# Patient Record
Sex: FEMALE | Race: WHITE | NOT HISPANIC OR LATINO | Employment: OTHER | URBAN - METROPOLITAN AREA
[De-identification: names, ages, dates, MRNs, and addresses within clinical notes are randomized per-mention and may not be internally consistent; named-entity substitution may affect disease eponyms.]

---

## 2017-01-04 ENCOUNTER — OFFICE VISIT (OUTPATIENT)
Dept: PSYCHIATRY | Facility: CLINIC | Age: 32
End: 2017-01-04
Payer: COMMERCIAL

## 2017-01-04 ENCOUNTER — TELEPHONE (OUTPATIENT)
Dept: ADMINISTRATIVE | Facility: HOSPITAL | Age: 32
End: 2017-01-04

## 2017-01-04 DIAGNOSIS — F41.1 ANXIETY STATE, UNSPECIFIED: Primary | ICD-10-CM

## 2017-01-04 DIAGNOSIS — F60.89 CLUSTER B PERSONALITY DISORDER: ICD-10-CM

## 2017-01-04 DIAGNOSIS — F39 MOOD DISORDER: ICD-10-CM

## 2017-01-04 PROCEDURE — 90834 PSYTX W PT 45 MINUTES: CPT | Mod: S$GLB,,, | Performed by: SOCIAL WORKER

## 2017-01-04 PROCEDURE — 99499 UNLISTED E&M SERVICE: CPT | Mod: S$PBB,,, | Performed by: SOCIAL WORKER

## 2017-01-04 NOTE — PROGRESS NOTES
"Individual Psychotherapy (PhD/LCSW)    1/4/2017    Site:  [facility]         Therapeutic Intervention: Met with patient.  Outpatient - Insight oriented psychotherapy 45 min - CPT code 03549 and Outpatient - Supportive psychotherapy 45 min - CPT Code 45191    Chief complaint/reason for encounter: attention deficit, depression, mood swings, anger, anxiety and interpersonal     Interval history and content of current session: Patient presents today wearing a long, red wig.  She reports she is doing "ok" today.  She notes after last session, she sought help at [facility], and was hospitalized for a couple weeks, due to "intense rage."  She elaborates that she thought she might her boyfriend, so MD there thought it best she be admitted.  Today, she denies any HI or SI.  She denies any angry feelings.  She is currently staying with her mother - "Until I get stable."  She is still in communication with boyfriend and they have plans to reconcile, but "he wants me to get the help I need first."  [facility] stepped her down to their IOP, but patient states, "I couldn't handle it.  I related to everyone in the group too intensely."  She describes feeling her emotions "are all over the place."  Discussed emotional regulation.  Patient inquires about seeing me twice weekly.  Checked schedule and informed patient there is no availability to accommodate that frequency.  Patient will return to see me this Friday 01/06 at 3 PM.  Recommended she seek other [facility] - provided contact numbers for local programs, including [facility].  Also, recommended patient seek care with local DBT skills group - provided contact information for [facility].  Patient mentions she has been exercising and is compliant with medications (Lithium 300 mg three times daily, Zoloft 150 mg daily, Abilify 15 mg daily, and Gabapentin 600 mg three times daily).  She plans to see Dr. Renae on 01/06.  She expresses the desire to eventually get a part time job " - discussed the need to focus on treatment first, and then possible employment.                                  Treatment plan:  · Target symptoms: depression, anxiety , mood swings  · Why chosen therapy is appropriate versus another modality: relevant to diagnosis, patient responds to this modality  · Outcome monitoring methods: self-report, observation  · Therapeutic intervention type: insight oriented psychotherapy, supportive psychotherapy    Risk parameters:  Patient reports no suicidal ideation  Patient reports no homicidal ideation  Patient reports no self-injurious behavior  Patient reports no violent behavior    Verbal deficits: None    Patient's response to intervention:  The patient's response to intervention is accepting.    Progress toward goals and other mental status changes:  The patient's progress toward goals is limited.    Diagnosis:     ICD-10-CM ICD-9-CM   1. Anxiety state, unspecified F41.1 300.00   2. Mood disorder F39 296.90   3. Cluster B personality disorder F60.9 301.9       Plan:  individual psychotherapy and medication management by physician    Return to clinic: as scheduled    Length of Service: 45 minutes

## 2017-01-04 NOTE — TELEPHONE ENCOUNTER
Please be advised as follows:       As per PHN: Pt. was discharged from [facility] on 12/23/16. Needs a post hospital follow up appointment.     Thanks,  Caitlyn Cooley, RN  RN Navigator  [facility]

## 2017-01-06 ENCOUNTER — PATIENT MESSAGE (OUTPATIENT)
Dept: PSYCHIATRY | Facility: HOSPITAL | Age: 32
End: 2017-01-06

## 2017-01-06 ENCOUNTER — OFFICE VISIT (OUTPATIENT)
Dept: PSYCHIATRY | Facility: CLINIC | Age: 32
End: 2017-01-06
Payer: COMMERCIAL

## 2017-01-06 VITALS
DIASTOLIC BLOOD PRESSURE: 65 MMHG | WEIGHT: 120 LBS | SYSTOLIC BLOOD PRESSURE: 128 MMHG | BODY MASS INDEX: 18.83 KG/M2 | HEART RATE: 91 BPM | HEIGHT: 67 IN

## 2017-01-06 DIAGNOSIS — F39 MOOD DISORDER: ICD-10-CM

## 2017-01-06 DIAGNOSIS — F39 UNSPECIFIED EPISODIC MOOD DISORDER: Primary | ICD-10-CM

## 2017-01-06 DIAGNOSIS — F41.1 ANXIETY STATE, UNSPECIFIED: Primary | ICD-10-CM

## 2017-01-06 DIAGNOSIS — F60.89 CLUSTER B PERSONALITY DISORDER: ICD-10-CM

## 2017-01-06 DIAGNOSIS — F41.1 ANXIETY STATE, UNSPECIFIED: ICD-10-CM

## 2017-01-06 PROCEDURE — 90834 PSYTX W PT 45 MINUTES: CPT | Mod: S$GLB,,, | Performed by: SOCIAL WORKER

## 2017-01-06 PROCEDURE — 90833 PSYTX W PT W E/M 30 MIN: CPT | Mod: S$GLB,,, | Performed by: NURSE PRACTITIONER

## 2017-01-06 PROCEDURE — 99999 PR PBB SHADOW E&M-EST. PATIENT-LVL III: CPT | Mod: PBBFAC,,, | Performed by: NURSE PRACTITIONER

## 2017-01-06 PROCEDURE — 99499 UNLISTED E&M SERVICE: CPT | Mod: S$PBB,,, | Performed by: NURSE PRACTITIONER

## 2017-01-06 PROCEDURE — 1159F MED LIST DOCD IN RCRD: CPT | Mod: S$GLB,,, | Performed by: NURSE PRACTITIONER

## 2017-01-06 PROCEDURE — 99214 OFFICE O/P EST MOD 30 MIN: CPT | Mod: S$GLB,,, | Performed by: NURSE PRACTITIONER

## 2017-01-06 PROCEDURE — 99499 UNLISTED E&M SERVICE: CPT | Mod: S$PBB,,, | Performed by: SOCIAL WORKER

## 2017-01-06 RX ORDER — SERTRALINE HYDROCHLORIDE 100 MG/1
TABLET, FILM COATED ORAL
Qty: 60 TABLET | Refills: 5 | Status: SHIPPED | OUTPATIENT
Start: 2017-01-06 | End: 2017-06-15 | Stop reason: SDUPTHER

## 2017-01-06 RX ORDER — ARIPIPRAZOLE 15 MG/1
TABLET ORAL
Qty: 15 TABLET | Refills: 5 | Status: SHIPPED | OUTPATIENT
Start: 2017-01-06 | End: 2017-01-26 | Stop reason: SDUPTHER

## 2017-01-06 RX ORDER — LITHIUM CARBONATE 300 MG/1
300 CAPSULE ORAL
Qty: 90 CAPSULE | Refills: 5 | Status: SHIPPED | OUTPATIENT
Start: 2017-01-06 | End: 2017-01-20 | Stop reason: SDUPTHER

## 2017-01-06 RX ORDER — GABAPENTIN 600 MG/1
TABLET ORAL
Qty: 120 TABLET | Refills: 5 | Status: SHIPPED | OUTPATIENT
Start: 2017-01-06 | End: 2017-03-30 | Stop reason: SDUPTHER

## 2017-01-06 NOTE — PATIENT INSTRUCTIONS
[facility] - DEPARTMENT OF PSYCHIATRY   PATIENT INFORMATION    We appreciate the opportunity to participate in your medical care and hope the following protocols will make it easier for you to receive quality treatment in our department.    1. PUNCTUALITY: Your appointment is scheduled for a fixed amount of time.  To get the benefit of your appointment, please arrive early enough to allow time for traffic, parking and registration.  If you are late for your appointment, you may have to reschedule.  Please make every effort to be on time.      2. PAYMENT FOR SERVICES:   Payments are expected at the time of service.  Please contact [phone] if you need to resolve issues involving your account at [facility] or to set up a payment plan.    3. CANCELLATION / MISSED APPOINTMENTS:   In order to receive quality care, all appointments must be kept.  Appointment may be cancelled at least 24 hours before your appointment time. If you do not give at least 24-hour notice of cancellation a fee may be billed directly to the patient.  Please note that insurance does not cover no-show charges, so you will be billed directly.  If you are consistently late, cancel, or do not show for your appointments, our department reserves the right to terminate treatment     MESSAGES- In general you can reach the department by calling [phone], between 8:00 a.m. and 5:00 p.m., Monday through Friday.  You can also use the [facility] Patient Portal.     AFTER HOURS, WEEKEND OR HOLIDAYS- For urgent questions after hours, weekends and holidays, calling the department number [phone] will connect you with a representative.  EMERGENCY-  In case of a crisis when there is a concern of harm to self or others, call 911 or the office [phone] between 8:00 a.m. and 5:00 p.m., Monday through Friday or go to the [facility] Emergency Room.    4. PRESCRIPTION REFILLS:  Prescription refills must be done at your physician office visit, You  will be given a sufficient number of refills to last until your next appointment, you must come to appointments for prescriptions.   No additional refills will be approved beyond the original treatment plan. Again, please note that no additional prescriptions will be approved per patient request.     5. FOLLOW UP APPOINTMENTS:  Follow-up appointments can be made in person at the Psychiatry Appointment Desk, online through the [facility] Patient Portal, or by calling [phone], from 8am to 5pm, between Monday and Friday.  Patients are responsible for scheduling their own follow-up appointment,  It  Is recommended you schedule your appointment before leaving the clinic.    6. Providers are NOT ABLE to schedule appointments; all appointments must be made through the appointment department or through [facility].           PATIENTS MAY EXPERIENCE SYMPTOMS OF WITHDRAWAL IF THEY RUN OUT OF MEDICATIONS.  THE PATIENT WILL ASSUME ALL RESPONSIBILITIES OF ANY OUTCOMES WHEN THERE IS AN ISSUE WITH NONCOMPLIANCE WITH FOLLOW-UP APPOINTMENTS AND MEDICATIONS.        THERE IS TO BE NO USE OF ALCOHOL AND/OR ILLEGAL SUBSTANCES    PATIENT ARE TO GO TO THE CLOSEST EMERGENCY ROOM IF FEELING A THREAT TO THEMSELVES OR OTHER OR IF GRAVELY DISABLED    TELL US HOW WE ARE DOING.  PLEASE COMPLETE THE PATIENT SATISFACTION SURVERY  Revised December 2016

## 2017-01-06 NOTE — PROGRESS NOTES
"Outpatient Psychiatry Follow-Up Visit (MD/NP)    1/6/2017    Clinical Status of Patient:  Outpatient (Ambulatory)    Chief Complaint:  Vianney Callahan is a 31 y.o. female who presents today for follow-up of mood disorder.  Met with patient.      Interval History and Content of Current Session:  Interim Events/Subjective Report/Content of Current Session:     Mood and thought disorder chronic and fragile, HR for decompensation, chart reviewed.     Zoloft 150mg po q day  Neurontin 600mg po tid  Lithium 300mg po tid  Abilify 15mg po q day    Early December patient and BF had an argument, was in the middle of moving into a new apt, broke up, patient became very unstable, shaved her head, felt was was grandiose, was having what she called "visions or short movies" and was admitted to [facility] 12/8-23/2016.  Zoloft was increased and Lithium and Abilify was added.  Feels she is more irritable and paranoid, states she is depressed.  Living with her mother, which is good. High anxiety, easily frustrated and irritated.  Does not feel grandiose currently, sleeping throughout the night.  Becomes more anxious as the day goes on.        Psychotherapy:  · Target symptoms: mood disorder  · Why chosen therapy is appropriate versus another modality: relevant to diagnosis  · Outcome monitoring methods: self-report  · Therapeutic intervention type: insight oriented psychotherapy  · Topics discussed/themes: symptom recognition  · The patient's response to the intervention is accepting. The patient's progress toward treatment goals is poor.  · Duration of intervention: 16 minutes.      Review Of Systems:     GENERAL: no weight loss or gain  SKIN: No rashes or lacerations   HEAD: No headaches   EYES: No exophthalmos, jaundice or blindness  EARS: No dizziness, tinnitus or hearing loss   NOSE: No changes in smell   MOUTH & THROAT: No dyskinetic movements or obvious goiter   CHEST: No shortness of breath, hyperventilation or cough   CARDIOVASCULAR: No " "tachycardia or chest pain   ABDOMEN: no nausea,no vomiting, pain, constipation or diarrhea   URINARY: No frequency, dysuria or sexual dysfunction   ENDOCRINE: No polydipsia, polyuria   MUSCULOSKELETAL: No pain or stiffness of the joints   NEUROLOGIC: No weakness, sensory changes, seizures, confusion, memory loss, tremor or other abnormal movements      Psychiatric Review Of Systems:  sleep: yes  appetite changes: no  weight changes: no  energy/anergy: no  interest/pleasure/anhedonia: no  somatic symptoms: no  libido: no  anxiety/panic: yes        Past Medical, Family and Social History: The patient's past medical, family and social history have been reviewed and updated as appropriate within the electronic medical record - see encounter notes.    Compliance: yes    Side effects: None    Risk Parameters:  Patient reports no suicidal ideation  Patient reports no homicidal ideation  Patient reports no self-injurious behavior  Patient reports no violent behavior    Exam (detailed: at least 9 elements; comprehensive: all 15 elements)   Constitutional  Vitals:  Most recent vital signs, dated less than 90 days prior to this appointment, were reviewed.   Vitals:    01/06/17 1446   BP: 128/65   Pulse: 91   Weight: 54.4 kg (120 lb)   Height: 5' 7" (1.702 m)        General:  unremarkable, age appropriate, casually dressed, neatly groomed     Musculoskeletal  Muscle Strength/Tone:  no dyskinesia, no dystonia, no tremor, no tic   Gait & Station:  non-ataxic     Psychiatric  Speech:  constant, loud at times   Mood & Affect:  anxious, irritable, sad  blunted   Thought Process:  goal-directed   Associations:  intact, circumstantial, tangential   Thought Content:  normal, no suicidality, no homicidality, delusions, or paranoia   Insight:  has awareness of illness   Judgement: behavior is adequate to circumstances   Orientation:  grossly intact   Memory: intact for content of interview   Language: grossly intact   Attention Span & " Concentration:  able to focus, distracted   Fund of Knowledge:  intact and appropriate to age and level of education     Assessment and Diagnosis   Status/Progress: Based on the examination today, the patient's problem(s) is/are worsening.  New problems have been presented today.   Co-morbidities and Lack of compliance are not complicating management of the primary condition.  There are no active rule-out diagnoses for this patient at this time.     General Impression:       ICD-10-CM ICD-9-CM   1. Unspecified episodic mood disorder F39 296.90   2. Anxiety state, unspecified F41.1 300.00   Cluster B  R/O Bipolar, Schizoaffective    Intervention/Counseling/Treatment Plan   · Medication Management: The risks and benefits of medication were discussed with the patient.   · Increase Zoloft 200mg po q d  · Increase Neurontin 600mg 2 cap po q AM, 1 cap po 4pm, and 1 cap at hs  · Lithium 300mg po tid  · Decrease Abilify 15mg half tab po q AM due to increased anxiety  · Lithium Level and Labs  · Patient to start BMU      Return to Clinic: 6 weeks

## 2017-01-06 NOTE — MR AVS SNAPSHOT
[facility]  [address]  [place] [address]  Phone: [phone]  Fax: [phone]                  Vianney Callahan   2017 4:30 PM   Office Visit    Description:  Female : 1985   Provider:  Roman Renae NP   Department:  [facility] - Psychiatry           Reason for Visit     Mood           Diagnoses this Visit        Comments    Unspecified episodic mood disorder    -  Primary     Anxiety state, unspecified         Mood disorder                To Do List           Future Appointments        Provider Department Dept Phone    2017 8:00 AM BMU, [address] [facility] [phone]    1/10/2017 8:00 AM BMU, [address] [facility] [phone]    2017 8:00 AM BMU, [address] [facility] [phone]    2017 8:00 AM BMU, [address] [facility] [phone]    2017 8:00 AM BMU, [address] [facility] [phone]      Goals (5 Years of Data)     None      Follow-Up and Disposition     Return in about 6 weeks (around 2017).       These Medications        Disp Refills Start End    sertraline (ZOLOFT) 100 MG tablet 60 tablet 5 2017     Take 2 tabs by mouth each morning    Pharmacy: [facility] [address] Ph #: [phone]       lithium (ESKALITH) 300 MG capsule 90 capsule 5 2017     Take 1 capsule (300 mg total) by mouth 3 (three) times daily with meals. - Oral    Pharmacy: [id number] [place] [address] Ph #: [phone]       gabapentin (NEURONTIN) 600 MG tablet 120 tablet 5 2017     Take 2 caps by mouth every morning, 1 cap by mouth at 4pm, and 1 cap by mouth at bedtime    Pharmacy: [id number] [place] [address] Ph #: [phone]       aripiprazole (ABILIFY) 15 MG Tab 15 tablet 5 2017     Take half tab by mouth each morning     Pharmacy: [facility] [place] [address]. - [place] [address] #: [phone]         [facility]     [facility] - 24/7 Assistance  Registered nurses in the [facility] provide clinical advisement, health education, appointment booking, and other advisory services.  Call for this free service at [phone].             Medications           Message regarding Medications     Verify the changes and/or additions to your medication regime listed below are the same as discussed with your clinician today.  If any of these changes or additions are incorrect, please notify your healthcare provider.        START taking these NEW medications        Refills    sertraline (ZOLOFT) 100 MG tablet 5    Sig: Take 2 tabs by mouth each morning    Class: Normal    lithium (ESKALITH) 300 MG capsule 5    Sig: Take 1 capsule (300 mg total) by mouth 3 (three) times daily with meals.    Class: Normal    Route: Oral    aripiprazole (ABILIFY) 15 MG Tab 5    Sig: Take half tab by mouth each morning    Class: Normal      CHANGE how you are taking these medications     Start Taking Instead of    gabapentin (NEURONTIN) 600 MG tablet gabapentin (NEURONTIN) 600 MG tablet    Dosage:  Take 2 caps by mouth every morning, 1 cap by mouth at 4pm, and 1 cap by mouth at bedtime Dosage:  Take 1 tablet (600 mg total) by mouth 3 (three) times daily.    Reason for Change:  Reorder            Verify that the below list of medications is an accurate representation of the medications you are currently taking.  If none reported, the list may be blank. If incorrect, please contact your healthcare provider. Carry this list with you in case of emergency.           Current Medications     ACZONE 5 % topical gel APPLY TO AFFECTED AREA TWICE DAILY    aripiprazole (ABILIFY) 15 MG Tab Take half tab by mouth each morning    cyclobenzaprine (FLEXERIL) 10 MG tablet TAKE 0.5 TO 1 TABLET BY MOUTH AT BEDTIME AS  "NEEDED FOR BACKPAIN    fluconazole (DIFLUCAN) 150 MG Tab Take one tablet by mouth once and may retreat in 3 days if still symptomatic.    gabapentin (NEURONTIN) 600 MG tablet Take 2 caps by mouth every morning, 1 cap by mouth at 4pm, and 1 cap by mouth at bedtime    lithium (ESKALITH) 300 MG capsule Take 1 capsule (300 mg total) by mouth 3 (three) times daily with meals.    metronidazole (FLAGYL) 500 MG tablet take 1 tablet by mouth twice a day for 7 days    norethindrone-ethinyl estradiol (MICROGESTIN 1/20) 1-20 mg-mcg per tablet Take 1 tablet by mouth every evening.    ondansetron (ZOFRAN-ODT) 4 MG TbDL Take 1 tablet (4 mg total) by mouth every 8 (eight) hours as needed.    promethazine (PHENERGAN) 12.5 MG Tab 1 tab po q 8 h prn nausea    sertraline (ZOLOFT) 100 MG tablet Take 2 tabs by mouth each morning    TAZORAC 0.05 % gel APPLY TOPICALLY EVERY EVENING. WASH OFF IN THE MORNING, AND APPLY SUNSCREEN    tinidazole (TINDAMAX) 500 MG tablet Take 4 tablets by mouth at once daily for two days    tramadol (ULTRAM) 50 mg tablet take 1 tablet by mouth every 8 hours if needed    valacyclovir (VALTREX) 1000 MG tablet Take 1 tablet (1,000 mg total) by mouth once daily.           Clinical Reference Information           Vital Signs - Last Recorded  Most recent update: 1/6/2017  2:47 PM by Serenity Harper MA    BP Pulse Ht Wt BMI    128/65 (BP Location: Left arm, Patient Position: Sitting, BP Method: Automatic) 91 5' 7" (1.702 m) 54.4 kg (120 lb) 18.79 kg/m2      Blood Pressure          Most Recent Value    BP  128/65      Allergies as of 1/6/2017     Dilantin [Phenytoin Sodium Extended]    Lithium Analogues    Saphris [Asenapine]    Topamax [Topiramate]    Wellbutrin [Bupropion Hcl]      Immunizations Administered on Date of Encounter - 1/6/2017     None      Orders Placed During Today's Visit     Future Labs/Procedures Expected by Expires    CBC auto differential  1/6/2017 1/6/2018    Comprehensive metabolic panel  " 1/6/2017 1/6/2018    Hepatic function panel  1/6/2017 1/6/2018    HEPATITIS PANEL, ACUTE  1/6/2017 3/7/2018    [place] level  1/6/2017 1/6/2018    TSH  1/6/2017 1/6/2018      Instructions    [facility] - DEPARTMENT OF PSYCHIATRY   PATIENT INFORMATION    We appreciate the opportunity to participate in your medical care and hope the following protocols will make it easier for you to receive quality treatment in our department.    1. PUNCTUALITY: Your appointment is scheduled for a fixed amount of time.  To get the benefit of your appointment, please arrive early enough to allow time for traffic, parking and registration.  If you are late for your appointment, you may have to reschedule.  Please make every effort to be on time.      2. PAYMENT FOR SERVICES:   Payments are expected at the time of service.  Please contact [phone] if you need to resolve issues involving your account at [facility] or to set up a payment plan.    3. CANCELLATION / MISSED APPOINTMENTS:   In order to receive quality care, all appointments must be kept.  Appointment may be cancelled at least 24 hours before your appointment time. If you do not give at least 24-hour notice of cancellation a fee may be billed directly to the patient.  Please note that insurance does not cover no-show charges, so you will be billed directly.  If you are consistently late, cancel, or do not show for your appointments, our department reserves the right to terminate treatment     MESSAGES- In general you can reach the department by calling [phone], between 8:00 a.m. and 5:00 p.m., Monday through Friday.  You can also use the [facility] Patient Portal.     AFTER HOURS, WEEKEND OR HOLIDAYS- For urgent questions after hours, weekends and holidays, calling the department number [phone] will connect you with a representative.  EMERGENCY-  In case of a crisis when there is a concern of harm to self or others, call 911 or the office [phone] between 8:00 a.m. and 5:00 p.m., Monday through Friday or go to the [facility] Emergency Room.    4. PRESCRIPTION REFILLS:  Prescription refills must be done at your physician office visit, You will be given a sufficient number of refills to last until your next appointment, you must come to appointments for prescriptions.   No additional refills will be approved beyond the original treatment plan. Again, please note that no additional prescriptions will be approved per patient request.     5. FOLLOW UP APPOINTMENTS:  Follow-up appointments can be made in person at the Psychiatry Appointment Desk, online through the [facility] Portal, or by calling [phone], from 8am to 5pm, between Monday and Friday.  Patients are responsible for scheduling their own follow-up appointment,  It  Is recommended you schedule your appointment before leaving the clinic.    6. Providers are NOT ABLE to schedule appointments; all appointments must be made through the appointment department or through [facility].           PATIENTS MAY EXPERIENCE SYMPTOMS OF WITHDRAWAL IF THEY RUN OUT OF MEDICATIONS.  THE PATIENT WILL ASSUME ALL RESPONSIBILITIES OF ANY OUTCOMES WHEN THERE IS AN ISSUE WITH NONCOMPLIANCE WITH FOLLOW-UP APPOINTMENTS AND MEDICATIONS.        THERE IS TO BE NO USE OF ALCOHOL AND/OR ILLEGAL SUBSTANCES    PATIENT ARE TO GO TO THE CLOSEST EMERGENCY ROOM IF FEELING A THREAT TO THEMSELVES OR OTHER OR IF GRAVELY DISABLED    TELL US HOW WE ARE DOING.  PLEASE COMPLETE THE PATIENT SATISFACTION SURVERY  Revised December 2016         Smoking Cessation     If you would like to quit smoking:   You may be eligible for free services if you are a [place] resident and started smoking cigarettes before September 1, 1988.  Call the [employer]oking [employer]essation [employer] ([employer]) toll free at (718) 608-5055 or (745) 873-0880.   Call 9-800-QUIT-NOW if you do not meet the above criteria.

## 2017-01-09 ENCOUNTER — HOSPITAL ENCOUNTER (OUTPATIENT)
Dept: PSYCHIATRY | Facility: HOSPITAL | Age: 32
Discharge: HOME | End: 2017-01-09
Attending: PSYCHIATRY & NEUROLOGY
Payer: MEDICARE

## 2017-01-09 VITALS
HEART RATE: 93 BPM | WEIGHT: 120.38 LBS | SYSTOLIC BLOOD PRESSURE: 116 MMHG | TEMPERATURE: 98 F | HEIGHT: 67 IN | DIASTOLIC BLOOD PRESSURE: 58 MMHG | RESPIRATION RATE: 18 BRPM | BODY MASS INDEX: 18.89 KG/M2

## 2017-01-09 DIAGNOSIS — M35.7 BENIGN HYPERMOBILITY SYNDROME: ICD-10-CM

## 2017-01-09 DIAGNOSIS — F31.9 BIPOLAR I DISORDER: Primary | ICD-10-CM

## 2017-01-09 DIAGNOSIS — R45.851 SUICIDAL IDEATIONS: ICD-10-CM

## 2017-01-09 DIAGNOSIS — F60.9 PERSONALITY DISORDER: ICD-10-CM

## 2017-01-09 DIAGNOSIS — R10.13 EPIGASTRIC ABDOMINAL PAIN: ICD-10-CM

## 2017-01-09 DIAGNOSIS — Z72.0 TOBACCO ABUSE: ICD-10-CM

## 2017-01-09 DIAGNOSIS — F19.21: ICD-10-CM

## 2017-01-09 DIAGNOSIS — K80.20 GALLSTONES: ICD-10-CM

## 2017-01-09 DIAGNOSIS — L70.0 ACNE VULGARIS: ICD-10-CM

## 2017-01-09 DIAGNOSIS — F29 PSYCHOSIS, UNSPECIFIED PSYCHOSIS TYPE: ICD-10-CM

## 2017-01-09 DIAGNOSIS — F60.89 CLUSTER B PERSONALITY DISORDER: ICD-10-CM

## 2017-01-09 DIAGNOSIS — F39 MOOD DISORDER: ICD-10-CM

## 2017-01-09 DIAGNOSIS — F90.9 ATTENTION DEFICIT HYPERACTIVITY DISORDER (ADHD), UNSPECIFIED ADHD TYPE: ICD-10-CM

## 2017-01-09 LAB
AMPHET+METHAMPHET UR QL: NEGATIVE
B-HCG UR QL: NEGATIVE
BARBITURATES UR QL SCN>200 NG/ML: NEGATIVE
BENZODIAZ UR QL SCN>200 NG/ML: NEGATIVE
BZE UR QL SCN: NEGATIVE
CANNABINOIDS UR QL SCN: NEGATIVE
CREAT UR-MCNC: 62 MG/DL
ETHANOL UR-MCNC: <10 MG/DL
METHADONE UR QL SCN>300 NG/ML: NEGATIVE
OPIATES UR QL SCN: NEGATIVE
PCP UR QL SCN>25 NG/ML: NEGATIVE
TOXICOLOGY INFORMATION: NORMAL

## 2017-01-09 PROCEDURE — 90853 GROUP PSYCHOTHERAPY: CPT | Performed by: SOCIAL WORKER

## 2017-01-09 PROCEDURE — 90853 GROUP PSYCHOTHERAPY: CPT

## 2017-01-09 PROCEDURE — 99223 1ST HOSP IP/OBS HIGH 75: CPT | Mod: ,,, | Performed by: PSYCHIATRY & NEUROLOGY

## 2017-01-09 PROCEDURE — 81025 URINE PREGNANCY TEST: CPT

## 2017-01-09 PROCEDURE — 90853 GROUP PSYCHOTHERAPY: CPT | Mod: ,,, | Performed by: PSYCHOLOGIST

## 2017-01-09 PROCEDURE — 80307 DRUG TEST PRSMV CHEM ANLYZR: CPT

## 2017-01-09 PROCEDURE — 82570 ASSAY OF URINE CREATININE: CPT

## 2017-01-09 NOTE — PLAN OF CARE
01/09/17 1000   Activity/Group Therapy Checklist   Group Current Events  (Weekend Check In)   Attendance Attended   Follows Direction Followed directions   Group Interactions/Observations Interacted appropriately   Affect/Mood Range Normal range   Affect/Mood Display Appropriate   Goal Progression Progressing

## 2017-01-09 NOTE — TREATMENT PLAN
"[facility]  BEHAVIORAL MEDICINE UNIT  INTERDISCIPLINARY TREATMENT PLAN  INTENSIVE OUTPATIENT PROGRAM    INTERDISCIPLINARY  TREATMENT TEAM:    Enrique Howard M.D., Psychiatrist      Fern Gutierrez, Ph.D., Clinical Psychologist    Perla Thomas R.N., Registered Nurse    Loida Farley, [facility],     Brianna Prajapati, [facility] [place] [facility],     Waldemar León, Ph.D., Clinical Psychologist    Keri Umana, Ph.D., Clinical Psychologist      Resident:    (Signatures scanned into record separately).      ESTIMATED LOS:  2 weeks      The patient has reviewed the treatment plan with staff and has signed the "Patient Responsibilities" form.    (Patient signature scanned into record separately).      TREATMENT PLAN    DIAGNOSIS:      Patient Education Needs/Barriers to Learning (i.e., Language, Reading, Comprehension): None        Support/Advocacy Services/Needs (i.e., Financial, Transportation, Medications): None        Community Resources (i.e., Alcoholics Anonymous, Al Anon): None    Patients Identified Goals:  1. To learn coping skills  2. Get medication adjusted properly  3. To get best treatment possible  4.    Coping Skills:  1. Smoking   2. Exercise   3. Hot Shower  4. Hot Bath        Strengths:  1. I am creative  2. People usually like me  3.  4.      Limitations:  1. Bad relationships  2. Side effects from meds- I quit taking them b/c I don't like the side effects  3.  4.      Goals and Objectives:  1. Goal: Attend and participate in all groups   Objective measure: Progress notes indicating active listening,    self-disclosure, feedback   Time frame to reach goal: Each day    2. Goal: Medication management   Objective measure: Physician progress note indicating improved medication   status   Time frame to reach goal: By discharge    3. Goal: Reduce depression   Objective measure: Physician progress note indicating depression is improved   Time frame to reach goal: By discharge    4. "  Goal: Reduce anxiety   Objective measure: Physician progress note indicating anxiety is improved   Time frame to reach goal: By discharge    5. Goal: Develop stress coping skills   Objective measure: Patient self-report of improved coping   Time frame to reach goal: By discharge    6.  Goal: Address health problems   Objective measure: Physician progress note regarding management of health   problems   Time frame to reach goal: Within first week of treatment    7. Goal: Assess level of pain   Objective measure: Physician progress note regarding patient's self-reported pain   Time frame to reach goal: Within first week of treatment    8. Goal:    Objective measure:    Time frame to reach goal:    9. Goal:    Objective measure:    Time frame to reach goal:     10. Goal:    Objective measure:    Time frame to reach goal:       Group Interventions:    Psychodynamic Group Psychotherapy - 1 hour, 5 times per week  Goals: 1. Utilize group empathy and support for problem solving; 2. Apply stress management, communication, and assertiveness skills to personal issues; 3. Discuss mental health symptoms and explore strategies for coping; 4. Discuss ways to change lifestyle to maintain better emotional health.    Communication Skills - 1 hour, 2 times per week  Goals: 1. Learn rules of effective communication; 2. Improve listening skills; 3. Practice clear communication.    Nutrition and Health - 1 hour, 1 time per week  Goals: 1. Explore impact that nutrition has on health; 2. Identify positive nutritional choices; 3. Explore how nutrition relates to stress tolerance.    Personal Growth - 1 hour, 2 times per week  Goals: 1. Discuss the development of self; 2. Create personal awareness and insight; 3. Explore a variety of psycho-educational techniques.    Promoting Healthy Lifestyles - 1 hour, 1 time per week  Goals: 1. Understand the Biopsychosocial Model of Health; 2. Develop insight into how mental health can impact other  dimensions of health; 3. Develop appropriate health promotion strategies.    Rational Emotive Therapy (RET) - 1 hour, 1 time per week  Goals: 1. Learn an action-oriented psychotherapy called RET; 2. Focus on identifying, challenging, and replacing self-defeating thoughts and beliefs; 3. Explore how healthier thinking can lead to healthier emotional well-being.    Relationship Dynamics - 1 hour, 1 time per week  Goals: 1. Learn about factors that shape relationships; 2. Understand the central role of relationships in personal well-being; 3. Learn how to improve all relationships.    Relaxation Training - 1 hour, 3 times per week  Goals: 1. Learn about and implement various techniques for releasing physical tension from the body.    Spirituality - 1 hour, 1 time per week  Goals: 1. Discuss and reflect on the process of seeking peace and comfort; 2. Identify healthy ways of exploring spirituality.    Stress Management - 1 hour, 4 times per week  Goals: 1. Identify types and levels of stress; 2. Identify and change maladaptive beliefs and behaviors; 3. Identify and practice techniques of stress management.

## 2017-01-09 NOTE — PROGRESS NOTES
Group Psychotherapy (PhD/LCSW)    Site: [facility]    Clinical status of patient: Intensive Outpatient Program (IOP)    Date: 1/9/2017    Group Focus: Communication Skills     Length of service: 90437 - 45-50 minutes    Number of patients in attendance: 9    Referred by: Behavioral Medicine Unit Treatment Team    Target symptoms: Mood Disorder    Patient's response to treatment: Active Listening      Progress toward goals: Progressing adequately    Interval History: Discussed basic communication skills and their application to problems currently facing group members.     Diagnosis: Bipolar Disorder, Borderline Personality Disorder    Plan: Continue treatment on U
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
## 2017-01-09 NOTE — PROGRESS NOTES
Group Psychotherapy (PhD/LCSW)    Site: [facility]    Clinical status of patient: Intensive Outpatient Program (IOP)    Date: 1/9/2017    Group Focus: Psychodynamic Group Psychotherapy    Length of service: 44419 - 45-50 minutes    Number of patients in attendance: 6    Referred by: Behavioral Medicine Unit Treatment Team    Target symptoms: Mood Disorder    Patient's response to treatment: Active Listening and Self-disclosure    Progress toward goals: Progressing slowly    Interval History: Pt introduced herself to the group today. She started out her introduction appropriately but soon began describing some of the delusions, hallucinations, and grandiose thoughts that she experienced while recently psychotic in a fairly loud voice.     Diagnosis: Bipolar Disorder, Borderline Personality Disorder    Plan: Continue treatment on U

## 2017-01-10 ENCOUNTER — HOSPITAL ENCOUNTER (OUTPATIENT)
Dept: PSYCHIATRY | Facility: HOSPITAL | Age: 32
Discharge: HOME | End: 2017-01-10
Attending: PSYCHIATRY & NEUROLOGY
Payer: COMMERCIAL

## 2017-01-10 DIAGNOSIS — R10.13 EPIGASTRIC ABDOMINAL PAIN: ICD-10-CM

## 2017-01-10 DIAGNOSIS — K80.20 GALLSTONES: ICD-10-CM

## 2017-01-10 DIAGNOSIS — F90.9 ATTENTION DEFICIT HYPERACTIVITY DISORDER (ADHD), UNSPECIFIED ADHD TYPE: ICD-10-CM

## 2017-01-10 DIAGNOSIS — F19.21: ICD-10-CM

## 2017-01-10 DIAGNOSIS — F31.9 BIPOLAR I DISORDER: Primary | ICD-10-CM

## 2017-01-10 DIAGNOSIS — L70.0 ACNE VULGARIS: ICD-10-CM

## 2017-01-10 DIAGNOSIS — Z72.0 TOBACCO ABUSE: ICD-10-CM

## 2017-01-10 DIAGNOSIS — R45.851 SUICIDAL IDEATIONS: ICD-10-CM

## 2017-01-10 DIAGNOSIS — F39 MOOD DISORDER: ICD-10-CM

## 2017-01-10 DIAGNOSIS — F60.9 PERSONALITY DISORDER: ICD-10-CM

## 2017-01-10 DIAGNOSIS — F60.89 CLUSTER B PERSONALITY DISORDER: ICD-10-CM

## 2017-01-10 DIAGNOSIS — M35.7 BENIGN HYPERMOBILITY SYNDROME: ICD-10-CM

## 2017-01-10 PROCEDURE — 99232 SBSQ HOSP IP/OBS MODERATE 35: CPT | Mod: ,,, | Performed by: INTERNAL MEDICINE

## 2017-01-10 PROCEDURE — 90853 GROUP PSYCHOTHERAPY: CPT | Mod: ,,, | Performed by: PSYCHOLOGIST

## 2017-01-10 PROCEDURE — 90853 GROUP PSYCHOTHERAPY: CPT | Performed by: SOCIAL WORKER

## 2017-01-10 PROCEDURE — 90853 GROUP PSYCHOTHERAPY: CPT

## 2017-01-10 NOTE — PLAN OF CARE
01/10/17 1000   Activity/Group Therapy Checklist   Group Cognitive Therapy  (Coping Skills)   Attendance Attended   Follows Direction Followed directions   Group Interactions/Observations Interacted appropriately;Alert   Affect/Mood Range Normal range   Affect/Mood Display Appropriate   Goal Progression Progressing

## 2017-01-10 NOTE — PROGRESS NOTES
Group Psychotherapy (PhD/LCSW)    Site: [facility]    Clinical status of patient: Intensive Outpatient Program (IOP)    Date: 1/10/2017    Group Focus: Psychodynamic Group Psychotherapy    Length of service: 99801 - 45-50 minutes    Number of patients in attendance: 4    Referred by: Behavioral Medicine Unit Treatment Team    Target symptoms: Mood Disorder    Patient's response to treatment: Active Listening and Self-disclosure    Progress toward goals: Progressing slowly    Interval History: Pt. reported progress in acceptance of emotions; however, she noted more difficulty with commitment to values-based actions.     Diagnosis: Bipolar Disorder, Borderline Personality Disorder    Plan: Continue treatment on U

## 2017-01-10 NOTE — PROGRESS NOTES
"Group Psychotherapy (PhD/LCSW)    Site: [facility]    Clinical status of patient: Intensive Outpatient Program (IOP)    Date: 1/10/2017    Group Focus: Psychodynamic Group Psychotherapy    Length of service: 45809 - 45-50 minutes    Number of patients in attendance: 4    Referred by: Behavioral Medicine Unit Treatment Team    Target symptoms: Mood Disorder    Patient's response to treatment: Active Listening and Self-disclosure    Progress toward goals: Progressing slowly    Interval History: Pt appeared more relaxed in group today and participated appropriately. She explained that impulsive and "wound-up" behavior are likely signs that she is experiencing extreme anxiety. Additionally, she provided some background to the group about her current romantic relationship and her ambivalence about its continuation despite knowledge that it is "toxic".    Diagnosis: Bipolar Disorder, Borderline Personality Disorder    Plan: Continue treatment on BMU        "

## 2017-01-11 ENCOUNTER — HOSPITAL ENCOUNTER (OUTPATIENT)
Dept: PSYCHIATRY | Facility: HOSPITAL | Age: 32
Discharge: HOME | End: 2017-01-11
Attending: PSYCHIATRY & NEUROLOGY
Payer: COMMERCIAL

## 2017-01-11 VITALS — HEART RATE: 81 BPM | RESPIRATION RATE: 18 BRPM | DIASTOLIC BLOOD PRESSURE: 59 MMHG | SYSTOLIC BLOOD PRESSURE: 119 MMHG

## 2017-01-11 DIAGNOSIS — F60.9 PERSONALITY DISORDER: ICD-10-CM

## 2017-01-11 DIAGNOSIS — F19.21: ICD-10-CM

## 2017-01-11 DIAGNOSIS — K80.20 GALLSTONES: ICD-10-CM

## 2017-01-11 DIAGNOSIS — F60.89 CLUSTER B PERSONALITY DISORDER: ICD-10-CM

## 2017-01-11 DIAGNOSIS — Z72.0 TOBACCO ABUSE: ICD-10-CM

## 2017-01-11 DIAGNOSIS — F31.9 BIPOLAR I DISORDER: Primary | ICD-10-CM

## 2017-01-11 DIAGNOSIS — F90.9 ATTENTION DEFICIT HYPERACTIVITY DISORDER (ADHD), UNSPECIFIED ADHD TYPE: ICD-10-CM

## 2017-01-11 DIAGNOSIS — M35.7 BENIGN HYPERMOBILITY SYNDROME: ICD-10-CM

## 2017-01-11 DIAGNOSIS — R10.13 EPIGASTRIC ABDOMINAL PAIN: ICD-10-CM

## 2017-01-11 DIAGNOSIS — R45.851 SUICIDAL IDEATIONS: ICD-10-CM

## 2017-01-11 DIAGNOSIS — F39 MOOD DISORDER: ICD-10-CM

## 2017-01-11 DIAGNOSIS — L70.0 ACNE VULGARIS: ICD-10-CM

## 2017-01-11 PROCEDURE — 90853 GROUP PSYCHOTHERAPY: CPT | Mod: ,,, | Performed by: PSYCHOLOGIST

## 2017-01-11 PROCEDURE — 90853 GROUP PSYCHOTHERAPY: CPT

## 2017-01-11 PROCEDURE — 90853 GROUP PSYCHOTHERAPY: CPT | Performed by: SOCIAL WORKER

## 2017-01-11 NOTE — PROGRESS NOTES
Group Psychotherapy (PhD/LCSW)    Site: [facility]    Clinical status of patient: Intensive Outpatient Program (IOP)    Date: 1/11/2017    Group Focus: Psychodynamic Group Psychotherapy    Length of service: 16559 - 45-50 minutes    Number of patients in attendance: 4    Referred by: Behavioral Medicine Unit Treatment Team    Target symptoms: Mood Disorder    Patient's response to treatment: Active Listening and Self-disclosure    Progress toward goals: Progressing slowly    Interval History: Pt asked group to help her understand how to know if she is in a good relationship. She discussed her ambivalence about leaving her boyfriend, and explored how scary it would be for her to be single even though she realizes that her current relationship is unhealthy.    Diagnosis: Bipolar Disorder, Borderline Personality Disorder    Plan: Continue treatment on BMU

## 2017-01-11 NOTE — PROGRESS NOTES
Group Psychotherapy (PhD/LCSW)    Site: [facility]    Clinical status of patient: Intensive Outpatient Program (IOP)    Date: 1/11/2017    Group Focus: Stress Management    Length of service: 57739 - 45-50 minutes    Number of patients in attendance: 7    Referred by: Behavioral Medicine Unit Treatment Team    Target symptoms: Mood Disorder    Patient's response to treatment: Active Listening and Self-disclosure    Progress toward goals: Progressing adequately    Interval History: Discussed numerous positive affirmations to use to build self-esteem.    Diagnosis: Bipolar disorder    Plan: Continue treatment on U

## 2017-01-11 NOTE — PROGRESS NOTES
Mahendra placed a call to pt to inquire about attending program today. No answer sw left a vm for immediate call back.        Brianna Prajapati LMSW

## 2017-01-11 NOTE — PLAN OF CARE
01/11/17 1000   Activity/Group Therapy Checklist   Group Cognitive Therapy  (Therapy Toolbox)   Attendance Attended   Follows Direction Followed directions   Group Interactions/Observations Interacted appropriately;Alert   Affect/Mood Range Normal range   Affect/Mood Display Appropriate   Goal Progression Progressing

## 2017-01-12 ENCOUNTER — HOSPITAL ENCOUNTER (OUTPATIENT)
Dept: PSYCHIATRY | Facility: HOSPITAL | Age: 32
Discharge: HOME | End: 2017-01-12
Attending: PSYCHIATRY & NEUROLOGY
Payer: COMMERCIAL

## 2017-01-12 DIAGNOSIS — F31.9 BIPOLAR I DISORDER: Primary | ICD-10-CM

## 2017-01-12 PROCEDURE — 90853 GROUP PSYCHOTHERAPY: CPT | Mod: ,,, | Performed by: PSYCHOLOGIST

## 2017-01-12 PROCEDURE — 90853 GROUP PSYCHOTHERAPY: CPT

## 2017-01-12 NOTE — PROGRESS NOTES
Group Psychotherapy (PhD/LCSW)    Site: [facility]    Clinical status of patient: Intensive Outpatient Program (IOP)    Date: 1/12/2017    Group Focus: Stress Management    Length of service: 93130 - 45-50 minutes    Number of patients in attendance: 8    Referred by: Behavioral Medicine Unit Treatment Team    Target symptoms: Mood Disorder    Patient's response to treatment: Active Listening and Self-disclosure    Progress toward goals: Progressing adequately    Interval History: Discussed multiple ways to increase resilience.    Diagnosis: Bipolar disorder    Plan: Continue treatment on U

## 2017-01-12 NOTE — PROGRESS NOTES
Group Psychotherapy (PhD/LCSW)    Site: [facility]    Clinical status of patient: Intensive Outpatient Program (IOP)    Date: 1/12/2017    Group Focus: Psychodynamic Group Psychotherapy    Length of service: 25560 - 45-50 minutes    Number of patients in attendance: 5    Referred by: Behavioral Medicine Unit Treatment Team    Target symptoms: Mood Disorder    Patient's response to treatment: Active Listening and Self-disclosure    Progress toward goals: Progressing slowly    Interval History: Pt reports that she did not have a headache this morning and felt somewhat lighter after discussing a difficult topic in group yesterday.    Diagnosis: Bipolar Disorder, Borderline Personality Disorder    Plan: Continue treatment on U

## 2017-01-12 NOTE — PROGRESS NOTES
Group Psychotherapy (PhD/LCSW)    Site: [facility]    Clinical status of patient: Intensive Outpatient Program (IOP)    Date: 1/12/2017    Group Focus: Spirituality and Well Being    Length of service: 87243 - 45-50 minutes    Number of patients in attendance: 5    Referred by: Behavioral Medicine Unit Treatment Team    Target symptoms: Mood Disorder    Patient's response to treatment: Active Listening, Feedback, and Self-disclosure    Progress toward goals: Progressing adequately    Interval History:  Discussed the ways that social connectedness and the experience of acceptance by others support both spiritual and psychological well-being. Pt talked about how important it was for her to connect with a new group member yesterday and the way it left her feeling less alone in the world.     Diagnosis: Bipolar disorder    Plan: Continue treatment on BMU

## 2017-01-13 ENCOUNTER — HOSPITAL ENCOUNTER (OUTPATIENT)
Dept: PSYCHIATRY | Facility: HOSPITAL | Age: 32
Discharge: HOME | End: 2017-01-13
Attending: PSYCHIATRY & NEUROLOGY
Payer: MEDICARE

## 2017-01-13 DIAGNOSIS — F31.9 BIPOLAR I DISORDER: Primary | ICD-10-CM

## 2017-01-13 DIAGNOSIS — R45.851 SUICIDAL IDEATIONS: ICD-10-CM

## 2017-01-13 DIAGNOSIS — R10.13 EPIGASTRIC ABDOMINAL PAIN: ICD-10-CM

## 2017-01-13 DIAGNOSIS — Z72.0 TOBACCO ABUSE: ICD-10-CM

## 2017-01-13 DIAGNOSIS — F19.21: ICD-10-CM

## 2017-01-13 DIAGNOSIS — F39 MOOD DISORDER: ICD-10-CM

## 2017-01-13 DIAGNOSIS — F90.9 ATTENTION DEFICIT HYPERACTIVITY DISORDER (ADHD), UNSPECIFIED ADHD TYPE: ICD-10-CM

## 2017-01-13 DIAGNOSIS — F60.9 PERSONALITY DISORDER: ICD-10-CM

## 2017-01-13 DIAGNOSIS — K80.20 GALLSTONES: ICD-10-CM

## 2017-01-13 DIAGNOSIS — L70.0 ACNE VULGARIS: ICD-10-CM

## 2017-01-13 DIAGNOSIS — F60.89 CLUSTER B PERSONALITY DISORDER: ICD-10-CM

## 2017-01-13 DIAGNOSIS — M35.7 BENIGN HYPERMOBILITY SYNDROME: ICD-10-CM

## 2017-01-13 PROCEDURE — 99233 SBSQ HOSP IP/OBS HIGH 50: CPT | Mod: ,,, | Performed by: PSYCHIATRY & NEUROLOGY

## 2017-01-13 PROCEDURE — 90853 GROUP PSYCHOTHERAPY: CPT | Mod: ,,, | Performed by: PSYCHOLOGIST

## 2017-01-13 PROCEDURE — 90834 PSYTX W PT 45 MINUTES: CPT | Mod: 59 | Performed by: SOCIAL WORKER

## 2017-01-13 PROCEDURE — 90853 GROUP PSYCHOTHERAPY: CPT

## 2017-01-13 PROCEDURE — 90853 GROUP PSYCHOTHERAPY: CPT | Mod: 59,,, | Performed by: PSYCHOLOGIST

## 2017-01-13 NOTE — PROGRESS NOTES
Group Psychotherapy (PhD/LCSW)    Site: [facility]    Clinical status of patient: Intensive Outpatient Program (IOP)    Date: 1/13/2017    Group Focus: Psychodynamic Group Psychotherapy    Length of service: 62026 - 45-50 minutes    Number of patients in attendance: 4    Referred by: Behavioral Medicine Unit Treatment Team    Target symptoms: Mood Disorder    Patient's response to treatment: Active Listening and Self-disclosure    Progress toward goals: Progressing slowly    Interval History: Pt shared her fear and desire to go to nephew's party this weekend; she received feedback and support well.    Diagnosis: Bipolar Disorder, Borderline Personality Disorder    Plan: Continue treatment on U

## 2017-01-13 NOTE — PROGRESS NOTES
"[facility]  Progress Note  Behavioral Medicine Unit    Date: 2017  Time: 1:55 PM    Name: Vianney Callahan    Age: 31 y.o.       : 1985            Admit Date: 17    SUBJECTIVE:  Patient is a 31 y.o. old female with a history of unspecified mood and anxiety disorder and possible borderline personality disorder admitted to the BMU additional  stabilization after recent hospitalization in December.  At that time she apparently became angry with her boyfriend and had desire to harm him.  She cut off her hair so that she would be less attractive to him.  She also has had complaints about anxiety that is uncontrolled.  She also reports that PTA she was thinking things that were not true, including negative things about her bf, that there was a man living inside her head that was controlling her and that her, bf was a drug dealer.    She reports she is finding the program helpful.  She believes she is doing really well and is working on coping skills, She feels more calm and "okay with being calm instead of up."  She still reports she is easily excitable, cries easily.  She reports she cries during every group.  She reports hearing a voice inside of her head as she was trying to go to sleep that sounded like the voice of one of the other patients saying "Don't say anything" which meant nothing to her.  Pt reports she is not sleeping well, 4hrs last night because of "thinking of a lot of things at the same time."       She likes Zoloft and feels it has been helpful - recently increased from 150mg daily to 200mg daily on 17.  Lithium and Abilify were started in December during hospitalization and she also feels that these are helpful.  Abilify was decreased from 15mg to 7.5mg on 17 due to concerns for akathisia.  Neurontin was recently increased from 600mg TID to 1200mg qAM, 600mg qPM, and 600mg qHS, which she believes may be helpful as well.       Current Medications:   Current " Outpatient Prescriptions on File Prior to Encounter   Medication Sig Dispense Refill    ACZONE 5 % topical gel APPLY TO AFFECTED AREA TWICE DAILY 60 g 0    aripiprazole (ABILIFY) 15 MG Tab Take half tab by mouth each morning 15 tablet 5    cyclobenzaprine (FLEXERIL) 10 MG tablet TAKE 0.5 TO 1 TABLET BY MOUTH AT BEDTIME AS NEEDED FOR BACKPAIN 30 tablet 0    fluconazole (DIFLUCAN) 150 MG Tab Take one tablet by mouth once and may retreat in 3 days if still symptomatic. 2 tablet 4    gabapentin (NEURONTIN) 600 MG tablet Take 2 caps by mouth every morning, 1 cap by mouth at 4pm, and 1 cap by mouth at bedtime 120 tablet 5    lithium (ESKALITH) 300 MG capsule Take 1 capsule (300 mg total) by mouth 3 (three) times daily with meals. 90 capsule 5    metronidazole (FLAGYL) 500 MG tablet take 1 tablet by mouth twice a day for 7 days 14 tablet 0    norethindrone-ethinyl estradiol (MICROGESTIN 1/20) 1-20 mg-mcg per tablet Take 1 tablet by mouth every evening. 84 tablet 4    ondansetron (ZOFRAN-ODT) 4 MG TbDL Take 1 tablet (4 mg total) by mouth every 8 (eight) hours as needed. 30 tablet 0    promethazine (PHENERGAN) 12.5 MG Tab 1 tab po q 8 h prn nausea 30 tablet 0    sertraline (ZOLOFT) 100 MG tablet Take 2 tabs by mouth each morning 60 tablet 5    TAZORAC 0.05 % gel APPLY TOPICALLY EVERY EVENING. WASH OFF IN THE MORNING, AND APPLY SUNSCREEN 100 g 0    tinidazole (TINDAMAX) 500 MG tablet Take 4 tablets by mouth at once daily for two days 8 tablet 1    tramadol (ULTRAM) 50 mg tablet take 1 tablet by mouth every 8 hours if needed 60 tablet 0    valacyclovir (VALTREX) 1000 MG tablet Take 1 tablet (1,000 mg total) by mouth once daily. 90 tablet 3     No current facility-administered medications on file prior to encounter.      Psychiatric ROS:  See Dr. Chou's Admission Note of date 1/9/17 for ROS.  Otherwise, addressed in HPI above.    OBJECTIVE:  Labs/Imaging/Studies:   No results found for this or any previous visit  "(from the past 48 hour(s)).   Lab Results   Component Value Date    CBMZ 5.3 (L) 01/29/2008     Mental Status Exam:  Appearance:  Well groomed, thin, appearing healthy and of stated age, long blondish wig  Behavior:  Cooperative, pleasant  Speech:  Quite verbal, not loud or pressured  Mood:  As above  Affect:  Full range and appropriate, no lability  Thought Process:  Tangential and at times illogical including a statement, "I understand the sofa"  Thought Content:  Negative for suicidal ideation, homicidal ideation, delusions or hallucinations.  Associations:  Intact  Memory:  Grossly Intact  Level of Consciousness/Orientation:  Grossly intact  Fund of Knowledge:  Good  Attention:  Attends to interview without distraction  Language:  intact  Insight:  Pt has awareness of illness  Judgment:  Fair  Psychomotor signs:  No involuntary movements or tremor  Gait:  Normal    ASSESSMENT/PLAN:  General Assessment:  Patient is a 31 y.o., female admitted to the BMU partial hospitalization program for stabilization of mood and improvement of anxiety.    Diagnoses:  Bipolar Disorder manic vs mixed, moderate  Unspecified Anxiety Disorder  Cluster B Personality traits, likely Borderline Personality Disorder    Plan:  · Continue BMU treatment  · Continue current medication regimen - recently changed by Roman Renae on 1/6/17:   1) Abilify 7.5mg daily (Started during December hospitalization, Decreased on 1/6/17 from 15mg daily due to concern for akathisia)   2) Lithium 300mg TID (Started during December hospitalization), We discussed possible increase of dose given level   3) Zoloft 200mg daily (Increased from 150mg daily on 1/6/17)   4) Neurontin 1200mg qAM, 600mg qPM, and 600mg qHS (Increased on 1/6/17 from Neurontin 600mg TID)     Enrique Howard MD     "

## 2017-01-13 NOTE — PROGRESS NOTES
Group Psychotherapy (PhD/LCSW)    Site: [facility]    Clinical status of patient: Intensive Outpatient Program (IOP)    Date: 1/13/2017    Group Focus: Stress Management    Length of service: 56149 - 45-50 minutes    Number of patients in attendance: 6    Referred by: Behavioral Medicine Unit Treatment Team    Target symptoms: Mood Disorder    Patient's response to treatment: Active Listening and Self-disclosure    Progress toward goals: Progressing slowly    Interval History: Group learned mindfulness techniques (breathing, thought defusion) to improve present-moment awareness, impulsive behavior tendencies, and tolerance of various emotional states.    Diagnosis: Bipolar Disorder, Borderline Personality Disorder    Plan: Continue treatment on U

## 2017-01-13 NOTE — PROGRESS NOTES
Group Psychotherapy (PhD/LCSW)    Site: [facility]    Clinical status of patient: Intensive Outpatient Program (IOP)    Date: 1/13/2017    Group Focus: Strength Training    Length of service: 42189 - 45-50 minutes    Number of patients in attendance: 3    Referred by: Behavioral Medicine Unit Treatment Team    Target symptoms: Mood Disorder    Patient's response to treatment: Active Listening, Feedback, and Self-disclosure    Progress toward goals: Progressing adequately     Interval History: Discussed strategies for maintaining progress being made on BMU and promoting emotional resilience. Pt talked about the way positive self-talk is helpful to her when facing challenges that make her anxious.     Diagnosis: Bipolar Disorder, Borderline Personality Disorder    Plan: Continue treatment on BMU

## 2017-01-13 NOTE — PATIENT CARE CONFERENCE
BMU Staffing       Problem List:  Bipolar, MRE Manic  Unspecified Anxiety D/O  Cluster B PD Traits  Recent Inpatient Hospitalization  Maladaptive Coping Skills  Poor Stress Management      Pt was staffed with treatment team today. Staff discussed pt's reason for seeking treatment at this time. Staff discussed pt's recent admission to inpatient unit ([place]). Staff discussed pt's hypomanic symptoms presenting in group. Staff discussed pt's presentation in group (loose and pressured). Staff discussed that pt has fair insight into her symptoms but at times has to be redirected.          Follow-Up Appointments:  Medication Management: Dr. Renae  Psychotherapy: TBD      Staff Present:  Dr. Gutierrez, PhD  Loida Farley, [facility]  Brianna Prajapati, ANALI Thomas RN

## 2017-01-16 ENCOUNTER — HOSPITAL ENCOUNTER (OUTPATIENT)
Dept: PSYCHIATRY | Facility: HOSPITAL | Age: 32
Discharge: HOME | End: 2017-01-16
Attending: PSYCHIATRY & NEUROLOGY
Payer: COMMERCIAL

## 2017-01-16 DIAGNOSIS — Z72.0 TOBACCO ABUSE: ICD-10-CM

## 2017-01-16 DIAGNOSIS — F90.9 ATTENTION DEFICIT HYPERACTIVITY DISORDER (ADHD), UNSPECIFIED ADHD TYPE: ICD-10-CM

## 2017-01-16 DIAGNOSIS — F39 MOOD DISORDER: ICD-10-CM

## 2017-01-16 DIAGNOSIS — R10.13 EPIGASTRIC ABDOMINAL PAIN: ICD-10-CM

## 2017-01-16 DIAGNOSIS — R45.851 SUICIDAL IDEATIONS: ICD-10-CM

## 2017-01-16 DIAGNOSIS — F19.21: ICD-10-CM

## 2017-01-16 DIAGNOSIS — F60.89 CLUSTER B PERSONALITY DISORDER: ICD-10-CM

## 2017-01-16 DIAGNOSIS — F60.9 PERSONALITY DISORDER: ICD-10-CM

## 2017-01-16 DIAGNOSIS — F31.9 BIPOLAR I DISORDER: Primary | ICD-10-CM

## 2017-01-16 DIAGNOSIS — L70.0 ACNE VULGARIS: ICD-10-CM

## 2017-01-16 DIAGNOSIS — K80.20 GALLSTONES: ICD-10-CM

## 2017-01-16 DIAGNOSIS — M35.7 BENIGN HYPERMOBILITY SYNDROME: ICD-10-CM

## 2017-01-16 PROCEDURE — 90853 GROUP PSYCHOTHERAPY: CPT

## 2017-01-16 PROCEDURE — 99232 SBSQ HOSP IP/OBS MODERATE 35: CPT | Mod: ,,, | Performed by: PSYCHIATRY & NEUROLOGY

## 2017-01-16 PROCEDURE — 90853 GROUP PSYCHOTHERAPY: CPT | Mod: ,,, | Performed by: PSYCHOLOGIST

## 2017-01-16 PROCEDURE — 90853 GROUP PSYCHOTHERAPY: CPT | Performed by: SOCIAL WORKER

## 2017-01-16 PROCEDURE — 90853 GROUP PSYCHOTHERAPY: CPT | Mod: 59,,, | Performed by: PSYCHOLOGIST

## 2017-01-16 RX ORDER — ARIPIPRAZOLE 10 MG/1
10 TABLET ORAL DAILY
Qty: 30 TABLET | Refills: 0 | Status: SHIPPED | OUTPATIENT
Start: 2017-01-16 | End: 2017-01-26

## 2017-01-16 NOTE — PLAN OF CARE
01/16/17 1000   Activity/Group Therapy Checklist   Group Cognitive Therapy  (Weekend Check In)   Attendance Attended   Follows Direction Followed directions   Group Interactions/Observations Interacted appropriately   Affect/Mood Range Normal range   Affect/Mood Display Appropriate   Goal Progression Progressing

## 2017-01-16 NOTE — PROGRESS NOTES
Group Psychotherapy (PhD/LCSW)    Site: [facility]    Clinical status of patient: Intensive Outpatient Program (IOP)    Date: 1/16/2017    Group Focus: Communication Skills       Length of service: 65933 - 45-50 minutes    Number of patients in attendance: 6    Referred by: Behavioral Medicine Unit Treatment Team    Target symptoms: Mood Disorder    Patient's response to treatment: Active Listening and Self-disclosure    Progress toward goals: Progressing slowly    Interval History: Patient participated in role playing practice of turning you-messages into I-messages. Pt role played giving I-message to her boyfriend about the way she reacts when he comes home from work stressed out.     Diagnosis: Bipolar Disorder, Borderline Personality Disorder    Plan: Continue treatment on BMU

## 2017-01-16 NOTE — PROGRESS NOTES
"Group Psychotherapy (PhD/LCSW)    Site: [facility]    Clinical status of patient: Intensive Outpatient Program (IOP)    Date: 1/16/2017    Group Focus: Psychodynamic Group Psychotherapy    Length of service: 41106 - 45-50 minutes    Number of patients in attendance: 3    Referred by: Behavioral Medicine Unit Treatment Team    Target symptoms: Mood Disorder    Patient's response to treatment: Active Listening and Self-disclosure    Progress toward goals: Progressing slowly    Interval History: Pt went to her nephew's party and stayed the whole time. Despite feeling that the party was "pleasant", she wanted to leave because she assumed that her family members were judging her harshly or made assumptions that certain of their behaviors had to do with her her. She expressed some anger toward her family, particularly her sister.    Diagnosis: Bipolar Disorder, Borderline Personality Disorder    Plan: Continue treatment on BMU        "

## 2017-01-16 NOTE — PROGRESS NOTES
"Progress Note  Psychiatry   Behavioral Medicine Unit    2017 10:21 AM   Pt Name: Vianney Callahan   : 1985   MRN: [id number]     Admit Date: 17  LOS: 8 days    SUBJECTIVE:  Patient is a 31 y.o. old female with a history of unspecified mood and anxiety disorder and possible borderline personality disorder admitted to the BMU additional  stabilization after recent hospitalization in December.     Today,  Pt reports that she had a good weekend. Expresses having some anxiety which remains a problem. She reports that she went to her family [employer] party over the weekend that made her very anxious and she had to leave. She states that she felt like she was panic attacks. She reports having some visual hallucination? (however questionable reliability and may be over endorsing her symptoms) She describes it as being very nervous and when she was talking with her sister she saw her sisters mouth become distorted and bid and "weird' pt states that she "didnt freak out" but was able to redirect herself. She denied having any other Vh or psychotic symptoms. She continues to report her worry about her relationship and if she needs to continue to be with him or not. Encouraged pt to discuss further in group. Pt insightful regarding her Borderline personality d/o and realizes that iot may be partly why she is having a hard time.  She denied any other complains or side effects currently.    Allergies:   Review of patient's allergies indicates:   Allergen Reactions    Dilantin [phenytoin sodium extended] Rash    Lithium analogues Rash    Saphris [asenapine]      Confusion and depressed mood.    Topamax [topiramate] Anaphylaxis    Wellbutrin [bupropion hcl] Other (See Comments)     Induces law         Current Medications:   Home Psychiatric Meds:   1) Abilify 7.5mg daily   2) Lithium 300mg TID   3) Zoloft 200mg daily (Increased from 150mg daily on 17)  4) Neurontin 1200mg qAM, 600mg qPM, and 600mg qHS (Increased on " 1/6/17 from Neurontin 600mg TID)     Psychiatric ROS:  See Dr. Howard's Admission Note of date 1/9/2017 for ROS.  No acute changes since then      OBJECTIVE:   Vitals (Most Recent)  There were no vitals filed for this visit.      Labs/Imaging/Studies:   No results found for this or any previous visit (from the past 48 hour(s)).   Lab Results   Component Value Date    CBMZ 5.3 (L) 01/29/2008         Pain: n/a /10    Mental Status Exam:  Appearance: casually dressed, shaved head  Behavior/Cooperation: friendly and cooperative, restless and fidgety   Speech: normal tone, normal pitch, normal volume, rapid  Mood: neutral  Affect: normal  Thought Process: normal and logical  Thought Content: normal, no suicidality, no homicidality, delusions, or paranoia, hallucinations: (auditory: no, visual: yes but questionable reliability   Orientation: grossly intact  Memory: Grossly intact  Attention Span/Concentration: Easily distracted  Fund of Knowledge: Aware of current events  Estimate of Intelligence: grossly intact  Cognition: grossly intact  Insight: fair  Judgment: fair        ASSESSMENT/PLAN:   General Assessment:  Patient is a 31 y.o., female admitted to the U partial hospitalization program for stabilization of mood and improvement of anxiety.     Diagnoses:  Bipolar Disorder manic vs mixed, moderate  Unspecified Anxiety Disorder  Cluster B Personality traits, likely Borderline Personality Disorder      Plan:  1. Continue U treatment- group and individual therapies  2. Continue current medication regimen except Will increase ABilify 10mg qd as pt identifies this is helping with her anxiety informed pt to watch out for any EPS or akathisia like symptoms she complained of previously when she was on 15mg qd    KAYLA MORSE MD   [facility]   1/16/2017 5:24 PM

## 2017-01-17 NOTE — PROGRESS NOTES
"Individual Psychotherapy (PhD/LCSW)    1/6/2017    Site:  [facility]         Therapeutic Intervention: Met with patient.  Outpatient - Insight oriented psychotherapy 45 min - CPT code 08156 and Outpatient - Supportive psychotherapy 45 min - CPT Code 09309    Chief complaint/reason for encounter: attention deficit, depression, mood swings, anger, anxiety and interpersonal     Interval history and content of current session: Patient reports she is doing "ok" today, but notes she is now grieving her relationship.  Since last session, boyfriend broke up with her - he has blocked her calls.  She continues to reside with her mother.  Processed feelings about recent break up and discussed the importance of focusing on self and healing.  She denies current HI or SI.  She speaks about a long hx co-dependency and describes boyfriend as a "con artist."  Further discussed emotional regulation.  She mentions she recently added one of her ex-boyfriends as a friend on facebook.  Discouraged seeking this type of attention/involvement with someone at this time.  Reiterated the need to focus on self for stability.  Patient verbalizes plans to begin BMU on Monday, Jan. 9th.           Treatment plan:  · Target symptoms: depression, anxiety , mood swings  · Why chosen therapy is appropriate versus another modality: relevant to diagnosis, patient responds to this modality  · Outcome monitoring methods: self-report, observation  · Therapeutic intervention type: insight oriented psychotherapy, supportive psychotherapy    Risk parameters:  Patient reports no suicidal ideation  Patient reports no homicidal ideation  Patient reports no self-injurious behavior  Patient reports no violent behavior    Verbal deficits: None    Patient's response to intervention:  The patient's response to intervention is accepting.    Progress toward goals and other mental status changes:  The patient's progress toward goals is limited.    Diagnosis:     " ICD-10-CM ICD-9-CM   1. Anxiety state, unspecified F41.1 300.00   2. Mood disorder F39 296.90   3. Cluster B personality disorder F60.9 301.9       Plan:  individual psychotherapy and medication management by physician; patient plans to begin BMU on Monday, Jan. 9th    Return to clinic: as scheduled    Length of Service: 45 minutes

## 2017-01-18 ENCOUNTER — HOSPITAL ENCOUNTER (OUTPATIENT)
Dept: PSYCHIATRY | Facility: HOSPITAL | Age: 32
Discharge: HOME | End: 2017-01-18
Attending: PSYCHIATRY & NEUROLOGY
Payer: COMMERCIAL

## 2017-01-18 DIAGNOSIS — L70.0 ACNE VULGARIS: ICD-10-CM

## 2017-01-18 DIAGNOSIS — M35.7 BENIGN HYPERMOBILITY SYNDROME: ICD-10-CM

## 2017-01-18 DIAGNOSIS — F60.9 PERSONALITY DISORDER: ICD-10-CM

## 2017-01-18 DIAGNOSIS — F31.9 BIPOLAR I DISORDER: Primary | ICD-10-CM

## 2017-01-18 DIAGNOSIS — F39 MOOD DISORDER: ICD-10-CM

## 2017-01-18 DIAGNOSIS — F60.89 CLUSTER B PERSONALITY DISORDER: ICD-10-CM

## 2017-01-18 DIAGNOSIS — F19.21: ICD-10-CM

## 2017-01-18 DIAGNOSIS — R45.851 SUICIDAL IDEATIONS: ICD-10-CM

## 2017-01-18 DIAGNOSIS — F90.9 ATTENTION DEFICIT HYPERACTIVITY DISORDER (ADHD), UNSPECIFIED ADHD TYPE: ICD-10-CM

## 2017-01-18 DIAGNOSIS — Z72.0 TOBACCO ABUSE: ICD-10-CM

## 2017-01-18 DIAGNOSIS — K80.20 GALLSTONES: ICD-10-CM

## 2017-01-18 DIAGNOSIS — R10.13 EPIGASTRIC ABDOMINAL PAIN: ICD-10-CM

## 2017-01-18 PROCEDURE — 99233 SBSQ HOSP IP/OBS HIGH 50: CPT | Mod: ,,, | Performed by: PSYCHIATRY & NEUROLOGY

## 2017-01-18 PROCEDURE — 90853 GROUP PSYCHOTHERAPY: CPT | Performed by: SOCIAL WORKER

## 2017-01-18 PROCEDURE — 90853 GROUP PSYCHOTHERAPY: CPT | Mod: 59,,, | Performed by: PSYCHOLOGIST

## 2017-01-18 PROCEDURE — 90853 GROUP PSYCHOTHERAPY: CPT | Mod: ,,, | Performed by: PSYCHOLOGIST

## 2017-01-18 PROCEDURE — 90853 GROUP PSYCHOTHERAPY: CPT

## 2017-01-18 NOTE — PROGRESS NOTES
[facility]  Progress Note  Behavioral Medicine Unit    Date: 2017  Time:  PM    Name: Vianney Callahan    Age: 31 y.o.       : 1985            Admit Date: 17    SUBJECTIVE:  Patient is a 31 y.o. old female with a history of unspecified mood and anxiety disorder and possible borderline personality disorder admitted to the BMU additional  stabilization after recent hospitalization in December.  At that time she apparently became angry with her boyfriend and had desire to harm him.  She cut off her hair so that she would be less attractive to him.  She also has had complaints about anxiety that is uncontrolled.  She also reports that PTA she was thinking things that were not true, including negative things about her bf, that there was a man living inside her head that was controlling her and that her, bf was a drug dealer.    She reports today that she has been stable but became emotional in group when discussing self talk.  She reports she is finding therapy very helpful.  Still have concerns with her relationship and stress with her boyfriend.      Abilify was increased to 10mg by Dr. Swenson 2 days ago.  She denies ARs from it.  She has been going to the gym and swimming as well as reading outside.  She reports some inappropriate anger recently related to her byfriend.  Her sleep has been variable between 4 and 10 hrs.  She states she is eating okay except for tday when she was anxious from attending group.  She denies SI or desire to harm others.  She is uncertain about whether she has had AHs or not recently, reporting that she may hear some mumbling.        Current Medications:   Current Outpatient Prescriptions on File Prior to Encounter   Medication Sig Dispense Refill    ACZONE 5 % topical gel APPLY TO AFFECTED AREA TWICE DAILY 60 g 0    aripiprazole (ABILIFY) 10 MG Tab Take 1 tablet (10 mg total) by mouth once daily. 30 tablet 0    aripiprazole (ABILIFY) 15 MG Tab Take  half tab by mouth each morning 15 tablet 5    cyclobenzaprine (FLEXERIL) 10 MG tablet TAKE 0.5 TO 1 TABLET BY MOUTH AT BEDTIME AS NEEDED FOR BACKPAIN 30 tablet 0    fluconazole (DIFLUCAN) 150 MG Tab Take one tablet by mouth once and may retreat in 3 days if still symptomatic. 2 tablet 4    gabapentin (NEURONTIN) 600 MG tablet Take 2 caps by mouth every morning, 1 cap by mouth at 4pm, and 1 cap by mouth at bedtime 120 tablet 5    lithium (ESKALITH) 300 MG capsule Take 1 capsule (300 mg total) by mouth 3 (three) times daily with meals. 90 capsule 5    metronidazole (FLAGYL) 500 MG tablet take 1 tablet by mouth twice a day for 7 days 14 tablet 0    norethindrone-ethinyl estradiol (MICROGESTIN 1/20) 1-20 mg-mcg per tablet Take 1 tablet by mouth every evening. 84 tablet 4    ondansetron (ZOFRAN-ODT) 4 MG TbDL Take 1 tablet (4 mg total) by mouth every 8 (eight) hours as needed. 30 tablet 0    promethazine (PHENERGAN) 12.5 MG Tab 1 tab po q 8 h prn nausea 30 tablet 0    sertraline (ZOLOFT) 100 MG tablet Take 2 tabs by mouth each morning 60 tablet 5    TAZORAC 0.05 % gel APPLY TOPICALLY EVERY EVENING. WASH OFF IN THE MORNING, AND APPLY SUNSCREEN 100 g 0    tinidazole (TINDAMAX) 500 MG tablet Take 4 tablets by mouth at once daily for two days 8 tablet 1    tramadol (ULTRAM) 50 mg tablet take 1 tablet by mouth every 8 hours if needed 60 tablet 0    valacyclovir (VALTREX) 1000 MG tablet Take 1 tablet (1,000 mg total) by mouth once daily. 90 tablet 3     No current facility-administered medications on file prior to encounter.      Psychiatric ROS:  See Dr. Chou's Admission Note of date 1/9/17 for ROS.  Otherwise, addressed in HPI above.    OBJECTIVE:  Labs/Imaging/Studies:   No results found for this or any previous visit (from the past 48 hour(s)).   Lab Results   Component Value Date    CBMZ 5.3 (L) 01/29/2008     Mental Status Exam:  Appearance:  Well groomed, thin, appearing healthy and of stated age, very  short hair  Behavior:  Cooperative, pleasant  Speech:  Quite verbal, not loud or pressured  Mood:  Near euthymic  Affect:  Full range and appropriate, no lability  Thought Process:  Mildly tangential  Thought Content:  Negative for suicidal ideation, homicidal ideation, delusions or hallucinations currently  Associations:  Intact  Memory:  Grossly Intact  Level of Consciousness/Orientation:  Grossly intact  Fund of Knowledge:  Good  Attention:  Attends to interview without distraction  Language:  intact  Insight:  Pt has awareness of illness  Judgment:  Fair  Psychomotor signs:  No involuntary movements or tremor  Gait:  Normal    ASSESSMENT/PLAN:  General Assessment:  Patient is a 31 y.o., female admitted to the BMU partial hospitalization program for stabilization of mood and improvement of anxiety.    Diagnoses:  Bipolar Disorder manic vs mixed, moderate  Unspecified Anxiety Disorder  Cluster B Personality traits, likely Borderline Personality Disorder    Plan:  · Continue BMU treatment  · Continue current medication regimen - recently changed by Roman Renae on 1/6/17:   1) Abilify 10 mg daily (Started during December hospitalization, Decreased on 1/6/17 from 15mg daily due to concern for akathisia, increased to 10m on 1/16/17)   2) Lithium 300mg TID (Started during December hospitalization), We discussed possible increase of dose given level   3) Zoloft 200mg daily (Increased from 150mg daily on 1/6/17)   4) Neurontin 1200mg qAM, 600mg qPM, and 600mg qHS (Increased on 1/6/17 from Neurontin 600mg TID)     Enrique Howard MD

## 2017-01-18 NOTE — PLAN OF CARE
01/18/17 1400   Activity/Group Therapy Checklist   Group Addiction Education  (Stress and Relaxation Explained)   Attendance Attended   Follows Direction Followed directions   Group Interactions/Observations Interacted appropriately;Alert   Affect/Mood Range Normal range   Affect/Mood Display Appropriate   Goal Progression Progressing

## 2017-01-18 NOTE — PROGRESS NOTES
Group Psychotherapy (PhD/LCSW)    Site: [facility]    Clinical status of patient: Intensive Outpatient Program (IOP)    Date: 1/18/2017    Group Focus: Stress Management    Length of service: 37194 - 45-50 minutes    Number of patients in attendance: 5    Referred by: Behavioral Medicine Unit Treatment Team    Target symptoms: Mood Disorder    Patient's response to treatment: Active Listening and Self-disclosure    Progress toward goals: Progressing adequately    Interval History: Practiced using positive self-talk to address stressors.    Diagnosis: Bipolar disorder    Plan: Continue treatment on U

## 2017-01-18 NOTE — PROGRESS NOTES
"Group Psychotherapy (PhD/LCSW)    Site: [facility]    Clinical status of patient: Intensive Outpatient Program (IOP)    Date: 1/18/2017    Group Focus: Psychodynamic Group Psychotherapy    Length of service: 15187 - 45-50 minutes    Number of patients in attendance: 2    Referred by: Behavioral Medicine Unit Treatment Team    Target symptoms: Mood Disorder    Patient's response to treatment: Active Listening and Self-disclosure    Progress toward goals: Progressing slowly    Interval History: Pt struggling with thoughts of being "a worthless failure". Worked with pt utilizing principles of defusion to see the thoughts for what they are so that she can hold them more lightly.    Diagnosis: Bipolar Disorder, Borderline Personality Disorder    Plan: Continue treatment on BMU        "

## 2017-01-19 ENCOUNTER — HOSPITAL ENCOUNTER (OUTPATIENT)
Dept: PSYCHIATRY | Facility: HOSPITAL | Age: 32
Discharge: HOME | End: 2017-01-19
Attending: PSYCHIATRY & NEUROLOGY
Payer: COMMERCIAL

## 2017-01-19 DIAGNOSIS — L70.0 ACNE VULGARIS: ICD-10-CM

## 2017-01-19 DIAGNOSIS — Z72.0 TOBACCO ABUSE: ICD-10-CM

## 2017-01-19 DIAGNOSIS — K80.20 GALLSTONES: ICD-10-CM

## 2017-01-19 DIAGNOSIS — F90.9 ATTENTION DEFICIT HYPERACTIVITY DISORDER (ADHD), UNSPECIFIED ADHD TYPE: ICD-10-CM

## 2017-01-19 DIAGNOSIS — F60.89 CLUSTER B PERSONALITY DISORDER: ICD-10-CM

## 2017-01-19 DIAGNOSIS — F60.9 PERSONALITY DISORDER: ICD-10-CM

## 2017-01-19 DIAGNOSIS — F19.21: ICD-10-CM

## 2017-01-19 DIAGNOSIS — R10.13 EPIGASTRIC ABDOMINAL PAIN: ICD-10-CM

## 2017-01-19 DIAGNOSIS — F31.9 BIPOLAR I DISORDER: Primary | ICD-10-CM

## 2017-01-19 DIAGNOSIS — F39 MOOD DISORDER: ICD-10-CM

## 2017-01-19 DIAGNOSIS — M35.7 BENIGN HYPERMOBILITY SYNDROME: ICD-10-CM

## 2017-01-19 DIAGNOSIS — R45.851 SUICIDAL IDEATIONS: ICD-10-CM

## 2017-01-19 PROCEDURE — 90853 GROUP PSYCHOTHERAPY: CPT | Mod: ,,, | Performed by: PSYCHOLOGIST

## 2017-01-19 PROCEDURE — 90853 GROUP PSYCHOTHERAPY: CPT | Mod: 59,,, | Performed by: PSYCHOLOGIST

## 2017-01-19 PROCEDURE — 90853 GROUP PSYCHOTHERAPY: CPT

## 2017-01-19 PROCEDURE — 99232 SBSQ HOSP IP/OBS MODERATE 35: CPT | Mod: ,,, | Performed by: PSYCHIATRY & NEUROLOGY

## 2017-01-19 NOTE — PROGRESS NOTES
Group Psychotherapy (PhD/LCSW)    Site: [facility]    Clinical status of patient: Intensive Outpatient Program (IOP)    Date: 1/19/2017    Group Focus: The Dynamics of Relationship      Length of service: 48798 - 45-50 minutes    Number of patients in attendance: 2    Referred by: Behavioral Medicine Unit Treatment Team    Target symptoms: Mood Disorder    Patient's response to treatment: Active Listening and Self-disclosure    Progress toward goals: Progressing slowly    Interval History: Pt discussed fears that if she gets better emotionally and starts to move forward in her life doing the things she wants and needs to do for herself she will lose her relationship with her boyfriend. She said she found it very helpful to see that to be assertive and true to oneself means giving up control over the other person's feelings.     Diagnosis: Bipolar Disorder, Borderline Personality Disorder    Plan: Continue treatment on BMU

## 2017-01-19 NOTE — PROGRESS NOTES
"[facility]  Progress Note  Behavioral Medicine Unit    Date: 2017  Time:  PM    Name: Vianney Callahan    Age: 31 y.o.       : 1985            Admit Date: 17    SUBJECTIVE:  Patient is a 31 y.o. old female with a history of unspecified mood and anxiety disorder and possible borderline personality disorder admitted to the BMU additional  stabilization after recent hospitalization in December.  At that time she apparently became angry with her boyfriend and had desire to harm him.  She cut off her hair so that she would be less attractive to him.  She also has had complaints about anxiety that is uncontrolled.  She also reports that PTA she was thinking things that were not true, including negative things about her bf, that there was a man living inside her head that was controlling her and that her, bf was a drug dealer.    She reports today that she is having intense emotions that keep changing from paralyzing depression to intense anxiety with second guessing herslef.  She is crying a lot.  "Emotions are running through me."      She reports she is always anxious however but it may be worse since abilify was increased.      She complains of paranoia with the feeling that "Something is going to come get me" but admits that she has not had this since her discharge.  She continues to speak of a voice in her head that she initially reports is a male voice but later upon questioning reports is gender neutral that she cannot distinguish from her own thoughts.  It tells her to do routine non dangerous tasks, gives example "take off you glasses."      She had a hard time sleeping last night.  Had grandiose thoughts of returning to school and becoming a , then she had a panic attack.        Current Medications:   Current Outpatient Prescriptions on File Prior to Encounter   Medication Sig Dispense Refill    ACZONE 5 % topical gel APPLY TO AFFECTED AREA TWICE DAILY 60 g 0    " aripiprazole (ABILIFY) 10 MG Tab Take 1 tablet (10 mg total) by mouth once daily. 30 tablet 0    aripiprazole (ABILIFY) 15 MG Tab Take half tab by mouth each morning 15 tablet 5    cyclobenzaprine (FLEXERIL) 10 MG tablet TAKE 0.5 TO 1 TABLET BY MOUTH AT BEDTIME AS NEEDED FOR BACKPAIN 30 tablet 0    fluconazole (DIFLUCAN) 150 MG Tab Take one tablet by mouth once and may retreat in 3 days if still symptomatic. 2 tablet 4    gabapentin (NEURONTIN) 600 MG tablet Take 2 caps by mouth every morning, 1 cap by mouth at 4pm, and 1 cap by mouth at bedtime 120 tablet 5    lithium (ESKALITH) 300 MG capsule Take 1 capsule (300 mg total) by mouth 3 (three) times daily with meals. 90 capsule 5    metronidazole (FLAGYL) 500 MG tablet take 1 tablet by mouth twice a day for 7 days 14 tablet 0    norethindrone-ethinyl estradiol (MICROGESTIN 1/20) 1-20 mg-mcg per tablet Take 1 tablet by mouth every evening. 84 tablet 4    ondansetron (ZOFRAN-ODT) 4 MG TbDL Take 1 tablet (4 mg total) by mouth every 8 (eight) hours as needed. 30 tablet 0    promethazine (PHENERGAN) 12.5 MG Tab 1 tab po q 8 h prn nausea 30 tablet 0    sertraline (ZOLOFT) 100 MG tablet Take 2 tabs by mouth each morning 60 tablet 5    TAZORAC 0.05 % gel APPLY TOPICALLY EVERY EVENING. WASH OFF IN THE MORNING, AND APPLY SUNSCREEN 100 g 0    tinidazole (TINDAMAX) 500 MG tablet Take 4 tablets by mouth at once daily for two days 8 tablet 1    tramadol (ULTRAM) 50 mg tablet take 1 tablet by mouth every 8 hours if needed 60 tablet 0    valacyclovir (VALTREX) 1000 MG tablet Take 1 tablet (1,000 mg total) by mouth once daily. 90 tablet 3     No current facility-administered medications on file prior to encounter.      Psychiatric ROS:  See Dr. Chou's Admission Note of date 1/9/17 for ROS.  Otherwise, addressed in HPI above.    OBJECTIVE:  Labs/Imaging/Studies:   No results found for this or any previous visit (from the past 48 hour(s)).   Lab Results   Component Value  Date    CBMZ 5.3 (L) 01/29/2008     Mental Status Exam:  Appearance:  Well groomed, thin, appearing healthy and of stated age, very short hair  Behavior:  Cooperative, pleasant  Speech:  Quite verbal, not loud or pressured  Mood:  Near euthymic  Affect:  Labile, at times tearful  Thought Process:  Mildly tangential  Thought Content:  Negative for suicidal ideation, homicidal ideation, delusions or hallucinations currently  Associations:  Intact  Memory:  Grossly Intact  Level of Consciousness/Orientation:  Grossly intact  Fund of Knowledge:  Good  Attention:  Attends to interview without distraction  Language:  intact  Insight:  Pt has awareness of illness  Judgment:  Fair  Psychomotor signs:  No involuntary movements or tremor  Gait:  Normal    ASSESSMENT/PLAN:  General Assessment:  Patient is a 31 y.o., female admitted to the BMU partial hospitalization program for stabilization of mood and improvement of anxiety.    Diagnoses:  Bipolar Disorder manic vs mixed, moderate  Unspecified Anxiety Disorder  Cluster B Personality traits, likely Borderline Personality Disorder    Plan:  · Continue BMU treatment  · Continue current medication regimen - recently changed by Roman Renae on 1/6/17:   1) Abilify 10 mg daily (Started during December hospitalization, Decreased on 1/6/17 from 15mg daily due to concern for akathisia, increased to 10m on 1/16/17)   2) Lithium 300mg TID (Started during December hospitalization), We discussed possible increase of dose given level   3) Zoloft 200mg daily (Increased from 150mg daily on 1/6/17)   4) Neurontin 1200mg qAM, 600mg qPM, and 600mg qHS (Increased on 1/6/17 from Neurontin 600mg TID)   · We discussed possible increase of lithium if not improved by next week.    Enrique Howard MD

## 2017-01-19 NOTE — PROGRESS NOTES
Group Psychotherapy (PhD/LCSW)    Site: [facility]    Clinical status of patient: Intensive Outpatient Program (IOP)    Date: 1/19/2017    Group Focus: Psychodynamic Group Psychotherapy    Length of service: 71882 - 45-50 minutes    Number of patients in attendance: 3    Referred by: Behavioral Medicine Unit Treatment Team    Target symptoms: Mood Disorder    Patient's response to treatment: Active Listening and Self-disclosure    Progress toward goals: Progressing slowly    Interval History: Pt was emotional, with pressured speech today. She shared her experience of having very intense and overwhelming emotions in group. Discussed her diagnosis of Borderline Personality Disorder and how this affects her emotional experiences, and the importance of emotion regulation techniques, some of which we discussed today.    Diagnosis: Bipolar Disorder, Borderline Personality Disorder    Plan: Continue treatment on U

## 2017-01-19 NOTE — PROGRESS NOTES
Group Psychotherapy (PhD/LCSW)    Site: [facility]    Clinical status of patient: Intensive Outpatient Program (IOP)    Date: 1/19/2017    Group Focus: Stress Management    Length of service: 45608 - 45-50 minutes    Number of patients in attendance: 7    Referred by: Behavioral Medicine Unit Treatment Team    Target symptoms: Mood Disorder    Patient's response to treatment: Active Listening and Self-disclosure    Progress toward goals: Progressing adequately    Interval History: Discussed the range of feeling states and practice expressing feelings specifically.    Diagnosis: Bipolar disorder    Plan: Continue treatment on U

## 2017-01-20 ENCOUNTER — HOSPITAL ENCOUNTER (OUTPATIENT)
Dept: PSYCHIATRY | Facility: HOSPITAL | Age: 32
Discharge: HOME | End: 2017-01-20
Attending: PSYCHIATRY & NEUROLOGY
Payer: COMMERCIAL

## 2017-01-20 DIAGNOSIS — F39 UNSPECIFIED EPISODIC MOOD DISORDER: ICD-10-CM

## 2017-01-20 DIAGNOSIS — Z72.0 TOBACCO ABUSE: ICD-10-CM

## 2017-01-20 DIAGNOSIS — F60.9 PERSONALITY DISORDER: ICD-10-CM

## 2017-01-20 DIAGNOSIS — F60.89 CLUSTER B PERSONALITY DISORDER: ICD-10-CM

## 2017-01-20 DIAGNOSIS — F31.9 BIPOLAR I DISORDER: Primary | ICD-10-CM

## 2017-01-20 DIAGNOSIS — F90.9 ATTENTION DEFICIT HYPERACTIVITY DISORDER (ADHD), UNSPECIFIED ADHD TYPE: ICD-10-CM

## 2017-01-20 DIAGNOSIS — R45.851 SUICIDAL IDEATIONS: ICD-10-CM

## 2017-01-20 DIAGNOSIS — F19.21: ICD-10-CM

## 2017-01-20 DIAGNOSIS — F39 MOOD DISORDER: ICD-10-CM

## 2017-01-20 DIAGNOSIS — M35.7 BENIGN HYPERMOBILITY SYNDROME: ICD-10-CM

## 2017-01-20 DIAGNOSIS — K80.20 GALLSTONES: ICD-10-CM

## 2017-01-20 DIAGNOSIS — L70.0 ACNE VULGARIS: ICD-10-CM

## 2017-01-20 DIAGNOSIS — R10.13 EPIGASTRIC ABDOMINAL PAIN: ICD-10-CM

## 2017-01-20 PROCEDURE — 99233 SBSQ HOSP IP/OBS HIGH 50: CPT | Mod: ,,, | Performed by: PSYCHIATRY & NEUROLOGY

## 2017-01-20 PROCEDURE — 90853 GROUP PSYCHOTHERAPY: CPT | Mod: 59,,, | Performed by: PSYCHOLOGIST

## 2017-01-20 PROCEDURE — 90853 GROUP PSYCHOTHERAPY: CPT

## 2017-01-20 PROCEDURE — 90853 GROUP PSYCHOTHERAPY: CPT | Mod: ,,, | Performed by: PSYCHOLOGIST

## 2017-01-20 RX ORDER — LITHIUM CARBONATE 300 MG/1
600 CAPSULE ORAL 2 TIMES DAILY
Qty: 120 CAPSULE | Refills: 1 | Status: SHIPPED | OUTPATIENT
Start: 2017-01-20 | End: 2017-02-06 | Stop reason: SDUPTHER

## 2017-01-20 NOTE — PROGRESS NOTES
Group Psychotherapy (PhD/LCSW)    Site: [facility]    Clinical status of patient: Intensive Outpatient Program (IOP)    Date: 1/20/2017    Group Focus: Spirituality and Well Being      Length of service: 33646 - 45-50 minutes    Number of patients in attendance: 3    Referred by: Behavioral Medicine Unit Treatment Team    Target symptoms: Mood Disorder    Patient's response to treatment: Active Listening and Self-disclosure    Progress toward goals: Progressing slowly    Interval History: Discussed the value and application of 12-step principles to stress management with the emphasis on the first three steps. Pt very anxious in group but settled down some as group progressed.     Diagnosis: Bipolar Disorder, Borderline Personality Disorder    Plan: Continue treatment on BMU

## 2017-01-20 NOTE — PATIENT CARE CONFERENCE
BMU Staffing       Problem List:  Bipolar, MRE Manic  Unspecified Anxiety D/O  Cluster B PD Traits  Recent Inpatient Hospitalization  Maladaptive Coping Skills  Poor Stress Management      Pt was staffed with treatment team today. Staff discussed pt's progress in group sessions. Staff discussed that pt is unsure about auditory hallucinations. Staff discussed pt's increase in abilify on Monday. Staff discussed pt's continued hypomanic symptoms. Staff discussed tentative d/c date.          Follow-Up Appointments:  Medication Management: Dr. Renae  Psychotherapy: TBD      Staff Present:  MD Dr. Brenda Burton, PhD  Alethea Barrett, PhD  [facility]oid[facility] Farley, [facility]  Brianna Prajapati, JD [facility]  Perla Thomas, RN

## 2017-01-20 NOTE — PROGRESS NOTES
"Group Psychotherapy (PhD/LCSW)    Site: [facility]    Clinical status of patient: Intensive Outpatient Program (IOP)    Date: 1/20/2017    Group Focus: Psychodynamic Group Psychotherapy    Length of service: 49199 - 45-50 minutes    Number of patients in attendance: 2    Referred by: Behavioral Medicine Unit Treatment Team    Target symptoms: Mood Disorder    Patient's response to treatment: Active Listening and Self-disclosure    Progress toward goals: Progressing slowly    Interval History: Pt asked today about the concept of "honesty" and wanted to work through her guilt about lying to her mother that she goes to see her boyfriend at times, since the two of them "hate each other". She discussed the costs and benefits of telling the truth versus lying. She was somewhat labile and pressured again in group but was able to settle down and was redirectable.    Diagnosis: Bipolar Disorder, Borderline Personality Disorder    Plan: Continue treatment on BMU        "

## 2017-01-20 NOTE — PROGRESS NOTES
"[facility]  Progress Note  Behavioral Medicine Unit    Date: 2017  Time: 11:30 AM    Name: Vianney Callahan    Age: 31 y.o.       : 1985            Admit Date: 17    SUBJECTIVE:  Patient is a 31 y.o. old female with a history of unspecified mood and anxiety disorder and possible borderline personality disorder admitted to the BMU additional  stabilization after recent hospitalization in December.  At that time she apparently became angry with her boyfriend and had desire to harm him.  She cut off her hair so that she would be less attractive to him.  She also has had complaints about anxiety that is uncontrolled.  She also reports that PTA she was thinking things that were not true, including negative things about her bf, that there was a man living inside her head that was controlling her and that her, bf was a drug dealer.    Today she reports she is in crisis because of recent thoughts of cutting herself.  She reports she last did this at age 17.  She mentions that she has had SI but upon further questioning states that she is afraid of death and will not make a suicide attempt.  She reports having a difficult time tolerating group because she cannot concentrate on it and feels like she wants to run.  She feels "all over the place". She admits to going to the mall yesterday and spending what little money she had on clothes.        Current Medications:   Current Outpatient Prescriptions on File Prior to Encounter   Medication Sig Dispense Refill    ACZONE 5 % topical gel APPLY TO AFFECTED AREA TWICE DAILY 60 g 0    aripiprazole (ABILIFY) 10 MG Tab Take 1 tablet (10 mg total) by mouth once daily. 30 tablet 0    aripiprazole (ABILIFY) 15 MG Tab Take half tab by mouth each morning 15 tablet 5    cyclobenzaprine (FLEXERIL) 10 MG tablet TAKE 0.5 TO 1 TABLET BY MOUTH AT BEDTIME AS NEEDED FOR BACKPAIN 30 tablet 0    fluconazole (DIFLUCAN) 150 MG Tab Take one tablet by mouth once and " may retreat in 3 days if still symptomatic. 2 tablet 4    gabapentin (NEURONTIN) 600 MG tablet Take 2 caps by mouth every morning, 1 cap by mouth at 4pm, and 1 cap by mouth at bedtime 120 tablet 5    lithium (ESKALITH) 300 MG capsule Take 1 capsule (300 mg total) by mouth 3 (three) times daily with meals. 90 capsule 5    metronidazole (FLAGYL) 500 MG tablet take 1 tablet by mouth twice a day for 7 days 14 tablet 0    norethindrone-ethinyl estradiol (MICROGESTIN 1/20) 1-20 mg-mcg per tablet Take 1 tablet by mouth every evening. 84 tablet 4    ondansetron (ZOFRAN-ODT) 4 MG TbDL Take 1 tablet (4 mg total) by mouth every 8 (eight) hours as needed. 30 tablet 0    promethazine (PHENERGAN) 12.5 MG Tab 1 tab po q 8 h prn nausea 30 tablet 0    sertraline (ZOLOFT) 100 MG tablet Take 2 tabs by mouth each morning 60 tablet 5    TAZORAC 0.05 % gel APPLY TOPICALLY EVERY EVENING. WASH OFF IN THE MORNING, AND APPLY SUNSCREEN 100 g 0    tinidazole (TINDAMAX) 500 MG tablet Take 4 tablets by mouth at once daily for two days 8 tablet 1    tramadol (ULTRAM) 50 mg tablet take 1 tablet by mouth every 8 hours if needed 60 tablet 0    valacyclovir (VALTREX) 1000 MG tablet Take 1 tablet (1,000 mg total) by mouth once daily. 90 tablet 3     No current facility-administered medications on file prior to encounter.      Psychiatric ROS:  See Dr. Chou's Admission Note of date 1/9/17 for ROS.  Otherwise, addressed in HPI above.    OBJECTIVE:  Labs/Imaging/Studies:   No results found for this or any previous visit (from the past 48 hour(s)).   Lab Results   Component Value Date    CBMZ 5.3 (L) 01/29/2008     Mental Status Exam:  Appearance:  Well groomed, thin, neatly dressed with hat, appearing healthy and of stated age, very short hair  Behavior:  Cooperative, pleasant  Speech:  Quite verbal, not loud or pressured  Mood:  anxious  Affect:  Labile, at times tearful  Thought Process:  Mildly tangential  Thought Content:  SI as above, No  homicidal ideation, delusions or hallucinations currently  Associations:  Intact  Memory:  Grossly Intact  Level of Consciousness/Orientation:  Grossly intact  Fund of Knowledge:  Good  Attention:  Attends to interview without distraction  Language:  intact  Insight:  Pt has awareness of illness  Judgment:  Fair  Psychomotor signs:  No involuntary movements or tremor  Gait:  Normal    ASSESSMENT/PLAN:  General Assessment:  Patient is a 31 y.o., female admitted to the BMU partial hospitalization program for stabilization of mood and improvement of anxiety.    Diagnoses:  Bipolar Disorder manic vs mixed, moderate  Unspecified Anxiety Disorder  Cluster B Personality traits, likely Borderline Personality Disorder    Plan:  · Continue BMU treatment  · Abilify 10 mg daily (Started during December hospitalization, Decreased on 1/6/17 from 15mg daily due to concern for akathisia, increased to 10m on 1/16/17  · Increase Lithium to 600mg bid.  (Started during December hospitalization at 300mg tid)  · Zoloft 200mg daily (Increased from 150mg daily on 1/6/17)  · Neurontin 1200mg qAM, 600mg qPM, and 600mg qHS (Increased on 1/6/17 from Neurontin 600mg TID)       Enrique Howard MD

## 2017-01-23 ENCOUNTER — HOSPITAL ENCOUNTER (OUTPATIENT)
Dept: PSYCHIATRY | Facility: HOSPITAL | Age: 32
Discharge: HOME | End: 2017-01-23
Attending: PSYCHIATRY & NEUROLOGY
Payer: COMMERCIAL

## 2017-01-23 DIAGNOSIS — F60.9 PERSONALITY DISORDER: ICD-10-CM

## 2017-01-23 DIAGNOSIS — L70.0 ACNE VULGARIS: ICD-10-CM

## 2017-01-23 DIAGNOSIS — R10.13 EPIGASTRIC ABDOMINAL PAIN: ICD-10-CM

## 2017-01-23 DIAGNOSIS — F19.21: ICD-10-CM

## 2017-01-23 DIAGNOSIS — Z72.0 TOBACCO ABUSE: ICD-10-CM

## 2017-01-23 DIAGNOSIS — F39 MOOD DISORDER: ICD-10-CM

## 2017-01-23 DIAGNOSIS — M35.7 BENIGN HYPERMOBILITY SYNDROME: ICD-10-CM

## 2017-01-23 DIAGNOSIS — R45.851 SUICIDAL IDEATIONS: ICD-10-CM

## 2017-01-23 DIAGNOSIS — F60.89 CLUSTER B PERSONALITY DISORDER: ICD-10-CM

## 2017-01-23 DIAGNOSIS — K80.20 GALLSTONES: ICD-10-CM

## 2017-01-23 DIAGNOSIS — F31.9 BIPOLAR I DISORDER: ICD-10-CM

## 2017-01-23 DIAGNOSIS — F90.9 ATTENTION DEFICIT HYPERACTIVITY DISORDER (ADHD), UNSPECIFIED ADHD TYPE: ICD-10-CM

## 2017-01-23 PROCEDURE — 99233 SBSQ HOSP IP/OBS HIGH 50: CPT | Mod: ,,, | Performed by: PSYCHIATRY & NEUROLOGY

## 2017-01-23 PROCEDURE — 90853 GROUP PSYCHOTHERAPY: CPT

## 2017-01-23 PROCEDURE — 90853 GROUP PSYCHOTHERAPY: CPT | Mod: ,,, | Performed by: PSYCHOLOGIST

## 2017-01-23 PROCEDURE — 90853 GROUP PSYCHOTHERAPY: CPT | Mod: 59,,, | Performed by: PSYCHOLOGIST

## 2017-01-23 NOTE — PROGRESS NOTES
"Group Psychotherapy (PhD/LCSW)    Site: [facility]    Clinical status of patient: Intensive Outpatient Program (IOP)    Date: 1/23/2017    Group Focus: Communication Skills     Length of service: 76953 - 45-50 minutes    Number of patients in attendance: 10    Referred by: Behavioral Medicine Unit Treatment Team    Target symptoms: Mood Disorder    Patient's response to treatment: Active Listening and Feedback    Progress toward goals: Progressing slowly    Interval History:  Discussed application of basic communication skills to common relationship problems affecting group members (I-messages; Reflective Listening; contextual factors). Role played their use. Emphasized the value of Reflective Listening in responding to the concern of others when perceived as "controlling" by the patient.     Diagnosis: Bipolar Disorder, Borderline Personality Disorder    Plan: Continue treatment on BMU        "

## 2017-01-23 NOTE — PROGRESS NOTES
"[facility]  Progress Note  Behavioral Medicine Unit    Date: 2017  Time: 2:15 PM    Name: Vianney Callahan    Age: 31 y.o.       : 1985            Admit Date: 17    SUBJECTIVE:  Patient is a 31 y.o. old female with a history of unspecified mood and anxiety disorder and possible borderline personality disorder admitted to the BMU additional  stabilization after recent hospitalization in December.  At that time she apparently became angry with her boyfriend and had desire to harm him.  She cut off her hair so that she would be less attractive to him.  She also has had complaints about anxiety that is uncontrolled.  She also reports that PTA she was thinking things that were not true, including negative things about her bf, that there was a man living inside her head that was controlling her and that her, bf was a drug dealer.    Today she reports her weekend turned out well.  She felt better on  than Saturday.  She still admits to crying.  Thouhts of harming herself have resolved.  She is using some mindfulness strategies such as telling herself,  "I am not my thoughts.  I am not my feelings" and  "This is not going to last forever."  She still reports fear of death.     She reports she has been having some nausea and vomiting since starting lithium.  She reports it is always in the morning typically an hour or 1.5 hrs after dosing.  She has vomited only twice.             Current Medications:   Current Outpatient Prescriptions on File Prior to Encounter   Medication Sig Dispense Refill    ACZONE 5 % topical gel APPLY TO AFFECTED AREA TWICE DAILY 60 g 0    aripiprazole (ABILIFY) 10 MG Tab Take 1 tablet (10 mg total) by mouth once daily. 30 tablet 0    aripiprazole (ABILIFY) 15 MG Tab Take half tab by mouth each morning 15 tablet 5    cyclobenzaprine (FLEXERIL) 10 MG tablet TAKE 0.5 TO 1 TABLET BY MOUTH AT BEDTIME AS NEEDED FOR BACKPAIN 30 tablet 0    fluconazole (DIFLUCAN) " 150 MG Tab Take one tablet by mouth once and may retreat in 3 days if still symptomatic. 2 tablet 4    gabapentin (NEURONTIN) 600 MG tablet Take 2 caps by mouth every morning, 1 cap by mouth at 4pm, and 1 cap by mouth at bedtime 120 tablet 5    lithium (ESKALITH) 300 MG capsule Take 2 capsules (600 mg total) by mouth 2 (two) times daily. 120 capsule 1    metronidazole (FLAGYL) 500 MG tablet take 1 tablet by mouth twice a day for 7 days 14 tablet 0    norethindrone-ethinyl estradiol (MICROGESTIN 1/20) 1-20 mg-mcg per tablet Take 1 tablet by mouth every evening. 84 tablet 4    ondansetron (ZOFRAN-ODT) 4 MG TbDL Take 1 tablet (4 mg total) by mouth every 8 (eight) hours as needed. 30 tablet 0    promethazine (PHENERGAN) 12.5 MG Tab 1 tab po q 8 h prn nausea 30 tablet 0    sertraline (ZOLOFT) 100 MG tablet Take 2 tabs by mouth each morning 60 tablet 5    TAZORAC 0.05 % gel APPLY TOPICALLY EVERY EVENING. WASH OFF IN THE MORNING, AND APPLY SUNSCREEN 100 g 0    tinidazole (TINDAMAX) 500 MG tablet Take 4 tablets by mouth at once daily for two days 8 tablet 1    tramadol (ULTRAM) 50 mg tablet take 1 tablet by mouth every 8 hours if needed 60 tablet 0    valacyclovir (VALTREX) 1000 MG tablet Take 1 tablet (1,000 mg total) by mouth once daily. 90 tablet 3     No current facility-administered medications on file prior to encounter.      Psychiatric ROS:  See Dr. Chou's Admission Note of date 1/9/17 for ROS.  Otherwise, addressed in HPI above.    OBJECTIVE:  Labs/Imaging/Studies:   No results found for this or any previous visit (from the past 48 hour(s)).   Lab Results   Component Value Date    CBMZ 5.3 (L) 01/29/2008     Mental Status Exam:  Appearance:  Well groomed, thin, neatly dressed with hat, appearing healthy and of stated age, very short white hair  Behavior:  Cooperative, pleasant  Speech:  Verbal, not loud or pressured  Mood:  anxious  Affect:  Less labile but still tearful at times  Thought Process:   Mildly tangential  Thought Content:  SI as above, No homicidal ideation, delusions or hallucinations currently  Associations:  Intact  Memory:  Grossly Intact  Level of Consciousness/Orientation:  Grossly intact  Fund of Knowledge:  Good  Attention:  Attends to interview without distraction  Language:  intact  Insight:  Pt has awareness of illness  Judgment:  Fair  Psychomotor signs:  No involuntary movements or tremor  Gait:  Normal    ASSESSMENT/PLAN:  General Assessment:  Patient is a 31 y.o., female admitted to the BMU partial hospitalization program for stabilization of mood and improvement of anxiety.    Diagnoses:  Bipolar Disorder manic vs mixed, moderate  Unspecified Anxiety Disorder  Cluster B Personality traits, likely Borderline Personality Disorder    Plan:  · Continue BMU treatment  · Abilify 10 mg daily (Started during December hospitalization, Decreased on 1/6/17 from 15mg daily due to concern for akathisia, increased to 10m on 1/16/17  · Continue Lithium 600mg bid.  (Started during December hospitalization at 300mg tid)  · Zoloft 200mg daily (Increased from 150mg daily on 1/6/17)  · Neurontin 1200mg qAM, 600mg qPM, and 600mg qHS (Increased on 1/6/17 from Neurontin 600mg TID)       Enrique Howard MD

## 2017-01-23 NOTE — PROGRESS NOTES
"Group Psychotherapy (PhD/LCSW)    Site: [facility]    Clinical status of patient: Intensive Outpatient Program (IOP)    Date: 1/23/2017    Group Focus: Psychodynamic Group Psychotherapy    Length of service: 56193 - 45-50 minutes    Number of patients in attendance: 3    Referred by: Behavioral Medicine Unit Treatment Team    Target symptoms: Mood Disorder    Patient's response to treatment: Active Listening and Self-disclosure    Progress toward goals: Progressing slowly    Interval History:  Patient fulfilled commitments of going to the gym, practicing mindfulness, and coming back to group today.  She made a new commitment of exercising and practicing defusion.  She was engaged in discussion on defusion and acknowledged that she has "deep feelings" that are difficult to defuse.     Diagnosis: Bipolar Disorder, Borderline Personality Disorder    Plan: Continue treatment on BMU        "

## 2017-01-23 NOTE — PROGRESS NOTES
Group Psychotherapy (PhD/LCSW)    Site: [facility]    Clinical status of patient: Intensive Outpatient Program (IOP)    Date: 1/23/2017    Group Focus: Acceptance & Commitment Therapy (ACT) Group Psychotherapy    Length of service: 73864 - 45-50 minutes    Number of patients in attendance: 3    Referred by: Behavioral Medicine Unit Treatment Team    Target symptoms: Mood Disorder    Patient's response to treatment: Active Listening and Self-disclosure    Progress toward goals: Progressing slowly    Interval History: Session focus was Acceptance and Willingness.  Patient was having difficulty with medications (i.e., nausea and fatigue), but was engaged in discussion.  She reported going to a family party, which made her family happy but felt invalidating to her.  She seemed open to the session content.    Diagnosis: Bipolar Disorder, Borderline Personality Disorder    Plan: Continue treatment on U

## 2017-01-24 ENCOUNTER — HOSPITAL ENCOUNTER (OUTPATIENT)
Dept: PSYCHIATRY | Facility: HOSPITAL | Age: 32
Discharge: HOME | End: 2017-01-24
Attending: PSYCHIATRY & NEUROLOGY
Payer: COMMERCIAL

## 2017-01-24 DIAGNOSIS — K80.20 GALLSTONES: ICD-10-CM

## 2017-01-24 DIAGNOSIS — F60.9 PERSONALITY DISORDER: ICD-10-CM

## 2017-01-24 DIAGNOSIS — F31.9 BIPOLAR I DISORDER: Primary | ICD-10-CM

## 2017-01-24 DIAGNOSIS — F90.9 ATTENTION DEFICIT HYPERACTIVITY DISORDER (ADHD), UNSPECIFIED ADHD TYPE: ICD-10-CM

## 2017-01-24 DIAGNOSIS — F39 MOOD DISORDER: ICD-10-CM

## 2017-01-24 DIAGNOSIS — F19.21: ICD-10-CM

## 2017-01-24 DIAGNOSIS — R45.851 SUICIDAL IDEATIONS: ICD-10-CM

## 2017-01-24 DIAGNOSIS — L70.0 ACNE VULGARIS: ICD-10-CM

## 2017-01-24 DIAGNOSIS — Z72.0 TOBACCO ABUSE: ICD-10-CM

## 2017-01-24 DIAGNOSIS — M35.7 BENIGN HYPERMOBILITY SYNDROME: ICD-10-CM

## 2017-01-24 DIAGNOSIS — F60.89 CLUSTER B PERSONALITY DISORDER: ICD-10-CM

## 2017-01-24 DIAGNOSIS — R10.13 EPIGASTRIC ABDOMINAL PAIN: ICD-10-CM

## 2017-01-24 PROCEDURE — 90853 GROUP PSYCHOTHERAPY: CPT | Performed by: SOCIAL WORKER

## 2017-01-24 PROCEDURE — 90853 GROUP PSYCHOTHERAPY: CPT | Mod: 59,,, | Performed by: PSYCHOLOGIST

## 2017-01-24 PROCEDURE — 90853 GROUP PSYCHOTHERAPY: CPT | Mod: ,,, | Performed by: PSYCHOLOGIST

## 2017-01-24 PROCEDURE — 90853 GROUP PSYCHOTHERAPY: CPT

## 2017-01-24 NOTE — PLAN OF CARE
01/24/17 1100   Activity/Group Therapy Checklist   Group Educational  (Identifying Values)   Attendance Attended   Follows Direction Followed directions   Group Interactions/Observations Interacted appropriately   Affect/Mood Range Normal range   Affect/Mood Display Appropriate   Goal Progression Progressing

## 2017-01-24 NOTE — PROGRESS NOTES
Group Psychotherapy (PhD/LCSW)    Site: [facility]    Clinical status of patient: Intensive Outpatient Program (IOP)    Date: 1/24/2017    Group Focus: Psychodynamic Group Psychotherapy    Length of service: 82784 - 45-50 minutes    Number of patients in attendance: 4    Referred by: Behavioral Medicine Unit Treatment Team    Target symptoms: Mood Disorder    Patient's response to treatment: Active Listening and Self-disclosure    Progress toward goals: Progressing slowly    Interval History: Pt talked about appropriate use of mindfulness and defusion while getting angry at her boyfriend yesterday.    Diagnosis: Bipolar Disorder, Borderline Personality Disorder    Plan: Continue treatment on U

## 2017-01-25 ENCOUNTER — HOSPITAL ENCOUNTER (OUTPATIENT)
Dept: PSYCHIATRY | Facility: HOSPITAL | Age: 32
Discharge: HOME | End: 2017-01-25
Attending: PSYCHIATRY & NEUROLOGY
Payer: MEDICARE

## 2017-01-25 DIAGNOSIS — F60.9 PERSONALITY DISORDER: ICD-10-CM

## 2017-01-25 DIAGNOSIS — F90.9 ATTENTION DEFICIT HYPERACTIVITY DISORDER (ADHD), UNSPECIFIED ADHD TYPE: ICD-10-CM

## 2017-01-25 DIAGNOSIS — K80.20 GALLSTONES: ICD-10-CM

## 2017-01-25 DIAGNOSIS — F31.9 BIPOLAR I DISORDER: Primary | ICD-10-CM

## 2017-01-25 DIAGNOSIS — F39 MOOD DISORDER: ICD-10-CM

## 2017-01-25 DIAGNOSIS — F60.89 CLUSTER B PERSONALITY DISORDER: ICD-10-CM

## 2017-01-25 DIAGNOSIS — F19.21: ICD-10-CM

## 2017-01-25 DIAGNOSIS — Z72.0 TOBACCO ABUSE: ICD-10-CM

## 2017-01-25 DIAGNOSIS — M35.7 BENIGN HYPERMOBILITY SYNDROME: ICD-10-CM

## 2017-01-25 DIAGNOSIS — L70.0 ACNE VULGARIS: ICD-10-CM

## 2017-01-25 DIAGNOSIS — R45.851 SUICIDAL IDEATIONS: ICD-10-CM

## 2017-01-25 DIAGNOSIS — R10.13 EPIGASTRIC ABDOMINAL PAIN: ICD-10-CM

## 2017-01-25 PROCEDURE — 90853 GROUP PSYCHOTHERAPY: CPT | Mod: 59,,, | Performed by: PSYCHOLOGIST

## 2017-01-25 PROCEDURE — 90853 GROUP PSYCHOTHERAPY: CPT | Mod: ,,, | Performed by: PSYCHOLOGIST

## 2017-01-25 PROCEDURE — 99233 SBSQ HOSP IP/OBS HIGH 50: CPT | Mod: ,,, | Performed by: PSYCHIATRY & NEUROLOGY

## 2017-01-25 PROCEDURE — 90853 GROUP PSYCHOTHERAPY: CPT

## 2017-01-25 NOTE — PROGRESS NOTES
[facility]  Progress Note  Behavioral Medicine Unit    Date: 2017  Time: 11:15 AM    Name: Vianney Callahan    Age: 31 y.o.       : 1985            Admit Date: 17    SUBJECTIVE:  Patient is a 31 y.o. old female with a history of unspecified mood and anxiety disorder and possible borderline personality disorder admitted to the BMU additional  stabilization after recent hospitalization in December.  At that time she apparently became angry with her boyfriend and had desire to harm him.  She cut off her hair so that she would be less attractive to him.  She also has had complaints about anxiety that is uncontrolled.  She also reports that PTA she was thinking things that were not true, including negative things about her bf, that there was a man living inside her head that was controlling her and that her, bf was a drug dealer.    Today the patient c/o continued concerns she believes are side effects to lithium including shakey vision, lethargy, feeling heavy and continued nausea and vomitting.  She denies crying excessively.  Denies SI.  She did lose her tempere some with her boyfriend last night.  She has been falling asleep in group.      Prior trials:  depakote - abdom pain  seroquel - several trials sedation, sometimes helped  latuda - cant take a high dose at night  zyprexa - eats too much and then purges  Trileptal   risperdal - hand twitches on higher doses  Geodon- similar to latduda    Current Medications:   Current Outpatient Prescriptions on File Prior to Encounter   Medication Sig Dispense Refill    ACZONE 5 % topical gel APPLY TO AFFECTED AREA TWICE DAILY 60 g 0    aripiprazole (ABILIFY) 10 MG Tab Take 1 tablet (10 mg total) by mouth once daily. 30 tablet 0    aripiprazole (ABILIFY) 15 MG Tab Take half tab by mouth each morning 15 tablet 5    cyclobenzaprine (FLEXERIL) 10 MG tablet TAKE 0.5 TO 1 TABLET BY MOUTH AT BEDTIME AS NEEDED FOR BACKPAIN 30 tablet 0     fluconazole (DIFLUCAN) 150 MG Tab Take one tablet by mouth once and may retreat in 3 days if still symptomatic. 2 tablet 4    gabapentin (NEURONTIN) 600 MG tablet Take 2 caps by mouth every morning, 1 cap by mouth at 4pm, and 1 cap by mouth at bedtime 120 tablet 5    lithium (ESKALITH) 300 MG capsule Take 2 capsules (600 mg total) by mouth 2 (two) times daily. 120 capsule 1    metronidazole (FLAGYL) 500 MG tablet take 1 tablet by mouth twice a day for 7 days 14 tablet 0    norethindrone-ethinyl estradiol (MICROGESTIN 1/20) 1-20 mg-mcg per tablet Take 1 tablet by mouth every evening. 84 tablet 4    ondansetron (ZOFRAN-ODT) 4 MG TbDL Take 1 tablet (4 mg total) by mouth every 8 (eight) hours as needed. 30 tablet 0    promethazine (PHENERGAN) 12.5 MG Tab 1 tab po q 8 h prn nausea 30 tablet 0    sertraline (ZOLOFT) 100 MG tablet Take 2 tabs by mouth each morning 60 tablet 5    TAZORAC 0.05 % gel APPLY TOPICALLY EVERY EVENING. WASH OFF IN THE MORNING, AND APPLY SUNSCREEN 100 g 0    tinidazole (TINDAMAX) 500 MG tablet Take 4 tablets by mouth at once daily for two days 8 tablet 1    tramadol (ULTRAM) 50 mg tablet take 1 tablet by mouth every 8 hours if needed 60 tablet 0    valacyclovir (VALTREX) 1000 MG tablet Take 1 tablet (1,000 mg total) by mouth once daily. 90 tablet 3     No current facility-administered medications on file prior to encounter.      Psychiatric ROS:  See Dr. Chou's Admission Note of date 1/9/17 for ROS.  Otherwise, addressed in HPI above.    OBJECTIVE:  Labs/Imaging/Studies:   No results found for this or any previous visit (from the past 48 hour(s)).   Lab Results   Component Value Date    CBMZ 5.3 (L) 01/29/2008     Mental Status Exam:  Appearance:  Well groomed, thin, neatly dressed with hat, appearing healthy and of stated age, very short white hair  Behavior:  Cooperative, pleasant  Speech:  not loud or pressured  Mood:  anxious  Affect:   Calm, mostly serious, appropriate  Thought  Process:  Goal oriented  Thought Content:   No suicidal or homicidal ideation, delusions or hallucinations currently  Associations:  Intact  Memory:  Grossly Intact  Level of Consciousness/Orientation:  Grossly intact  Fund of Knowledge:  Good  Attention:  Attends to interview without distraction  Language:  intact  Insight:  Pt has awareness of illness  Judgment:  Fair  Psychomotor signs:  No involuntary movements or tremor  Gait:  Normal    ASSESSMENT/PLAN:  General Assessment:  Patient is a 31 y.o., female admitted to the BMU partial hospitalization program for stabilization of mood and improvement of anxiety.    Diagnoses:  Bipolar Disorder manic vs mixed, moderate  Unspecified Anxiety Disorder  Cluster B Personality traits, likely Borderline Personality Disorder    Plan:  · Continue BMU treatment  · Will attempt to increase abilify to 15mg, a dose she has taken before with akathisia (Started during December hospitalization, Decreased on 1/6/17 from 15mg daily due to concern for akathisia, increased to 10m on 1/16/17  · Reduce Lithium to 900mg daily, the dose she was taking before complaining of side effects..  (Started during December hospitalization at 300mg tid)  · We discussed the possiblity of replacing abilify with risperidone  · Zoloft 200mg daily (Increased from 150mg daily on 1/6/17)  · Neurontin 1200mg qAM, 600mg qPM, and 600mg qHS (Increased on 1/6/17 from Neurontin 600mg TID)       Enrique Howard MD

## 2017-01-25 NOTE — PROGRESS NOTES
Group Psychotherapy (PhD/LCSW)    Site: [facility]    Clinical status of patient: Intensive Outpatient Program (IOP)    Date: 1/25/2017    Group Focus: Dialectical Behavior Therapy (DBT)-Based Group Psychotherapy    Length of service: 75094 - 45-50 minutes    Number of patients in attendance: 3    Referred by: Behavioral Medicine Unit Treatment Team    Target symptoms: Mood Disorder    Patient's response to treatment: Active Listening and Self-disclosure    Progress toward goals: Progressing slowly    Interval History: Session focus was Distress Tolerance:  Self-Soothing.  Patient identified self-soothing skills for all five senses.  She was actively engaged in session discussion.    Diagnosis: Bipolar Disorder, Borderline Personality Disorder    Plan: Continue treatment on [place]

## 2017-01-25 NOTE — PROGRESS NOTES
Group Psychotherapy (PhD/LCSW)    Site: [facility]    Clinical status of patient: Intensive Outpatient Program (IOP)    Date: 1/25/2017    Group Focus: Stress Management    Length of service: 67885 - 45-50 minutes    Number of patients in attendance: 13    Referred by: Behavioral Medicine Unit Treatment Team    Target symptoms: Mood Disorder    Patient's response to treatment: Active Listening and Self-disclosure    Progress toward goals: Progressing adequately    Interval History: Discussed how excessive change can contribute to illness.    Diagnosis: Bipolar disorder    Plan: Continue treatment on U

## 2017-01-25 NOTE — PROGRESS NOTES
"Group Psychotherapy (PhD/LCSW)    Site: [facility]    Clinical status of patient: Intensive Outpatient Program (IOP)    Date: 1/25/2017    Group Focus: Psychodynamic Group Psychotherapy    Length of service: 64735 - 45-50 minutes    Number of patients in attendance: 5    Referred by: Behavioral Medicine Unit Treatment Team    Target symptoms: Mood Disorder    Patient's response to treatment: Active Listening and Self-disclosure    Progress toward goals: Progressing slowly    Interval History: Pt reports that she remains "confused" about many things in her life but is working on "staying focused" as she is learning in group. She is having some problems with her medication and side effects and does not feel well today, but managed to remain engaged in group.    Diagnosis: Bipolar Disorder, Borderline Personality Disorder    Plan: Continue treatment on BMU        "

## 2017-01-26 ENCOUNTER — HOSPITAL ENCOUNTER (OUTPATIENT)
Dept: PSYCHIATRY | Facility: HOSPITAL | Age: 32
Discharge: HOME | End: 2017-01-26
Attending: PSYCHIATRY & NEUROLOGY
Payer: MEDICARE

## 2017-01-26 DIAGNOSIS — Z72.0 TOBACCO ABUSE: ICD-10-CM

## 2017-01-26 DIAGNOSIS — F60.9 PERSONALITY DISORDER: ICD-10-CM

## 2017-01-26 DIAGNOSIS — F39 MOOD DISORDER: ICD-10-CM

## 2017-01-26 DIAGNOSIS — L70.0 ACNE VULGARIS: ICD-10-CM

## 2017-01-26 DIAGNOSIS — F31.9 BIPOLAR I DISORDER: Primary | ICD-10-CM

## 2017-01-26 DIAGNOSIS — K80.20 GALLSTONES: ICD-10-CM

## 2017-01-26 DIAGNOSIS — F60.89 CLUSTER B PERSONALITY DISORDER: ICD-10-CM

## 2017-01-26 DIAGNOSIS — R10.13 EPIGASTRIC ABDOMINAL PAIN: ICD-10-CM

## 2017-01-26 DIAGNOSIS — R45.851 SUICIDAL IDEATIONS: ICD-10-CM

## 2017-01-26 DIAGNOSIS — F39 UNSPECIFIED EPISODIC MOOD DISORDER: ICD-10-CM

## 2017-01-26 DIAGNOSIS — F19.21: ICD-10-CM

## 2017-01-26 DIAGNOSIS — F90.9 ATTENTION DEFICIT HYPERACTIVITY DISORDER (ADHD), UNSPECIFIED ADHD TYPE: ICD-10-CM

## 2017-01-26 DIAGNOSIS — M35.7 BENIGN HYPERMOBILITY SYNDROME: ICD-10-CM

## 2017-01-26 PROCEDURE — 90853 GROUP PSYCHOTHERAPY: CPT | Mod: 59,,, | Performed by: PSYCHOLOGIST

## 2017-01-26 PROCEDURE — 90853 GROUP PSYCHOTHERAPY: CPT | Mod: ,,, | Performed by: PSYCHOLOGIST

## 2017-01-26 PROCEDURE — 90853 GROUP PSYCHOTHERAPY: CPT

## 2017-01-26 PROCEDURE — 99232 SBSQ HOSP IP/OBS MODERATE 35: CPT | Mod: ,,, | Performed by: PSYCHIATRY & NEUROLOGY

## 2017-01-26 RX ORDER — ARIPIPRAZOLE 15 MG/1
15 TABLET ORAL DAILY
Qty: 30 TABLET | Refills: 1 | Status: SHIPPED | OUTPATIENT
Start: 2017-01-26 | End: 2017-02-06 | Stop reason: DRUGHIGH

## 2017-01-26 NOTE — PROGRESS NOTES
Group Psychotherapy (PhD/LCSW)    Site: [facility]    Clinical status of patient: Intensive Outpatient Program (IOP)    Date: 1/26/2017    Group Focus: Stress Management    Length of service: 45841 - 45-50 minutes    Number of patients in attendance: 15    Referred by: Behavioral Medicine Unit Treatment Team    Target symptoms: Mood Disorder    Patient's response to treatment: Active Listening and Self-disclosure    Progress toward goals: Progressing adequately    Interval History: Discussed how embracing false assumptions can contribute to stress.    Diagnosis: Bipolar disorder    Plan: Continue treatment on U

## 2017-01-26 NOTE — PATIENT CARE CONFERENCE
BMU Staffing       Problem List:  Bipolar, MRE Manic  Unspecified Anxiety D/O  Cluster B PD Traits  Recent Inpatient Hospitalization  Maladaptive Coping Skills  Poor Stress Management      Pt was staffed with treatment team today. Staff discussed pt's progress in group sessions. Staff discussed that pt is having side effects from medications and medication adjustments.        Follow-Up Appointments:  Medication Management: Dr. Renae  Psychotherapy: Isiah Mahan      Staff Present:  MD Dr. Brenda Burton, PhD  Brianna Prajapati, LEANNE Thomas RN

## 2017-01-26 NOTE — PROGRESS NOTES
"Group Psychotherapy (PhD/LCSW)    Site: [facility]    Clinical status of patient: Intensive Outpatient Program (IOP)    Date: 1/26/2017    Group Focus: Psychodynamic Group Psychotherapy    Length of service: 67537 - 45-50 minutes    Number of patients in attendance: 5    Referred by: Behavioral Medicine Unit Treatment Team    Target symptoms: Mood Disorder    Patient's response to treatment: Active Listening and Self-disclosure    Progress toward goals: Progressing slowly    Interval History: Pt reports that she is feeling well today. She empathized with another pt who is dealing with "trust issues" with her partner, and helped her work on defusion techniques.    Diagnosis: Bipolar Disorder, Borderline Personality Disorder    Plan: Continue treatment on BMU        "

## 2017-01-26 NOTE — PROGRESS NOTES
[facility]  Progress Note  Behavioral Medicine Unit    Date: 2017  Time: 2:05 PM    Name: Vianney Callahan    Age: 31 y.o.       : 1985            Admit Date: 17    SUBJECTIVE:  Patient is a 31 y.o. old female with a history of unspecified mood and anxiety disorder and possible borderline personality disorder admitted to the BMU additional  stabilization after recent hospitalization in December.  At that time she apparently became angry with her boyfriend and had desire to harm him.  She cut off her hair so that she would be less attractive to him.  She also has had complaints about anxiety that is uncontrolled.  She also reports that PTA she was thinking things that were not true, including negative things about her bf, that there was a man living inside her head that was controlling her and that her, bf was a drug dealer.    After yesterday's medication changes the patient reports she remains tired and falling asleep in group.  She however denies akathesia with the increase of abilify thus far.  She denies SI.  She has not been crying.  She has not had intense anger since yesterday.        Prior trials:  depakote - abdom pain  seroquel - several trials sedation, sometimes helped  latuda - cant take a high dose at night  zyprexa - eats too much and then purges  Trileptal   risperdal - hand twitches on higher doses  Geodon- similar to latduda    Current Medications:   Current Outpatient Prescriptions on File Prior to Encounter   Medication Sig Dispense Refill    ACZONE 5 % topical gel APPLY TO AFFECTED AREA TWICE DAILY 60 g 0    cyclobenzaprine (FLEXERIL) 10 MG tablet TAKE 0.5 TO 1 TABLET BY MOUTH AT BEDTIME AS NEEDED FOR BACKPAIN 30 tablet 0    fluconazole (DIFLUCAN) 150 MG Tab Take one tablet by mouth once and may retreat in 3 days if still symptomatic. 2 tablet 4    gabapentin (NEURONTIN) 600 MG tablet Take 2 caps by mouth every morning, 1 cap by mouth at 4pm, and 1 cap by  mouth at bedtime 120 tablet 5    lithium (ESKALITH) 300 MG capsule Take 2 capsules (600 mg total) by mouth 2 (two) times daily. 120 capsule 1    metronidazole (FLAGYL) 500 MG tablet take 1 tablet by mouth twice a day for 7 days 14 tablet 0    norethindrone-ethinyl estradiol (MICROGESTIN 1/20) 1-20 mg-mcg per tablet Take 1 tablet by mouth every evening. 84 tablet 4    ondansetron (ZOFRAN-ODT) 4 MG TbDL Take 1 tablet (4 mg total) by mouth every 8 (eight) hours as needed. 30 tablet 0    promethazine (PHENERGAN) 12.5 MG Tab 1 tab po q 8 h prn nausea 30 tablet 0    sertraline (ZOLOFT) 100 MG tablet Take 2 tabs by mouth each morning 60 tablet 5    TAZORAC 0.05 % gel APPLY TOPICALLY EVERY EVENING. WASH OFF IN THE MORNING, AND APPLY SUNSCREEN 100 g 0    tinidazole (TINDAMAX) 500 MG tablet Take 4 tablets by mouth at once daily for two days 8 tablet 1    tramadol (ULTRAM) 50 mg tablet take 1 tablet by mouth every 8 hours if needed 60 tablet 0    valacyclovir (VALTREX) 1000 MG tablet Take 1 tablet (1,000 mg total) by mouth once daily. 90 tablet 3    [DISCONTINUED] aripiprazole (ABILIFY) 10 MG Tab Take 1 tablet (10 mg total) by mouth once daily. 30 tablet 0    [DISCONTINUED] aripiprazole (ABILIFY) 15 MG Tab Take half tab by mouth each morning 15 tablet 5     No current facility-administered medications on file prior to encounter.      Psychiatric ROS:  See Dr. Chou's Admission Note of date 1/9/17 for ROS.  Otherwise, addressed in HPI above.    OBJECTIVE:  Labs/Imaging/Studies:   No results found for this or any previous visit (from the past 48 hour(s)).   Lab Results   Component Value Date    CBMZ 5.3 (L) 01/29/2008     Mental Status Exam:  Appearance:  Well groomed, thin, neatly dressed with hat, appearing healthy and of stated age, very short white hair  Behavior:  Cooperative, pleasant  Speech:  not loud or pressured  Mood:  Much improved  Affect:   Calm, mostly serious, appropriate  Thought Process:  Goal  oriented  Thought Content:   No suicidal or homicidal ideation, delusions or hallucinations currently  Associations:  Intact  Memory:  Grossly Intact  Level of Consciousness/Orientation:  Grossly intact  Fund of Knowledge:  Good  Attention:  Attends to interview without distraction  Language:  intact  Insight:  Pt has awareness of illness  Judgment:  Fair  Psychomotor signs:  No involuntary movements or tremor  Gait:  Normal    ASSESSMENT/PLAN:  General Assessment:  Patient is a 31 y.o., female admitted to the BMU partial hospitalization program for stabilization of mood and improvement of anxiety.    Diagnoses:  Bipolar Disorder manic vs mixed, moderate  Unspecified Anxiety Disorder  Cluster B Personality traits, likely Borderline Personality Disorder    Plan:  · Continue BMU treatment.  We discussed D/C tomorrow  · Continue abilify 15mg, a dose she has taken before with akathisia (Started during December hospitalization, Decreased on 1/6/17 from 15mg daily due to concern for akathisia, increased to 10m on 1/16/17  · Continue Lithium 900mg daily, the dose she was taking before complaining of side effects..  (Started during December hospitalization at 300mg tid)  · Zoloft 200mg daily (Increased from 150mg daily on 1/6/17)  · Neurontin 1200mg qAM, 600mg qPM, and 600mg qHS (Increased on 1/6/17 from Neurontin 600mg TID)       Enrique Howard MD

## 2017-01-26 NOTE — PROGRESS NOTES
Group Psychotherapy (PhD/LCSW)    Site: [facility]    Clinical status of patient: Intensive Outpatient Program (IOP)    Date: 1/26/2017    Group Focus: Strength Training      Length of service: 44039 - 45-50 minutes    Number of patients in attendance: 5    Referred by: Behavioral Medicine Unit Treatment Team    Target symptoms: Mood Disorder    Patient's response to treatment: Active Listening and Feedback    Progress toward goals: Progressing slowly    Interval History: Discussed creative problem solving strategies. Explained technique of brainstorming. Group successfully practiced brainstorming to help a group member develop new ideas for finding work.     Diagnosis: Bipolar Disorder, Borderline Personality Disorder    Plan: Continue treatment on BMU

## 2017-01-27 ENCOUNTER — HOSPITAL ENCOUNTER (OUTPATIENT)
Dept: PSYCHIATRY | Facility: HOSPITAL | Age: 32
Discharge: HOME | End: 2017-01-27
Attending: PSYCHIATRY & NEUROLOGY
Payer: COMMERCIAL

## 2017-01-27 DIAGNOSIS — F31.9 BIPOLAR I DISORDER: Primary | ICD-10-CM

## 2017-01-27 DIAGNOSIS — M35.7 BENIGN HYPERMOBILITY SYNDROME: ICD-10-CM

## 2017-01-27 DIAGNOSIS — F60.9 PERSONALITY DISORDER: ICD-10-CM

## 2017-01-27 DIAGNOSIS — R10.13 EPIGASTRIC ABDOMINAL PAIN: ICD-10-CM

## 2017-01-27 DIAGNOSIS — F39 MOOD DISORDER: ICD-10-CM

## 2017-01-27 DIAGNOSIS — F19.21: ICD-10-CM

## 2017-01-27 DIAGNOSIS — K80.20 GALLSTONES: ICD-10-CM

## 2017-01-27 DIAGNOSIS — F90.9 ATTENTION DEFICIT HYPERACTIVITY DISORDER (ADHD), UNSPECIFIED ADHD TYPE: ICD-10-CM

## 2017-01-27 DIAGNOSIS — R45.851 SUICIDAL IDEATIONS: ICD-10-CM

## 2017-01-27 DIAGNOSIS — F60.89 CLUSTER B PERSONALITY DISORDER: ICD-10-CM

## 2017-01-27 DIAGNOSIS — Z72.0 TOBACCO ABUSE: ICD-10-CM

## 2017-01-27 DIAGNOSIS — L70.0 ACNE VULGARIS: ICD-10-CM

## 2017-01-27 PROCEDURE — 90853 GROUP PSYCHOTHERAPY: CPT | Mod: ,,, | Performed by: PSYCHOLOGIST

## 2017-01-27 PROCEDURE — 90853 GROUP PSYCHOTHERAPY: CPT | Mod: 59,,, | Performed by: PSYCHOLOGIST

## 2017-01-27 PROCEDURE — 90853 GROUP PSYCHOTHERAPY: CPT

## 2017-01-27 NOTE — PROGRESS NOTES
Group Psychotherapy (PhD/LCSW)    Site: [facility]    Clinical status of patient: Intensive Outpatient Program (IOP)    Date: 1/27/2017    Group Focus: Stress Management    Length of service: 97235 - 45-50 minutes    Number of patients in attendance: 13    Referred by: Behavioral Medicine Unit Treatment Team    Target symptoms: Mood Disorder    Patient's response to treatment: Active Listening and Self-disclosure    Progress toward goals: Progressing slowly    Interval History: Group learned mindfulness techniques (breathing) to improve present-moment awareness, impulsive behavior tendencies, and tolerance of various emotional states.    Diagnosis: Bipolar Disorder, Borderline Personality Disorder    Plan: Continue treatment on U

## 2017-01-27 NOTE — PROGRESS NOTES
Group Psychotherapy (PhD/LCSW)    Site: [facility]    Clinical status of patient: Intensive Outpatient Program (IOP)    Date: 1/27/2017    Group Focus:The Dynamics of Relationship       Length of service: 46656 - 45-50 minutes    Number of patients in attendance: 5    Referred by: Behavioral Medicine Unit Treatment Team    Target symptoms: Mood Disorder    Patient's response to treatment: Active Listening and Self-disclosure    Progress toward goals: Progressing slowly    Interval History: Discussed techniques for self-initiated change in dysfunctional relationship dynamics by shifting the focus from getting the other person to change to identifying ways that one can change one's own responses in the dysfunctional dynamics. Pt discussed ways she could ameliorate problems in relationship with her mother.     Diagnosis: Bipolar Disorder, Borderline Personality Disorder    Plan: Continue treatment on BMU

## 2017-01-27 NOTE — IP AVS SNAPSHOT
[facility]  [address]  [facility] [address]  Phone: [phone]           Patient Discharge Instructions     Our goal is to set you up for success. This packet includes information on your condition, medications, and your home care. It will help you to care for yourself so you don't get sicker and need to go back to the hospital.     Please ask your nurse if you have any questions.        There are many details to remember when preparing to leave the hospital. Here is what you will need to do:    1. Take your medicine. If you are prescribed medications, review your Medication List in the following pages. You may have new medications to  at the pharmacy and others that you'll need to stop taking. Review the instructions for how and when to take your medications. Talk with your doctor or nurses if you are unsure of what to do.     2. Go to your follow-up appointments. Specific follow-up information is listed in the following pages. Your may be contacted by a transition nurse or clinical provider about future appointments. Be sure we have all of the phone numbers to reach you, if needed. Please contact your provider's office if you are unable to make an appointment.     3. Watch for warning signs. Your doctor or nurse will give you detailed warning signs to watch for and when to call for assistance. These instructions may also include educational information about your condition. If you experience any of warning signs to your health, call your doctor.               [facility]  Unless otherwise directed by your provider, please contact [facility], our nurse care line that is available for 24/7 assistance.     [phone] (toll-free)    Registered nurses in the [facility] provide clinical advisement, health education, appointment booking, and other advisory services.                    ** Verify the list of medication(s) below is accurate and up  to date. Carry this with you in case of emergency. If your medications have changed, please notify your healthcare provider.             Medication List      TAKE these medications        Additional Info                      ACZONE 5 % topical gel   Quantity:  60 g   Refills:  0   Generic drug:  dapsone    Instructions:  APPLY TO AFFECTED AREA TWICE DAILY     Begin Date    AM    Noon    PM    Bedtime       aripiprazole 15 MG Tab   Commonly known as:  ABILIFY   Quantity:  30 tablet   Refills:  1   Dose:  15 mg    Instructions:  Take 1 tablet (15 mg total) by mouth once daily. Take half tab by mouth each morning     Begin Date    AM    Noon    PM    Bedtime       cyclobenzaprine 10 MG tablet   Commonly known as:  FLEXERIL   Quantity:  30 tablet   Refills:  0    Instructions:  TAKE 0.5 TO 1 TABLET BY MOUTH AT BEDTIME AS NEEDED FOR BACKPAIN     Begin Date    AM    Noon    PM    Bedtime       fluconazole 150 MG Tab   Commonly known as:  DIFLUCAN   Quantity:  2 tablet   Refills:  4    Instructions:  Take one tablet by mouth once and may retreat in 3 days if still symptomatic.     Begin Date    AM    Noon    PM    Bedtime       gabapentin 600 MG tablet   Commonly known as:  NEURONTIN   Quantity:  120 tablet   Refills:  5    Instructions:  Take 2 caps by mouth every morning, 1 cap by mouth at 4pm, and 1 cap by mouth at bedtime     Begin Date    AM    Noon    PM    Bedtime       lithium 300 MG capsule   Commonly known as:  ESKALITH   Quantity:  120 capsule   Refills:  1   Dose:  600 mg    Instructions:  Take 2 capsules (600 mg total) by mouth 2 (two) times daily.     Begin Date    AM    Noon    PM    Bedtime       metronidazole 500 MG tablet   Commonly known as:  FLAGYL   Quantity:  14 tablet   Refills:  0    Instructions:  take 1 tablet by mouth twice a day for 7 days     Begin Date    AM    Noon    PM    Bedtime       norethindrone-ethinyl estradiol 1-20 mg-mcg per tablet   Commonly known as:  MICROGESTIN 1/20   Quantity:   84 tablet   Refills:  4   Dose:  1 tablet    Instructions:  Take 1 tablet by mouth every evening.     Begin Date    AM    Noon    PM    Bedtime       ondansetron 4 MG Tbdl   Commonly known as:  ZOFRAN-ODT   Quantity:  30 tablet   Refills:  0   Dose:  4 mg    Instructions:  Take 1 tablet (4 mg total) by mouth every 8 (eight) hours as needed.     Begin Date    AM    Noon    PM    Bedtime       promethazine 12.5 MG Tab   Commonly known as:  PHENERGAN   Quantity:  30 tablet   Refills:  0    Instructions:  1 tab po q 8 h prn nausea     Begin Date    AM    Noon    PM    Bedtime       sertraline 100 MG tablet   Commonly known as:  ZOLOFT   Quantity:  60 tablet   Refills:  5    Instructions:  Take 2 tabs by mouth each morning     Begin Date    AM    Noon    PM    Bedtime       TAZORAC 0.05 % gel   Quantity:  100 g   Refills:  0   Generic drug:  tazarotene    Instructions:  APPLY TOPICALLY EVERY EVENING. WASH OFF IN THE MORNING, AND APPLY SUNSCREEN     Begin Date    AM    Noon    PM    Bedtime       tinidazole 500 MG tablet   Commonly known as:  TINDAMAX   Quantity:  8 tablet   Refills:  1    Instructions:  Take 4 tablets by mouth at once daily for two days     Begin Date    AM    Noon    PM    Bedtime       tramadol 50 mg tablet   Commonly known as:  ULTRAM   Quantity:  60 tablet   Refills:  0    Instructions:  take 1 tablet by mouth every 8 hours if needed     Begin Date    AM    Noon    PM    Bedtime       valacyclovir 1000 MG tablet   Commonly known as:  VALTREX   Quantity:  90 tablet   Refills:  3   Dose:  1000 mg    Instructions:  Take 1 tablet (1,000 mg total) by mouth once daily.     Begin Date    AM    Noon    PM    Bedtime                  Please bring to all follow up appointments:    1. A copy of your discharge instructions.  2. All medicines you are currently taking in their original bottles.  3. Identification and insurance card.    Please arrive 15 minutes ahead of scheduled appointment time.    Please call 24  hours in advance if you must reschedule your appointment and/or time.        Your Scheduled Appointments     Jan 30, 2017  1:00 PM CST   Hospital Follow Up with Nellie Coreas MD   [facility] - Internal Medicine ([facility])    [address]  [place] [address]   [phone]            Feb 03, 2017  2:00 PM CST   Follow Up/Office Visit with Isiah Mahan LCSW [facility] ([facility] )    [address]  [place] [address]   [phone]            Feb 06, 2017  8:30 AM CST   Follow Up/Office Visit with Roman Renae NP   [facility] - Psychiatry ([facility] )    [address]  [place] [address]   [phone]            Feb 07, 2017 10:00 AM CST   Follow Up/Office Visit with Isiah Mahan LCSW [facility] ([facility] )    [address]  [place] [address]   [phone]            Feb 08, 2017  8:15 AM CST   Follow Up/Office Visit with Clarissa Lynn MD   [facility] - Dermatology ([facility] )    [address]  [place] [address]   [phone]                  Admission Information     Date & Time Provider Department CSN    1/27/2017  8:00 AM Enrique Howard MD [facility] [id number]       Admisson Diagnosis: None      Care Providers     Provider Role Specialty Primary office phone    Enrique Howard MD Attending Provider Psychiatry [phone]      Your Vitals Were     Last Period                   01/06/2017           Recent Lab Values     No lab values to display.      Blood Glucose and Lipid Panel Labs        9/10/2012 12/5/2016                       10:53 AM  9:00 AM          Cholesterol 165 193          Triglycerides 61 71          HDL Cholesterol 57 64          LDL Cholesterol 96.0 114.8          HDL/Cholesterol Ratio 34.5 33.2          Total Cholesterol/HDL Ratio 2.9 3.0          Non-HDL Cholesterol - 129          Comment for Cholesterol at 10:53  AM on 9/10/2012:  The National Cholesterol Education Program (NCEP) has set thefollowing guidelines (reference ranges) for Cholesterol:Optimal.....................<200 mg/dLBorderline High.............200-239 mg/dLHigh........................> or = 240 mg/dL    Comment for Cholesterol at  9:00 AM on 12/5/2016:  The National Cholesterol Education Program (NCEP) has set the  following guidelines (reference ranges) for Cholesterol:  Optimal.....................<200 mg/dL  Borderline High.............200-239 mg/dL  High........................> or = 240 mg/dL      Comment for Triglycerides at 10:53 AM on 9/10/2012:  The National Cholesterol Education Program (NCEP) has set thefollowing guidelines (reference values) for triglycerides:Normal......................<150 mg/dLBorderline High.............150-199 mg/dLHigh........................200-499 mg/dLVery High...................> or = 500 mg/dL    Comment for Triglycerides at  9:00 AM on 12/5/2016:  The National Cholesterol Education Program (NCEP) has set the  following guidelines (reference values) for triglycerides:  Normal......................<150 mg/dL  Borderline High.............150-199 mg/dL  High........................200-499 mg/dL      Comment for HDL Cholesterol at 10:53 AM on 9/10/2012:  The National Cholesterol Education Program (NCEP) has set thefollowing guidelines (reference values) for HDL Cholesterol:Low...............<40Optimal...........>60    Comment for HDL Cholesterol at  9:00 AM on 12/5/2016:  The National Cholesterol Education Program (NCEP) has set the  following guidelines (reference values) for HDL Cholesterol:  Low...............<40 mg/dL  Optimal...........>60 mg/dL      Comment for LDL Cholesterol at 10:53 AM on 9/10/2012:  The National Cholesterol Education Program (NCEP) has set thefollowing guidelines (reference values) for LDL Cholesterol:Optimal.......................<130 mg/dLBorderline High...............130-159  mg/dLHigh..........................160-189 mg/dLVery High.....................>190 mg/dL    Comment for LDL Cholesterol at  9:00 AM on 12/5/2016:  The National Cholesterol Education Program (NCEP) has set the  following guidelines (reference values) for LDL Cholesterol:  Optimal.......................<130 mg/dL  Borderline High...............130-159 mg/dL  High..........................160-189 mg/dL  Very High.....................>190 mg/dL      Comment for Non-HDL Cholesterol at  9:00 AM on 12/5/2016:  Risk category and Non-HDL cholesterol goals:  Coronary heart disease (CHD)or equivalent (10-year risk of CHD >20%):  Non-HDL cholesterol goal     <130 mg/dL  Two or more CHD risk factors and 10-year risk of CHD <= 20%:  Non-HDL cholesterol goal     <160 mg/dL  0 to 1 CHD risk factor:  Non-HDL cholesterol goal     <190 mg/dL        Allergies as of 1/27/2017        Reactions    Dilantin [Phenytoin Sodium Extended] Rash    Lithium Analogues Rash    Saphris [Asenapine]     Confusion and depressed mood.    Topamax [Topiramate] Anaphylaxis    Wellbutrin [Bupropion Hcl] Other (See Comments)    Induces law      Advance Directives     An advance directive is a document which, in the event you are no longer able to make decisions for yourself, tells your healthcare team what kind of treatment you do or do not want to receive, or who you would like to make those decisions for you.  If you do not currently have an advance directive, [facility] encourages you to create one.  For more information call:  [phone]), [phone] ([phone]),  or log on to [email or web address].        Smoking Cessation     If you would like to quit smoking:   You may be eligible for free services if you are a [place] resident and started smoking cigarettes before September 1, 1988.  Call the Smoking Cessation Trust (SCT) toll free at (900) 176-7936 or (174) 940-3026.   Call 0-855-QUIT-NOW if you do not meet the above  criteria.            Language Assistance Services     ATTENTION: Language assistance services are available, free of charge. Please call [phone].      ATENCIÓN: Si habla herson, tiene a best disposición servicios gratuitos de asistencia lingüística. Llame al [phone].     CHÚ Ý: N?u b?n nói Ti?ng Vi?t, có các d?ch v? h? tr? ngôn ng? mi?n phí dành cho b?n. G?i s? [phone].        National Suicide Prevention Lifeline     If you or someone you know is thinking about suicide, call the National Suicide Prevention Lifeline.  National Suicide Hotline: 8-955-076-RJTJ (0877)  The lifeline is free, confidential and always available.  Help a loved one, a friend or yourself.  www.suicideprevenetionlifeline.org           [facility] complies with applicable Federal civil rights laws and does not discriminate on the basis of race, color, national origin, age, disability, or sex.

## 2017-01-30 ENCOUNTER — OFFICE VISIT (OUTPATIENT)
Dept: PSYCHIATRY | Facility: CLINIC | Age: 32
End: 2017-01-30
Payer: COMMERCIAL

## 2017-01-30 ENCOUNTER — OFFICE VISIT (OUTPATIENT)
Dept: INTERNAL MEDICINE | Facility: CLINIC | Age: 32
End: 2017-01-30
Payer: MEDICARE

## 2017-01-30 VITALS
SYSTOLIC BLOOD PRESSURE: 108 MMHG | OXYGEN SATURATION: 99 % | HEART RATE: 96 BPM | WEIGHT: 122.38 LBS | HEIGHT: 67 IN | BODY MASS INDEX: 19.21 KG/M2 | DIASTOLIC BLOOD PRESSURE: 60 MMHG

## 2017-01-30 DIAGNOSIS — R76.8 FALSE POSITIVE SEROLOGICAL TEST FOR HEPATITIS C: Primary | ICD-10-CM

## 2017-01-30 DIAGNOSIS — F17.210 CIGARETTE SMOKER: ICD-10-CM

## 2017-01-30 DIAGNOSIS — F31.77 BIPOLAR I DISORDER, MOST RECENT EPISODE (OR CURRENT) MIXED, IN PARTIAL OR UNSPECIFIED REMISSION: Primary | ICD-10-CM

## 2017-01-30 DIAGNOSIS — F39 UNSPECIFIED EPISODIC MOOD DISORDER: ICD-10-CM

## 2017-01-30 PROCEDURE — 99499 UNLISTED E&M SERVICE: CPT | Mod: S$PBB,,, | Performed by: INTERNAL MEDICINE

## 2017-01-30 PROCEDURE — 1159F MED LIST DOCD IN RCRD: CPT | Mod: S$GLB,,, | Performed by: PSYCHIATRY & NEUROLOGY

## 2017-01-30 PROCEDURE — 99213 OFFICE O/P EST LOW 20 MIN: CPT | Mod: S$GLB,,, | Performed by: INTERNAL MEDICINE

## 2017-01-30 PROCEDURE — 99213 OFFICE O/P EST LOW 20 MIN: CPT | Mod: S$GLB,,, | Performed by: PSYCHIATRY & NEUROLOGY

## 2017-01-30 PROCEDURE — 99499 UNLISTED E&M SERVICE: CPT | Mod: S$GLB,,, | Performed by: PSYCHIATRY & NEUROLOGY

## 2017-01-30 PROCEDURE — 99999 PR PBB SHADOW E&M-EST. PATIENT-LVL III: CPT | Mod: PBBFAC,,, | Performed by: INTERNAL MEDICINE

## 2017-01-30 PROCEDURE — 1159F MED LIST DOCD IN RCRD: CPT | Mod: S$GLB,,, | Performed by: INTERNAL MEDICINE

## 2017-01-30 RX ORDER — NICOTINE 7MG/24HR
1 PATCH, TRANSDERMAL 24 HOURS TRANSDERMAL DAILY
Refills: 0 | Status: CANCELLED | COMMUNITY
Start: 2017-01-30

## 2017-01-30 NOTE — PROGRESS NOTES
Subjective:       Patient ID: Vianney Callahan is a 31 y.o. female.    Chief Complaint: Hospital Follow Up    HPI   Comes to discuss Hep C ab status.  Positive when measured at [facility] request at [facility].  She has well established h/o false Hep C ab.  Viral loads negative when measured in 5/11,  and 10/13.  No interval h/o Hep c exposure, IV drug abuse.  Lft's are normal.     Admitted [place] 12/8, followed by stay at [facility].    She is eager to stop smoking, but doesn't want to try chanatix due to h/o mental illness.  Would like rx for nicoderm.  Her boyfriend smokes in the house.    Otherwise without complaints.  Depression/mood DO controlled with lithium, ability, zoloft, neurontin.      Review of Systems   Constitutional: Negative for fever and unexpected weight change.   HENT: Negative for congestion and postnasal drip.    Eyes: Negative for pain, discharge and visual disturbance.   Respiratory: Negative for cough, chest tightness, shortness of breath and wheezing.    Cardiovascular: Negative for chest pain and leg swelling.   Gastrointestinal: Positive for nausea (which she relates to Lithium). Negative for abdominal pain, constipation and diarrhea.   Genitourinary: Negative for difficulty urinating, dysuria and hematuria.   Skin: Negative for rash.   Neurological: Negative for headaches.   Psychiatric/Behavioral: Negative for dysphoric mood and sleep disturbance. The patient is not nervous/anxious.        Objective:      Physical Exam   Constitutional: She is oriented to person, place, and time. She appears well-developed and well-nourished. She appears distressed.   Abdominal: Soft. Bowel sounds are normal. She exhibits no distension. There is no tenderness.   Neurological: She is alert and oriented to person, place, and time.   Psychiatric: She has a normal mood and affect. Her behavior is normal.       Assessment:       1. False positive serological test for hepatitis C    2. Unspecified episodic mood disorder    3.  Cigarette smoker        Plan:       Vianney was seen today for hospital follow up.    Diagnoses and all orders for this visit:    False positive serological test for hepatitis C    Unspecified episodic mood disorder    Cigarette smoker    Other orders  -     nicotine (NICODERM CQ) 7 mg/24 hr; Place 1 patch onto the skin once daily.    No need to re-measure Hep C viral load   Encouraged to join smoking cessation clinic.  She'll consider.
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
## 2017-01-30 NOTE — MR AVS SNAPSHOT
[facility] - Internal Medicine  [address]  [facility] [address]  Phone: [phone]  Fax: [phone]                  Vianney Callahan   2017 1:00 PM   Office Visit    Description:  Female : 1985   Provider:  Nellie Coreas MD   Department:  [facility] - [facility] Medicine           Reason for Visit     Hospital Follow Up                To Do List           Future Appointments        Provider Department Dept Phone    2017 8:15 AM Clarissa Lynn MD [facility] - Dermatology [phone]      Goals (5 Years of Data)     None      [facility]     [facility] Nurse Care Line -  Assistance  Registered nurses in the [facility] provide clinical advisement, health education, appointment booking, and other advisory services.  Call for this free service at [phone].             Medications           Message regarding Medications     Verify the changes and/or additions to your medication regime listed below are the same as discussed with your clinician today.  If any of these changes or additions are incorrect, please notify your healthcare provider.        STOP taking these medications     fluconazole (DIFLUCAN) 150 MG Tab Take one tablet by mouth once and may retreat in 3 days if still symptomatic.    metronidazole (FLAGYL) 500 MG tablet take 1 tablet by mouth twice a day for 7 days    tinidazole (TINDAMAX) 500 MG tablet Take 4 tablets by mouth at once daily for two days           Verify that the below list of medications is an accurate representation of the medications you are currently taking.  If none reported, the list may be blank. If incorrect, please contact your healthcare provider. Carry this list with you in case of emergency.           Current Medications     ACZONE 5 % topical gel APPLY TO AFFECTED AREA TWICE DAILY    aripiprazole (ABILIFY) 15 MG Tab Take 1 tablet (15 mg total) by mouth once daily. Take half tab by mouth each morning    gabapentin  "(NEURONTIN) 600 MG tablet Take 2 caps by mouth every morning, 1 cap by mouth at 4pm, and 1 cap by mouth at bedtime    lithium (ESKALITH) 300 MG capsule Take 2 capsules (600 mg total) by mouth 2 (two) times daily.    norethindrone-ethinyl estradiol (MICROGESTIN 1/20) 1-20 mg-mcg per tablet Take 1 tablet by mouth every evening.    sertraline (ZOLOFT) 100 MG tablet Take 2 tabs by mouth each morning    TAZORAC 0.05 % gel APPLY TOPICALLY EVERY EVENING. WASH OFF IN THE MORNING, AND APPLY SUNSCREEN    tramadol (ULTRAM) 50 mg tablet take 1 tablet by mouth every 8 hours if needed    valacyclovir (VALTREX) 1000 MG tablet Take 1 tablet (1,000 mg total) by mouth once daily.    cyclobenzaprine (FLEXERIL) 10 MG tablet TAKE 0.5 TO 1 TABLET BY MOUTH AT BEDTIME AS NEEDED FOR BACKPAIN    ondansetron (ZOFRAN-ODT) 4 MG TbDL Take 1 tablet (4 mg total) by mouth every 8 (eight) hours as needed.    promethazine (PHENERGAN) 12.5 MG Tab 1 tab po q 8 h prn nausea           Clinical Reference Information           Vital Signs - Last Recorded  Most recent update: 1/30/2017  1:10 PM by Jay Vaughn MA    BP Pulse Ht Wt LMP SpO2    108/60 (BP Location: Right arm, Patient Position: Sitting, BP Method: Manual) 96 5' 7" (1.702 m) 55.5 kg (122 lb 5.7 oz) 01/06/2017 99%    BMI                19.16 kg/m2          Blood Pressure          Most Recent Value    BP  108/60      Allergies as of 1/30/2017     Dilantin [Phenytoin Sodium Extended]    Lithium Analogues    Saphris [Asenapine]    Topamax [Topiramate]    Wellbutrin [Bupropion Hcl]      Immunizations Administered on Date of Encounter - 1/30/2017     None      Smoking Cessation     If you would like to quit smoking:   You may be eligible for free services if you are a [place] resident and started smoking cigarettes before September 1, 1988.  Call the Smoking Cessation Trust (SCT) toll free at [phone] or [phone].   Call -800-QUIT-NOW if you do not meet the above criteria.       "

## 2017-02-03 ENCOUNTER — OFFICE VISIT (OUTPATIENT)
Dept: PSYCHIATRY | Facility: CLINIC | Age: 32
End: 2017-02-03
Payer: COMMERCIAL

## 2017-02-03 DIAGNOSIS — F60.89 CLUSTER B PERSONALITY DISORDER: ICD-10-CM

## 2017-02-03 DIAGNOSIS — F39 MOOD DISORDER: ICD-10-CM

## 2017-02-03 DIAGNOSIS — F41.1 ANXIETY STATE, UNSPECIFIED: Primary | ICD-10-CM

## 2017-02-03 PROCEDURE — 90834 PSYTX W PT 45 MINUTES: CPT | Mod: S$GLB,,, | Performed by: SOCIAL WORKER

## 2017-02-03 PROCEDURE — 99499 UNLISTED E&M SERVICE: CPT | Mod: S$GLB,,, | Performed by: SOCIAL WORKER

## 2017-02-06 ENCOUNTER — OFFICE VISIT (OUTPATIENT)
Dept: PSYCHIATRY | Facility: CLINIC | Age: 32
End: 2017-02-06
Payer: COMMERCIAL

## 2017-02-06 VITALS
WEIGHT: 123 LBS | SYSTOLIC BLOOD PRESSURE: 110 MMHG | HEART RATE: 91 BPM | BODY MASS INDEX: 19.3 KG/M2 | HEIGHT: 67 IN | DIASTOLIC BLOOD PRESSURE: 61 MMHG

## 2017-02-06 DIAGNOSIS — F39 MOOD DISORDER: Primary | ICD-10-CM

## 2017-02-06 DIAGNOSIS — F41.9 ANXIETY: ICD-10-CM

## 2017-02-06 DIAGNOSIS — F39 UNSPECIFIED EPISODIC MOOD DISORDER: ICD-10-CM

## 2017-02-06 PROCEDURE — 99214 OFFICE O/P EST MOD 30 MIN: CPT | Mod: S$GLB,,, | Performed by: NURSE PRACTITIONER

## 2017-02-06 PROCEDURE — 99499 UNLISTED E&M SERVICE: CPT | Mod: S$GLB,,, | Performed by: NURSE PRACTITIONER

## 2017-02-06 PROCEDURE — 90833 PSYTX W PT W E/M 30 MIN: CPT | Mod: S$GLB,,, | Performed by: NURSE PRACTITIONER

## 2017-02-06 PROCEDURE — 99999 PR PBB SHADOW E&M-EST. PATIENT-LVL III: CPT | Mod: PBBFAC,,, | Performed by: NURSE PRACTITIONER

## 2017-02-06 RX ORDER — ARIPIPRAZOLE 10 MG/1
10 TABLET ORAL DAILY
Qty: 30 TABLET | Refills: 5 | Status: SHIPPED | OUTPATIENT
Start: 2017-02-06 | End: 2017-06-15 | Stop reason: SDUPTHER

## 2017-02-06 RX ORDER — LITHIUM CARBONATE 300 MG/1
CAPSULE ORAL
Qty: 90 CAPSULE | Refills: 5 | Status: SHIPPED | OUTPATIENT
Start: 2017-02-06 | End: 2017-03-30 | Stop reason: SDUPTHER

## 2017-02-06 NOTE — PATIENT INSTRUCTIONS
[facility] - DEPARTMENT OF PSYCHIATRY   PATIENT INFORMATION    We appreciate the opportunity to participate in your medical care and hope the following protocols will make it easier for you to receive quality treatment in our department.    1. PUNCTUALITY: Your appointment is scheduled for a fixed amount of time.  To get the benefit of your appointment, please arrive early enough to allow time for traffic, parking and registration.  If you are late for your appointment, you may have to reschedule.  Please make every effort to be on time.      2. PAYMENT FOR SERVICES:   Payments are expected at the time of service.  Please contact [phone] if you need to resolve issues involving your account at [facility] or to set up a payment plan.    3. CANCELLATION / MISSED APPOINTMENTS:   In order to receive quality care, all appointments must be kept.  Appointment may be cancelled at least 24 hours before your appointment time. If you do not give at least 24-hour notice of cancellation a fee may be billed directly to the patient.  Please note that insurance does not cover no-show charges, so you will be billed directly.  If you are consistently late, cancel, or do not show for your appointments, our department reserves the right to terminate treatment     MESSAGES- In general you can reach the department by calling [phone], between 8:00 a.m. and 5:00 p.m., Monday through Friday.  You can also use the [facility] Patient Portal.     AFTER HOURS, WEEKEND OR HOLIDAYS- For urgent questions after hours, weekends and holidays, calling the department number [phone] will connect you with a representative.  EMERGENCY-  In case of a crisis when there is a concern of harm to self or others, call 911 or the office [phone] between 8:00 a.m. and 5:00 p.m., Monday through Friday or go to the [facility] Emergency Room.    4. PRESCRIPTION REFILLS:  Prescription refills must be done at your physician office visit, You  will be given a sufficient number of refills to last until your next appointment, you must come to appointments for prescriptions.   No additional refills will be approved beyond the original treatment plan. Again, please note that no additional prescriptions will be approved per patient request.     5. FOLLOW UP APPOINTMENTS:  Follow-up appointments can be made in person at the Psychiatry Appointment Desk, online through the [facility] Patient Portal, or by calling [phone], from 8am to 5pm, between Monday and Friday.  Patients are responsible for scheduling their own follow-up appointment,  It  Is recommended you schedule your appointment before leaving the clinic.    6. Providers are NOT ABLE to schedule appointments; all appointments must be made through the appointment department or through [facility] Portal.           PATIENTS MAY EXPERIENCE SYMPTOMS OF WITHDRAWAL IF THEY RUN OUT OF MEDICATIONS.  THE PATIENT WILL ASSUME ALL RESPONSIBILITIES OF ANY OUTCOMES WHEN THERE IS AN ISSUE WITH NONCOMPLIANCE WITH FOLLOW-UP APPOINTMENTS AND MEDICATIONS.        THERE IS TO BE NO USE OF ALCOHOL AND/OR ILLEGAL SUBSTANCES    PATIENT ARE TO GO TO THE CLOSEST EMERGENCY ROOM IF FEELING A THREAT TO THEMSELVES OR OTHER OR IF GRAVELY DISABLED    TELL US HOW WE ARE DOING.  PLEASE COMPLETE THE PATIENT SATISFACTION SURVERY  Revised December 2016

## 2017-02-06 NOTE — MR AVS SNAPSHOT
[facility]  [address]  [place] [address]  Phone: [phone]  Fax: [phone]                  Vianney Callahan   2017 8:30 AM   Office Visit    Description:  Female : 1985   Provider:  Roman Renae NP   Department:  [facility] Psych[facility]           Reason for Visit     Mood           Diagnoses this Visit        Comments    Mood disorder    -  Primary     Anxiety         Unspecified episodic mood disorder                To Do List           Future Appointments        Provider Department Dept Phone    2017 8:15 AM Clarissa Lynn MD [facility] Dermatology [phone]      Goals (5 Years of Data)     None      Follow-Up and Disposition     Return in about 8 weeks (around 4/3/2017).       These Medications        Disp Refills Start End    aripiprazole (ABILIFY) 10 MG Tab 30 tablet 5 2017     Take 1 tablet (10 mg total) by mouth once daily. - Oral    [facility] [place] [address] Ph #: [phone]       lithium (ESKALITH) 300 MG capsule 90 capsule 5 2017     Take 1 cap by mouth each morning and 2 caps by mouth at bedtime    Ph[facility]acy: [facility] - [place] [address] Ph #: [phone]         [facility]     [facility] On Call Nurse Care Line -  Assistance  Registered nurses in the [facility] On Call Center provide clinical advisement, health education, appointment booking, and other advisory services.  Call for this free service at [phone].             Medications           Message regarding Medications     Verify the changes and/or additions to your medication regime listed below are the same as discussed with your clinician today.  If any of these changes or additions are incorrect, please notify your healthcare provider.        START taking these NEW medications        Refills    aripiprazole (ABILIFY) 10 MG Tab 5    Sig: Take 1 tablet (10 mg total) by  "mouth once daily.    Class: Normal    Route: Oral      CHANGE how you are taking these medications     Start Taking Instead of    lithium (ESKALITH) 300 MG capsule lithium (ESKALITH) 300 MG capsule    Dosage:  Take 1 cap by mouth each morning and 2 caps by mouth at bedtime Dosage:  Take 2 capsules (600 mg total) by mouth 2 (two) times daily.    Reason for Change:  Reorder            Verify that the below list of medications is an accurate representation of the medications you are currently taking.  If none reported, the list may be blank. If incorrect, please contact your healthcare provider. Carry this list with you in case of emergency.           Current Medications     ACZONE 5 % topical gel APPLY TO AFFECTED AREA TWICE DAILY    aripiprazole (ABILIFY) 10 MG Tab Take 1 tablet (10 mg total) by mouth once daily.    cyclobenzaprine (FLEXERIL) 10 MG tablet TAKE 0.5 TO 1 TABLET BY MOUTH AT BEDTIME AS NEEDED FOR BACKPAIN    gabapentin (NEURONTIN) 600 MG tablet Take 2 caps by mouth every morning, 1 cap by mouth at 4pm, and 1 cap by mouth at bedtime    lithium (ESKALITH) 300 MG capsule Take 1 cap by mouth each morning and 2 caps by mouth at bedtime    norethindrone-ethinyl estradiol (MICROGESTIN 1/20) 1-20 mg-mcg per tablet Take 1 tablet by mouth every evening.    ondansetron (ZOFRAN-ODT) 4 MG TbDL Take 1 tablet (4 mg total) by mouth every 8 (eight) hours as needed.    promethazine (PHENERGAN) 12.5 MG Tab 1 tab po q 8 h prn nausea    sertraline (ZOLOFT) 100 MG tablet Take 2 tabs by mouth each morning    TAZORAC 0.05 % gel APPLY TOPICALLY EVERY EVENING. WASH OFF IN THE MORNING, AND APPLY SUNSCREEN    tramadol (ULTRAM) 50 mg tablet take 1 tablet by mouth every 8 hours if needed    valacyclovir (VALTREX) 1000 MG tablet Take 1 tablet (1,000 mg total) by mouth once daily.           Clinical Reference Information           Your Vitals Were     BP Pulse Height Weight Last Period BMI    110/61 91 5' 7" (1.702 m) 55.8 kg (123 lb) " 01/06/2017 19.26 kg/m2      Blood Pressure          Most Recent Value    BP  110/61      Allergies as of 2/6/2017     Dilantin [Phenytoin Sodium Extended]    Lithium Analogues    Saphris [Asenapine]    Topamax [Topiramate]    Wellbutrin [Bupropion Hcl]      Immunizations Administered on Date of Encounter - 2/6/2017     None      Instructions    [facility] - DEPARTMENT OF PSYCHIATRY   PATIENT INFORMATION    We appreciate the opportunity to participate in your medical care and hope the following protocols will make it easier for you to receive quality treatment in our department.    1. PUNCTUALITY: Your appointment is scheduled for a fixed amount of time.  To get the benefit of your appointment, please arrive early enough to allow time for traffic, parking and registration.  If you are late for your appointment, you may have to reschedule.  Please make every effort to be on time.      2. PAYMENT FOR SERVICES:   Payments are expected at the time of service.  Please contact [phone] if you need to resolve issues involving your account at [facility] or to set up a payment plan.    3. CANCELLATION / MISSED APPOINTMENTS:   In order to receive quality care, all appointments must be kept.  Appointment may be cancelled at least 24 hours before your appointment time. If you do not give at least 24-hour notice of cancellation a fee may be billed directly to the patient.  Please note that insurance does not cover no-show charges, so you will be billed directly.  If you are consistently late, cancel, or do not show for your appointments, our department reserves the right to terminate treatment     MESSAGES- In general you can reach the department by calling [phone], between 8:00 a.m. and 5:00 p.m., Monday through Friday.  You can also use the [facility] Patient Portal.     AFTER HOURS, WEEKEND OR HOLIDAYS- For urgent questions after hours, weekends and holidays, calling the department number [phone] will  connect you with a representative.  EMERGENCY-  In case of a crisis when there is a concern of harm to self or others, call 911 or the office [phone] between 8:00 a.m. and 5:00 p.m., Monday through Friday or go to the [facility] Emergency Room.    4. PRESCRIPTION REFILLS:  Prescription refills must be done at your physician office visit, You will be given a sufficient number of refills to last until your next appointment, you must come to appointments for prescriptions.   No additional refills will be approved beyond the original treatment plan. Again, please note that no additional prescriptions will be approved per patient request.     5. FOLLOW UP APPOINTMENTS:  Follow-up appointments can be made in person at the Psychiatry Appointment Desk, online through the [facility] Patient Portal, or by calling [phone], from 8am to 5pm, between Monday and Friday.  Patients are responsible for scheduling their own follow-up appointment,  It  Is recommended you schedule your appointment before leaving the clinic.    6. Providers are NOT ABLE to schedule appointments; all appointments must be made through the appointment department or through [facility].           PATIENTS MAY EXPERIENCE SYMPTOMS OF WITHDRAWAL IF THEY RUN OUT OF MEDICATIONS.  THE PATIENT WILL ASSUME ALL RESPONSIBILITIES OF ANY OUTCOMES WHEN THERE IS AN ISSUE WITH NONCOMPLIANCE WITH FOLLOW-UP APPOINTMENTS AND MEDICATIONS.        THERE IS TO BE NO USE OF ALCOHOL AND/OR ILLEGAL SUBSTANCES    PATIENT ARE TO GO TO THE CLOSEST EMERGENCY ROOM IF FEELING A THREAT TO THEMSELVES OR OTHER OR IF GRAVELY DISABLED    TELL US HOW WE ARE DOING.  PLEASE COMPLETE THE PATIENT SATISFACTION SURVERY  Revised December 2016         Smoking Cessation     If you would like to quit smoking:   You may be eligible for free services if you are a [place] resident and started smoking cigarettes before September 1, 1988.  Call the [employer]oking [employer]essation [employer] ([employer]) toll  free at (944) 315-4958 or (859) 953-6043.   Call 1-800-QUIT-NOW if you do not meet the above criteria.            Language Assistance Services     ATTENTION: Language assistance services are available, free of charge. Please call [phone].      ATENCIÓN: Si habla español, tiene a best disposición servicios gratuitos de asistencia lingüística. Llame al [phone].     CHÚ Ý: N?u b?n nói Ti?ng Vi?t, có các d?ch v? h? tr? ngôn ng? mi?n phí dành cho b?n. G?i s? [phone].         [facility] complies with applicable Federal civil rights laws and does not discriminate on the basis of race, color, national origin, age, disability, or sex.

## 2017-02-06 NOTE — PROGRESS NOTES
"Outpatient Psychiatry Follow-Up Visit (MD/NP)    2/6/2017    Clinical Status of Patient:  Outpatient (Ambulatory)    Chief Complaint:  Vianney Callahan is a 31 y.o. female who presents today for follow-up of mood disorder.  Met with patient.      Interval History and Content of Current Session:  Interim Events/Subjective Report/Content of Current Session:     Mood and thought disorder chronic and fragile, HR for decompensation, chart and Labs reviewed.     Last Lith Level 3 weeks ago, 0.5.       Zoloft 200mg po q day  Neurontin 600mg 2 caps po q AM, 1 cap po q 4pm, 1 cap po q hs  Lithium 300mg 1 cap po a AM and 2 caps po q hs  Abilify 15mg half tab po q day    Patient completed BMU, "it helped me a lot".  Admits she is still taking to her ex-BF, admits to periods of anxiety and pending feelings of panic.  At times feels as if she wants to cry, but feels more in control of her emotions.  Still working with therapist.  Overall, describes mood as more stable.  Eating and sleeping .  Sleeping patient improving , sleeping at night.       Psychotherapy:  · Target symptoms: mood disorder  · Why chosen therapy is appropriate versus another modality: relevant to diagnosis  · Outcome monitoring methods: self-report  · Therapeutic intervention type: insight oriented psychotherapy  · Topics discussed/themes: symptom recognition  · The patient's response to the intervention is accepting. The patient's progress toward treatment goals is fair.  · Duration of intervention: 16 minutes.      Review Of Systems:     GENERAL: no weight loss or gain  SKIN: No rashes or lacerations   HEAD: No headaches   EYES: No exophthalmos, jaundice or blindness  EARS: No dizziness, tinnitus or hearing loss   NOSE: No changes in smell   MOUTH & THROAT: No dyskinetic movements or obvious goiter   CHEST: No shortness of breath, hyperventilation or cough   CARDIOVASCULAR: No tachycardia or chest pain   ABDOMEN: no nausea,no vomiting, pain, constipation or " "diarrhea   URINARY: No frequency, dysuria or sexual dysfunction   ENDOCRINE: No polydipsia, polyuria   MUSCULOSKELETAL: No pain or stiffness of the joints   NEUROLOGIC: No weakness, sensory changes, seizures, confusion, memory loss, tremor or other abnormal movements      Psychiatric Review Of Systems:  sleep: yes  appetite changes: no  weight changes: no  energy/anergy: no  interest/pleasure/anhedonia: no  somatic symptoms: no  libido: no  anxiety/panic: yes        Past Medical, Family and Social History: The patient's past medical, family and social history have been reviewed and updated as appropriate within the electronic medical record - see encounter notes.    Compliance: yes    Side effects: None    Risk Parameters:  Patient reports no suicidal ideation  Patient reports no homicidal ideation  Patient reports no self-injurious behavior  Patient reports no violent behavior    Exam (detailed: at least 9 elements; comprehensive: all 15 elements)   Constitutional  Vitals:  Most recent vital signs, dated less than 90 days prior to this appointment, were reviewed.   Vitals:    02/06/17 0811   BP: 110/61   Pulse: 91   Weight: 55.8 kg (123 lb)   Height: 5' 7" (1.702 m)        General:  unremarkable, age appropriate, casually dressed, neatly groomed     Musculoskeletal  Muscle Strength/Tone:  no dyskinesia, no dystonia, no tremor, no tic   Gait & Station:  non-ataxic     Psychiatric  Speech:  constant, loud at times   Mood & Affect:  steady, yet still dysthymic  appropriate   Thought Process:  goal-directed   Associations:  intact, circumstantial, tangential   Thought Content:  normal, no suicidality, no homicidality, delusions, or paranoia   Insight:  has awareness of illness   Judgement: behavior is adequate to circumstances   Orientation:  grossly intact   Memory: intact for content of interview   Language: grossly intact   Attention Span & Concentration:  able to focus, distracted   Fund of Knowledge:  intact and " appropriate to age and level of education     Assessment and Diagnosis   Status/Progress: Based on the examination today, the patient's problem(s) is/are resolving.  New problems have been presented today.   Co-morbidities and Lack of compliance are not complicating management of the primary condition.  There are no active rule-out diagnoses for this patient at this time.     General Impression:       ICD-10-CM ICD-9-CM   1. Mood disorder F39 296.90   2. Anxiety F41.9 300.00   Cluster B  R/O Bipolar, Schizoaffective    Intervention/Counseling/Treatment Plan   · Medication Management: The risks and benefits of medication were discussed with the patient.   · Zoloft 200mg po q d  · Neurontin 600mg 2 cap po q AM, 1 cap po 4pm, and 1 cap at hs  · Lithium 300mg po a AM and 2 caps po hs  · Abilify 10mg po q d  · Continue with individual therapist      Return to Clinic: 6 weeks

## 2017-02-08 ENCOUNTER — OFFICE VISIT (OUTPATIENT)
Dept: DERMATOLOGY | Facility: CLINIC | Age: 32
End: 2017-02-08
Payer: MEDICARE

## 2017-02-08 DIAGNOSIS — Z12.83 SKIN CANCER SCREENING: ICD-10-CM

## 2017-02-08 DIAGNOSIS — D22.9 MULTIPLE BENIGN NEVI: Primary | ICD-10-CM

## 2017-02-08 PROCEDURE — 99213 OFFICE O/P EST LOW 20 MIN: CPT | Mod: S$GLB,,, | Performed by: DERMATOLOGY

## 2017-02-08 PROCEDURE — 99999 PR PBB SHADOW E&M-EST. PATIENT-LVL II: CPT | Mod: PBBFAC,,, | Performed by: DERMATOLOGY

## 2017-02-08 NOTE — PATIENT INSTRUCTIONS

## 2017-02-08 NOTE — PROGRESS NOTES
Subjective:       Patient ID:  Vianney Callahan is a 31 y.o. female who presents for   Chief Complaint   Patient presents with    [facility] Check     [facility]  [facility]ed [facility] body skin check [facility].  Pt had an atypical mole excised from her L thigh around 4.5 yrs ago. States there is no pigment returning in that location.  No personal hx of skin cancer, but pt has a family history of skin cancer. Her mother had a dark black mole excised from her back.  Pt states she has a mole on her chest which may be changing color. Present since childhood. Asymptomatic and no prior treatment.    Review of Systems   Constitutional: Positive for fatigue. Negative for fever, chills, weight loss, weight gain, night sweats and malaise.   Skin: Positive for daily sunscreen use and activity-related sunscreen use. Negative for recent sunburn.   Hematologic/Lymphatic: Does not bruise/bleed easily.        Objective:    Physical Exam   Constitutional: She appears well-developed and well-nourished. No distress.   Neurological: She is alert and oriented to person, place, and time. She is not disoriented.   Psychiatric: She has a normal mood and affect.   Skin:   Areas Examined (abnormalities noted in diagram):   Head / Face Inspection Performed  Neck Inspection Performed  Chest / Axilla Inspection Performed  Back Inspection Performed  RUE Inspected  LUE Inspection Performed                  Diagram Legend     Erythematous scaling macule/papule c/w actinic keratosis       Vascular papule c/w angioma      Pigmented verrucoid papule/plaque c/w seborrheic keratosis      Yellow umbilicated papule c/w sebaceous hyperplasia      Irregularly shaped tan macule c/w lentigo     1-2 mm smooth white papules consistent with Milia      Movable subcutaneous cyst with punctum c/w epidermal inclusion cyst      Subcutaneous movable cyst c/w pilar cyst      Firm pink to brown papule c/w dermatofibroma      Pedunculated fleshy papule(s) c/w skin tag(s)       Evenly pigmented macule c/w junctional nevus     Mildly variegated pigmented, slightly irregular-bordered macule c/w mildly atypical nevus      Flesh colored to evenly pigmented papule c/w intradermal nevus       Pink pearly papule/plaque c/w basal cell carcinoma      Erythematous hyperkeratotic cursted plaque c/w SCC      Surgical scar with no sign of skin cancer recurrence      Open and closed comedones      Inflammatory papules and pustules      Verrucoid papule consistent consistent with wart     Erythematous eczematous patches and plaques     Dystrophic onycholytic nail with subungual debris c/w onychomycosis     Umbilicated papule    Erythematous-base heme-crusted tan verrucoid plaque consistent with inflamed seborrheic keratosis     Erythematous Silvery Scaling Plaque c/w Psoriasis     See annotation      Assessment / Plan:        Multiple benign nevi  Benign-appearing on exam today. Counseled pt to monitor mole(s) and return to clinic if any changes noted or symptoms (bleeding, itching, pain, etc) noted. Brochure provided.    Skin cancer screening  Upper body skin examination performed today including at least 6 points as noted in physical examination. No lesions suspicious for malignancy noted.  Patient instructed in importance of daily broad spectrum sunscreen use with spf at least 30. Sun avoidance and topical protection/protective clothing discussed.    Return in about 1 year (around 2/8/2018) for skin check or sooner for any concerns.

## 2017-02-08 NOTE — MR AVS SNAPSHOT
[facility]  [address]  [facility] [address]  Phone: [phone]  Fax: [phone]                  Vianney Callahan   2017 8:15 AM   Office Visit    Description:  Female : 1985   Provider:  Clarissa Lynn MD   Department:  [facility]           Reason for Visit     Skin Check           Diagnoses this Visit        Comments    Multiple benign nevi    -  Primary     Skin cancer screening                To Do List           Goals (5 Years of Data)     None      Follow-Up and Disposition     Return in about 1 year (around 2018) for skin check or sooner for any concerns.      [facility]     [facility] -  Assistance  Registered nurses in the [facility] provide clinical advisement, health education, appointment booking, and other advisory services.  Call for this free service at [phone].             Medications           Message regarding Medications     Verify the changes and/or additions to your medication regime listed below are the same as discussed with your clinician today.  If any of these changes or additions are incorrect, please notify your healthcare provider.             Verify that the below list of medications is an accurate representation of the medications you are currently taking.  If none reported, the list may be blank. If incorrect, please contact your healthcare provider. Carry this list with you in case of emergency.           Current Medications     ACZONE 5 % topical gel APPLY TO AFFECTED AREA TWICE DAILY    aripiprazole (ABILIFY) 10 MG Tab Take 1 tablet (10 mg total) by mouth once daily.    cyclobenzaprine (FLEXERIL) 10 MG tablet TAKE 0.5 TO 1 TABLET BY MOUTH AT BEDTIME AS NEEDED FOR BACKPAIN    gabapentin (NEURONTIN) 600 MG tablet Take 2 caps by mouth every morning, 1 cap by mouth at 4pm, and 1 cap by mouth at bedtime    lithium (ESKALITH) 300 MG capsule Take 1 cap by mouth each morning and 2  caps by mouth at bedtime    norethindrone-ethinyl estradiol (MICROGESTIN 1/20) 1-20 mg-mcg per tablet Take 1 tablet by mouth every evening.    ondansetron (ZOFRAN-ODT) 4 MG TbDL Take 1 tablet (4 mg total) by mouth every 8 (eight) hours as needed.    promethazine (PHENERGAN) 12.5 MG Tab 1 tab po q 8 h prn nausea    sertraline (ZOLOFT) 100 MG tablet Take 2 tabs by mouth each morning    TAZORAC 0.05 % gel APPLY TOPICALLY EVERY EVENING. WASH OFF IN THE MORNING, AND APPLY SUNSCREEN    tramadol (ULTRAM) 50 mg tablet take 1 tablet by mouth every 8 hours if needed    valacyclovir (VALTREX) 1000 MG tablet Take 1 tablet (1,000 mg total) by mouth once daily.           Clinical Reference Information           Your Vitals Were     Last Period                   01/06/2017           Allergies as of 2/8/2017     Dilantin [Phenytoin Sodium Extended]    Lithium Analogues    Saphris [Asenapine]    Topamax [Topiramate]    Wellbutrin [Bupropion Hcl]      Immunizations Administered on Date of Encounter - 2/8/2017     None      Instructions    Summer Sun Protection      The [facility] would like to remind you of the importance of sun protection all year round and particularly during the summer when the suns rays are the strongest. It has been proven that both acute and chronic sun exposure damages our cells and leads to skin cancer. Beyond skin cancer, the sun causes 90% of the symptoms of pre-mature skin aging, including wrinkles, lentigines (brown spots), and thin, easily bruised skin. Proper sun protection can help prevent these unwanted conditions.    Many patients report that the dont go in the sun. It has been shown that the average person receives 18 hours of incidental sun exposure per week during activities such as walking through parking lots, driving, or sitting next to windows. This accumulates to several bad sunburns per year!    In choosing sunscreen, you want one that protects against both UVA and  UVB rays. It is recommended that you use one of SPF 30 or higher. It is important to apply the sunscreen about 20 minutes prior to sun exposure. Most sunscreens are chemical sunscreens and a reaction must take place in the skin so that they are effective. If they are applied and then you are immediately exposed to the sun or start sweating, this reaction has not had time to take place and you are therefore unprotected. Sunscreen needs to be reapplied every 2 hours if you are participating in water sports or sweating. We recommend Elta MD or Neutrogena Ultra Sheer Dry Touch SPF 55 for daily use; however there are many options and it is most important for you to find one that you will use on a consistent basis.    If you have sensitive skin, you may do best with a sunscreen that contains only physical blockers such as titanium dioxide or zinc oxide. These are typically thicker and harder to apply, however they afford very good protection. Neutrogena Sensitive Skin, Blue Lizard Sensitive Skin (pink top) or Neutrogena Pure and Free are popular ones.     Aside from sunscreen, clothes with UV protection, wide brimmed hats, and sunglasses are other means of sun protection that we recommend.                        [facility]  [address]  [place] [address]  Dept: [phone]  Dept Fax: [phone]                                                                                      Smoking Cessation     If you would like to quit smoking:   You may be eligible for free services if you are a [place] resident and started smoking cigarettes before September 1, 1988.  Call the Smoking Cessation Trust (SCT) toll free at (016) 304-0520 or (771) 592-0261.   Call 3-963-QUIT-NOW if you do not meet the above criteria.            Language Assistance Services     ATTENTION: Language assistance services are available, free of charge. Please call [phone].      ATENCIÓN: Si  habla herson, tiene a best disposición servicios gratuitos de asistencia lingüística. Bipin al [phone].     FRANCIS Ý: N?u b?n nói Ti?ng Vi?t, có các d?ch v? h? tr? ngôn ng? mi?n phí dành cho b?n. G?i s? [phone].         [facility] complies with applicable Federal civil rights laws and does not discriminate on the basis of race, color, national origin, age, disability, or sex.

## 2017-02-20 RX ORDER — TRAMADOL HYDROCHLORIDE 50 MG/1
TABLET ORAL
Qty: 60 TABLET | Refills: 0 | Status: SHIPPED | OUTPATIENT
Start: 2017-02-20 | End: 2017-04-18 | Stop reason: SDUPTHER
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
## 2017-03-15 RX ORDER — DAPSONE 50 MG/G
GEL TOPICAL
Qty: 60 G | Refills: 0 | Status: SHIPPED | OUTPATIENT
Start: 2017-03-15 | End: 2017-07-05 | Stop reason: SDUPTHER

## 2017-03-17 ENCOUNTER — OFFICE VISIT (OUTPATIENT)
Dept: OTOLARYNGOLOGY | Facility: CLINIC | Age: 32
End: 2017-03-17
Payer: MEDICARE

## 2017-03-17 VITALS
TEMPERATURE: 99 F | BODY MASS INDEX: 21.29 KG/M2 | SYSTOLIC BLOOD PRESSURE: 127 MMHG | WEIGHT: 132.5 LBS | HEIGHT: 66 IN | DIASTOLIC BLOOD PRESSURE: 68 MMHG | HEART RATE: 105 BPM

## 2017-03-17 DIAGNOSIS — J34.3 HYPERTROPHY OF BOTH INFERIOR NASAL TURBINATES: ICD-10-CM

## 2017-03-17 DIAGNOSIS — J34.2 NASAL SEPTAL DEVIATION: ICD-10-CM

## 2017-03-17 DIAGNOSIS — R06.83 SNORING: Primary | ICD-10-CM

## 2017-03-17 DIAGNOSIS — J35.1 TONSILLAR HYPERTROPHY: ICD-10-CM

## 2017-03-17 DIAGNOSIS — R09.81 NASAL CONGESTION: ICD-10-CM

## 2017-03-17 PROCEDURE — 1160F RVW MEDS BY RX/DR IN RCRD: CPT | Mod: S$GLB,,, | Performed by: OTOLARYNGOLOGY

## 2017-03-17 PROCEDURE — 99999 PR PBB SHADOW E&M-EST. PATIENT-LVL III: CPT | Mod: PBBFAC,,, | Performed by: OTOLARYNGOLOGY

## 2017-03-17 PROCEDURE — 99203 OFFICE O/P NEW LOW 30 MIN: CPT | Mod: S$GLB,,, | Performed by: OTOLARYNGOLOGY
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
## 2017-03-17 NOTE — PATIENT INSTRUCTIONS
Consider allergy consultation pending course [id number]  Trial of otc Nasacort or Flonase; 1 spray @ nostril BID x 6 weeks   Consider addition of Astelin nasal spray to regimen( one spray @ nostril h.s. or BID)         ( Rx provided)   Nasal irrigation may help; use distilled water only; Steffen Med rinse kit  Pt. is a potential candidate for septoplasty +/- turbinate reduction pending course  Avoid supine sleeping position  Mucinex D 60 mg in AM may help ( if not taking Aderall)   Smoking cessation literature provided

## 2017-03-17 NOTE — MR AVS SNAPSHOT
[facility] - Otorhinolaryngology  [address]  [facility] [address]  Phone: [phone]  Fax: [phone]                  Vianney Callahan   3/17/2017 9:30 AM   Office Visit    Description:  Female : 1985   Provider:  Nicolas Hernandez III, MD   Department:  [facility] - Otorhinolaryngology           Diagnoses this Visit        Comments    Snoring    -  Primary     Nasal septal deviation     left/posterior    Hypertrophy of both inferior nasal turbinates         Tonsillar hypertrophy         Nasal congestion                To Do List           Goals (5 Years of Data)     None      [facility]     [facility] Care [facility] -  Assistance  Registered nurses in the [facility] Center provide clinical advisement, health education, appointment booking, and other advisory services.  Call for this free service at [phone].             Medications           Message regarding Medications     Verify the changes and/or additions to your medication regime listed below are the same as discussed with your clinician today.  If any of these changes or additions are incorrect, please notify your healthcare provider.             Verify that the below list of medications is an accurate representation of the medications you are currently taking.  If none reported, the list may be blank. If incorrect, please contact your healthcare provider. Carry this list with you in case of emergency.           Current Medications     ACZONE 5 % topical gel APPLY TOPICALLY TO THE AFFECTED AREA TWICE DAILY    aripiprazole (ABILIFY) 10 MG Tab Take 1 tablet (10 mg total) by mouth once daily.    cyclobenzaprine (FLEXERIL) 10 MG tablet TAKE 0.5 TO 1 TABLET BY MOUTH AT BEDTIME AS NEEDED FOR BACKPAIN    gabapentin (NEURONTIN) 600 MG tablet Take 2 caps by mouth every morning, 1 cap by mouth at 4pm, and 1 cap by mouth at bedtime    lithium (ESKALITH) 300 MG capsule Take 1 cap by mouth each morning and 2 caps by mouth  "at bedtime    norethindrone-ethinyl estradiol (MICROGESTIN 1/20) 1-20 mg-mcg per tablet Take 1 tablet by mouth every evening.    ondansetron (ZOFRAN-ODT) 4 MG TbDL Take 1 tablet (4 mg total) by mouth every 8 (eight) hours as needed.    promethazine (PHENERGAN) 12.5 MG Tab 1 tab po q 8 h prn nausea    sertraline (ZOLOFT) 100 MG tablet Take 2 tabs by mouth each morning    TAZORAC 0.05 % gel APPLY TOPICALLY EVERY EVENING. WASH OFF IN THE MORNING, AND APPLY SUNSCREEN    tramadol (ULTRAM) 50 mg tablet take 1 tablet by mouth every 8 hours if needed    valacyclovir (VALTREX) 1000 MG tablet Take 1 tablet (1,000 mg total) by mouth once daily.           Clinical Reference Information           Your Vitals Were     BP Pulse Temp    127/68 (BP Location: Right arm, Patient Position: Sitting, BP Method: Automatic) 105 98.8 °F (37.1 °C) (Tympanic)    Height Weight BMI    5' 6" (1.676 m) 60.1 kg (132 lb 7.9 oz) 21.39 kg/m2      Blood Pressure          Most Recent Value    BP  127/68      Allergies as of 3/17/2017     Dilantin [Phenytoin Sodium Extended]    Lithium Analogues    Saphris [Asenapine]    Topamax [Topiramate]    Wellbutrin [Bupropion Hcl]      Immunizations Administered on Date of Encounter - 3/17/2017     None      Instructions    Consider allergy consultation pending course [id number]  Trial of otc Nasacort or Flonase; 1 spray @ nostril BID x 6 weeks   Consider addition of Astelin nasal spray to regimen( one spray @ nostril h.s. or BID)   Nasal irrigation may help; use distilled water only; Steffen Med rinse kit  Pt. is a potential candidate for septoplasty +/- turbinate reduction pending course  Avoid supine sleeping position            Smoking Cessation     If you would like to quit smoking:   You may be eligible for free services if you are a [place] resident and started smoking cigarettes before September 1, 1988.  Call the Smoking Cessation Trust (SCT) toll free at (920) 272-8989 or (286) 637-3254.   Call " 1-800-QUIT-NOW if you do not meet the above criteria.            Language Assistance Services     ATTENTION: Language assistance services are available, free of charge. Please call [phone].      ATENCIÓN: Si habla herson, tiene a best disposición servicios gratuitos de asistencia lingüística. Llame al [phone].     CHÚ Ý: N?u b?n nói Ti?ng Vi?t, có các d?ch v? h? tr? ngôn ng? mi?n phí dành cho b?n. G?i s? [phone].         [facility] complies with applicable Federal civil rights laws and does not discriminate on the basis of race, color, national origin, age, disability, or sex.

## 2017-03-19 ENCOUNTER — PATIENT MESSAGE (OUTPATIENT)
Dept: PSYCHIATRY | Facility: CLINIC | Age: 32
End: 2017-03-19

## 2017-03-19 DIAGNOSIS — F31.61 BIPOLAR DISORDER, CURRENT EPISODE MIXED, MILD: Primary | ICD-10-CM

## 2017-03-20 ENCOUNTER — PATIENT MESSAGE (OUTPATIENT)
Dept: PSYCHIATRY | Facility: CLINIC | Age: 32
End: 2017-03-20

## 2017-03-20 ENCOUNTER — LAB VISIT (OUTPATIENT)
Dept: LAB | Facility: HOSPITAL | Age: 32
End: 2017-03-20
Payer: MEDICARE

## 2017-03-20 DIAGNOSIS — F31.61 BIPOLAR DISORDER, CURRENT EPISODE MIXED, MILD: ICD-10-CM

## 2017-03-20 LAB — LITHIUM SERPL-SCNC: 0.5 MMOL/L

## 2017-03-20 PROCEDURE — 80178 ASSAY OF LITHIUM: CPT

## 2017-03-20 PROCEDURE — 36415 COLL VENOUS BLD VENIPUNCTURE: CPT

## 2017-03-21 ENCOUNTER — TELEPHONE (OUTPATIENT)
Dept: PSYCHIATRY | Facility: CLINIC | Age: 32
End: 2017-03-21

## 2017-03-21 ENCOUNTER — PATIENT MESSAGE (OUTPATIENT)
Dept: OBSTETRICS AND GYNECOLOGY | Facility: CLINIC | Age: 32
End: 2017-03-21

## 2017-03-30 ENCOUNTER — OFFICE VISIT (OUTPATIENT)
Dept: PSYCHIATRY | Facility: CLINIC | Age: 32
End: 2017-03-30
Payer: COMMERCIAL

## 2017-03-30 VITALS
SYSTOLIC BLOOD PRESSURE: 105 MMHG | HEIGHT: 66 IN | HEART RATE: 57 BPM | BODY MASS INDEX: 20.57 KG/M2 | WEIGHT: 128 LBS | DIASTOLIC BLOOD PRESSURE: 58 MMHG

## 2017-03-30 DIAGNOSIS — F39 MOOD DISORDER: ICD-10-CM

## 2017-03-30 DIAGNOSIS — F39 UNSPECIFIED EPISODIC MOOD DISORDER: ICD-10-CM

## 2017-03-30 DIAGNOSIS — F31.61 BIPOLAR DISORDER, CURRENT EPISODE MIXED, MILD: Primary | ICD-10-CM

## 2017-03-30 PROCEDURE — 99499 UNLISTED E&M SERVICE: CPT | Mod: S$GLB,,, | Performed by: NURSE PRACTITIONER

## 2017-03-30 PROCEDURE — 1160F RVW MEDS BY RX/DR IN RCRD: CPT | Mod: S$GLB,,, | Performed by: NURSE PRACTITIONER

## 2017-03-30 PROCEDURE — 90833 PSYTX W PT W E/M 30 MIN: CPT | Mod: S$GLB,,, | Performed by: NURSE PRACTITIONER

## 2017-03-30 PROCEDURE — 99999 PR PBB SHADOW E&M-EST. PATIENT-LVL III: CPT | Mod: PBBFAC,,, | Performed by: NURSE PRACTITIONER

## 2017-03-30 PROCEDURE — 99214 OFFICE O/P EST MOD 30 MIN: CPT | Mod: S$GLB,,, | Performed by: NURSE PRACTITIONER

## 2017-03-30 RX ORDER — GABAPENTIN 600 MG/1
TABLET ORAL
Qty: 120 TABLET | Refills: 5
Start: 2017-01-06 | End: 2017-06-15 | Stop reason: SDUPTHER

## 2017-03-30 RX ORDER — LITHIUM CARBONATE 300 MG/1
CAPSULE ORAL
Qty: 90 CAPSULE | Refills: 5
Start: 2017-02-06 | End: 2017-06-15 | Stop reason: SDDI

## 2017-03-30 NOTE — PATIENT INSTRUCTIONS
[facility] - DEPARTMENT OF PSYCHIATRY   PATIENT INFORMATION    We appreciate the opportunity to participate in your medical care and hope the following protocols will make it easier for you to receive quality treatment in our department.    1. PUNCTUALITY: Your appointment is scheduled for a fixed amount of time.  To get the benefit of your appointment, please arrive early enough to allow time for traffic, parking and registration.  If you are late for your appointment, you may have to reschedule.  Please make every effort to be on time.      2. PAYMENT FOR SERVICES:   Payments are expected at the time of service.  Please contact [phone] if you need to resolve issues involving your account at [facility] or to set up a payment plan.    3. CANCELLATION / MISSED APPOINTMENTS:   In order to receive quality care, all appointments must be kept.  Appointment may be cancelled at least 24 hours before your appointment time. If you do not give at least 24-hour notice of cancellation a fee may be billed directly to the patient.  Please note that insurance does not cover no-show charges, so you will be billed directly.  If you are consistently late, cancel, or do not show for your appointments, our department reserves the right to terminate treatment     MESSAGES- In general you can reach the department by calling [phone], between 8:00 a.m. and 5:00 p.m., Monday through Friday.  You can also use the [facility] Patient Portal.     AFTER HOURS, WEEKEND OR HOLIDAYS- For urgent questions after hours, weekends and holidays, calling the department number [phone] will connect you with a representative.  EMERGENCY-  In case of a crisis when there is a concern of harm to self or others, call 911 or the office [phone] between 8:00 a.m. and 5:00 p.m., Monday through Friday or go to the [facility] Emergency Room.    4. PRESCRIPTION REFILLS:  Prescription refills must be done at your physician office visit, You  will be given a sufficient number of refills to last until your next appointment, you must come to appointments for prescriptions.   No additional refills will be approved beyond the original treatment plan. Again, please note that no additional prescriptions will be approved per patient request.     5. FOLLOW UP APPOINTMENTS:  Follow-up appointments can be made in person at the Psychiatry Appointment Desk, online through the [facility] Portal, or by calling [phone], from 8am to 5pm, between Monday and Friday.  Patients are responsible for scheduling their own follow-up appointment,  It  Is recommended you schedule your appointment before leaving the clinic.    6. Providers are NOT ABLE to schedule appointments; all appointments must be made through the appointment department or through [facility].           PATIENTS MAY EXPERIENCE SYMPTOMS OF WITHDRAWAL IF THEY RUN OUT OF MEDICATIONS.  THE PATIENT WILL ASSUME ALL RESPONSIBILITIES OF ANY OUTCOMES WHEN THERE IS AN ISSUE WITH NONCOMPLIANCE WITH FOLLOW-UP APPOINTMENTS AND MEDICATIONS.        THERE IS TO BE NO USE OF ALCOHOL AND/OR ILLEGAL SUBSTANCES    PATIENT ARE TO GO TO THE CLOSEST EMERGENCY ROOM IF FEELING A THREAT TO THEMSELVES OR OTHER OR IF GRAVELY DISABLED    TELL US HOW WE ARE DOING.  PLEASE COMPLETE THE PATIENT SATISFACTION SURVERY  Revised December 2016

## 2017-03-30 NOTE — PROGRESS NOTES
Outpatient Psychiatry Follow-Up Visit (MD/NP)    3/30/2017    Clinical Status of Patient:  Outpatient (Ambulatory)    Chief Complaint:  Vianney Callahan is a 31 y.o. female who presents today for follow-up of mood disorder.  Met with patient.      Interval History and Content of Current Session:  Interim Events/Subjective Report/Content of Current Session:     Mood and thought disorder chronic and fragile, HR for decompensation, chart and Labs reviewed.     Repeat Lith Level, 0.5.     Zoloft 200mg po q day  Neurontin 600mg 2 caps po q AM, 1 cap po q 4pm, 1 cap po q hs  Lithium 300mg 1 cap po a AM and 2 caps po q hs  Abilify 10mg po q day    Patient recently got a job as a  at local [employer]. Making friends.  Patient likes Lithium, feels mood is stable, yet patient is gaining weight and lethargic during the day, difficulty focusing, difficulty getting motivated, would rather sit on the couch . Patient would like an alternative. Patient sleeping steady, able to fall asleep, wakes twice, smokes a cig, and goes back to sleep.        Psychotherapy:  · Target symptoms: mood disorder  · Why chosen therapy is appropriate versus another modality: relevant to diagnosis  · Outcome monitoring methods: self-report  · Therapeutic intervention type: insight oriented psychotherapy  · Topics discussed/themes: symptom recognition  · The patient's response to the intervention is accepting. The patient's progress toward treatment goals is fair.  · Duration of intervention: 16 minutes.      Review Of Systems:     GENERAL: + weight loss  SKIN: No rashes or lacerations   HEAD: No headaches   EYES: No exophthalmos, jaundice or blindness  EARS: No dizziness, tinnitus or hearing loss   NOSE: No changes in smell   MOUTH & THROAT: No dyskinetic movements or obvious goiter   CHEST: No shortness of breath, hyperventilation or cough   CARDIOVASCULAR: No tachycardia or chest pain   ABDOMEN: no nausea,no vomiting, pain, constipation or  "diarrhea   URINARY: No frequency, dysuria or sexual dysfunction   ENDOCRINE: No polydipsia, polyuria   MUSCULOSKELETAL: No pain or stiffness of the joints   NEUROLOGIC: No weakness, sensory changes, seizures, confusion, memory loss, tremor or other abnormal movements      Psychiatric Review Of Systems:  sleep: yes  appetite changes: no  weight changes: no  energy/anergy: no  interest/pleasure/anhedonia: no  somatic symptoms: no  libido: no  anxiety/panic: yes        Past Medical, Family and Social History: The patient's past medical, family and social history have been reviewed and updated as appropriate within the electronic medical record - see encounter notes.    Compliance: yes    Side effects: None    Risk Parameters:  Patient reports no suicidal ideation  Patient reports no homicidal ideation  Patient reports no self-injurious behavior  Patient reports no violent behavior    Exam (detailed: at least 9 elements; comprehensive: all 15 elements)   Constitutional  Vitals:  Most recent vital signs, dated less than 90 days prior to this appointment, were reviewed.   Vitals:    03/30/17 0800   BP: (!) 105/58   Pulse: (!) 57   Weight: 58.1 kg (128 lb)   Height: 5' 6" (1.676 m)        General:  unremarkable, age appropriate, casually dressed, neatly groomed     Musculoskeletal  Muscle Strength/Tone:  no dyskinesia, no dystonia, no tremor, no tic   Gait & Station:  non-ataxic     Psychiatric  Speech:  constant, loud at times   Mood & Affect:  steady, happy  appropriate   Thought Process:  goal-directed   Associations:  intact, circumstantial, tangential   Thought Content:  normal, no suicidality, no homicidality, delusions, or paranoia   Insight:  has awareness of illness   Judgement: behavior is adequate to circumstances   Orientation:  grossly intact   Memory: intact for content of interview   Language: grossly intact   Attention Span & Concentration:  able to focus, distracted   Fund of Knowledge:  intact and " appropriate to age and level of education     Assessment and Diagnosis   Status/Progress: Based on the examination today, the patient's problem(s) is/are resolving.  New problems have been presented today.   Co-morbidities and Lack of compliance are not complicating management of the primary condition.  There are no active rule-out diagnoses for this patient at this time.     General Impression:       ICD-10-CM ICD-9-CM   1. Bipolar disorder, current episode mixed, mild F31.61 296.61   2. Unspecified episodic mood disorder F39 296.90   3. Mood disorder F39 296.90        Cluster B Traits      Intervention/Counseling/Treatment Plan   · Medication Management: The risks and benefits of medication were discussed with the patient.   · Zoloft 200mg po q day  · Change to Neurontin 600mg 1 cap po q AM, 1 cap po 4pm, and 2 cap at hs  · D/C morning dose of Lithium; continue Lithium 2 caps po hs  · Abilify 10mg po q day  · Continue with individual therapist      Return to Clinic: 6 weeks

## 2017-03-30 NOTE — MR AVS SNAPSHOT
[facility] - Psychiatry  [address]  [place] [address]  Phone: [phone]  Fax: [phone]                  Vianney Callahan   3/30/2017 8:00 AM   Office Visit    Description:  Female : 1985   Provider:  Roman Renae NP   Department:  [facility] - Psychiatry           Reason for Visit     Mood           Diagnoses this Visit        Comments    Bipolar disorder, current episode mixed, mild    -  Primary     Unspecified episodic mood disorder         Mood disorder                To Do List           Goals (5 Years of Data)     None      Follow-Up and Disposition     Return in about 6 weeks (around 2017).       These Medications        Disp Refills Start End    gabapentin (NEURONTIN) 600 MG tablet 120 tablet 5 2017     Take 1 caps by mouth every morning, 1 cap by mouth at 4pm, and 2 cap by mouth at bedtime    [facility] [place] [address] Ph #: [phone]       lithium (ESKALITH) 300 MG capsule 90 capsule 5 2017     Take 2 caps by mouth at bedtime    [facility] [address] Ph #: [phone]         [facility]     [facility] Nurse Care Line -  Assistance  Unless otherwise directed by your provider, please contact [facility], our nurse care line that is available for  assistance.     Registered nurses in the [facility] provide: appointment scheduling, clinical advisement, health education, and other advisory services.  Call: [phone] (toll free)               Medications           Message regarding Medications     Verify the changes and/or additions to your medication regime listed below are the same as discussed with your clinician today.  If any of these changes or additions are incorrect, please notify your healthcare provider.        CHANGE how you are taking these medications     Start Taking Instead of    gabapentin  (NEURONTIN) 600 MG tablet gabapentin (NEURONTIN) 600 MG tablet    Dosage:  Take 1 caps by mouth every morning, 1 cap by mouth at 4pm, and 2 cap by mouth at bedtime Dosage:  Take 2 caps by mouth every morning, 1 cap by mouth at 4pm, and 1 cap by mouth at bedtime    Reason for Change:  Reorder     lithium (ESKALITH) 300 MG capsule lithium (ESKALITH) 300 MG capsule    Dosage:  Take 2 caps by mouth at bedtime Dosage:  Take 1 cap by mouth each morning and 2 caps by mouth at bedtime    Reason for Change:  Reorder            Verify that the below list of medications is an accurate representation of the medications you are currently taking.  If none reported, the list may be blank. If incorrect, please contact your healthcare provider. Carry this list with you in case of emergency.           Current Medications     ACZONE 5 % topical gel APPLY TOPICALLY TO THE AFFECTED AREA TWICE DAILY    aripiprazole (ABILIFY) 10 MG Tab Take 1 tablet (10 mg total) by mouth once daily.    cyclobenzaprine (FLEXERIL) 10 MG tablet TAKE 0.5 TO 1 TABLET BY MOUTH AT BEDTIME AS NEEDED FOR BACKPAIN    gabapentin (NEURONTIN) 600 MG tablet Take 1 caps by mouth every morning, 1 cap by mouth at 4pm, and 2 cap by mouth at bedtime    lithium (ESKALITH) 300 MG capsule Take 2 caps by mouth at bedtime    norethindrone-ethinyl estradiol (MICROGESTIN 1/20) 1-20 mg-mcg per tablet Take 1 tablet by mouth every evening.    ondansetron (ZOFRAN-ODT) 4 MG TbDL Take 1 tablet (4 mg total) by mouth every 8 (eight) hours as needed.    promethazine (PHENERGAN) 12.5 MG Tab 1 tab po q 8 h prn nausea    sertraline (ZOLOFT) 100 MG tablet Take 2 tabs by mouth each morning    TAZORAC 0.05 % gel APPLY TOPICALLY EVERY EVENING. WASH OFF IN THE MORNING, AND APPLY SUNSCREEN    tramadol (ULTRAM) 50 mg tablet take 1 tablet by mouth every 8 hours if needed    valacyclovir (VALTREX) 1000 MG tablet Take 1 tablet (1,000 mg total) by mouth once daily.           Clinical Reference  "Information           Your Vitals Were     BP Pulse Height Weight BMI    105/58 57 5' 6" (1.676 m) 58.1 kg (128 lb) 20.66 kg/m2      Blood Pressure          Most Recent Value    BP  (!)  105/58      Allergies as of 3/30/2017     Dilantin [Phenytoin Sodium Extended]    Lithium Analogues    Saphris [Asenapine]    Topamax [Topiramate]    Wellbutrin [Bupropion Hcl]      Immunizations Administered on Date of Encounter - 3/30/2017     None      Instructions    [facility] - DEPARTMENT OF PSYCHIATRY   PATIENT INFORMATION    We appreciate the opportunity to participate in your medical care and hope the following protocols will make it easier for you to receive quality treatment in our department.    1. PUNCTUALITY: Your appointment is scheduled for a fixed amount of time.  To get the benefit of your appointment, please arrive early enough to allow time for traffic, parking and registration.  If you are late for your appointment, you may have to reschedule.  Please make every effort to be on time.      2. PAYMENT FOR SERVICES:   Payments are expected at the time of service.  Please contact [phone] if you need to resolve issues involving your account at [facility] or to set up a payment plan.    3. CANCELLATION / MISSED APPOINTMENTS:   In order to receive quality care, all appointments must be kept.  Appointment may be cancelled at least 24 hours before your appointment time. If you do not give at least 24-hour notice of cancellation a fee may be billed directly to the patient.  Please note that insurance does not cover no-show charges, so you will be billed directly.  If you are consistently late, cancel, or do not show for your appointments, our department reserves the right to terminate treatment     MESSAGES- In general you can reach the department by calling [phone], between 8:00 a.m. and 5:00 p.m., Monday through Friday.  You can also use the [facility] Patient Portal.     AFTER HOURS, WEEKEND OR " HOLIDAYS- For urgent questions after hours, weekends and holidays, calling the department number [phone] will connect you with a representative.  EMERGENCY-  In case of a crisis when there is a concern of harm to self or others, call 911 or the office [phone] between 8:00 a.m. and 5:00 p.m., Monday through Friday or go to the [facility] Emergency Room.    4. PRESCRIPTION REFILLS:  Prescription refills must be done at your physician office visit, You will be given a sufficient number of refills to last until your next appointment, you must come to appointments for prescriptions.   No additional refills will be approved beyond the original treatment plan. Again, please note that no additional prescriptions will be approved per patient request.     5. FOLLOW UP APPOINTMENTS:  Follow-up appointments can be made in person at the Psychiatry Appointment Desk, online through the [facility] Portal, or by calling [phone], from 8am to 5pm, between Monday and Friday.  Patients are responsible for scheduling their own follow-up appointment,  It  Is recommended you schedule your appointment before leaving the clinic.    6. Providers are NOT ABLE to schedule appointments; all appointments must be made through the appointment department or through [facility].           PATIENTS MAY EXPERIENCE SYMPTOMS OF WITHDRAWAL IF THEY RUN OUT OF MEDICATIONS.  THE PATIENT WILL ASSUME ALL RESPONSIBILITIES OF ANY OUTCOMES WHEN THERE IS AN ISSUE WITH NONCOMPLIANCE WITH FOLLOW-UP APPOINTMENTS AND MEDICATIONS.        THERE IS TO BE NO USE OF ALCOHOL AND/OR ILLEGAL SUBSTANCES    PATIENT ARE TO GO TO THE CLOSEST EMERGENCY ROOM IF FEELING A THREAT TO THEMSELVES OR OTHER OR IF GRAVELY DISABLED    TELL US HOW WE ARE DOING.  PLEASE COMPLETE THE PATIENT SATISFACTION SURVERY  Revised December 2016         Smoking Cessation     If you would like to quit smoking:   You may be eligible for free services if you are a  [place] resident and started smoking cigarettes before September 1, 1988.  Call the Smoking Cessation Trust (S[employer]) toll free at (362) 526-1113 or (260) 328-6144.   Call 1-800-QUIT-NOW if you do not meet the above criteria.   Contact us via email: [email or web address]   View our website for more information: [email or web address]        Language Assistance Services     ATTENTION: Language assistance services are available, free of charge. Please call [phone].      ATENCIÓN: Si habla español, tiene a best disposición servicios gratuitos de asistencia lingüística. Llame al [phone].     CHÚ Ý: N?u b?n nói Ti?ng Vi?t, có các d?ch v? h? tr? ngôn ng? mi?n phí dành cho b?n. G?i s? [phone].         [facility] complies with applicable Federal civil rights laws and does not discriminate on the basis of race, color, national origin, age, disability, or sex.

## 2017-04-18 RX ORDER — TRAMADOL HYDROCHLORIDE 50 MG/1
TABLET ORAL
Qty: 60 TABLET | Refills: 0 | Status: SHIPPED | OUTPATIENT
Start: 2017-04-18 | End: 2017-06-21 | Stop reason: SDUPTHER

## 2017-06-15 ENCOUNTER — OFFICE VISIT (OUTPATIENT)
Dept: PSYCHIATRY | Facility: CLINIC | Age: 32
End: 2017-06-15
Payer: COMMERCIAL

## 2017-06-15 VITALS
BODY MASS INDEX: 20.95 KG/M2 | HEIGHT: 66 IN | WEIGHT: 130.38 LBS | DIASTOLIC BLOOD PRESSURE: 58 MMHG | SYSTOLIC BLOOD PRESSURE: 113 MMHG | HEART RATE: 93 BPM

## 2017-06-15 DIAGNOSIS — F31.61 BIPOLAR DISORDER, CURRENT EPISODE MIXED, MILD: Primary | ICD-10-CM

## 2017-06-15 DIAGNOSIS — F39 EPISODIC MOOD DISORDER: ICD-10-CM

## 2017-06-15 DIAGNOSIS — F39 MOOD DISORDER: ICD-10-CM

## 2017-06-15 PROCEDURE — 90833 PSYTX W PT W E/M 30 MIN: CPT | Mod: S$GLB,,, | Performed by: NURSE PRACTITIONER

## 2017-06-15 PROCEDURE — 90833 PSYTX W PT W E/M 30 MIN: CPT | Mod: PBBFAC | Performed by: NURSE PRACTITIONER

## 2017-06-15 PROCEDURE — 99214 OFFICE O/P EST MOD 30 MIN: CPT | Mod: ,,, | Performed by: NURSE PRACTITIONER

## 2017-06-15 PROCEDURE — 99499 UNLISTED E&M SERVICE: CPT | Mod: S$PBB,,, | Performed by: NURSE PRACTITIONER

## 2017-06-15 PROCEDURE — 99213 OFFICE O/P EST LOW 20 MIN: CPT | Mod: PBBFAC | Performed by: NURSE PRACTITIONER

## 2017-06-15 PROCEDURE — 99999 PR PBB SHADOW E&M-EST. PATIENT-LVL III: CPT | Mod: PBBFAC,,, | Performed by: NURSE PRACTITIONER

## 2017-06-15 RX ORDER — GABAPENTIN 600 MG/1
TABLET ORAL
Qty: 60 TABLET | Refills: 5 | Status: SHIPPED | OUTPATIENT
Start: 2017-06-15 | End: 2017-07-20 | Stop reason: SDUPTHER

## 2017-06-15 RX ORDER — ARIPIPRAZOLE 10 MG/1
TABLET ORAL
Qty: 30 TABLET | Refills: 5 | Status: SHIPPED | OUTPATIENT
Start: 2017-06-15 | End: 2017-07-05

## 2017-06-15 RX ORDER — SERTRALINE HYDROCHLORIDE 100 MG/1
TABLET, FILM COATED ORAL
Qty: 60 TABLET | Refills: 5 | Status: SHIPPED | OUTPATIENT
Start: 2017-06-15 | End: 2017-07-20 | Stop reason: SDUPTHER

## 2017-06-15 NOTE — PROGRESS NOTES
"Outpatient Psychiatry Follow-Up Visit (MD/NP)    6/15/2017    Clinical Status of Patient:  Outpatient (Ambulatory)    Chief Complaint:  Vianney Callahan is a 31 y.o. female who presents today for follow-up of mood disorder.  Met with patient.      Interval History and Content of Current Session:  Interim Events/Subjective Report/Content of Current Session:     Mood and thought disorder chronic and fragile, HR for decompensation, chart and Labs reviewed.     Lith Level, 0.5 March 2017    Zoloft 200mg po q day  Neurontin 600mg 1 caps po q AM, 1 cap po q 4pm, 2 cap po q hs  Lithium 300mg 2 caps po q hs  Abilify 10mg po q day    Patient took herself of Lithium without consultation with me.  Stopped Abilify, was depressed and crying but restarted Abilify May 2017.  Only taking Neurontin 600mg po q hs, Mood stable, denies depression and law but feels "all over the place".  Believes she should be on some dose of Lithium  Still sleepy during the day.  Sleeping and eating steady.  Enjoying her job.      Psychotherapy:  · Target symptoms: mood disorder  · Why chosen therapy is appropriate versus another modality: relevant to diagnosis  · Outcome monitoring methods: self-report  · Therapeutic intervention type: insight oriented psychotherapy  · Topics discussed/themes: symptom recognition  · The patient's response to the intervention is accepting. The patient's progress toward treatment goals is fair.  · Duration of intervention: 16 minutes.      Review Of Systems:     GENERAL: + weight gain  SKIN: No rashes or lacerations   HEAD: No headaches   EYES: No exophthalmos, jaundice or blindness  EARS: No dizziness, tinnitus or hearing loss   NOSE: No changes in smell   MOUTH & THROAT: No dyskinetic movements or obvious goiter   CHEST: No shortness of breath, hyperventilation or cough   CARDIOVASCULAR: No tachycardia or chest pain   ABDOMEN: no nausea,no vomiting, pain, constipation or diarrhea   URINARY: No frequency, dysuria or " "sexual dysfunction   ENDOCRINE: No polydipsia, polyuria   MUSCULOSKELETAL: No pain or stiffness of the joints   NEUROLOGIC: No weakness, sensory changes, seizures, confusion, memory loss, tremor or other abnormal movements      Psychiatric Review Of Systems:  sleep: yes  appetite changes: no  weight changes: no  energy/anergy: yes  interest/pleasure/anhedonia: yes  somatic symptoms: no  libido: no  anxiety/panic: no        Past Medical, Family and Social History: The patient's past medical, family and social history have been reviewed and updated as appropriate within the electronic medical record - see encounter notes.    Compliance: No    Side effects: None    Risk Parameters:  Patient reports no suicidal ideation  Patient reports no homicidal ideation  Patient reports no self-injurious behavior  Patient reports no violent behavior    Exam (detailed: at least 9 elements; comprehensive: all 15 elements)   Constitutional  Vitals:  Most recent vital signs, dated less than 90 days prior to this appointment, were reviewed.   Vitals:    06/15/17 0800   BP: (!) 113/58   Pulse: 93   Weight: 59.1 kg (130 lb 6.4 oz)   Height: 5' 6" (1.676 m)        General:  unremarkable, age appropriate, casually dressed, neatly groomed     Musculoskeletal  Muscle Strength/Tone:  no dyskinesia, no dystonia, no tremor, no tic   Gait & Station:  non-ataxic     Psychiatric  Speech:  constant, loud at times   Mood & Affect:  steady, irritable  appropriate   Thought Process:  goal-directed   Associations:  intact, circumstantial, tangential   Thought Content:  normal, no suicidality, no homicidality, delusions, or paranoia   Insight:  has awareness of illness   Judgement: behavior is adequate to circumstances   Orientation:  grossly intact   Memory: intact for content of interview   Language: grossly intact   Attention Span & Concentration:  able to focus, distracted   Fund of Knowledge:  intact and appropriate to age and level of education "     Assessment and Diagnosis   Status/Progress: Based on the examination today, the patient's problem(s) is/are adequately but not ideally controlled.  New problems have been presented today.   Lack of compliance ist complicating management of the primary condition.  There are no active rule-out diagnoses for this patient at this time.     General Impression:       ICD-10-CM ICD-9-CM   1. Bipolar disorder, current episode mixed, mild F31.61 296.61        Cluster B Traits      Intervention/Counseling/Treatment Plan   · Medication Management: The risks and benefits of medication were discussed with the patient.   · Change Zoloft 100mg po bid  · Change Neurontin 600mg po bid  · Change Abilify 10mg half tab po bid  · Hold Lithium, may add later  · Continue with individual therapist      Return to Clinic: 2 months

## 2017-06-15 NOTE — PATIENT INSTRUCTIONS
[facility] - DEPARTMENT OF PSYCHIATRY   PATIENT INFORMATION    We appreciate the opportunity to participate in your medical care and hope the following protocols will make it easier for you to receive quality treatment in our department.    1. PUNCTUALITY: Your appointment is scheduled for a fixed amount of time.  To get the benefit of your appointment, please arrive at least 15 minutes early enough to allow time for traffic, parking and registration.  If you are late for your appointment, you may have to reschedule.  Please make every effort to be on time.      2. PAYMENT FOR SERVICES:   Payments are expected at the time of service.  Please contact [phone] if you need to resolve issues involving your account at [facility] or to set up a payment plan.    3. CANCELLATION / MISSED APPOINTMENTS:   In order to receive quality care, all appointments must be kept.  Appointment may be cancelled at least 24 hours before your appointment time. If you do not give at least 24-hour notice of cancellation a fee may be billed directly to the patient.  Please note that insurance does not cover no-show charges, so you will be billed directly.  If you are consistently late, cancel, or do not show for your appointments, our department reserves the right to terminate treatment     MESSAGES- In general you can reach the department by calling [phone], between 8:00 a.m. and 5:00 p.m., Monday through Friday.  You can also use the [facility] Patient Portal.     AFTER HOURS, WEEKEND OR HOLIDAYS- For urgent questions after hours, weekends and holidays, calling the department number [phone] will connect you with a representative.  EMERGENCY-  In case of a crisis when there is a concern of harm to self or others, call 911 or the office [phone] between 8:00 a.m. and 5:00 p.m., Monday through Friday or go to the [facility] Emergency Room.    4. PRESCRIPTION REFILLS:  Prescription refills must be done at your  physician office visit, You will be given a sufficient number of refills to last until your next appointment, you must come to appointments for prescriptions.       5. FOLLOW UP APPOINTMENTS:  Follow-up appointments can be made in person at the Psychiatry Appointment Desk, online through the [facility] Patient Portal, or by calling [phone], from 8am to 5pm, between Monday and Friday.  Patients are responsible for scheduling their own follow-up appointment,  It  Is recommended you schedule your appointment before leaving the clinic.    6. Providers are NOT ABLE to schedule appointments; all appointments must be made through the appointment department or through [facility] Patient Portal.     Cancellation Policy:  Please provide greater than 24 hour notice of any cancellations as a fee will be charged for failure to do so. This policy is in effect to allow for other individuals on a long waiting list to be seen as soon as possible. Unlike other branches of medicine where several individuals can be scheduled in a 15 minute time slot, only one individual can be scheduled in any time slot in Psychiatry.    Walk-in appointments:      Walk in appointments are not available. For emergencies, please go to the Emergency Room.    [facility]: Please enroll as this is the preferred method of contacting your doctor for non-emergent calls. It will also allow you to book your own follow up appointments.    No Shows: Repeated no shows may result in a warning letter or you may be asked to seek care elsewhere.    Phone Calls: Please discuss concerns with your doctor within your scheduled appointment time. In most cases Psychotherapy and Medication management are not appropriate by telephone. If you have a simple question/concern, please provide all of the following information to the :   Detailed description of concern   Pharmacy name and phone number   Date of last visit and next scheduled visit   Phone number  where you can be reached throughout the day   Indication as to whether leaving a voice mail or message on an answering machine is appropriate (if applicable)  Calls will be returned by the Medical Assistant or Office Staff after your doctor has reviewed the message.  Please allow 24 hours for a returned phone call before calling back to leave another message. (If numerous calls are made between appointments, or if calls end up being lengthy, more frequent appointments are required for proper management.)    Disability: We do not do disability evaluations.  Please contact Social Security Administration for evaluations and determinations. You will then sign releases allowing for records from this office to be forwarded to Social Security Administration to use in their evaluation.    Gun Permit: We do not offer Sound Judgment Evaluations or assessments leading to gun ownership, nor do we fill out or file paperwork relevant to owning, concealing or purchasing a firearm.        Emotional Support     Animals: ESAs are not trained to perform tasks or recognize particular signs or symptoms but are distinguished by the close, emotional, and supportive bond between the animal and the owner; therefore we do not provide documentation, including letters, to aid in the acclamation that an Emotional Support Animal is required.      Samples: We do not provide samples of any medications. If you have financial difficulties and are on a limited income, you may qualify for Patient Assistance Programs from various pharmaceutical companies. This will require that you complete paperwork with your financial information, but this does not guarantee that the company will approve the application. Alternative medication options can be discussed.    Controlled Medications: Some medications are tightly controlled and regulated by the FDA and [employer].  Controlled medications will not be refilled before 30 days. Some of these medications will not  be refilled without a face to face appointment. For some individuals, monthly appointments may have to be scheduled.    Forms: If you have any forms or letters that need to be completed by your doctor, please present these at the beginning of the appointment. Forms, letters, etc. will not be completed outside of appointment times. Please provide a full name and address of the recipient. Anonymous letters will not be dictated. We do not have disability forms, FMLA forms, etc. on file.  You will need to obtain the necessary paperwork from your employer/school.    Limitations: You will be referred to other providers if we feel unable to adequately diagnose or treat your particular condition, or if collaboration with another provider would allow for better management of your condition.    Revised 4/12/2017    PATIENTS MAY EXPERIENCE SYMPTOMS OF WITHDRAWAL IF THEY RUN OUT OF MEDICATIONS.  THE PATIENT WILL ASSUME ALL RESPONSIBILITIES OF ANY OUTCOMES WHEN THERE IS AN ISSUE WITH NONCOMPLIANCE WITH FOLLOW-UP APPOINTMENTS AND MEDICATIONS.        THERE IS TO BE NO USE OF ALCOHOL AND/OR ILLEGAL SUBSTANCES      PATIENT ARE TO GO TO THE CLOSEST EMERGENCY ROOM IF FEELING A THREAT TO THEMSELVES OR OTHER OR IF GRAVELY DISABLED    TELL US HOW WE ARE DOING.  PLEASE COMPLETE THE PATIENT SATISFACTION SURVERY

## 2017-06-21 RX ORDER — CYCLOBENZAPRINE HCL 10 MG
TABLET ORAL
Qty: 30 TABLET | Refills: 0 | Status: ON HOLD | OUTPATIENT
Start: 2017-06-21 | End: 2018-04-05 | Stop reason: HOSPADM

## 2017-06-21 RX ORDER — TRAMADOL HYDROCHLORIDE 50 MG/1
TABLET ORAL
Qty: 60 TABLET | Refills: 0 | Status: SHIPPED | OUTPATIENT
Start: 2017-06-21 | End: 2017-09-14 | Stop reason: SDUPTHER

## 2017-06-22 ENCOUNTER — PATIENT MESSAGE (OUTPATIENT)
Dept: PSYCHIATRY | Facility: CLINIC | Age: 32
End: 2017-06-22

## 2017-06-23 ENCOUNTER — PATIENT MESSAGE (OUTPATIENT)
Dept: PSYCHIATRY | Facility: CLINIC | Age: 32
End: 2017-06-23

## 2017-06-27 ENCOUNTER — PATIENT MESSAGE (OUTPATIENT)
Dept: PSYCHIATRY | Facility: CLINIC | Age: 32
End: 2017-06-27

## 2017-06-27 DIAGNOSIS — F39 MOOD DISORDER: Primary | ICD-10-CM

## 2017-06-28 ENCOUNTER — PATIENT MESSAGE (OUTPATIENT)
Dept: PSYCHIATRY | Facility: CLINIC | Age: 32
End: 2017-06-28

## 2017-06-28 RX ORDER — BUPROPION HYDROCHLORIDE 150 MG/1
150 TABLET ORAL DAILY
Qty: 30 TABLET | Refills: 5 | Status: SHIPPED | OUTPATIENT
Start: 2017-06-28 | End: 2017-07-20 | Stop reason: SDUPTHER

## 2017-07-05 ENCOUNTER — OFFICE VISIT (OUTPATIENT)
Dept: DERMATOLOGY | Facility: CLINIC | Age: 32
End: 2017-07-05
Payer: MEDICARE

## 2017-07-05 DIAGNOSIS — B35.1 ONYCHOMYCOSIS: ICD-10-CM

## 2017-07-05 DIAGNOSIS — B35.3 TINEA PEDIS OF BOTH FEET: Primary | ICD-10-CM

## 2017-07-05 PROCEDURE — 99214 OFFICE O/P EST MOD 30 MIN: CPT | Mod: S$GLB,,, | Performed by: DERMATOLOGY

## 2017-07-05 PROCEDURE — 99999 PR PBB SHADOW E&M-EST. PATIENT-LVL II: CPT | Mod: PBBFAC,,, | Performed by: DERMATOLOGY

## 2017-07-05 RX ORDER — CICLOPIROX 80 MG/ML
SOLUTION TOPICAL
Qty: 1 BOTTLE | Refills: 5 | Status: SHIPPED | OUTPATIENT
Start: 2017-07-05 | End: 2019-02-07

## 2017-07-05 RX ORDER — DAPSONE 50 MG/G
GEL TOPICAL
Qty: 60 G | Refills: 2 | Status: ON HOLD | OUTPATIENT
Start: 2017-07-05 | End: 2018-04-05 | Stop reason: HOSPADM

## 2017-07-05 RX ORDER — ECONAZOLE NITRATE 10 MG/G
CREAM TOPICAL 2 TIMES DAILY
Qty: 85 G | Refills: 2 | Status: SHIPPED | OUTPATIENT
Start: 2017-07-05 | End: 2017-08-31

## 2017-07-05 RX ORDER — TAZAROTENE 0.5 MG/G
GEL TOPICAL
Qty: 100 G | Refills: 3 | Status: ON HOLD | OUTPATIENT
Start: 2017-07-05 | End: 2018-04-05 | Stop reason: HOSPADM

## 2017-07-05 NOTE — PATIENT INSTRUCTIONS

## 2017-07-05 NOTE — PROGRESS NOTES
Subjective:       Patient ID:  Vianney Callahan is a 32 y.o. female who presents for   Chief Complaint   Patient presents with    Nail Problem     Toes     Nail Problem   Affected locations: right toes  Duration: 1 month  Severity: mild  Timing: constant  Aggravated by: nothing  Relieving factors/Treatments tried: nothing  Improvement on treatment: no relief      Pt states she noticed some discolored and thickened toenails after her last trip to get a pedicure. Debris was scraped out from underneath her great toenails.  She has also had peeling skin on feet since wearing her aunt's shoes >10 yrs ago.  Has a hx of Hep C infection which she states has been cleared per GI.    Would also like refills of aczone and tazorac.    Review of Systems   Constitutional: Negative for fever, chills, weight loss, weight gain, fatigue, night sweats and malaise.   Skin: Negative for recent sunburn.   Hematologic/Lymphatic: Does not bruise/bleed easily.        Objective:    Physical Exam   Constitutional: She appears well-developed and well-nourished. No distress.   Neurological: She is alert and oriented to person, place, and time. She is not disoriented.   Psychiatric: She has a normal mood and affect.   Skin:   Areas Examined (abnormalities noted in diagram):   RLE Inspected  LLE Inspection Performed  Nails and Digits Inspection Performed             Diagram Legend     Erythematous scaling macule/papule c/w actinic keratosis       Vascular papule c/w angioma      Pigmented verrucoid papule/plaque c/w seborrheic keratosis      Yellow umbilicated papule c/w sebaceous hyperplasia      Irregularly shaped tan macule c/w lentigo     1-2 mm smooth white papules consistent with Milia      Movable subcutaneous cyst with punctum c/w epidermal inclusion cyst      Subcutaneous movable cyst c/w pilar cyst      Firm pink to brown papule c/w dermatofibroma      Pedunculated fleshy papule(s) c/w skin tag(s)      Evenly pigmented macule c/w  junctional nevus     Mildly variegated pigmented, slightly irregular-bordered macule c/w mildly atypical nevus      Flesh colored to evenly pigmented papule c/w intradermal nevus       Pink pearly papule/plaque c/w basal cell carcinoma      Erythematous hyperkeratotic cursted plaque c/w SCC      Surgical scar with no sign of skin cancer recurrence      Open and closed comedones      Inflammatory papules and pustules      Verrucoid papule consistent consistent with wart     Erythematous eczematous patches and plaques     Dystrophic onycholytic nail with subungual debris c/w onychomycosis     Umbilicated papule    Erythematous-base heme-crusted tan verrucoid plaque consistent with inflamed seborrheic keratosis     Erythematous Silvery Scaling Plaque c/w Psoriasis     See annotation      Assessment / Plan:        Tinea pedis of both feet  -     econazole nitrate 1 % cream; Apply topically 2 (two) times daily. To both feet X 4 wks  Dispense: 85 g; Refill: 2    Onychomycosis  -     ciclopirox (PENLAC) 8 % Soln; Apply daily to affected nail. Must remove with rubbing alcohol once weekly and then re-start.  Dispense: 1 Bottle; Refill: 5    Other orders  -     dapsone (ACZONE) 5 % topical gel; apply to affected areas on face daily - BID  Dispense: 60 g; Refill: 2  -     tazarotene (TAZORAC) 0.05 % gel; APPLY TOPICALLY EVERY EVENING. WASH OFF IN THE MORNING, AND APPLY SUNSCREEN  Dispense: 100 g; Refill: 3    rtc Feb 2018 for skin check or sooner for any concerns

## 2017-07-20 ENCOUNTER — OFFICE VISIT (OUTPATIENT)
Dept: PSYCHIATRY | Facility: CLINIC | Age: 32
End: 2017-07-20
Payer: COMMERCIAL

## 2017-07-20 VITALS
WEIGHT: 127 LBS | BODY MASS INDEX: 20.41 KG/M2 | DIASTOLIC BLOOD PRESSURE: 69 MMHG | HEART RATE: 93 BPM | SYSTOLIC BLOOD PRESSURE: 105 MMHG | HEIGHT: 66 IN

## 2017-07-20 DIAGNOSIS — F39 MOOD DISORDER: ICD-10-CM

## 2017-07-20 DIAGNOSIS — F41.1 GAD (GENERALIZED ANXIETY DISORDER): Primary | ICD-10-CM

## 2017-07-20 DIAGNOSIS — F39 EPISODIC MOOD DISORDER: ICD-10-CM

## 2017-07-20 PROCEDURE — 99999 PR PBB SHADOW E&M-EST. PATIENT-LVL III: CPT | Mod: PBBFAC,,, | Performed by: NURSE PRACTITIONER

## 2017-07-20 PROCEDURE — 99499 UNLISTED E&M SERVICE: CPT | Mod: S$GLB,,, | Performed by: NURSE PRACTITIONER

## 2017-07-20 PROCEDURE — 90833 PSYTX W PT W E/M 30 MIN: CPT | Mod: S$GLB,,, | Performed by: NURSE PRACTITIONER

## 2017-07-20 PROCEDURE — 99214 OFFICE O/P EST MOD 30 MIN: CPT | Mod: S$GLB,,, | Performed by: NURSE PRACTITIONER

## 2017-07-20 RX ORDER — SERTRALINE HYDROCHLORIDE 100 MG/1
TABLET, FILM COATED ORAL
Qty: 60 TABLET | Refills: 3 | Status: SHIPPED | OUTPATIENT
Start: 2017-07-20 | End: 2017-10-24 | Stop reason: SDUPTHER

## 2017-07-20 RX ORDER — GABAPENTIN 600 MG/1
TABLET ORAL
Qty: 60 TABLET | Refills: 3 | Status: SHIPPED | OUTPATIENT
Start: 2017-07-20 | End: 2017-08-31

## 2017-07-20 RX ORDER — BUPROPION HYDROCHLORIDE 300 MG/1
300 TABLET ORAL DAILY
Qty: 30 TABLET | Refills: 3 | Status: SHIPPED | OUTPATIENT
Start: 2017-07-20 | End: 2017-07-31

## 2017-07-20 NOTE — PROGRESS NOTES
"Outpatient Psychiatry Follow-Up Visit (MD/NP)    7/20/2017    Clinical Status of Patient:  Outpatient (Ambulatory)    Chief Complaint:  Vianney Callahan is a 32 y.o. female who presents today for follow-up of mood disorder.  Met with patient for the first time.  Her last visit was with Dr. Renae on   6/15/17.  Chart Reviewed,    Interval History and Content of Current Session:  Interim Events/Subjective Report/Content of Current Session:      Current Psych Med List:  Welbutrin XL 150mg po daily  Zoloft 200mg po q day  Neurontin 600mg 1 caps po q AM, 1 cap po q 4pm, 2 cap po q hs    Pt reports that she has felt better with Wellbutrin stating "I feel like I can focus better".  Affect appears irritable.  Poor eye contact.  Denies insomnia, panic attacks, or suicidality.  Functioning well at work.  Pt is a  at a restaurant.  Reports some forgetfullness which may be attributed to anxiety.  When asked to list her stressors she stated "I tend to stress myself out".  Endorses social anxiety and negative thinking.        Psychotherapy:  · Target symptoms: mood disorder  · Why chosen therapy is appropriate versus another modality: relevant to diagnosis  · Outcome monitoring methods: self-report  · Therapeutic intervention type: insight oriented psychotherapy, CBT  · Topics discussed/themes: symptom recognition  · The patient's response to the intervention is accepting. The patient's progress toward treatment goals is fair.  · Duration of intervention: 16 minutes.      Review Of Systems:     GENERAL: no significant change in bodyweight  SKIN: No rashes or lacerations   HEAD: No headaches   EYES: No exophthalmos, jaundice or blindness  EARS: No dizziness, tinnitus or hearing loss   NOSE: No changes in smell   MOUTH & THROAT: No dyskinetic movements or obvious goiter   CHEST: No shortness of breath, hyperventilation or cough   CARDIOVASCULAR: No tachycardia or chest pain   ABDOMEN: no nausea,no vomiting, pain, constipation " "or diarrhea   URINARY: No frequency, dysuria or sexual dysfunction   ENDOCRINE: No polydipsia, polyuria   MUSCULOSKELETAL: No pain or stiffness of the joints   NEUROLOGIC: No weakness, sensory changes, seizures, confusion, memory loss, tremor or other abnormal movements      Psychiatric Review Of Systems:  sleep: wnl  appetite changes: no  weight changes: no  energy/anergy: wnl  interest/pleasure/anhedonia: wnl  somatic symptoms: no  libido: wnl  anxiety/panic: yes/no        Past Medical, Family and Social History: The patient's past medical, family and social history have been reviewed and updated as appropriate within the electronic medical record - see encounter notes.    Compliance: No    Side effects: None    Risk Parameters:  Patient reports no suicidal ideation  Patient reports no homicidal ideation  Patient reports no self-injurious behavior  Patient reports no violent behavior    Exam (detailed: at least 9 elements; comprehensive: all 15 elements)   Constitutional  Vitals:  Most recent vital signs, dated less than 90 days prior to this appointment, were reviewed.   Vitals:    07/20/17 0742   BP: 105/69   Pulse: 93   Weight: 57.6 kg (127 lb)   Height: 5' 6" (1.676 m)        General:  unremarkable, age appropriate, casually dressed, neatly groomed     Musculoskeletal  Muscle Strength/Tone:  no dyskinesia, no dystonia, no tremor, no tic   Gait & Station:  non-ataxic     Psychiatric  Speech:  no latency; no press   Mood & Affect:  steady, irritable  appropriate   Thought Process:  goal-directed   Associations:  intact, circumstantial   Thought Content:  normal, no suicidality, no homicidality, delusions, or paranoia   Insight:  has awareness of illness   Judgement: behavior is adequate to circumstances   Orientation:  grossly intact   Memory: intact for content of interview   Language: grossly intact   Attention Span & Concentration:  able to focus, distracted   Fund of Knowledge:  intact and appropriate to age " and level of education     Assessment and Diagnosis   Status/Progress: Based on the examination today, the patient's problem(s) is/are adequately but not ideally controlled.  New problems have not been presented today.   Lack of compliance ist complicating management of the primary condition.  There are no active rule-out diagnoses for this patient at this time.     General Impression:       ICD-10-CM ICD-9-CM   1. RADHA (generalized anxiety disorder) F41.1 300.02   2. Episodic mood disorder F39 296.90   3. Mood disorder F39 296.90        Cluster B Traits      Intervention/Counseling/Treatment Plan   · Medication Management: The risks and benefits of medication were discussed with the patient.   ·  Zoloft 100mg po bid  ·  Neurontin 600mg po bid  · Increase Wellbutrin XL 300mg po daily  · Consider CBT  · Continue with individual therapist      Return to Clinic: 3 months     Total time: 25 minutes  Counseling time: 16 min

## 2017-07-31 ENCOUNTER — TELEPHONE (OUTPATIENT)
Dept: PSYCHIATRY | Facility: CLINIC | Age: 32
End: 2017-07-31

## 2017-07-31 ENCOUNTER — PATIENT MESSAGE (OUTPATIENT)
Dept: PSYCHIATRY | Facility: CLINIC | Age: 32
End: 2017-07-31

## 2017-07-31 DIAGNOSIS — F39 MOOD DISORDER: Primary | ICD-10-CM

## 2017-07-31 RX ORDER — BUPROPION HCL 150 MG
150 TABLET, EXTENDED RELEASE 24 HR ORAL DAILY
Qty: 30 TABLET | Refills: 5 | Status: SHIPPED | OUTPATIENT
Start: 2017-07-31 | End: 2017-09-28 | Stop reason: SDUPTHER

## 2017-08-31 ENCOUNTER — OFFICE VISIT (OUTPATIENT)
Dept: URGENT CARE | Facility: CLINIC | Age: 32
End: 2017-08-31
Payer: MEDICARE

## 2017-08-31 VITALS
HEIGHT: 67 IN | BODY MASS INDEX: 20.4 KG/M2 | TEMPERATURE: 98 F | RESPIRATION RATE: 18 BRPM | DIASTOLIC BLOOD PRESSURE: 74 MMHG | SYSTOLIC BLOOD PRESSURE: 109 MMHG | OXYGEN SATURATION: 96 % | WEIGHT: 130 LBS | HEART RATE: 94 BPM

## 2017-08-31 DIAGNOSIS — B96.89 ACUTE BACTERIAL SINUSITIS: Primary | ICD-10-CM

## 2017-08-31 DIAGNOSIS — J20.9 ACUTE PURULENT BRONCHITIS: ICD-10-CM

## 2017-08-31 DIAGNOSIS — J01.90 ACUTE BACTERIAL SINUSITIS: Primary | ICD-10-CM

## 2017-08-31 PROCEDURE — 99213 OFFICE O/P EST LOW 20 MIN: CPT | Mod: 25,S$GLB,, | Performed by: INTERNAL MEDICINE

## 2017-08-31 PROCEDURE — 3008F BODY MASS INDEX DOCD: CPT | Mod: S$GLB,,, | Performed by: INTERNAL MEDICINE

## 2017-08-31 PROCEDURE — 96372 THER/PROPH/DIAG INJ SC/IM: CPT | Mod: S$GLB,,, | Performed by: INTERNAL MEDICINE

## 2017-08-31 RX ORDER — BETAMETHASONE SODIUM PHOSPHATE AND BETAMETHASONE ACETATE 3; 3 MG/ML; MG/ML
9 INJECTION, SUSPENSION INTRA-ARTICULAR; INTRALESIONAL; INTRAMUSCULAR; SOFT TISSUE ONCE
Status: COMPLETED | OUTPATIENT
Start: 2017-08-31 | End: 2017-08-31

## 2017-08-31 RX ORDER — AZITHROMYCIN 250 MG/1
TABLET, FILM COATED ORAL
Qty: 6 TABLET | Refills: 0 | Status: SHIPPED | OUTPATIENT
Start: 2017-08-31 | End: 2017-11-28

## 2017-08-31 RX ADMIN — BETAMETHASONE SODIUM PHOSPHATE AND BETAMETHASONE ACETATE 9 MG: 3; 3 INJECTION, SUSPENSION INTRA-ARTICULAR; INTRALESIONAL; INTRAMUSCULAR; SOFT TISSUE at 01:08

## 2017-08-31 NOTE — PROGRESS NOTES
"Subjective:       Patient ID: Vianney Clalahan is a 32 y.o. female.    Vitals:  height is 5' 7" (1.702 m) and weight is 59 kg (130 lb). Her temperature is 98.2 °F (36.8 °C). Her blood pressure is 109/74 and her pulse is 94. Her respiration is 18 and oxygen saturation is 96%.     Chief Complaint: Cough    Cough   This is a new problem. The current episode started in the past 7 days. The problem has been unchanged. The problem occurs constantly. The cough is productive of sputum. Associated symptoms include headaches and a sore throat. Pertinent negatives include no chest pain, chills, ear pain, eye redness, fever, myalgias, shortness of breath or wheezing. Nothing aggravates the symptoms. She has tried OTC cough suppressant for the symptoms. The treatment provided no relief.     Review of Systems   Constitution: Positive for malaise/fatigue. Negative for chills and fever.   HENT: Positive for congestion, headaches and sore throat. Negative for ear pain and hoarse voice.    Eyes: Negative for discharge and redness.   Cardiovascular: Negative for chest pain, dyspnea on exertion and leg swelling.   Respiratory: Positive for cough. Negative for shortness of breath, sputum production and wheezing.    Musculoskeletal: Negative for myalgias.   Gastrointestinal: Negative for abdominal pain and nausea.       Objective:      Physical Exam   Constitutional: She appears well-developed and well-nourished.   HENT:   Head: Normocephalic and atraumatic.   Nose: Mucosal edema (mucosal erythema with purulent drainage) present.   Neck: Normal range of motion. Neck supple.   Cardiovascular: Normal rate and regular rhythm.    Pulmonary/Chest: Effort normal and breath sounds normal.   Abdominal: Soft. Bowel sounds are normal.       Assessment:       1. Acute bacterial sinusitis    2. Acute purulent bronchitis        Plan:         Acute bacterial sinusitis  -     betamethasone acetate-betamethasone sodium phosphate injection 9 mg; Inject 1.5 " mLs (9 mg total) into the muscle once.  -     azithromycin (ZITHROMAX Z-LESLY) 250 MG tablet; Take 2 tablets (500 mg) on  Day 1,  followed by 1 tablet (250 mg) once daily on Days 2 through 5.  Dispense: 6 tablet; Refill: 0    Acute purulent bronchitis  -     betamethasone acetate-betamethasone sodium phosphate injection 9 mg; Inject 1.5 mLs (9 mg total) into the muscle once.  -     azithromycin (ZITHROMAX Z-LESLY) 250 MG tablet; Take 2 tablets (500 mg) on  Day 1,  followed by 1 tablet (250 mg) once daily on Days 2 through 5.  Dispense: 6 tablet; Refill: 0

## 2017-09-14 RX ORDER — TRAMADOL HYDROCHLORIDE 50 MG/1
TABLET ORAL
Qty: 60 TABLET | Refills: 0 | Status: SHIPPED | OUTPATIENT
Start: 2017-09-14 | End: 2017-12-22 | Stop reason: SDUPTHER

## 2017-09-28 ENCOUNTER — PATIENT MESSAGE (OUTPATIENT)
Dept: PSYCHIATRY | Facility: CLINIC | Age: 32
End: 2017-09-28

## 2017-09-28 DIAGNOSIS — F39 MOOD DISORDER: ICD-10-CM

## 2017-09-28 RX ORDER — BUPROPION HCL 150 MG
150 TABLET, EXTENDED RELEASE 24 HR ORAL DAILY
Qty: 30 TABLET | Refills: 5 | Status: SHIPPED | OUTPATIENT
Start: 2017-09-28 | End: 2017-10-24

## 2017-10-16 ENCOUNTER — PATIENT MESSAGE (OUTPATIENT)
Dept: PSYCHIATRY | Facility: CLINIC | Age: 32
End: 2017-10-16

## 2017-10-24 ENCOUNTER — OFFICE VISIT (OUTPATIENT)
Dept: PSYCHIATRY | Facility: CLINIC | Age: 32
End: 2017-10-24
Payer: COMMERCIAL

## 2017-10-24 VITALS
HEART RATE: 96 BPM | HEIGHT: 67 IN | SYSTOLIC BLOOD PRESSURE: 117 MMHG | BODY MASS INDEX: 21.25 KG/M2 | WEIGHT: 135.38 LBS | DIASTOLIC BLOOD PRESSURE: 68 MMHG

## 2017-10-24 DIAGNOSIS — Z72.0 TOBACCO ABUSE: ICD-10-CM

## 2017-10-24 DIAGNOSIS — F39 EPISODIC MOOD DISORDER: ICD-10-CM

## 2017-10-24 DIAGNOSIS — F41.1 GAD (GENERALIZED ANXIETY DISORDER): Primary | ICD-10-CM

## 2017-10-24 DIAGNOSIS — F60.89 CLUSTER B PERSONALITY DISORDER: ICD-10-CM

## 2017-10-24 PROCEDURE — 99214 OFFICE O/P EST MOD 30 MIN: CPT | Mod: S$GLB,,, | Performed by: NURSE PRACTITIONER

## 2017-10-24 PROCEDURE — 99999 PR PBB SHADOW E&M-EST. PATIENT-LVL II: CPT | Mod: PBBFAC,,, | Performed by: NURSE PRACTITIONER

## 2017-10-24 PROCEDURE — 99499 UNLISTED E&M SERVICE: CPT | Mod: S$GLB,,, | Performed by: NURSE PRACTITIONER

## 2017-10-24 PROCEDURE — 90833 PSYTX W PT W E/M 30 MIN: CPT | Mod: S$GLB,,, | Performed by: NURSE PRACTITIONER

## 2017-10-24 RX ORDER — BUPROPION HYDROCHLORIDE 100 MG/1
TABLET, EXTENDED RELEASE ORAL
Qty: 60 TABLET | Refills: 11 | Status: SHIPPED | OUTPATIENT
Start: 2017-10-24 | End: 2017-11-22 | Stop reason: DRUGHIGH

## 2017-10-24 RX ORDER — SERTRALINE HYDROCHLORIDE 100 MG/1
TABLET, FILM COATED ORAL
Qty: 60 TABLET | Refills: 11 | Status: SHIPPED | OUTPATIENT
Start: 2017-10-24 | End: 2017-11-25

## 2017-10-24 NOTE — PROGRESS NOTES
Outpatient Psychiatry Follow-Up Visit (MD/NP)    10/24/2017    Clinical Status of Patient:  Outpatient (Ambulatory)    Chief Complaint:  Vianney Callahan is a 32 y.o. female who presents today for follow-up of mood disorder.  Met with patient.  6/15/17.  Chart Reviewed,    Interval History and Content of Current Session:  Interim Events/Subjective Report/Content of Current Session:      Current Psych Med List:  Welbutrin XL 150mg po daily  Zoloft 200mg po q day  Neurontin 600mg 1 caps po q AM, 1 cap po q 4pm, 2 cap po q hs    Pt's request for brand name Wellbutrin was denied by [employer].  Pt currently taking only the Zoloft.  Stated that she felt more depressed and emotional with generic Wellbutrin.  Pt reports that she had good results in the past with this medication but is sensitive to generic version.  Pt started smoking cigarettes again.  Agreed to try Wellbutrin SR to see if it has a better response.    No complaints of insomnia, appetite dysregulation, or suicidal thoughts.      Psychotherapy:  · Target symptoms: mood disorder  · Why chosen therapy is appropriate versus another modality: relevant to diagnosis  · Outcome monitoring methods: self-report  · Therapeutic intervention type: insight oriented psychotherapy, CBT  · Topics discussed/themes: symptom recognition  · The patient's response to the intervention is accepting. The patient's progress toward treatment goals is fair.  · Duration of intervention: 19 minutes.      Review Of Systems:     GENERAL: no significant change in bodyweight  SKIN: No rashes or lacerations   HEAD: No headaches   EYES: No exophthalmos, jaundice or blindness  EARS: No dizziness, tinnitus or hearing loss   NOSE: No changes in smell   MOUTH & THROAT: No dyskinetic movements or obvious goiter   CHEST: No shortness of breath, hyperventilation or cough   CARDIOVASCULAR: No tachycardia or chest pain   ABDOMEN: no nausea,no vomiting, pain, constipation or diarrhea  "  URINARY: No frequency, dysuria or sexual dysfunction   ENDOCRINE: No polydipsia, polyuria   MUSCULOSKELETAL: No pain or stiffness of the joints   NEUROLOGIC: No weakness, sensory changes, seizures, confusion, memory loss, tremor or other abnormal movements      Psychiatric Review Of Systems:  sleep: wnl  appetite changes: no  weight changes: no  energy/anergy: wnl  interest/pleasure/anhedonia: wnl  somatic symptoms: no  libido: wnl  anxiety/panic: yes/no        Past Medical, Family and Social History: The patient's past medical, family and social history have been reviewed and updated as appropriate within the electronic medical record - see encounter notes.    Compliance: No    Side effects: None    Risk Parameters:  Patient reports no suicidal ideation  Patient reports no homicidal ideation  Patient reports no self-injurious behavior  Patient reports no violent behavior    Exam (detailed: at least 9 elements; comprehensive: all 15 elements)   Constitutional  Vitals:  Most recent vital signs, dated less than 90 days prior to this appointment, were reviewed.   Vitals:    10/24/17 0756   BP: 117/68   Pulse: 96   Weight: 61.4 kg (135 lb 6.4 oz)   Height: 5' 7" (1.702 m)        General:  unremarkable, age appropriate, casually dressed, neatly groomed     Musculoskeletal  Muscle Strength/Tone:  no dyskinesia, no dystonia, no tremor, no tic   Gait & Station:  non-ataxic     Psychiatric  Speech:  no latency; no press   Mood & Affect:  steady, irritable  appropriate   Thought Process:  goal-directed   Associations:  intact, circumstantial   Thought Content:  normal, no suicidality, no homicidality, delusions, or paranoia   Insight:  has awareness of illness   Judgement: behavior is adequate to circumstances   Orientation:  grossly intact   Memory: intact for content of interview   Language: grossly intact   Attention Span & Concentration:  able to focus, distracted   Fund of Knowledge:  intact and appropriate to age and " level of education     Assessment and Diagnosis   Status/Progress: Based on the examination today, the patient's problem(s) is/are adequately but not ideally controlled.  New problems have not been presented today.   Co-morbidities and Lack of compliance are not complicating management of the primary condition.  There are no active rule-out diagnoses for this patient at this time.     General Impression:       ICD-10-CM ICD-9-CM   1. RADHA (generalized anxiety disorder) F41.1 300.02   2. Episodic mood disorder F39 296.90   3. Tobacco abuse Z72.0 305.1   4. Cluster B personality disorder F60.9 301.9          Intervention/Counseling/Treatment Plan   · Medication Management: The risks and benefits of medication were discussed with the patient.   · Continue  Zoloft 100mg po bid  · Hold  Neurontin 600mg po bid  · Switch to Wellbutrin  mg po daily x 7 days then increase to BID (morning and early afternoon)        Return to Clinic: 3 months

## 2017-11-10 ENCOUNTER — PATIENT MESSAGE (OUTPATIENT)
Dept: PSYCHIATRY | Facility: CLINIC | Age: 32
End: 2017-11-10

## 2017-11-12 ENCOUNTER — HOSPITAL ENCOUNTER (EMERGENCY)
Facility: HOSPITAL | Age: 32
Discharge: PSYCHIATRIC OR SUBSTANCE USE FACILITY | End: 2017-11-13
Attending: EMERGENCY MEDICINE
Payer: MEDICARE

## 2017-11-12 DIAGNOSIS — R00.0 TACHYCARDIA: ICD-10-CM

## 2017-11-12 DIAGNOSIS — E87.6 HYPOKALEMIA: ICD-10-CM

## 2017-11-12 DIAGNOSIS — R45.851 SUICIDAL IDEATION: Primary | ICD-10-CM

## 2017-11-12 DIAGNOSIS — F60.89 CLUSTER B PERSONALITY DISORDER: ICD-10-CM

## 2017-11-12 DIAGNOSIS — Z91.199 MEDICAL NON-COMPLIANCE: ICD-10-CM

## 2017-11-12 DIAGNOSIS — R45.89 DEPRESSED MOOD: ICD-10-CM

## 2017-11-12 DIAGNOSIS — F41.1 GENERALIZED ANXIETY DISORDER: ICD-10-CM

## 2017-11-12 LAB
ALBUMIN SERPL BCP-MCNC: 3.7 G/DL
ALP SERPL-CCNC: 80 U/L
ALT SERPL W/O P-5'-P-CCNC: 14 U/L
AMORPH CRY UR QL COMP ASSIST: ABNORMAL
AMPHET+METHAMPHET UR QL: ABNORMAL
ANION GAP SERPL CALC-SCNC: 6 MMOL/L
AST SERPL-CCNC: 16 U/L
B-HCG UR QL: NEGATIVE
BARBITURATES UR QL SCN>200 NG/ML: NEGATIVE
BASOPHILS # BLD AUTO: 0.04 K/UL
BASOPHILS NFR BLD: 0.4 %
BENZODIAZ UR QL SCN>200 NG/ML: ABNORMAL
BILIRUB SERPL-MCNC: 0.9 MG/DL
BILIRUB UR QL STRIP: NEGATIVE
BUN SERPL-MCNC: 15 MG/DL
BZE UR QL SCN: NEGATIVE
CALCIUM SERPL-MCNC: 8.8 MG/DL
CANNABINOIDS UR QL SCN: NEGATIVE
CHLORIDE SERPL-SCNC: 107 MMOL/L
CLARITY UR REFRACT.AUTO: ABNORMAL
CO2 SERPL-SCNC: 27 MMOL/L
COLOR UR AUTO: YELLOW
CREAT SERPL-MCNC: 0.8 MG/DL
CREAT UR-MCNC: 345 MG/DL
CTP QC/QA: YES
DIFFERENTIAL METHOD: ABNORMAL
EOSINOPHIL # BLD AUTO: 0.3 K/UL
EOSINOPHIL NFR BLD: 2.6 %
ERYTHROCYTE [DISTWIDTH] IN BLOOD BY AUTOMATED COUNT: 12.2 %
EST. GFR  (AFRICAN AMERICAN): >60 ML/MIN/1.73 M^2
EST. GFR  (NON AFRICAN AMERICAN): >60 ML/MIN/1.73 M^2
ETHANOL UR-MCNC: <10 MG/DL
GLUCOSE SERPL-MCNC: 73 MG/DL
GLUCOSE UR QL STRIP: NEGATIVE
HCT VFR BLD AUTO: 40.6 %
HGB BLD-MCNC: 13.7 G/DL
HGB UR QL STRIP: ABNORMAL
IMM GRANULOCYTES # BLD AUTO: 0.03 K/UL
IMM GRANULOCYTES NFR BLD AUTO: 0.3 %
KETONES UR QL STRIP: ABNORMAL
LEUKOCYTE ESTERASE UR QL STRIP: NEGATIVE
LYMPHOCYTES # BLD AUTO: 3.4 K/UL
LYMPHOCYTES NFR BLD: 31.7 %
MCH RBC QN AUTO: 29.5 PG
MCHC RBC AUTO-ENTMCNC: 33.7 G/DL
MCV RBC AUTO: 88 FL
METHADONE UR QL SCN>300 NG/ML: NEGATIVE
MICROSCOPIC COMMENT: ABNORMAL
MONOCYTES # BLD AUTO: 1 K/UL
MONOCYTES NFR BLD: 9.1 %
NEUTROPHILS # BLD AUTO: 6.1 K/UL
NEUTROPHILS NFR BLD: 55.9 %
NITRITE UR QL STRIP: NEGATIVE
NRBC BLD-RTO: 0 /100 WBC
OPIATES UR QL SCN: NEGATIVE
PCP UR QL SCN>25 NG/ML: NEGATIVE
PH UR STRIP: 6 [PH] (ref 5–8)
PLATELET # BLD AUTO: 397 K/UL
PMV BLD AUTO: 10.1 FL
POTASSIUM SERPL-SCNC: 3.2 MMOL/L
PROT SERPL-MCNC: 6.6 G/DL
PROT UR QL STRIP: NEGATIVE
RBC # BLD AUTO: 4.64 M/UL
RBC #/AREA URNS AUTO: 1 /HPF (ref 0–4)
SODIUM SERPL-SCNC: 140 MMOL/L
SP GR UR STRIP: >=1.03 (ref 1–1.03)
SQUAMOUS #/AREA URNS AUTO: 1 /HPF
TOXICOLOGY INFORMATION: ABNORMAL
URN SPEC COLLECT METH UR: ABNORMAL
UROBILINOGEN UR STRIP-ACNC: NEGATIVE EU/DL
WBC # BLD AUTO: 10.83 K/UL

## 2017-11-12 PROCEDURE — 81025 URINE PREGNANCY TEST: CPT | Performed by: EMERGENCY MEDICINE

## 2017-11-12 PROCEDURE — 81001 URINALYSIS AUTO W/SCOPE: CPT

## 2017-11-12 PROCEDURE — 80053 COMPREHEN METABOLIC PANEL: CPT

## 2017-11-12 PROCEDURE — 25000003 PHARM REV CODE 250: Performed by: EMERGENCY MEDICINE

## 2017-11-12 PROCEDURE — 93010 ELECTROCARDIOGRAM REPORT: CPT | Mod: ,,, | Performed by: INTERNAL MEDICINE

## 2017-11-12 PROCEDURE — 80307 DRUG TEST PRSMV CHEM ANLYZR: CPT

## 2017-11-12 PROCEDURE — 85025 COMPLETE CBC W/AUTO DIFF WBC: CPT

## 2017-11-12 RX ORDER — LORAZEPAM 2 MG/ML
2 INJECTION INTRAMUSCULAR EVERY 4 HOURS PRN
Status: DISCONTINUED | OUTPATIENT
Start: 2017-11-12 | End: 2017-11-13 | Stop reason: HOSPADM

## 2017-11-12 RX ORDER — HALOPERIDOL 5 MG/ML
5 INJECTION INTRAMUSCULAR EVERY 4 HOURS PRN
Status: DISCONTINUED | OUTPATIENT
Start: 2017-11-12 | End: 2017-11-13 | Stop reason: HOSPADM

## 2017-11-12 RX ORDER — POTASSIUM CHLORIDE 20 MEQ/1
40 TABLET, EXTENDED RELEASE ORAL ONCE
Status: COMPLETED | OUTPATIENT
Start: 2017-11-12 | End: 2017-11-12

## 2017-11-12 RX ORDER — DIPHENHYDRAMINE HYDROCHLORIDE 50 MG/ML
50 INJECTION INTRAMUSCULAR; INTRAVENOUS EVERY 4 HOURS PRN
Status: DISCONTINUED | OUTPATIENT
Start: 2017-11-12 | End: 2017-11-13 | Stop reason: HOSPADM

## 2017-11-12 RX ORDER — ACETAMINOPHEN 325 MG/1
650 TABLET ORAL EVERY 6 HOURS PRN
Status: DISCONTINUED | OUTPATIENT
Start: 2017-11-12 | End: 2017-11-13 | Stop reason: HOSPADM

## 2017-11-12 RX ADMIN — POTASSIUM CHLORIDE 40 MEQ: 1500 TABLET, EXTENDED RELEASE ORAL at 09:11

## 2017-11-13 VITALS
SYSTOLIC BLOOD PRESSURE: 103 MMHG | BODY MASS INDEX: 20.36 KG/M2 | DIASTOLIC BLOOD PRESSURE: 56 MMHG | WEIGHT: 130 LBS | HEART RATE: 79 BPM | OXYGEN SATURATION: 98 % | TEMPERATURE: 99 F | RESPIRATION RATE: 18 BRPM

## 2017-11-13 PROCEDURE — 99285 EMERGENCY DEPT VISIT HI MDM: CPT | Mod: 25

## 2017-11-13 PROCEDURE — 99285 EMERGENCY DEPT VISIT HI MDM: CPT | Mod: ,,, | Performed by: EMERGENCY MEDICINE

## 2017-11-13 PROCEDURE — 93005 ELECTROCARDIOGRAM TRACING: CPT

## 2017-11-13 NOTE — SUBJECTIVE & OBJECTIVE
Patient History           Medical as of 11/12/2017     Past Medical History     Diagnosis Date Comments Source    Abnormal cervical Papanicolaou smear 2012 Colposcopy Provider    ADHD (attention deficit hyperactivity disorder) 8/16/2012 -- Provider    Allergy -- -- Provider    Anxiety -- -- Provider    Back pain 5/19/2014 -- Provider    Bipolar affective disorder -- -- Provider    Cholecystitis -- -- Provider    Chronic migraine without aura, with intractable migraine, so stated, without mention of status migrainosus 9/24/2013 -- Provider    Depression -- -- Provider    Fever blister -- -- Provider    Gallstones 5/26/2014 -- Provider    Headache(784.0) -- -- Provider    History of hepatitis C -- -- Provider    History of psychiatric hospitalization -- Suicide attempt Provider    Personality disorder 4/11/2013 -- Provider    Psychosis 10/7/2013 -- Provider    Therapy -- -- Provider    Tobacco abuse 9/24/2013 -- Provider                  Surgical as of 11/12/2017     Past Surgical History     Procedure Laterality Date Comments Source    FRACTURE SURGERY -- -- -- Provider    SKIN BIOPSY -- -- -- Provider    MOLE REMOVAL -- -- -- Provider    ESOPHAGOGASTRODUODENOSCOPY -- -- -- Provider                  Family as of 11/12/2017     Problem Relation Name Age of Onset Comments Source    Melanoma Mother -- -- -- Provider    ADD / ADHD Mother -- -- -- Provider    Bipolar disorder Mother -- -- -- Provider    Schizophrenia Mother -- -- -- Provider    Alcohol abuse Father -- -- -- Provider    Drug abuse Father -- -- -- Provider    Depression Father -- -- -- Provider    No Known Problems Sister -- -- -- Provider    Acne Maternal Aunt -- -- -- Provider    Depression Maternal Aunt -- -- -- Provider    Migraines Maternal Aunt -- -- -- Provider    Anxiety disorder Paternal Aunt -- -- -- Provider    Depression Paternal Aunt -- -- -- Provider    Breast cancer Neg Hx -- -- -- Provider    Colon cancer Neg Hx -- -- -- Provider     Ovarian cancer Neg Hx -- -- -- Provider            Tobacco Use as of 11/12/2017     Smoking Status Smoking Start Date Smoking Quit Date Packs/day Years Used    Current Some Day Smoker -- -- 1.00 --    Types Comments Smokeless Tobacco Status Smokeless Tobacco Quit Date Source     Cigarettes -- Current User -- Provider            Alcohol Use as of 11/12/2017     Alcohol Use Drinks/Week Alcohol/Week Comments Source    No -- 0.0 oz -- Provider            Drug Use as of 11/12/2017     Drug Use Types Frequency Comments Source    No -- -- -- Provider            Sexual Activity as of 11/12/2017     Sexually Active Birth Control Partners Comments Source    Yes OCP Male -- Provider            Activities of Daily Living as of 11/12/2017     Activities of Daily Living Question Response Comments Source    Are you pregnant or think you may be? No -- Provider    Breast-feeding No -- Provider    Caffeine Use: Excessive No -- Provider    Financial Status: Unemployed No -- Provider    Legal: Other No -- Provider    Childhood History: Adopted No -- Provider    Financial Status: Other No -- Provider    Leisure: Exercise No -- Provider    Childhood History: Early trauma No -- Provider    Firearms: Does patient have access to a firearm? No -- Provider    Leisure: Fishing No -- Provider    Childhood History: Raised by parents Yes -- Provider    Home situation: homeless No -- Provider    Leisure: Hunting No -- Provider    Childhood History: Uneventful No -- Provider    Home situation: lives alone No -- Provider    Leisure: Movie Watching Yes -- Provider    Childhood History: Other No -- Provider    Home situation: lives in group home No -- Provider    Leisure: Shopping Yes -- Provider    Education: Unfinished High School No -- Provider    Home situation: lives in nursing home No -- Provider    Leisure: Sports No -- Provider    Education: High School Graduate Yes -- Provider    Home situation: lives in shelter No -- Provider    Leisure:  Time with family No -- Provider    Education: Unfinished college Yes -- Provider    Home situation: lives with family No -- Provider     Service No -- Provider    Education: Trade School No -- Provider    Home situation: lives with friends No -- Provider    Spirituality: Active Participation No -- Provider    Education: Associate's Degree No -- Provider    Home situation: lives with significant other Yes -- Provider    Spirituality: Organized Adventist No -- Provider    Education: Bachelor's Degree No -- Provider    Home situation: lives with spouse No -- Provider    Spirituality: Private Participation No -- Provider    Education: More than one Bachelor's or Professional No -- Provider    Legal consequences of chemical use No -- Provider    Patient feels they ought to cut down on drinking/drug use No -- Provider    Education: Master's, PhD No -- Provider    Legal: Arrest history No -- Provider    Patient annoyed by others criticizing their drinking/drug use No -- Provider    Financial Status: Disabled Yes -- Provider    Legal: Involved in civil litigation No -- Provider    Patient has felt bad or guilty about drinking/drug use No -- Provider    Financial Status: Employed No -- Provider    Legal: Involved in criminal litigation No -- Provider    Patient has had a drink/used drugs as an eye opener in the AM No -- Provider            Social Documentation as of 11/12/2017    Lives with boyfriend.    Exercise:  Stretching.   Source: Provider           Occupational as of 11/12/2017     Occupation Employer Comments Source    disabled oscar -- Provider    -- disabled  -- Provider            Socioeconomic as of 11/12/2017     Marital Status Spouse Name Number of Children Years Education Preferred Language Ethnicity Race Source    Single -- -- -- English /White White Provider         Pertinent History Q A Comments    as of 11/12/2017 Lives with family     Place in Birth [address]     Lives in home     Number  of Siblings 1     Raised by biological parents     Legal Involvement      Childhood Trauma early trauma     Criminal History of none     Financial Status unemployed     Highest Level of Education unfinished college     Does patient have access to a firearm? No      Service No     Primary Leisure Activity other     Spirituality non-practicing      Past Medical History:   Diagnosis Date    Abnormal cervical Papanicolaou smear 2012    Colposcopy    ADHD (attention deficit hyperactivity disorder) 8/16/2012    Allergy     Anxiety     Back pain 5/19/2014    Bipolar affective disorder     Cholecystitis     Chronic migraine without aura, with intractable migraine, so stated, without mention of status migrainosus 9/24/2013    Depression     Fever blister     Gallstones 5/26/2014    Headache(784.0)     History of hepatitis C     History of psychiatric hospitalization     Suicide attempt    Personality disorder 4/11/2013    Psychosis 10/7/2013    Therapy     Tobacco abuse 9/24/2013     Past Surgical History:   Procedure Laterality Date    ESOPHAGOGASTRODUODENOSCOPY      FRACTURE SURGERY      MOLE REMOVAL      SKIN BIOPSY       Family History     Problem Relation (Age of Onset)    ADD / ADHD Mother    Acne Maternal Aunt    Alcohol abuse Father    Anxiety disorder Paternal Aunt    Bipolar disorder Mother    Depression Father, Maternal Aunt, Paternal Aunt    Drug abuse Father    Melanoma Mother    Migraines Maternal Aunt    No Known Problems Sister    Schizophrenia Mother        Social History Main Topics    Smoking status: Current Some Day Smoker     Packs/day: 1.00     Types: Cigarettes    Smokeless tobacco: Current User    Alcohol use No    Drug use: No    Sexual activity: Yes     Partners: Male     Birth control/ protection: OCP     Review of patient's allergies indicates:   Allergen Reactions    Dilantin [phenytoin sodium extended] Rash    Lithium analogues Rash    Saphris [asenapine]       Confusion and depressed mood.    Topamax [topiramate] Anaphylaxis       No current facility-administered medications on file prior to encounter.      Current Outpatient Prescriptions on File Prior to Encounter   Medication Sig    azithromycin (ZITHROMAX Z-LESLY) 250 MG tablet Take 2 tablets (500 mg) on  Day 1,  followed by 1 tablet (250 mg) once daily on Days 2 through 5.    buPROPion (WELLBUTRIN SR) 100 MG TBSR 12 hr tablet Take 1 tablet in the morning for 7 days then increase to 1 tablet every morning and early afternoon    ciclopirox (PENLAC) 8 % Soln Apply daily to affected nail. Must remove with rubbing alcohol once weekly and then re-start.    cyclobenzaprine (FLEXERIL) 10 MG tablet take 0.5 to 1 tablet by mouth at bedtime if needed for BACKPAIN    dapsone (ACZONE) 5 % topical gel apply to affected areas on face daily - BID    norethindrone-ethinyl estradiol (MICROGESTIN 1/20) 1-20 mg-mcg per tablet Take 1 tablet by mouth every evening.    sertraline (ZOLOFT) 100 MG tablet Take 1 tab by mouth each morning and at bedtime    tazarotene (TAZORAC) 0.05 % gel APPLY TOPICALLY EVERY EVENING. WASH OFF IN THE MORNING, AND APPLY SUNSCREEN    tramadol (ULTRAM) 50 mg tablet take 1 tablet by mouth every 8 hours if needed    valacyclovir (VALTREX) 1000 MG tablet Take 1 tablet (1,000 mg total) by mouth once daily.     Psychotherapeutics     None        Review of Systems  Strengths and Liabilities: Strength: Patient is expressive/articulate.    Objective:     Vital Signs (Most Recent):  Temp: 98.2 °F (36.8 °C) (11/12/17 2019)  Pulse: 96 (11/12/17 2019)  Resp: 20 (11/12/17 2019)  BP: 112/61 (11/12/17 2019)  SpO2: 98 % (11/12/17 2019) Vital Signs (24h Range):  Temp:  [98.2 °F (36.8 °C)] 98.2 °F (36.8 °C)  Pulse:  [] 96  Resp:  [18-20] 20  SpO2:  [98 %-100 %] 98 %  BP: (110-112)/(60-61) 112/61        Weight: 59 kg (130 lb)  Body mass index is 20.36 kg/m².    No intake or output data in the 24 hours ending  "11/12/17 2155    Physical Exam      Level of Consciousness: alert  Orientation: oriented to person, place, situation  Grooming: fair  Psychomotor Behavior: restless, some PMA  Speech: normal rate and tone, loud volume, emotional  Language: English, no aphasia  Mood: "I don't even know!!!"  Affect: labile, tearful and angry by turns  Thought Process: tangential, perseverative, circumstatnial  Associations: mostly intact, some episodes of apparent looseness  Thought Content: passive SI without current intent. Denies current HI. Denies current AVH. Does not feel anyone in particular wants to hurt hr  Memory: intact to recent and remote events  Attention: distractable  Fund of Knowledge: appropriate for education level  Insight: fair  Judgment: limited      Significant Labs:   Last 24 Hours:   Recent Lab Results       11/12/17 2059 11/12/17 2055 11/12/17 2019      Alcohol, Urine  <10      Benzodiazepines  Presumptive Positive      Methadone metabolites  Negative      Phencyclidine  Negative      Immature Granulocytes   0.3     Immature Grans (Abs)   0.03     Albumin   3.7     Alkaline Phosphatase   80     ALT   14     Amphetamine Screen, Ur  Presumptive Positive      Anion Gap   6(L)     AST   16     Barbiturate Screen, Ur  Negative      Baso #   0.04     Basophil%   0.4     Total Bilirubin   0.9  Comment:  For infants and newborns, interpretation of results should be based  on gestational age, weight and in agreement with clinical  observations.  Premature Infant recommended reference ranges:  Up to 24 hours.............<8.0 mg/dL  Up to 48 hours............<12.0 mg/dL  3-5 days..................<15.0 mg/dL  6-29 days.................<15.0 mg/dL       BUN, Bld   15     Calcium   8.8     Chloride   107     CO2   27     Cocaine (Metab.)  Negative      Creatinine   0.8     Creatinine, Random Ur  345.0  Comment:  The random urine reference ranges provided were established   for 24 hour urine collections.  No reference " ranges exist for  random urine specimens.  Correlate clinically.  (H)      Differential Method   Automated     eGFR if    >60.0     eGFR if non    >60.0  Comment:  Calculation used to obtain the estimated glomerular filtration  rate (eGFR) is the CKD-EPI equation.        Eos #   0.3     Eosinophil%   2.6     Glucose   73     Gran #   6.1     Gran%   55.9     Hematocrit   40.6     Hemoglobin   13.7     Lymph #   3.4     Lymph%   31.7     MCH   29.5     MCHC   33.7     MCV   88     Mono #   1.0     Mono%   9.1     MPV   10.1     nRBC   0     Opiate Scrn, Ur  Negative      Platelets   397(H)     Potassium   3.2(L)     Preg Test, Ur Negative       Total Protein   6.6      Acceptable Yes       RBC   4.64     RDW   12.2     Sodium   140     Marijuana (THC) Metabolite  Negative      Toxicology Information  SEE COMMENT  Comment:  This screen includes the following classes of drugs at the   listed cut-off:  Benzodiazepines                  200 ng/ml  Methadone                        300 ng/ml  Cocaine metabolite               300 ng/ml  Opiates                          300 ng/ml  Barbiturates                     200 ng/ml  Amphetamines                    1000 ng/ml  Marijuana metabs (THC)            50 ng/ml  Phencyclidine (PCP)               25 ng/ml  High concentrations of Diphenhydramine may cross-react with  Phencyclidine PCP screening immunoassay giving a false   positive result.  High concentrations of Methylenedioxymethamphetamine (MDMA aka  Ectasy) and other structurally similar compounds may cross-   react with the Amphetamine/Methamphetamine screening   immunoassay giving a false positive result.  A metabolite of the anti-HIV drug Sustiva () may cause  false positive results in the Marijuana metabolite (THC)   screening assay.  Note: This exception list includes only more common   interferants in toxicology screen testing.  Because of many    cross-reactantspositive results on toxicology drug screens   should be confirmed whenever results do not correlate with   clinical presentation.  This report is intended for use in clinical monitoring and  management of patients. It is not intended for use in   employment related drug testing.  Because of any cross-reactants, positive results on toxicology  drug screens should be confirmed whenever results do not  correlate with clinical presentation.  Presumptive positive results are unconfirmed and may be used   only for medical purposes.        WBC   10.83           Significant Imaging: None
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
## 2017-11-13 NOTE — ED NOTES
Patient father( Justen) informed of transfer to [facility] in [place]. Father given [facility] Phone #.

## 2017-11-13 NOTE — ED NOTES
Pt accepted to [facility]. Accepting Dr. Parham.M.D. Call report [phone]. Please allow 20 mintues before calling report. Nurse was informed.

## 2017-11-13 NOTE — ED NOTES
Two patient identifiers checked and confirmed.    APPEARANCE: Resting comfortably in no acute distress. Patient has clean hair, skin and nails. Clothing is appropriate and properly fastened.  NEURO: Awake, alert, appropriate for age, and cooperative with a calm affect; pupils equal and round. Oriented x4.  HEENT: Head symmetrical. Bilateral eyes without redness or drainage.  CARDIAC: Regular rate and rhythm.  RESPIRATORY: Airway is open and patent. Respirations are spontaneous on room air. Normal respiratory effort and rate noted.  GI/: Abdomen soft and non-distended. Patient is reported to void and stool appropriately for age.  NEUROVASCULAR: All extremities are warm and pink with +2 pulses and capillary refill less than 3 seconds.  MUSCULOSKELETAL: Moves all extremities well; no obvious deformities noted.  SKIN: Warm and dry, adequate turgor, mucus membranes moist and pink

## 2017-11-13 NOTE — ED NOTES
Patient transferred to [facility] with ER nurse and Security with 1 bag of belongings placed in  2 bin.  called and informed of transfer to  2. Spoke with Denise with the . Patient searched for contraband.

## 2017-11-13 NOTE — ED TRIAGE NOTES
Pt states she has been depressed for about a week now and wants to seek help. States she has been through this previously and knows when to get help. When asking pt certain questions she states she doesn't know right now. Pt states she feels like people around her can read her mind. Pt denies hallucinations. Pt denies HI. Pt reports she felt suicidal on Friday but doesn't feel like she wants to harm herself currently.

## 2017-11-13 NOTE — ED NOTES
Patient denies SI, HI, V/A hallucinations. She reports she is depressed 7/10. She stated something is not right and wants help. Patient stated she had HA when offered Tylenol she refused and did not want me to call doctor for motrin. She stated they did not work. Will continue to monitor. SVC maintained.

## 2017-11-13 NOTE — ED PROVIDER NOTES
"Encounter Date: 11/12/2017    SCRIBE #1 NOTE: I, Shirin Daly, am scribing for, and in the presence of,  Dr. Gonzalez. I have scribed the following portions of the note - the Resident attestation.       History     Chief Complaint   Patient presents with    Psychiatric Evaluation     pt persents to the ed for a psych eval. [t states " i tried to go to [facility] but they didnt have any beds"      32 year old woman with generalized anxiety disorder, episodic mood disorder, and Cluster B personality disorder presenting for 1 week of depressed mood, confusion, and fears that other people are reading her thoughts. She reports self-discontinuation of all of her outpatient psychiatric medications a few weeks ago. She stated that she feels overwhelmed by her feelings. She denied suicidal ideation. She denied hallucinations. She reported a concern that other people are reading her mind. She stated that she is afraid that she cannot control her negative thoughts and that she will lose control of herself. She denies shortness of breath, chest pain, fevers/chills, abdominal pain, cough, and headache.              Review of patient's allergies indicates:   Allergen Reactions    Dilantin [phenytoin sodium extended] Rash    Lithium analogues Rash    Saphris [asenapine]      Confusion and depressed mood.    Topamax [topiramate] Anaphylaxis     Past Medical History:   Diagnosis Date    Abnormal cervical Papanicolaou smear 2012    Colposcopy    ADHD (attention deficit hyperactivity disorder) 8/16/2012    Allergy     Anxiety     Back pain 5/19/2014    Bipolar affective disorder     Cholecystitis     Chronic migraine without aura, with intractable migraine, so stated, without mention of status migrainosus 9/24/2013    Depression     Fever blister     Gallstones 5/26/2014    Headache(784.0)     History of hepatitis C     History of psychiatric hospitalization     Suicide attempt    Personality disorder 4/11/2013 "    Psychosis 10/7/2013    Therapy     Tobacco abuse 9/24/2013     Past Surgical History:   Procedure Laterality Date    ESOPHAGOGASTRODUODENOSCOPY      FRACTURE SURGERY      MOLE REMOVAL      SKIN BIOPSY       Family History   Problem Relation Age of Onset    Melanoma Mother     ADD / ADHD Mother     Bipolar disorder Mother     Schizophrenia Mother     Alcohol abuse Father     Drug abuse Father     Depression Father     No Known Problems Sister     Acne Maternal Aunt     Depression Maternal Aunt     Migraines Maternal Aunt     Anxiety disorder Paternal Aunt     Depression Paternal Aunt     Breast cancer Neg Hx     Colon cancer Neg Hx     Ovarian cancer Neg Hx      Social History   Substance Use Topics    Smoking status: Current Some Day Smoker     Packs/day: 1.00     Types: Cigarettes    Smokeless tobacco: Current User    Alcohol use No     Review of Systems   Constitutional: Negative for chills and fever.   HENT: Negative for congestion.    Eyes: Negative for visual disturbance.   Respiratory: Negative for shortness of breath.    Cardiovascular: Negative for chest pain.   Gastrointestinal: Negative for abdominal pain.   Endocrine: Negative for polyuria.   Genitourinary: Negative for dysuria.   Neurological: Negative for dizziness and headaches.   Psychiatric/Behavioral: Positive for dysphoric mood. Negative for agitation, behavioral problems, hallucinations, self-injury and suicidal ideas. The patient is nervous/anxious. The patient is not hyperactive.        Physical Exam     Initial Vitals [11/12/17 1907]   BP Pulse Resp Temp SpO2   110/60 (!) 133 18 98.2 °F (36.8 °C) 100 %      MAP       76.67         Physical Exam    Nursing note and vitals reviewed.  Constitutional: She appears well-developed and well-nourished. She is not diaphoretic. She appears distressed.   32 year old  woman sobbing in hospital bed.   HENT:   Head: Normocephalic and atraumatic.   Eyes: EOM are normal.  "Pupils are equal, round, and reactive to light.   Neck: Normal range of motion. Neck supple.   Cardiovascular: Normal rate, regular rhythm, normal heart sounds and intact distal pulses.   Pulmonary/Chest: Breath sounds normal. No respiratory distress. She has no wheezes.   Abdominal: Soft. Bowel sounds are normal. She exhibits no distension. There is no tenderness.   Musculoskeletal: Normal range of motion. She exhibits no edema.   Neurological: She is alert and oriented to person, place, and time. No cranial nerve deficit.   Skin: Skin is warm and dry.   Psychiatric:   Patient reports depressed mood x 1 week.  Patient denies suicidal ideation.  Labile affect noted.    Direct quotations:  "I feel confused and depressed. I can't put words to describe how I feel. I don't know what to do lately. I don't want to take my medications because they're messing with my head. I know I need to be seen at the hospital."         ED Course   Procedures  Labs Reviewed   CBC W/ AUTO DIFFERENTIAL - Abnormal; Notable for the following:        Result Value    Platelets 397 (*)     All other components within normal limits   COMPREHENSIVE METABOLIC PANEL - Abnormal; Notable for the following:     Potassium 3.2 (*)     Anion Gap 6 (*)     All other components within normal limits   TOXICOLOGY SCREEN, URINE, RANDOM (COMPLIANCE) - Abnormal; Notable for the following:     Creatinine, Random Ur 345.0 (*)     All other components within normal limits   DRUGS OF ABUSE SCREEN, BLOOD   DRUGS OF ABUSE SCREEN, BLOOD   POCT URINE PREGNANCY     EKG Readings: (Independently Interpreted)   NSR at a rate of 84. Normal axis. Normal ST segments. Normal t waves.          Medical Decision Making:   History:   Old Medical Records: I decided to obtain old medical records.  Initial Assessment:   32 year old woman with RADHA, episodic mood disorder, and Cluster B personality disorder last seen in outpatient psychiatric clinic on 10/24/17 and advised to continue " her medications with 3 month follow up. Patient since this time has stopped taking all of her medications. She reports depressed mood and denies suicidal ideation. Vital stable and within normal limits. Patient advised that she needs to be compliant with her medications in order to derive potential benefit from them. Presentation concerning for established episodic mood disorder vs. Medical non-compliance vs. Cluster B personality.    Mario Stewart MD  [facility]  11/12/2017; 1945  Independently Interpreted Test(s):   I have ordered and independently interpreted EKG Reading(s) - see prior notes  Clinical Tests:   Lab Tests: Ordered and Reviewed  Medical Tests: Reviewed and Ordered  ED Management:  Consult placed to inpatient psychiatry. Concern expressed to on-call Psychiatry physician that patient is non-compliant with outpatient medication regimen, but at the same time does not meet criteria for PEC.     Mario Stewart MD  [facility]  11/12/2017; 2028            Scribe Attestation:   Scribe #1: I performed the above scribed service and the documentation accurately describes the services I performed. I attest to the accuracy of the note.    Attending Attestation:   Physician Attestation Statement for Resident:  As the supervising MD   Physician Attestation Statement: I have personally seen and examined this patient.   I agree with the above history. -: 33 yo female with hx of anxiety and episodic mood disorder presenting with depression and psychosis after not taking her psych medication. She was initially somewhat tachycardic but resolved upon MD kidd. Labs revealed mild hyperkalemia which was replaced. Utox positive for amphetamines. Psych consulted.    Psych recommends inpatient.  PEC completed.   As the supervising MD I agree with the above PE.    As the supervising MD I agree with the above treatment, course, plan, and disposition.  I have reviewed and agree with the residents interpretation of the following:  EKG and lab data.  I have reviewed the following: old records at this facility.          Physician Attestation for Scribe:      Comments: I, Dr. Johny Bateman, personally performed the services described in this documentation. All medical record entries made by the scribe were at my direction and in my presence.  I have reviewed the chart and agree that the record reflects my personal performance and is accurate and complete. Johny Bateman MD.  10:40 PM 11/12/2017              ED Course      Clinical Impression:   The primary encounter diagnosis was Suicidal ideation. Diagnoses of Medical non-compliance, Cluster B personality disorder, Depressed mood, Generalized anxiety disorder, Tachycardia, and Hypokalemia were also pertinent to this visit.                        Johny Bateman MD  11/12/17 9502

## 2017-11-13 NOTE — ASSESSMENT & PLAN NOTE
History of bipolar disorder vs unspecified mood disorder. History of apparent manic episodes. Currently with mood lability, passive SI, and thinking that borders on delusional. Feel patient would benefit from inpatient psychiatric hospitalization.  Please PEC for danger to self based on SI.    No beds available at [facility]; please seek alternative placement. Defer to eventual inpatient team to restart scheduled psychotropics. Dangelo offer Vistaril 50mg PRN for sleep tonight if needed.     Case discussed with on-call staff, Dr. Crump.

## 2017-11-13 NOTE — ED NOTES
Admission packet faxed to [facility], [facility], [facility], [facility], [facility], [facility], [facility], [facility], [facility]. Awaiting responses.

## 2017-11-13 NOTE — ED NOTES
PEC received in [facility]. Will actively seek placement once pt is medically cleared.
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
## 2017-11-14 LAB
AMPHETAMINES SERPL QL: NEGATIVE
BARBITURATES SERPL QL SCN: NEGATIVE
BENZODIAZ SERPL QL: POSITIVE
CANNABINOIDS SERPL QL: NEGATIVE
COCAINE, BLOOD: NEGATIVE
ETHANOL SERPL-MCNC: NEGATIVE MG/DL
METHADONE SERPL QL SCN: NEGATIVE
OPIATES SERPL QL SCN: NEGATIVE
PCP SERPL QL SCN: NEGATIVE
PROPOXYPH SERPL QL: NEGATIVE

## 2017-11-22 ENCOUNTER — PATIENT MESSAGE (OUTPATIENT)
Dept: PSYCHIATRY | Facility: CLINIC | Age: 32
End: 2017-11-22

## 2017-11-22 ENCOUNTER — OFFICE VISIT (OUTPATIENT)
Dept: PSYCHIATRY | Facility: CLINIC | Age: 32
End: 2017-11-22
Payer: COMMERCIAL

## 2017-11-22 VITALS
DIASTOLIC BLOOD PRESSURE: 70 MMHG | WEIGHT: 131.63 LBS | HEART RATE: 105 BPM | SYSTOLIC BLOOD PRESSURE: 119 MMHG | HEIGHT: 67 IN | BODY MASS INDEX: 20.66 KG/M2

## 2017-11-22 DIAGNOSIS — G47.00 INSOMNIA DISORDER WITH NON-SLEEP DISORDER MENTAL COMORBIDITY: ICD-10-CM

## 2017-11-22 DIAGNOSIS — F60.89 CLUSTER B PERSONALITY DISORDER: ICD-10-CM

## 2017-11-22 DIAGNOSIS — F31.10 BIPOLAR DISORDER, MOST RECENT EPISODE MANIC: Primary | ICD-10-CM

## 2017-11-22 DIAGNOSIS — F41.1 GAD (GENERALIZED ANXIETY DISORDER): ICD-10-CM

## 2017-11-22 PROCEDURE — 99999 PR PBB SHADOW E&M-EST. PATIENT-LVL II: CPT | Mod: PBBFAC,,, | Performed by: NURSE PRACTITIONER

## 2017-11-22 PROCEDURE — 99499 UNLISTED E&M SERVICE: CPT | Mod: S$GLB,,, | Performed by: NURSE PRACTITIONER

## 2017-11-22 PROCEDURE — 90833 PSYTX W PT W E/M 30 MIN: CPT | Mod: S$GLB,,, | Performed by: NURSE PRACTITIONER

## 2017-11-22 PROCEDURE — 99214 OFFICE O/P EST MOD 30 MIN: CPT | Mod: S$GLB,,, | Performed by: NURSE PRACTITIONER

## 2017-11-22 RX ORDER — OLANZAPINE 2.5 MG/1
2.5 TABLET ORAL 3 TIMES DAILY
Qty: 90 TABLET | Refills: 5 | Status: SHIPPED | OUTPATIENT
Start: 2017-11-22 | End: 2018-01-30

## 2017-11-22 RX ORDER — LITHIUM CARBONATE 300 MG/1
300 CAPSULE ORAL
Qty: 90 CAPSULE | Refills: 5 | Status: SHIPPED | OUTPATIENT
Start: 2017-11-22 | End: 2018-01-30

## 2017-11-22 RX ORDER — AMITRIPTYLINE HYDROCHLORIDE 25 MG/1
25 TABLET, FILM COATED ORAL NIGHTLY PRN
Qty: 30 TABLET | Refills: 5 | Status: SHIPPED | OUTPATIENT
Start: 2017-11-22 | End: 2018-01-30

## 2017-11-22 RX ORDER — BUPROPION HYDROCHLORIDE 100 MG/1
100 TABLET ORAL DAILY
Qty: 30 TABLET | Refills: 5 | Status: SHIPPED | OUTPATIENT
Start: 2017-11-22 | End: 2018-01-30

## 2017-11-25 NOTE — PROGRESS NOTES
"Outpatient Psychiatry Follow-Up Visit (MD/NP)    11/22/2017    Clinical Status of Patient:  Outpatient (Ambulatory)    Chief Complaint:  Vianney Callahan is a 32 y.o. female who presents today for follow-up of mood disorder.  Met with patient. Last visit was on 10/24.  Pt released from hospital today and presents for follow-up. Chart Reviewed.    Interval History and Content of Current Session:  Interim Events/Subjective Report/Content of Current Session:    Current Psych Med List:  Welbutrin SR 100mg po BID  Zoloft 200mg po q day  Neurontin 600mg 1 caps po q AM, 1 cap po q 4pm, 2 cap po q hs    Pt presented to Emergency Dept on 11/12 for suicidal thoughts.  Reviewed [facility] medical records.   Per ED physician note:   32 year old woman with RADHA, episodic mood disorder, and Cluster B personality disorder last seen in outpatient psychiatric clinic on 10/24/17 and advised to continue her medications with 3 month follow up. Patient since this time has stopped taking all of her medications. She reports depressed mood and denies suicidal ideation. Vital stable and within normal limits. Patient advised that she needs to be compliant with her medications in order to derive potential benefit from them. Presentation concerning for established episodic mood disorder vs. Medical non-compliance vs. Cluster B personality.   Mario Stewart MD  [facility]  11/12/2017; 1945    Pt was transferred to [facility] for inpatient treatment and released today at 0900.  Pt provided discharge records with current medication changes:  · Wellbutrin 100 mg po daily  · Zyprexa 2.5 mg po TID  · Flexeril 10 mg po q hs     Pt reports that she had experienced paranoid delusions since last visit with theme of "having to purify my body" which led to her stopping her medications.  Denies current SI/HI, AH/VH, or delusions.  Speech was rapid.  Pt appeared hypomanic.  She lives with her boyfriend and denies use of alcohol or elicit drugs.  She " reports history of taking lithium on two separate trials.  Last trial did not cause side effects.  Pt willing to restart mood stabilizer.      Psychotherapy:  · Target symptoms: mood disorder  · Why chosen therapy is appropriate versus another modality: relevant to diagnosis  · Outcome monitoring methods: self-report, observation  · Therapeutic intervention type: insight oriented psychotherapy, CBT  · Topics discussed/themes: building skills sets for symptom management, symptom recognition  · The patient's response to the intervention is accepting. The patient's progress toward treatment goals is fair.  · Duration of intervention: 20 minutes.      Review Of Systems:     GENERAL: no significant change in bodyweight  SKIN: No rashes or lacerations   HEAD: No headaches   EYES: No exophthalmos, jaundice or blindness  EARS: No dizziness, tinnitus or hearing loss   NOSE: No changes in smell   MOUTH & THROAT: No dyskinetic movements or obvious goiter   CHEST: No shortness of breath, hyperventilation or cough   CARDIOVASCULAR: No tachycardia or chest pain   ABDOMEN: no nausea,no vomiting, pain, constipation or diarrhea   URINARY: No frequency, dysuria or sexual dysfunction   ENDOCRINE: No polydipsia, polyuria   MUSCULOSKELETAL: No pain or stiffness of the joints   NEUROLOGIC: No weakness, sensory changes, seizures, confusion, memory loss, tremor or other abnormal movements      Psychiatric Review Of Systems:  sleep: wnl  appetite changes: no  weight changes: no  energy/anergy: wnl  interest/pleasure/anhedonia: wnl  somatic symptoms: no  libido: wnl  anxiety/panic: yes/no        Past Medical, Family and Social History: The patient's past medical, family and social history have been reviewed and updated as appropriate within the electronic medical record - see encounter notes.    Compliance: No    Side effects: None    Risk Parameters:  Patient reports no suicidal ideation  Patient reports no homicidal ideation  Patient  "reports no self-injurious behavior  Patient reports no violent behavior    Exam (detailed: at least 9 elements; comprehensive: all 15 elements)   Constitutional  Vitals:  Most recent vital signs, dated less than 90 days prior to this appointment, were reviewed.   Vitals:    11/22/17 1032   BP: 119/70   Pulse: 105   Weight: 59.7 kg (131 lb 9.6 oz)   Height: 5' 7" (1.702 m)        General:  unremarkable, age appropriate, casually dressed, neatly groomed     Musculoskeletal  Muscle Strength/Tone:  no dyskinesia, no dystonia, no tremor, no tic   Gait & Station:  non-ataxic     Psychiatric  Speech:  no latency; no press   Mood & Affect:  steady, irritable  appropriate   Thought Process:  goal-directed   Associations:  intact, circumstantial   Thought Content:  normal, no suicidality, no homicidality, delusions, or paranoia   Insight:  has awareness of illness   Judgement: behavior is adequate to circumstances   Orientation:  grossly intact   Memory: intact for content of interview   Language: grossly intact   Attention Span & Concentration:  able to focus, distracted   Fund of Knowledge:  intact and appropriate to age and level of education     Assessment and Diagnosis   Status/Progress: Based on the examination today, the patient's problem(s) is/are treatment resistant and worsening.  New problems have been presented today (inpatient hospitalization).   Co-morbidities and Lack of compliance are not complicating management of the primary condition.  There are no active rule-out diagnoses for this patient at this time.     General Impression:       ICD-10-CM ICD-9-CM   1. Bipolar disorder, most recent episode manic F31.10 296.40   2. RADHA (generalized anxiety disorder) F41.1 300.02   3. Insomnia disorder with non-sleep disorder mental comorbidity G47.00 780.52   4. Cluster B personality disorder F60.9 301.9          Intervention/Counseling/Treatment Plan   · Medication Management: The risks and benefits of medication were " discussed with the patient.   · Discontinue   Zoloft 100mg po bid  · Change to Wellbutrin 100 mg po daily   · Start Elavil 25 mg po q hs PRN insomnia  · Continue Zyprexa 2.5 mg po TID (started at [facility])  · Start Lithium 300 mg po TID  · Ordered Lithium level for next week.  · Reviewed discharge records from [facility]  · Pt to schedule individual therapy with .  Encouraged BMU program once stable.  · Reviewed safety plan with patient      Return to Clinic: 1 month

## 2017-11-28 ENCOUNTER — OFFICE VISIT (OUTPATIENT)
Dept: INTERNAL MEDICINE | Facility: CLINIC | Age: 32
End: 2017-11-28
Payer: MEDICARE

## 2017-11-28 ENCOUNTER — PATIENT MESSAGE (OUTPATIENT)
Dept: PSYCHIATRY | Facility: CLINIC | Age: 32
End: 2017-11-28

## 2017-11-28 VITALS
SYSTOLIC BLOOD PRESSURE: 122 MMHG | OXYGEN SATURATION: 99 % | BODY MASS INDEX: 21.45 KG/M2 | DIASTOLIC BLOOD PRESSURE: 68 MMHG | TEMPERATURE: 98 F | WEIGHT: 136.69 LBS | HEIGHT: 67 IN | HEART RATE: 94 BPM

## 2017-11-28 DIAGNOSIS — G89.29 CHRONIC LOW BACK PAIN WITHOUT SCIATICA, UNSPECIFIED BACK PAIN LATERALITY: ICD-10-CM

## 2017-11-28 DIAGNOSIS — G43.719 INTRACTABLE CHRONIC MIGRAINE WITHOUT AURA AND WITHOUT STATUS MIGRAINOSUS: Primary | ICD-10-CM

## 2017-11-28 DIAGNOSIS — M54.50 CHRONIC LOW BACK PAIN WITHOUT SCIATICA, UNSPECIFIED BACK PAIN LATERALITY: ICD-10-CM

## 2017-11-28 DIAGNOSIS — F31.9 BIPOLAR I DISORDER: Chronic | ICD-10-CM

## 2017-11-28 PROCEDURE — 99499 UNLISTED E&M SERVICE: CPT | Mod: S$PBB,,, | Performed by: INTERNAL MEDICINE

## 2017-11-28 PROCEDURE — 99999 PR PBB SHADOW E&M-EST. PATIENT-LVL III: CPT | Mod: PBBFAC,,, | Performed by: INTERNAL MEDICINE

## 2017-11-28 PROCEDURE — 99214 OFFICE O/P EST MOD 30 MIN: CPT | Mod: S$GLB,,, | Performed by: INTERNAL MEDICINE

## 2017-11-28 RX ORDER — SUMATRIPTAN SUCCINATE 100 MG/1
TABLET ORAL
Qty: 9 TABLET | Refills: 2 | Status: SHIPPED | OUTPATIENT
Start: 2017-11-28 | End: 2018-07-31 | Stop reason: SDUPTHER
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
## 2017-11-29 ENCOUNTER — LAB VISIT (OUTPATIENT)
Dept: LAB | Facility: HOSPITAL | Age: 32
End: 2017-11-29
Attending: NURSE PRACTITIONER
Payer: MEDICARE

## 2017-11-29 DIAGNOSIS — F31.10 BIPOLAR DISORDER, MOST RECENT EPISODE MANIC: ICD-10-CM

## 2017-11-29 LAB — LITHIUM SERPL-SCNC: 1.2 MMOL/L

## 2017-11-29 PROCEDURE — 80178 ASSAY OF LITHIUM: CPT

## 2017-11-29 PROCEDURE — 36415 COLL VENOUS BLD VENIPUNCTURE: CPT

## 2017-11-30 ENCOUNTER — PATIENT MESSAGE (OUTPATIENT)
Dept: PSYCHIATRY | Facility: CLINIC | Age: 32
End: 2017-11-30

## 2017-12-01 DIAGNOSIS — Z30.41 ENCOUNTER FOR SURVEILLANCE OF CONTRACEPTIVE PILLS: ICD-10-CM

## 2017-12-01 RX ORDER — NORETHINDRONE ACETATE AND ETHINYL ESTRADIOL .02; 1 MG/1; MG/1
1 TABLET ORAL NIGHTLY
Qty: 21 TABLET | Refills: 0 | Status: SHIPPED | OUTPATIENT
Start: 2017-12-01 | End: 2018-01-30 | Stop reason: SDUPTHER

## 2017-12-01 RX ORDER — NORETHINDRONE ACETATE AND ETHINYL ESTRADIOL .02; 1 MG/1; MG/1
1 TABLET ORAL NIGHTLY
Qty: 28 TABLET | Refills: 0 | Status: SHIPPED | OUTPATIENT
Start: 2017-12-01 | End: 2017-12-01 | Stop reason: SDUPTHER

## 2017-12-15 ENCOUNTER — TELEPHONE (OUTPATIENT)
Dept: ADMINISTRATIVE | Facility: HOSPITAL | Age: 32
End: 2017-12-15

## 2017-12-15 ENCOUNTER — PATIENT MESSAGE (OUTPATIENT)
Dept: PSYCHIATRY | Facility: CLINIC | Age: 32
End: 2017-12-15

## 2017-12-15 NOTE — TELEPHONE ENCOUNTER
Please see message below from [facility] nurse-     Vianney Callahan  [id number] I received discharge medications list from patient behavioral health admission to [place] 11/13/17 - 11/22/17.      Wellbutrin 100mg qd    Geodon 20mg qd  Vistaril 50mg qhs    Zyprexa 2.5mg tid      Thanks,        Caitlyn Cooley, RN    RN Navigator    [facility]

## 2017-12-17 RX ORDER — HYDROXYZINE PAMOATE 50 MG/1
50 CAPSULE ORAL NIGHTLY
Qty: 1 CAPSULE | Refills: 0
Start: 2017-12-17 | End: 2018-01-30

## 2017-12-17 RX ORDER — ZIPRASIDONE HYDROCHLORIDE 20 MG/1
20 CAPSULE ORAL DAILY
Qty: 30 CAPSULE | Refills: 11
Start: 2017-12-17 | End: 2018-01-30

## 2017-12-23 RX ORDER — TRAMADOL HYDROCHLORIDE 50 MG/1
TABLET ORAL
Qty: 60 TABLET | Refills: 0 | Status: ON HOLD | OUTPATIENT
Start: 2017-12-23 | End: 2018-04-05 | Stop reason: HOSPADM

## 2017-12-27 DIAGNOSIS — B00.9 HERPES: ICD-10-CM

## 2017-12-27 RX ORDER — VALACYCLOVIR HYDROCHLORIDE 1 G/1
TABLET, FILM COATED ORAL
Qty: 90 TABLET | Refills: 3 | Status: SHIPPED | OUTPATIENT
Start: 2017-12-27 | End: 2018-01-30 | Stop reason: SDUPTHER

## 2017-12-31 ENCOUNTER — OFFICE VISIT (OUTPATIENT)
Dept: URGENT CARE | Facility: CLINIC | Age: 32
End: 2017-12-31
Payer: MEDICARE

## 2017-12-31 VITALS
BODY MASS INDEX: 21.35 KG/M2 | OXYGEN SATURATION: 100 % | TEMPERATURE: 98 F | DIASTOLIC BLOOD PRESSURE: 67 MMHG | SYSTOLIC BLOOD PRESSURE: 106 MMHG | WEIGHT: 136 LBS | RESPIRATION RATE: 18 BRPM | HEIGHT: 67 IN | HEART RATE: 100 BPM

## 2017-12-31 DIAGNOSIS — J32.9 RHINOSINUSITIS: Primary | ICD-10-CM

## 2017-12-31 DIAGNOSIS — J06.9 UPPER RESPIRATORY TRACT INFECTION, UNSPECIFIED TYPE: ICD-10-CM

## 2017-12-31 LAB
CTP QC/QA: YES
FLUAV AG NPH QL: NEGATIVE
FLUBV AG NPH QL: NEGATIVE

## 2017-12-31 PROCEDURE — 87804 INFLUENZA ASSAY W/OPTIC: CPT | Mod: QW,S$GLB,, | Performed by: PHYSICIAN ASSISTANT

## 2017-12-31 PROCEDURE — 96372 THER/PROPH/DIAG INJ SC/IM: CPT | Mod: S$GLB,,, | Performed by: PHYSICIAN ASSISTANT

## 2017-12-31 PROCEDURE — 99214 OFFICE O/P EST MOD 30 MIN: CPT | Mod: 25,S$GLB,, | Performed by: PHYSICIAN ASSISTANT

## 2017-12-31 RX ORDER — DEXAMETHASONE SODIUM PHOSPHATE 100 MG/10ML
8 INJECTION INTRAMUSCULAR; INTRAVENOUS
Status: COMPLETED | OUTPATIENT
Start: 2017-12-31 | End: 2017-12-31

## 2017-12-31 RX ADMIN — DEXAMETHASONE SODIUM PHOSPHATE 8 MG: 100 INJECTION INTRAMUSCULAR; INTRAVENOUS at 02:12

## 2017-12-31 NOTE — PROGRESS NOTES
"Subjective:       Patient ID: Vianney Callahan is a 32 y.o. female.    Vitals:  height is 5' 7" (1.702 m) and weight is 61.7 kg (136 lb). Her tympanic temperature is 98.3 °F (36.8 °C). Her blood pressure is 106/67 and her pulse is 100. Her respiration is 18 and oxygen saturation is 100%.     Chief Complaint: Cough (5-7 days )    Present with cough for 5-7 days.        Cough   This is a new problem. The current episode started in the past 7 days (5-7 days ). The problem has been unchanged. The problem occurs every few hours. The cough is non-productive. Associated symptoms include headaches, myalgias and rhinorrhea. Pertinent negatives include no chest pain, chills, ear pain, eye redness, fever, sore throat, shortness of breath or wheezing. Nothing aggravates the symptoms. Treatments tried: ibuprofen  The treatment provided no relief.     Review of Systems   Constitution: Positive for malaise/fatigue. Negative for chills and fever.   HENT: Positive for congestion and rhinorrhea. Negative for ear pain, hoarse voice and sore throat.    Eyes: Negative for discharge and redness.   Cardiovascular: Negative for chest pain, dyspnea on exertion and leg swelling.   Respiratory: Positive for cough. Negative for shortness of breath, sputum production and wheezing.    Musculoskeletal: Positive for myalgias.   Gastrointestinal: Negative for abdominal pain and nausea.   Neurological: Positive for dizziness and headaches.       Objective:      Physical Exam   Constitutional: She is oriented to person, place, and time. She appears well-developed and well-nourished.   HENT:   Head: Normocephalic and atraumatic.   Right Ear: Hearing, tympanic membrane, external ear and ear canal normal.   Left Ear: Hearing, tympanic membrane, external ear and ear canal normal.   Nose: Mucosal edema and rhinorrhea present.   Mouth/Throat: Uvula is midline and oropharynx is clear and moist.   Eyes: Conjunctivae are normal.   Neck: Normal range of motion. " Neck supple. No thyromegaly present.   Cardiovascular: Normal rate and regular rhythm.  Exam reveals no gallop and no friction rub.    No murmur heard.  Pulmonary/Chest: Effort normal and breath sounds normal. She has no wheezes. She has no rales.   Musculoskeletal: Normal range of motion.   Lymphadenopathy:     She has no cervical adenopathy.   Neurological: She is alert and oriented to person, place, and time.   Skin: Skin is warm and dry. No rash noted. No erythema.   Psychiatric: She has a normal mood and affect. Her behavior is normal. Judgment and thought content normal.   Nursing note and vitals reviewed.      Assessment:       1. Rhinosinusitis    2. Upper respiratory tract infection, unspecified type        Plan:         Rhinosinusitis    Upper respiratory tract infection, unspecified type  -     POCT Influenza A/B  -     dexamethasone injection 8 mg; Inject 0.8 mLs (8 mg total) into the muscle one time.      Vianney was seen today for cough.    Diagnoses and all orders for this visit:    Rhinosinusitis    Upper respiratory tract infection, unspecified type  -     POCT Influenza A/B  -     dexamethasone injection 8 mg; Inject 0.8 mLs (8 mg total) into the muscle one time.      Patient Instructions   - Rest.    - Drink plenty of fluids.    - Tylenol or Ibuprofen as directed as needed for fever/pain.    - Take over-the-counter claritin, zyrtec, allegra, or xyzal as directed.  - Use over the counter Flonase as directed for sinus congestion and postnasal drip.  - use nasal saline prior to Flonase.  - Antibiotics are not needed at this time.  - Usual course of cold symptom is 10-14 days, but longer if patient is a smoker.   - Use salt water gargle for sore throat.   - Follow up with your PCP or specialty clinic as directed in the next 1-2 weeks if not improved or as needed.  You can call [phone] to schedule an appointment with the appropriate provider.    - Go to the ED if your symptoms worsen.    Sinusitis  (No Antibiotics)    The sinuses are air-filled spaces within the bones of the face. They connect to the inside of the nose. Sinusitis is an inflammation of the tissue lining the sinus cavity. Sinus inflammation can occur during a cold. It can also be due to allergies to pollens and other particles in the air. It can cause symptoms such as sinus congestion, headache, sore throat, facial swelling and fullness. It may also cause a low-grade fever. No infection is present, and no antibiotic treatment is needed.  Home care  · Drink plenty of water, hot tea, and other liquids. This may help thin mucus. It also may promote sinus drainage.  · Heat may help soothe painful areas of the face. Use a towel soaked in hot water. Or,  the shower and direct the hot spray onto your face. Using a vaporizer along with a menthol rub at night may also help.   · An expectorant containing guaifenesin may help thin the mucus and promote drainage from the sinuses.  · Over-the-counter decongestants may be used unless a similar medicine was prescribed. Nasal sprays work the fastest. Use one that contains phenylephrine or oxymetazoline. First blow the nose gently. Then use the spray. Do not use these medicines more often than directed on the label or symptoms may get worse. You may also use tablets containing pseudoephedrine. Avoid products that combine ingredients, because side effects may be increased. Read labels. You can also ask the pharmacist for help. (NOTE: Persons with high blood pressure should not use decongestants. They can raise blood pressure.)  · Over-the-counter antihistamines may help if allergies contributed to your sinusitis.    · Use acetaminophen or ibuprofen to control pain, unless another pain medicine was prescribed. (If you have chronic liver or kidney disease or ever had a stomach ulcer, talk with your doctor before using these medicines. Aspirin should never be used in anyone under 18 years of age who is ill  with a fever. It may cause severe liver damage.)  · Use nasal rinses or irrigation as instructed by your health care provider.  · Don't smoke. This can worsen symptoms.  Follow-up care  Follow up with your healthcare provider or our staff if you are not improving within the next week.  When to seek medical advice  Call your healthcare provider if any of these occur:  · Green or yellow discharge from the nose or into the throat  · Facial pain or headache becoming more severe  · Stiff neck  · Unusual drowsiness or confusion  · Swelling of the forehead or eyelids  · Vision problems, including blurred or double vision  · Fever of 100.4ºF (38ºC) or higher, or as directed by your healthcare provider  · Seizure  · Breathing problems  · Symptoms not resolving within 10 days  Date Last Reviewed: 4/13/2015 © 2000-2017 [employer]. [address] [place] [address]. All rights reserved. This information is not intended as a substitute for professional medical care. Always follow your healthcare professional's instructions.        Viral Upper Respiratory Illness (Adult)  You have a viral upper respiratory illness (URI), which is another term for the common cold. This illness is contagious during the first few days. It is spread through the air by coughing and sneezing. It may also be spread by direct contact (touching the sick person and then touching your own eyes, nose, or mouth). Frequent handwashing will decrease risk of spread. Most viral illnesses go away within 7 to 10 days with rest and simple home remedies. Sometimes the illness may last for several weeks. Antibiotics will not kill a virus, and they are generally not prescribed for this condition.    Home care  · If symptoms are severe, rest at home for the first 2 to 3 days. When you resume activity, don't let yourself get too tired.  · Avoid being exposed to cigarette smoke (yours or others).  · You may use acetaminophen or ibuprofen to  control pain and fever, unless another medicine was prescribed. (Note: If you have chronic liver or kidney disease, have ever had a stomach ulcer or gastrointestinal bleeding, or are taking blood-thinning medicines, talk with your healthcare provider before using these medicines.) Aspirin should never be given to anyone under 18 years of age who is ill with a viral infection or fever. It may cause severe liver or brain damage.  · Your appetite may be poor, so a light diet is fine. Avoid dehydration by drinking 6 to 8 glasses of fluids per day (water, soft drinks, juices, tea, or soup). Extra fluids will help loosen secretions in the nose and lungs.  · Over-the-counter cold medicines will not shorten the length of time youre sick, but they may be helpful for the following symptoms: cough, sore throat, and nasal and sinus congestion. (Note: Do not use decongestants if you have high blood pressure.)  Follow-up care  Follow up with your healthcare provider, or as advised.  When to seek medical advice  Call your healthcare provider right away if any of these occur:  · Cough with lots of colored sputum (mucus)  · Severe headache; face, neck, or ear pain  · Difficulty swallowing due to throat pain  · Fever of 100.4°F (38°C)  Call 911, or get immediate medical care  Call emergency services right away if any of these occur:  · Chest pain, shortness of breath, wheezing, or difficulty breathing  · Coughing up blood  · Inability to swallow due to throat pain  Date Last Reviewed: 9/13/2015  © 1592-4157 [employer]. [address] [place] [address], [place] [address]. All rights reserved. This information is not intended as a substitute for professional medical care. Always follow your healthcare professional's instructions.

## 2017-12-31 NOTE — PATIENT INSTRUCTIONS
- Rest.    - Drink plenty of fluids.    - Tylenol or Ibuprofen as directed as needed for fever/pain.    - Take over-the-counter claritin, zyrtec, allegra, or xyzal as directed.  - Use over the counter Flonase as directed for sinus congestion and postnasal drip.  - use nasal saline prior to Flonase.  - Antibiotics are not needed at this time.  - Usual course of cold symptom is 10-14 days, but longer if patient is a smoker.   - Use salt water gargle for sore throat.   - Follow up with your PCP or specialty clinic as directed in the next 1-2 weeks if not improved or as needed.  You can call [phone] to schedule an appointment with the appropriate provider.    - Go to the ED if your symptoms worsen.    Sinusitis (No Antibiotics)    The sinuses are air-filled spaces within the bones of the face. They connect to the inside of the nose. Sinusitis is an inflammation of the tissue lining the sinus cavity. Sinus inflammation can occur during a cold. It can also be due to allergies to pollens and other particles in the air. It can cause symptoms such as sinus congestion, headache, sore throat, facial swelling and fullness. It may also cause a low-grade fever. No infection is present, and no antibiotic treatment is needed.  Home care  · Drink plenty of water, hot tea, and other liquids. This may help thin mucus. It also may promote sinus drainage.  · Heat may help soothe painful areas of the face. Use a towel soaked in hot water. Or,  the shower and direct the hot spray onto your face. Using a vaporizer along with a menthol rub at night may also help.   · An expectorant containing guaifenesin may help thin the mucus and promote drainage from the sinuses.  · Over-the-counter decongestants may be used unless a similar medicine was prescribed. Nasal sprays work the fastest. Use one that contains phenylephrine or oxymetazoline. First blow the nose gently. Then use the spray. Do not use these medicines more often than  directed on the label or symptoms may get worse. You may also use tablets containing pseudoephedrine. Avoid products that combine ingredients, because side effects may be increased. Read labels. You can also ask the pharmacist for help. (NOTE: Persons with high blood pressure should not use decongestants. They can raise blood pressure.)  · Over-the-counter antihistamines may help if allergies contributed to your sinusitis.    · Use acetaminophen or ibuprofen to control pain, unless another pain medicine was prescribed. (If you have chronic liver or kidney disease or ever had a stomach ulcer, talk with your doctor before using these medicines. Aspirin should never be used in anyone under 18 years of age who is ill with a fever. It may cause severe liver damage.)  · Use nasal rinses or irrigation as instructed by your health care provider.  · Don't smoke. This can worsen symptoms.  Follow-up care  Follow up with your healthcare provider or our staff if you are not improving within the next week.  When to seek medical advice  Call your healthcare provider if any of these occur:  · Green or yellow discharge from the nose or into the throat  · Facial pain or headache becoming more severe  · Stiff neck  · Unusual drowsiness or confusion  · Swelling of the forehead or eyelids  · Vision problems, including blurred or double vision  · Fever of 100.4ºF (38ºC) or higher, or as directed by your healthcare provider  · Seizure  · Breathing problems  · Symptoms not resolving within 10 days  Date Last Reviewed: 4/13/2015  © 6727-8802 [employer]. [address] [place] [address]. All rights reserved. This information is not intended as a substitute for professional medical care. Always follow your healthcare professional's instructions.        Viral Upper Respiratory Illness (Adult)  You have a viral upper respiratory illness (URI), which is another term for the common cold. This illness is contagious during  the first few days. It is spread through the air by coughing and sneezing. It may also be spread by direct contact (touching the sick person and then touching your own eyes, nose, or mouth). Frequent handwashing will decrease risk of spread. Most viral illnesses go away within 7 to 10 days with rest and simple home remedies. Sometimes the illness may last for several weeks. Antibiotics will not kill a virus, and they are generally not prescribed for this condition.    Home care  · If symptoms are severe, rest at home for the first 2 to 3 days. When you resume activity, don't let yourself get too tired.  · Avoid being exposed to cigarette smoke (yours or others).  · You may use acetaminophen or ibuprofen to control pain and fever, unless another medicine was prescribed. (Note: If you have chronic liver or kidney disease, have ever had a stomach ulcer or gastrointestinal bleeding, or are taking blood-thinning medicines, talk with your healthcare provider before using these medicines.) Aspirin should never be given to anyone under 18 years of age who is ill with a viral infection or fever. It may cause severe liver or brain damage.  · Your appetite may be poor, so a light diet is fine. Avoid dehydration by drinking 6 to 8 glasses of fluids per day (water, soft drinks, juices, tea, or soup). Extra fluids will help loosen secretions in the nose and lungs.  · Over-the-counter cold medicines will not shorten the length of time youre sick, but they may be helpful for the following symptoms: cough, sore throat, and nasal and sinus congestion. (Note: Do not use decongestants if you have high blood pressure.)  Follow-up care  Follow up with your healthcare provider, or as advised.  When to seek medical advice  Call your healthcare provider right away if any of these occur:  · Cough with lots of colored sputum (mucus)  · Severe headache; face, neck, or ear pain  · Difficulty swallowing due to throat pain  · Fever of 100.4°F  (38°C)  Call 911, or get immediate medical care  Call emergency services right away if any of these occur:  · Chest pain, shortness of breath, wheezing, or difficulty breathing  · Coughing up blood  · Inability to swallow due to throat pain  Date Last Reviewed: 9/13/2015  © 9916-2577 [employer]. [address] [place] [address], [place] [address]. All rights reserved. This information is not intended as a substitute for professional medical care. Always follow your healthcare professional's instructions.

## 2018-01-30 ENCOUNTER — TELEPHONE (OUTPATIENT)
Dept: OBSTETRICS AND GYNECOLOGY | Facility: CLINIC | Age: 33
End: 2018-01-30

## 2018-01-30 ENCOUNTER — OFFICE VISIT (OUTPATIENT)
Dept: OBSTETRICS AND GYNECOLOGY | Facility: CLINIC | Age: 33
End: 2018-01-30
Payer: MEDICARE

## 2018-01-30 VITALS
SYSTOLIC BLOOD PRESSURE: 102 MMHG | BODY MASS INDEX: 20.69 KG/M2 | WEIGHT: 131.81 LBS | HEIGHT: 67 IN | DIASTOLIC BLOOD PRESSURE: 72 MMHG

## 2018-01-30 DIAGNOSIS — B00.9 HERPES: ICD-10-CM

## 2018-01-30 DIAGNOSIS — Z30.41 ENCOUNTER FOR SURVEILLANCE OF CONTRACEPTIVE PILLS: ICD-10-CM

## 2018-01-30 DIAGNOSIS — Z87.42 HISTORY OF ABNORMAL CERVICAL PAP SMEAR: ICD-10-CM

## 2018-01-30 DIAGNOSIS — Z01.419 WELL WOMAN EXAM WITH ROUTINE GYNECOLOGICAL EXAM: Primary | ICD-10-CM

## 2018-01-30 PROCEDURE — 88175 CYTOPATH C/V AUTO FLUID REDO: CPT

## 2018-01-30 PROCEDURE — G0101 CA SCREEN;PELVIC/BREAST EXAM: HCPCS | Mod: S$GLB,,, | Performed by: NURSE PRACTITIONER

## 2018-01-30 PROCEDURE — 99999 PR PBB SHADOW E&M-EST. PATIENT-LVL II: CPT | Mod: PBBFAC,,, | Performed by: NURSE PRACTITIONER

## 2018-01-30 RX ORDER — NORETHINDRONE ACETATE AND ETHINYL ESTRADIOL .02; 1 MG/1; MG/1
1 TABLET ORAL NIGHTLY
Qty: 63 TABLET | Refills: 4 | Status: ON HOLD | OUTPATIENT
Start: 2018-01-30 | End: 2018-04-05 | Stop reason: HOSPADM

## 2018-01-30 RX ORDER — DIAZEPAM 10 MG/1
10 TABLET ORAL DAILY
Refills: 0 | COMMUNITY
Start: 2017-12-23 | End: 2018-03-22 | Stop reason: HOSPADM

## 2018-01-30 RX ORDER — SERTRALINE HYDROCHLORIDE 100 MG/1
TABLET, FILM COATED ORAL
Refills: 1 | COMMUNITY
Start: 2017-12-29 | End: 2018-03-22 | Stop reason: HOSPADM

## 2018-01-30 RX ORDER — VALACYCLOVIR HYDROCHLORIDE 1 G/1
1000 TABLET, FILM COATED ORAL DAILY
Qty: 90 TABLET | Refills: 3 | Status: SHIPPED | OUTPATIENT
Start: 2018-01-30 | End: 2023-04-06

## 2018-01-30 NOTE — PROGRESS NOTES
"HISTORY OF PRESENT ILLNESS:    Vianney Callahan is a 32 y.o. female, , Patient's last menstrual period was 2018 (approximate).,  presents for a routine exam and OCP refills.   -takes back to back and occasionally has spotting.   -stopped some of her psyche meds under the direction of her psychiatrist due to "crazy behavior" because it was worse off pills.   -needs refills.    Past Medical History:   Diagnosis Date    Abnormal cervical Papanicolaou smear     Colposcopy    ADHD (attention deficit hyperactivity disorder) 2012    Allergy     Anxiety     Back pain 2014    Bipolar affective disorder     Cholecystitis     Chronic migraine without aura, with intractable migraine, so stated, without mention of status migrainosus 2013    Depression     Fever blister     Gallstones 2014    Headache(784.0)     Hepatitis C antibody positive in blood     History of hepatitis C     History of psychiatric hospitalization     Suicide attempt    Personality disorder 2013    Psychosis 10/7/2013    Therapy     Tobacco abuse 2013       Past Surgical History:   Procedure Laterality Date    ESOPHAGOGASTRODUODENOSCOPY      FRACTURE SURGERY      MOLE REMOVAL      SKIN BIOPSY         MEDICATIONS AND ALLERGIES:      Current Outpatient Prescriptions:     ciclopirox (PENLAC) 8 % Soln, Apply daily to affected nail. Must remove with rubbing alcohol once weekly and then re-start., Disp: 1 Bottle, Rfl: 5    cyclobenzaprine (FLEXERIL) 10 MG tablet, take 0.5 to 1 tablet by mouth at bedtime if needed for BACKPAIN, Disp: 30 tablet, Rfl: 0    dapsone (ACZONE) 5 % topical gel, apply to affected areas on face daily - BID, Disp: 60 g, Rfl: 2    diazePAM (VALIUM) 10 MG Tab, Take 10 mg by mouth once daily., Disp: , Rfl: 0    norethindrone-ethinyl estradiol (MICROGESTIN ) 1-20 mg-mcg per tablet, Take 1 tablet by mouth every evening., Disp: 63 tablet, Rfl: 4    sertraline (ZOLOFT) 100 MG " tablet, , Disp: , Rfl: 1    sumatriptan (IMITREX) 100 MG tablet, Half-1 tab po q day prn migraine.  Repeat once in 2 hours if necessary, Disp: 9 tablet, Rfl: 2    tazarotene (TAZORAC) 0.05 % gel, APPLY TOPICALLY EVERY EVENING. WASH OFF IN THE MORNING, AND APPLY SUNSCREEN, Disp: 100 g, Rfl: 3    traMADol (ULTRAM) 50 mg tablet, take 1 tablet by mouth every 8 hours if needed, Disp: 60 tablet, Rfl: 0    valACYclovir (VALTREX) 1000 MG tablet, Take 1 tablet (1,000 mg total) by mouth once daily., Disp: 90 tablet, Rfl: 3    Review of patient's allergies indicates:   Allergen Reactions    Dilantin [phenytoin sodium extended] Rash    Saphris [asenapine]      Confusion and depressed mood.       Family History   Problem Relation Age of Onset    Melanoma Mother     ADD / ADHD Mother     Bipolar disorder Mother     Schizophrenia Mother     Alcohol abuse Father     Drug abuse Father     Depression Father     No Known Problems Sister     Acne Maternal Aunt     Depression Maternal Aunt     Migraines Maternal Aunt     Anxiety disorder Paternal Aunt     Depression Paternal Aunt     Breast cancer Neg Hx     Colon cancer Neg Hx     Ovarian cancer Neg Hx        Social History     Social History    Marital status: Single     Spouse name: N/A    Number of children: N/A    Years of education: N/A     Occupational History    disabled [place]     Disabled      Social History Main Topics    Smoking status: Current Some Day Smoker     Packs/day: 1.00     Types: Cigarettes    Smokeless tobacco: Never Used    Alcohol use No    Drug use: No    Sexual activity: Yes     Partners: Male     Birth control/ protection: OCP     Other Topics Concern    Are You Pregnant Or Think You May Be? No    Breast-Feeding No    Caffeine Use: Excessive No    Financial Status: Unemployed No    Legal: Other No    Childhood History: Adopted No    Financial Status: Other No    Leisure: Exercise No    Childhood History: Early Trauma  No    Firearms: Does Patient Have Access To A Firearm? No    Leisure: Fishing No    Childhood History: Raised By Parents Yes    Home Situation: Homeless No    Leisure: Hunting No    Childhood History: Uneventful No    Home Situation: Lives Alone No    Leisure: Movie Watching Yes    Childhood History: Other No    Home Situation: Lives In Group Home No    Leisure: Shopping Yes    Education: Unfinished High School No    Home Situation: Lives In Nursing Home No    Leisure: Sports No    Education: High School Graduate Yes    Home Situation: Lives In Shelter No    Leisure: Time With Family No    Education: Unfinished College Yes    Home Situation: Lives With Family No     Service No    Education: Trade School No    Home Situation: Lives With Friends No    Spirituality: Active Participation No    Education: Associate's Degree No    Home Situation: Lives With Significant Other Yes    Spirituality: Organized Yazdanism No    Education: Bachelor's Degree No    Home Situation: Lives With Spouse No    Spirituality: Private Participation No    Education: More Than One Bachelor's Or Professional No    Legal Consequences Of Chemical Use No    Patient Feels They Ought To Cut Down On Drinking/Drug Use No    Education: Master's, Phd No    Legal: Arrest History No    Patient Annoyed By Others Criticizing Their Drinking/Drug Use No    Financial Status: Disabled Yes    Legal: Involved In Civil Litigation No    Patient Has Felt Bad Or Guilty About Drinking/Drug Use No    Financial Status: Employed No    Legal: Involved In Criminal Litigation No    Patient Has Had A Drink/Used Drugs As An Eye Opener In The Am No     Social History Narrative    Lives with boyfriend.      Exercise:  Stretching.        OB HISTORY: None     COMPREHENSIVE GYN HISTORY:  PAP History: History of abnormal Paps: 2012. Treatment: Colposcopy. LAST PAP 11-17-16 NORMAL  Infection History: Reports STDs: HSV (fever blister),  "HPV, HEP C. Denies PID.  Benign History: Denies uterine fibroids. Denies ovarian cysts. Denies endometriosis. Denies other conditions.  Cancer History: Denies cervical cancer. Denies uterine cancer or hyperplasia. Denies ovarian cancer. Denies vulvar cancer or pre-cancer. Denies vaginal cancer or pre-cancer. Denies breast cancer. Denies colon cancer.  Sexual Activity History: Denies currently being sexually active  Menstrual History: Denies menses.   Dysmenorrhea History: Denies dysmenorrhea.  Contraception: OCPs - takes back to back.     ROS:  GENERAL: No weight changes. No swelling. No fatigue. No fever.  CARDIOVASCULAR: No chest pain. No shortness of breath. No leg cramps.   NEUROLOGICAL: No headaches. No vision changes.  BREASTS: No pain. No lumps. No discharge.  ABDOMEN: No pain. No nausea. No vomiting. No diarrhea. No constipation.  REPRODUCTIVE: No abnormal bleeding.   VULVA: No pain. No lesions. No itching.  VAGINA: No relaxation. No itching. No odor. No discharge. No lesions.  URINARY: No incontinence. No nocturia. No frequency. No dysuria.    /72   Ht 5' 7" (1.702 m)   Wt 59.8 kg (131 lb 13.4 oz)   LMP 01/25/2018 (Approximate)   BMI 20.65 kg/m²     PE:  APPEARANCE: Well nourished, well developed, in no acute distress.  AFFECT: WNL, alert and oriented x 3.  SKIN: No acne or hirsutism.  NECK: Neck symmetric, without masses or thyromegaly.  NODES: No inguinal, cervical, axillary or femoral lymph node enlargement.  CHEST: Good respiratory effort.   ABDOMEN: Soft. No tenderness or masses.  BREASTS: Symmetrical, no skin changes, visible lesions, palpable masses or nipple discharge bilaterally.  PELVIC: External female genitalia without lesions.  Female hair distribution. Adequate perineal body, Normal urethral meatus. Vagina moist and well rugated without lesions or discharge.  No significant cystocele or rectocele present. Cervix pink without lesions, discharge or tenderness. Uterus is 8 week size, " regular, mobile and nontender. Adnexa without masses or tenderness.  EXTREMITIES: No edema    DIAGNOSIS:  1. Well woman exam with routine gynecological exam    2. Encounter for surveillance of contraceptive pills    3. Herpes    4. History of abnormal cervical Pap smear        PLAN:    Orders Placed This Encounter    Liquid-based pap smear, screening    norethindrone-ethinyl estradiol (MICROGESTIN 1/20) 1-20 mg-mcg per tablet    valACYclovir (VALTREX) 1000 MG tablet   Declined STD tests    COUNSELING:  The patient was counseled today on:  -OCP use and potential side effects;  -A.C.S. Pap and pelvic exam guidelines (pap every 3 years - requested today);  -to follow up with her PCP for other health maintenance.    FOLLOW-UP with me annually.

## 2018-03-21 ENCOUNTER — HOSPITAL ENCOUNTER (EMERGENCY)
Facility: HOSPITAL | Age: 33
Discharge: HOME | End: 2018-03-22
Attending: EMERGENCY MEDICINE
Payer: MEDICARE

## 2018-03-21 DIAGNOSIS — R00.0 TACHYCARDIA: ICD-10-CM

## 2018-03-21 DIAGNOSIS — F31.12 BIPOLAR 1 DISORDER WITH MODERATE MANIA: Primary | ICD-10-CM

## 2018-03-21 PROCEDURE — 84443 ASSAY THYROID STIM HORMONE: CPT

## 2018-03-21 PROCEDURE — 93005 ELECTROCARDIOGRAM TRACING: CPT

## 2018-03-21 PROCEDURE — 99284 EMERGENCY DEPT VISIT MOD MDM: CPT | Mod: 25

## 2018-03-21 PROCEDURE — 99499 UNLISTED E&M SERVICE: CPT | Mod: ,,, | Performed by: EMERGENCY MEDICINE

## 2018-03-21 PROCEDURE — 80053 COMPREHEN METABOLIC PANEL: CPT

## 2018-03-21 PROCEDURE — 85025 COMPLETE CBC W/AUTO DIFF WBC: CPT

## 2018-03-21 PROCEDURE — 93010 ELECTROCARDIOGRAM REPORT: CPT | Mod: ,,, | Performed by: INTERNAL MEDICINE

## 2018-03-21 PROCEDURE — 80320 DRUG SCREEN QUANTALCOHOLS: CPT

## 2018-03-21 PROCEDURE — 80329 ANALGESICS NON-OPIOID 1 OR 2: CPT

## 2018-03-22 VITALS
SYSTOLIC BLOOD PRESSURE: 110 MMHG | TEMPERATURE: 98 F | HEART RATE: 98 BPM | DIASTOLIC BLOOD PRESSURE: 71 MMHG | RESPIRATION RATE: 17 BRPM | OXYGEN SATURATION: 100 % | WEIGHT: 131.81 LBS | BODY MASS INDEX: 20.69 KG/M2 | HEIGHT: 67 IN

## 2018-03-22 LAB
ALBUMIN SERPL BCP-MCNC: 4.5 G/DL
ALP SERPL-CCNC: 67 U/L
ALT SERPL W/O P-5'-P-CCNC: 20 U/L
AMPHET+METHAMPHET UR QL: NEGATIVE
ANION GAP SERPL CALC-SCNC: 14 MMOL/L
APAP SERPL-MCNC: <3 UG/ML
AST SERPL-CCNC: 19 U/L
B-HCG UR QL: NEGATIVE
BACTERIA #/AREA URNS AUTO: ABNORMAL /HPF
BARBITURATES UR QL SCN>200 NG/ML: NEGATIVE
BASOPHILS # BLD AUTO: 0.03 K/UL
BASOPHILS NFR BLD: 0.3 %
BENZODIAZ UR QL SCN>200 NG/ML: NORMAL
BILIRUB SERPL-MCNC: 1.1 MG/DL
BILIRUB UR QL STRIP: NEGATIVE
BUN SERPL-MCNC: 10 MG/DL
BZE UR QL SCN: NEGATIVE
CALCIUM SERPL-MCNC: 9.8 MG/DL
CANNABINOIDS UR QL SCN: NEGATIVE
CHLORIDE SERPL-SCNC: 104 MMOL/L
CLARITY UR REFRACT.AUTO: ABNORMAL
CO2 SERPL-SCNC: 20 MMOL/L
COLOR UR AUTO: YELLOW
CREAT SERPL-MCNC: 0.8 MG/DL
CREAT UR-MCNC: 79 MG/DL
DIFFERENTIAL METHOD: ABNORMAL
EOSINOPHIL # BLD AUTO: 0.1 K/UL
EOSINOPHIL NFR BLD: 0.5 %
ERYTHROCYTE [DISTWIDTH] IN BLOOD BY AUTOMATED COUNT: 12.1 %
EST. GFR  (AFRICAN AMERICAN): >60 ML/MIN/1.73 M^2
EST. GFR  (NON AFRICAN AMERICAN): >60 ML/MIN/1.73 M^2
ETHANOL SERPL-MCNC: <10 MG/DL
GLUCOSE SERPL-MCNC: 86 MG/DL
GLUCOSE UR QL STRIP: NEGATIVE
HCT VFR BLD AUTO: 45.8 %
HGB BLD-MCNC: 15.8 G/DL
HGB UR QL STRIP: ABNORMAL
IMM GRANULOCYTES # BLD AUTO: 0.03 K/UL
IMM GRANULOCYTES NFR BLD AUTO: 0.3 %
KETONES UR QL STRIP: ABNORMAL
LEUKOCYTE ESTERASE UR QL STRIP: ABNORMAL
LYMPHOCYTES # BLD AUTO: 2.1 K/UL
LYMPHOCYTES NFR BLD: 20.8 %
MCH RBC QN AUTO: 29.4 PG
MCHC RBC AUTO-ENTMCNC: 34.5 G/DL
MCV RBC AUTO: 85 FL
METHADONE UR QL SCN>300 NG/ML: NEGATIVE
MICROSCOPIC COMMENT: ABNORMAL
MONOCYTES # BLD AUTO: 0.9 K/UL
MONOCYTES NFR BLD: 8.3 %
NEUTROPHILS # BLD AUTO: 7.2 K/UL
NEUTROPHILS NFR BLD: 69.8 %
NITRITE UR QL STRIP: NEGATIVE
NRBC BLD-RTO: 0 /100 WBC
OPIATES UR QL SCN: NEGATIVE
PCP UR QL SCN>25 NG/ML: NEGATIVE
PH UR STRIP: 6 [PH] (ref 5–8)
PLATELET # BLD AUTO: 413 K/UL
PMV BLD AUTO: 10.1 FL
POTASSIUM SERPL-SCNC: 4 MMOL/L
PROT SERPL-MCNC: 7.5 G/DL
PROT UR QL STRIP: NEGATIVE
RBC # BLD AUTO: 5.37 M/UL
RBC #/AREA URNS AUTO: 8 /HPF (ref 0–4)
SODIUM SERPL-SCNC: 138 MMOL/L
SP GR UR STRIP: 1.01 (ref 1–1.03)
SQUAMOUS #/AREA URNS AUTO: 2 /HPF
TOXICOLOGY INFORMATION: NORMAL
TSH SERPL DL<=0.005 MIU/L-ACNC: 0.75 UIU/ML
URN SPEC COLLECT METH UR: ABNORMAL
UROBILINOGEN UR STRIP-ACNC: NEGATIVE EU/DL
WBC # BLD AUTO: 10.27 K/UL
WBC #/AREA URNS AUTO: 5 /HPF (ref 0–5)

## 2018-03-22 PROCEDURE — 80307 DRUG TEST PRSMV CHEM ANLYZR: CPT

## 2018-03-22 PROCEDURE — 81001 URINALYSIS AUTO W/SCOPE: CPT | Mod: 59

## 2018-03-22 PROCEDURE — 81025 URINE PREGNANCY TEST: CPT

## 2018-03-22 PROCEDURE — 25000003 PHARM REV CODE 250: Performed by: EMERGENCY MEDICINE

## 2018-03-22 RX ORDER — SERTRALINE HYDROCHLORIDE 50 MG/1
TABLET, FILM COATED ORAL
Qty: 10 TABLET | Refills: 0 | Status: SHIPPED | OUTPATIENT
Start: 2018-03-22 | End: 2018-03-22

## 2018-03-22 RX ORDER — DIAZEPAM 5 MG/1
5 TABLET ORAL EVERY 12 HOURS PRN
Qty: 5 TABLET | Refills: 0 | Status: SHIPPED | OUTPATIENT
Start: 2018-03-22 | End: 2018-03-22

## 2018-03-22 RX ORDER — DIAZEPAM 5 MG/1
5 TABLET ORAL EVERY 12 HOURS PRN
Qty: 5 TABLET | Refills: 0 | Status: ON HOLD | OUTPATIENT
Start: 2018-03-22 | End: 2018-04-05 | Stop reason: HOSPADM

## 2018-03-22 RX ORDER — SERTRALINE HYDROCHLORIDE 50 MG/1
100 TABLET, FILM COATED ORAL
Status: COMPLETED | OUTPATIENT
Start: 2018-03-22 | End: 2018-03-22

## 2018-03-22 RX ORDER — SERTRALINE HYDROCHLORIDE 50 MG/1
100 TABLET, FILM COATED ORAL DAILY
Status: DISCONTINUED | OUTPATIENT
Start: 2018-03-22 | End: 2018-03-22

## 2018-03-22 RX ORDER — SERTRALINE HYDROCHLORIDE 50 MG/1
TABLET, FILM COATED ORAL
Qty: 10 TABLET | Refills: 0 | Status: ON HOLD | OUTPATIENT
Start: 2018-03-22 | End: 2018-04-05 | Stop reason: HOSPADM

## 2018-03-22 RX ADMIN — SERTRALINE HYDROCHLORIDE 100 MG: 50 TABLET ORAL at 01:03

## 2018-03-22 NOTE — ED NOTES
"Pt states that for the past week she has not taken any of her prescribed medications because she "was trying to prove a point".  Pt noted to not be making eye contact with this RN and repeatedly asked to speak to MD .  Denies SI/HI/illicit drug usage.  AAOx3  "

## 2018-03-22 NOTE — ED PROVIDER NOTES
"Encounter Date: 3/21/2018    SCRIBE #1 NOTE: I, Silvia Pham, am scribing for, and in the presence of,  Dr. Gray. I have scribed the entire note.       History     Chief Complaint   Patient presents with    Psychiatric Evaluation     Patient states that she has not slept in 1 week. Is hearing voices in her head. Denies SI/HI. Hasn't taken medications in a couple days.      Time seen by provider: 11:44 PM    This is a 32 y.o. female with medical conditions including anxiety, psychiatric hospitalization, and depression who presents with complaint of auditory hallucinations. Pt states she has not slept in about a week and has a decreased appetite. Pt describes that she drove to [place] and back "looking for God on a mission". Pt reports general muscle tension related to anxiety. She cannot recall her LMP but denies possibility of pregnancy. Pt has stopped taking all medications including psychiatric and sleep medications.      The history is provided by the patient.     Review of patient's allergies indicates:   Allergen Reactions    Dilantin [phenytoin sodium extended] Rash    Saphris [asenapine]      Confusion and depressed mood.     Past Medical History:   Diagnosis Date    Abnormal cervical Papanicolaou smear 2012    Colposcopy    ADHD (attention deficit hyperactivity disorder) 8/16/2012    Allergy     Anxiety     Back pain 5/19/2014    Bipolar affective disorder     Cholecystitis     Chronic migraine without aura, with intractable migraine, so stated, without mention of status migrainosus 9/24/2013    Depression     Fever blister     Gallstones 5/26/2014    Headache(784.0)     Hepatitis C antibody positive in blood     History of hepatitis C     History of psychiatric hospitalization     Suicide attempt    Personality disorder 4/11/2013    Psychosis 10/7/2013    Therapy     Tobacco abuse 9/24/2013     Past Surgical History:   Procedure Laterality Date    ESOPHAGOGASTRODUODENOSCOPY   "    FRACTURE SURGERY      MOLE REMOVAL      SKIN BIOPSY       Family History   Problem Relation Age of Onset    Melanoma Mother     ADD / ADHD Mother     Bipolar disorder Mother     Schizophrenia Mother     Alcohol abuse Father     Drug abuse Father     Depression Father     No Known Problems Sister     Acne Maternal Aunt     Depression Maternal Aunt     Migraines Maternal Aunt     Anxiety disorder Paternal Aunt     Depression Paternal Aunt     Breast cancer Neg Hx     Colon cancer Neg Hx     Ovarian cancer Neg Hx      Social History   Substance Use Topics    Smoking status: Current Some Day Smoker     Packs/day: 1.00     Types: Cigarettes    Smokeless tobacco: Never Used    Alcohol use No     Review of Systems   Constitutional: Negative for chills and fever.   HENT: Negative for nosebleeds and sneezing.    Eyes: Negative for visual disturbance.   Respiratory: Negative for shortness of breath.    Cardiovascular: Negative for chest pain.   Gastrointestinal: Negative for nausea and vomiting.   Musculoskeletal: Negative for gait problem.        General muscle tension related to anxiety    Skin: Negative for rash.   Neurological: Negative for speech difficulty.   Psychiatric/Behavioral: Positive for hallucinations and sleep disturbance. Negative for suicidal ideas. The patient is nervous/anxious.        Physical Exam     Initial Vitals [03/21/18 2248]   BP Pulse Resp Temp SpO2   (!) 147/81 (!) 125 18 97.5 °F (36.4 °C) 100 %      MAP       103         Physical Exam    Nursing note and vitals reviewed.  Constitutional: She appears well-developed and well-nourished.   HENT:   Head: Normocephalic and atraumatic.   Mouth/Throat: Oropharynx is clear and moist.   Eyes: Conjunctivae and EOM are normal. Pupils are equal, round, and reactive to light.   Neck: Normal range of motion. Neck supple.   Cardiovascular: Normal rate, regular rhythm and normal heart sounds.   No murmur heard.  Pulmonary/Chest: Breath  sounds normal. No stridor. No respiratory distress. She has no wheezes. She has no rhonchi. She has no rales.   Abdominal: Soft. Bowel sounds are normal. She exhibits no distension. There is no tenderness. There is no rebound.   Musculoskeletal: Normal range of motion. She exhibits no edema or tenderness.   Neurological: She is alert and oriented to person, place, and time. She has normal strength. No cranial nerve deficit or sensory deficit.   Skin: Skin is warm and dry. No pallor.   Psychiatric: Her mood appears anxious. She is actively hallucinating (auditory). She expresses no homicidal and no suicidal ideation. She expresses no suicidal plans and no homicidal plans.         ED Course   Procedures  Labs Reviewed   CBC W/ AUTO DIFFERENTIAL - Abnormal; Notable for the following:        Result Value    Platelets 413 (*)     All other components within normal limits    Narrative:     ALC and ACETM (red top tube)   URINALYSIS - Abnormal; Notable for the following:     Appearance, UA Hazy (*)     Ketones, UA 3+ (*)     Occult Blood UA 2+ (*)     Leukocytes, UA 3+ (*)     All other components within normal limits   ACETAMINOPHEN LEVEL - Abnormal; Notable for the following:     Acetaminophen (Tylenol), Serum <3.0 (*)     All other components within normal limits    Narrative:     ALC and ACETM (red top tube)   COMPREHENSIVE METABOLIC PANEL - Abnormal; Notable for the following:     CO2 20 (*)     Total Bilirubin 1.1 (*)     All other components within normal limits    Narrative:     ALC and ACETM (red top tube)   URINALYSIS MICROSCOPIC - Abnormal; Notable for the following:     RBC, UA 8 (*)     All other components within normal limits   TSH    Narrative:     ALC and ACETM (red top tube)   DRUG SCREEN PANEL, URINE EMERGENCY   ALCOHOL,MEDICAL (ETHANOL)    Narrative:     ALC and ACETM (red top tube)   PREGNANCY TEST, URINE RAPID             Medical Decision Making:   History:   Old Medical Records: I decided to obtain  old medical records.  Initial Assessment:   I discussed with patient about following up with outpatient psychiatry. I not feel like she needs psychiatric admittance at this time. Pt agreed and again denied any SI.  Clinical Tests:   Lab Tests: Ordered and Reviewed  Medical Tests: Ordered and Reviewed  Other:   I have discussed this case with another health care provider.            Scribe Attestation:   Scribe #1: I performed the above scribed service and the documentation accurately describes the services I performed. I attest to the accuracy of the note.    Attending Attestation:           Physician Attestation for Scribe:      Comments: I, Dr. Gray, personally performed the services described in this documentation. All medical record entries made by the scribe were at my direction and in my presence.  I have reviewed the chart and agree that the record reflects my personal performance and is accurate and complete.                 Clinical Impression:   The primary encounter diagnosis was Bipolar 1 disorder with moderate law. A diagnosis of Tachycardia was also pertinent to this visit.    Disposition:   Disposition: Discharged  Condition: Stable                        Roque Gray MD  03/22/18 0320

## 2018-03-22 NOTE — ED NOTES
Pt verbalizes understanding of discharge instructions and feels comfortable with discharge home. Grateful for care.

## 2018-03-23 ENCOUNTER — HOSPITAL ENCOUNTER (EMERGENCY)
Facility: HOSPITAL | Age: 33
Discharge: PSYCHIATRIC OR SUBSTANCE USE FACILITY | End: 2018-03-24
Attending: EMERGENCY MEDICINE
Payer: MEDICARE

## 2018-03-23 DIAGNOSIS — R45.850 HOMICIDAL IDEATION: ICD-10-CM

## 2018-03-23 DIAGNOSIS — R45.851 SUICIDAL IDEATION: ICD-10-CM

## 2018-03-23 DIAGNOSIS — F23 ACUTE PSYCHOSIS: Primary | ICD-10-CM

## 2018-03-23 LAB
ALBUMIN SERPL BCP-MCNC: 4.7 G/DL
ALP SERPL-CCNC: 69 U/L
ALT SERPL W/O P-5'-P-CCNC: 19 U/L
ANION GAP SERPL CALC-SCNC: 12 MMOL/L
APAP SERPL-MCNC: <3 UG/ML
AST SERPL-CCNC: 21 U/L
BASOPHILS # BLD AUTO: 0.05 K/UL
BASOPHILS NFR BLD: 0.4 %
BILIRUB SERPL-MCNC: 0.8 MG/DL
BUN SERPL-MCNC: 16 MG/DL
CALCIUM SERPL-MCNC: 10.1 MG/DL
CHLORIDE SERPL-SCNC: 102 MMOL/L
CO2 SERPL-SCNC: 24 MMOL/L
CREAT SERPL-MCNC: 0.9 MG/DL
DIFFERENTIAL METHOD: ABNORMAL
EOSINOPHIL # BLD AUTO: 0.2 K/UL
EOSINOPHIL NFR BLD: 1.2 %
ERYTHROCYTE [DISTWIDTH] IN BLOOD BY AUTOMATED COUNT: 12.2 %
EST. GFR  (AFRICAN AMERICAN): >60 ML/MIN/1.73 M^2
EST. GFR  (NON AFRICAN AMERICAN): >60 ML/MIN/1.73 M^2
ETHANOL SERPL-MCNC: <10 MG/DL
GLUCOSE SERPL-MCNC: 76 MG/DL
HCT VFR BLD AUTO: 47.8 %
HGB BLD-MCNC: 16.1 G/DL
IMM GRANULOCYTES # BLD AUTO: 0.04 K/UL
IMM GRANULOCYTES NFR BLD AUTO: 0.3 %
LYMPHOCYTES # BLD AUTO: 3.2 K/UL
LYMPHOCYTES NFR BLD: 25.3 %
MCH RBC QN AUTO: 29.7 PG
MCHC RBC AUTO-ENTMCNC: 33.7 G/DL
MCV RBC AUTO: 88 FL
MONOCYTES # BLD AUTO: 1 K/UL
MONOCYTES NFR BLD: 7.7 %
NEUTROPHILS # BLD AUTO: 8.1 K/UL
NEUTROPHILS NFR BLD: 65.1 %
NRBC BLD-RTO: 0 /100 WBC
PLATELET # BLD AUTO: 419 K/UL
PMV BLD AUTO: 10.6 FL
POTASSIUM SERPL-SCNC: 3.9 MMOL/L
PROT SERPL-MCNC: 7.6 G/DL
RBC # BLD AUTO: 5.43 M/UL
SODIUM SERPL-SCNC: 138 MMOL/L
TSH SERPL DL<=0.005 MIU/L-ACNC: 0.63 UIU/ML
WBC # BLD AUTO: 12.5 K/UL

## 2018-03-23 PROCEDURE — 80053 COMPREHEN METABOLIC PANEL: CPT

## 2018-03-23 PROCEDURE — 84443 ASSAY THYROID STIM HORMONE: CPT

## 2018-03-23 PROCEDURE — 85025 COMPLETE CBC W/AUTO DIFF WBC: CPT

## 2018-03-23 PROCEDURE — 80320 DRUG SCREEN QUANTALCOHOLS: CPT

## 2018-03-23 PROCEDURE — 80329 ANALGESICS NON-OPIOID 1 OR 2: CPT

## 2018-03-24 VITALS
RESPIRATION RATE: 18 BRPM | TEMPERATURE: 99 F | HEART RATE: 90 BPM | BODY MASS INDEX: 18.83 KG/M2 | OXYGEN SATURATION: 99 % | DIASTOLIC BLOOD PRESSURE: 73 MMHG | WEIGHT: 120 LBS | SYSTOLIC BLOOD PRESSURE: 124 MMHG | HEIGHT: 67 IN

## 2018-03-24 LAB
AMPHET+METHAMPHET UR QL: NEGATIVE
B-HCG UR QL: NEGATIVE
BARBITURATES UR QL SCN>200 NG/ML: NEGATIVE
BENZODIAZ UR QL SCN>200 NG/ML: NORMAL
BILIRUB UR QL STRIP: NEGATIVE
BZE UR QL SCN: NEGATIVE
CANNABINOIDS UR QL SCN: NEGATIVE
CLARITY UR REFRACT.AUTO: CLEAR
COLOR UR AUTO: ABNORMAL
CREAT UR-MCNC: 36 MG/DL
CTP QC/QA: YES
GLUCOSE UR QL STRIP: NEGATIVE
HGB UR QL STRIP: ABNORMAL
KETONES UR QL STRIP: ABNORMAL
LEUKOCYTE ESTERASE UR QL STRIP: ABNORMAL
METHADONE UR QL SCN>300 NG/ML: NEGATIVE
MICROSCOPIC COMMENT: NORMAL
NITRITE UR QL STRIP: NEGATIVE
OPIATES UR QL SCN: NEGATIVE
PCP UR QL SCN>25 NG/ML: NEGATIVE
PH UR STRIP: 6 [PH] (ref 5–8)
PROT UR QL STRIP: NEGATIVE
RBC #/AREA URNS AUTO: 1 /HPF (ref 0–4)
SP GR UR STRIP: 1 (ref 1–1.03)
SQUAMOUS #/AREA URNS AUTO: 2 /HPF
TOXICOLOGY INFORMATION: NORMAL
URN SPEC COLLECT METH UR: ABNORMAL
UROBILINOGEN UR STRIP-ACNC: NEGATIVE EU/DL
WBC #/AREA URNS AUTO: 2 /HPF (ref 0–5)

## 2018-03-24 PROCEDURE — 80307 DRUG TEST PRSMV CHEM ANLYZR: CPT

## 2018-03-24 PROCEDURE — 81025 URINE PREGNANCY TEST: CPT | Performed by: EMERGENCY MEDICINE

## 2018-03-24 PROCEDURE — 25000003 PHARM REV CODE 250: Performed by: EMERGENCY MEDICINE

## 2018-03-24 PROCEDURE — 81001 URINALYSIS AUTO W/SCOPE: CPT

## 2018-03-24 PROCEDURE — 99284 EMERGENCY DEPT VISIT MOD MDM: CPT | Mod: ,,, | Performed by: PHYSICIAN ASSISTANT

## 2018-03-24 PROCEDURE — 99285 EMERGENCY DEPT VISIT HI MDM: CPT

## 2018-03-24 RX ORDER — OLANZAPINE 5 MG/1
10 TABLET ORAL ONCE AS NEEDED
Status: DISCONTINUED | OUTPATIENT
Start: 2018-03-24 | End: 2018-03-24

## 2018-03-24 RX ORDER — HALOPERIDOL 5 MG/ML
5 INJECTION INTRAMUSCULAR EVERY 6 HOURS PRN
Status: DISCONTINUED | OUTPATIENT
Start: 2018-03-24 | End: 2018-03-24

## 2018-03-24 RX ORDER — NITROFURANTOIN 25; 75 MG/1; MG/1
100 CAPSULE ORAL EVERY 12 HOURS
Status: DISCONTINUED | OUTPATIENT
Start: 2018-03-24 | End: 2018-03-24 | Stop reason: HOSPADM

## 2018-03-24 RX ORDER — NITROFURANTOIN 25; 75 MG/1; MG/1
100 CAPSULE ORAL 2 TIMES DAILY
Qty: 10 CAPSULE | Refills: 0 | Status: ON HOLD | OUTPATIENT
Start: 2018-03-24 | End: 2018-04-05 | Stop reason: HOSPADM

## 2018-03-24 RX ORDER — OLANZAPINE 10 MG/2ML
10 INJECTION, POWDER, FOR SOLUTION INTRAMUSCULAR ONCE AS NEEDED
Status: DISCONTINUED | OUTPATIENT
Start: 2018-03-24 | End: 2018-03-24

## 2018-03-24 RX ORDER — HALOPERIDOL 5 MG/ML
5 INJECTION INTRAMUSCULAR
Status: DISCONTINUED | OUTPATIENT
Start: 2018-03-24 | End: 2018-03-24

## 2018-03-24 RX ORDER — OLANZAPINE 5 MG/1
5 TABLET, ORALLY DISINTEGRATING ORAL EVERY 6 HOURS PRN
Status: DISCONTINUED | OUTPATIENT
Start: 2018-03-24 | End: 2018-03-24 | Stop reason: HOSPADM

## 2018-03-24 RX ORDER — HALOPERIDOL 5 MG/ML
5 INJECTION INTRAMUSCULAR ONCE AS NEEDED
Status: DISCONTINUED | OUTPATIENT
Start: 2018-03-24 | End: 2018-03-24

## 2018-03-24 RX ORDER — OLANZAPINE 10 MG/2ML
5 INJECTION, POWDER, FOR SOLUTION INTRAMUSCULAR EVERY 6 HOURS PRN
Status: DISCONTINUED | OUTPATIENT
Start: 2018-03-24 | End: 2018-03-24 | Stop reason: HOSPADM

## 2018-03-24 RX ADMIN — NITROFURANTOIN MONOHYDRATE AND NITROFURANTOIN MACROCRYSTALLINE 100 MG: 75; 25 CAPSULE ORAL at 06:03

## 2018-03-24 NOTE — ED NOTES
Pt has been accepted to [facility] via [facility]. Accepting MD is Dr. Amadeo Cortes. Call report [phone]. Call report in 15 min per facility request.
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
## 2018-03-24 NOTE — ASSESSMENT & PLAN NOTE
1. Dispo/Legal Status: Recommend P[facility] as the patient is currently a danger to herself and a danger to others. Seek inpatient psychiatric bed for safety and stabilization if/when medically cleared by the ER MD.  2. Scheduled Medications: none, will defer to the primary inpatient team as pt reports non-compliance for months. Defer any non-psych meds to the ER MD.   3. PRN Medications: Zyprexa 5 mg q6h PRN for non-redirectable agitation due to breakthrough psychosis or law  4. Precautions/Nursing: suicide  5. To-Do: Continue to observe pt's behavior while in the ER and will reassess the pt daily until placement is found.

## 2018-03-24 NOTE — PROVIDER PROGRESS NOTES - EMERGENCY DEPT.
Encounter Date: 3/23/2018    ED Physician Progress Notes        Physician Note:   Assumed care of patient while she was awaiting urinalysis.  This showed equivocal results of blood, ketones, leukocyte esterase.  As the patient is unreliable, will cover with Macrobid.  I have ordered Macrobid 100 mg by mouth twice a day ×5 days.    Patient is medically clear for transfer.

## 2018-03-24 NOTE — ED NOTES
Pt arrived to unit with RN and two security guards. Pt was checked by RN to make sure nothing was on person. Pt documentation, PEC and pt belongings given to RN. Pt belongings are in 2 bin. Pt is calm and cooperative.  called. Will continue to monitor.
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
## 2018-03-24 NOTE — ED NOTES
[facility]  has arrived. Two security guards and RN are present to escort pt to vehicle.  is called [facility] was given Patient Documentation, [facility], and patient belongings. Patient stated that she does not want to call anyone to let them know about transfer to a different facility. Patient is calm and cooperative.    Detail Level: Detailed Quality 110: Preventive Care And Screening: Influenza Immunization: Influenza Immunization previously received during influenza season

## 2018-03-24 NOTE — ED NOTES
Bed: [facility] 01  Expected date:   Expected time:   Means of arrival:   Comments:
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
## 2018-03-24 NOTE — SUBJECTIVE & OBJECTIVE
Patient History           Medical as of 3/24/2018     Past Medical History     Diagnosis Date Comments Source    Abnormal cervical Papanicolaou smear 2012 Colposcopy Provider    ADHD (attention deficit hyperactivity disorder) 8/16/2012 -- Provider    Allergy -- -- Provider    Anxiety -- -- Provider    Back pain 5/19/2014 -- Provider    Bipolar affective disorder -- -- Provider    Cholecystitis -- -- Provider    Chronic migraine without aura, with intractable migraine, so stated, without mention of status migrainosus 9/24/2013 -- Provider    Depression -- -- Provider    Fever blister -- -- Provider    Gallstones 5/26/2014 -- Provider    Headache(784.0) -- -- Provider    Hepatitis C antibody positive in blood -- -- Provider    History of hepatitis C -- -- Provider    History of psychiatric hospitalization -- Suicide attempt Provider    Personality disorder 4/11/2013 -- Provider    Psychosis 10/7/2013 -- Provider    Therapy -- -- Provider    Tobacco abuse 9/24/2013 -- Provider                  Surgical as of 3/24/2018     Past Surgical History     Procedure Laterality Date Comments Source    FRACTURE SURGERY -- -- -- Provider    SKIN BIOPSY -- -- -- Provider    MOLE REMOVAL -- -- -- Provider    ESOPHAGOGASTRODUODENOSCOPY -- -- -- Provider                  Family as of 3/24/2018     Problem Relation Name Age of Onset Comments Source    Melanoma Mother -- -- -- Provider    ADD / ADHD Mother -- -- -- Provider    Bipolar disorder Mother -- -- -- Provider    Schizophrenia Mother -- -- -- Provider    Alcohol abuse Father -- -- -- Provider    Drug abuse Father -- -- -- Provider    Depression Father -- -- -- Provider    No Known Problems Sister -- -- -- Provider    Acne Maternal Aunt -- -- -- Provider    Depression Maternal Aunt -- -- -- Provider    Migraines Maternal Aunt -- -- -- Provider    Anxiety disorder Paternal Aunt -- -- -- Provider    Depression Paternal Aunt -- -- -- Provider    Breast cancer Neg Hx -- -- --  Provider    Colon cancer Neg Hx -- -- -- Provider    Ovarian cancer Neg Hx -- -- -- Provider            Tobacco Use as of 3/24/2018     Smoking Status Smoking Start Date Smoking Quit Date Packs/day Years Used    Current Some Day Smoker -- -- 1.00 --    Types Comments Smokeless Tobacco Status Smokeless Tobacco Quit Date Source     Cigarettes -- Never Used -- Provider            Alcohol Use as of 3/24/2018     Alcohol Use Drinks/Week Alcohol/Week Comments Source    No -- 0.0 oz -- Provider            Drug Use as of 3/24/2018     Drug Use Types Frequency Comments Source    No -- -- -- Provider            Sexual Activity as of 3/24/2018     Sexually Active Birth Control Partners Comments Source    Yes OCP Male -- Provider            Activities of Daily Living as of 3/24/2018     Activities of Daily Living Question Response Comments Source    Are you pregnant or think you may be? No -- Provider    Breast-feeding No -- Provider    Caffeine Use: Excessive No -- Provider    Financial Status: Unemployed No -- Provider    Legal: Other No -- Provider    Childhood History: Adopted No -- Provider    Financial Status: Other No -- Provider    Leisure: Exercise No -- Provider    Childhood History: Early trauma No -- Provider    Firearms: Does patient have access to a firearm? No -- Provider    Leisure: Fishing No -- Provider    Childhood History: Raised by parents Yes -- Provider    Home situation: homeless No -- Provider    Leisure: Hunting No -- Provider    Childhood History: Uneventful No -- Provider    Home situation: lives alone No -- Provider    Leisure: Movie Watching Yes -- Provider    Childhood History: Other No -- Provider    Home situation: lives in group home No -- Provider    Leisure: Shopping Yes -- Provider    Education: Unfinished High School No -- Provider    Home situation: lives in nursing home No -- Provider    Leisure: Sports No -- Provider    Education: High School Graduate Yes -- Provider    Home situation:  lives in shelter No -- Provider    Leisure: Time with family No -- Provider    Education: Unfinished college Yes -- Provider    Home situation: lives with family No -- Provider     Service No -- Provider    Education: Trade School No -- Provider    Home situation: lives with friends No -- Provider    Spirituality: Active Participation No -- Provider    Education: Associate's Degree No -- Provider    Home situation: lives with significant other Yes -- Provider    Spirituality: Organized Church No -- Provider    Education: Bachelor's Degree No -- Provider    Home situation: lives with spouse No -- Provider    Spirituality: Private Participation No -- Provider    Education: More than one Bachelor's or Professional No -- Provider    Legal consequences of chemical use No -- Provider    Patient feels they ought to cut down on drinking/drug use No -- Provider    Education: Master's, PhD No -- Provider    Legal: Arrest history No -- Provider    Patient annoyed by others criticizing their drinking/drug use No -- Provider    Financial Status: Disabled Yes -- Provider    Legal: Involved in civil litigation No -- Provider    Patient has felt bad or guilty about drinking/drug use No -- Provider    Financial Status: Employed No -- Provider    Legal: Involved in criminal litigation No -- Provider    Patient has had a drink/used drugs as an eye opener in the AM No -- Provider            Social Documentation as of 3/24/2018    Lives with boyfriend.    Exercise:  Stretching.   Source: Provider           Occupational as of 3/24/2018     Occupation Employer Comments Source    disabled oscar -- Provider    -- disabled  -- Provider            Socioeconomic as of 3/24/2018     Marital Status Spouse Name Number of Children Years Education Preferred Language Ethnicity Race Source    Single -- -- -- English /White White Provider         Pertinent History Q A Comments    as of 3/24/2018 Lives with family     Place in  Birth Order 1st     Lives in home     Number of Siblings 1     Raised by biological parents     Legal Involvement      Childhood Trauma early trauma     Criminal History of none     Financial Status unemployed     Highest Level of Education unfinished college     Does patient have access to a firearm? No      Service No     Primary Leisure Activity other     Spirituality non-practicing      Past Medical History:   Diagnosis Date    Abnormal cervical Papanicolaou smear 2012    Colposcopy    ADHD (attention deficit hyperactivity disorder) 8/16/2012    Allergy     Anxiety     Back pain 5/19/2014    Bipolar affective disorder     Cholecystitis     Chronic migraine without aura, with intractable migraine, so stated, without mention of status migrainosus 9/24/2013    Depression     Fever blister     Gallstones 5/26/2014    Headache(784.0)     Hepatitis C antibody positive in blood     History of hepatitis C     History of psychiatric hospitalization     Suicide attempt    Personality disorder 4/11/2013    Psychosis 10/7/2013    Therapy     Tobacco abuse 9/24/2013     Past Surgical History:   Procedure Laterality Date    ESOPHAGOGASTRODUODENOSCOPY      FRACTURE SURGERY      MOLE REMOVAL      SKIN BIOPSY       Family History     Problem Relation (Age of Onset)    ADD / ADHD Mother    Acne Maternal Aunt    Alcohol abuse Father    Anxiety disorder Paternal Aunt    Bipolar disorder Mother    Depression Father, Maternal Aunt, Paternal Aunt    Drug abuse Father    Melanoma Mother    Migraines Maternal Aunt    No Known Problems Sister    Schizophrenia Mother        Social History Main Topics    Smoking status: Current Some Day Smoker     Packs/day: 1.00     Types: Cigarettes    Smokeless tobacco: Never Used    Alcohol use No    Drug use: No    Sexual activity: Yes     Partners: Male     Birth control/ protection: OCP     Review of patient's allergies indicates:   Allergen Reactions    Dilantin  [phenytoin sodium extended] Rash    Saphris [asenapine]      Confusion and depressed mood.       No current facility-administered medications on file prior to encounter.      Current Outpatient Prescriptions on File Prior to Encounter   Medication Sig    ciclopirox (PENLAC) 8 % Soln Apply daily to affected nail. Must remove with rubbing alcohol once weekly and then re-start.    cyclobenzaprine (FLEXERIL) 10 MG tablet take 0.5 to 1 tablet by mouth at bedtime if needed for BACKPAIN    dapsone (ACZONE) 5 % topical gel apply to affected areas on face daily - BID    diazePAM (VALIUM) 5 MG tablet Take 1 tablet (5 mg total) by mouth every 12 (twelve) hours as needed for Anxiety.    norethindrone-ethinyl estradiol (MICROGESTIN 1/20) 1-20 mg-mcg per tablet Take 1 tablet by mouth every evening.    sertraline (ZOLOFT) 50 MG tablet Take one 100mg tab daily    sumatriptan (IMITREX) 100 MG tablet Half-1 tab po q day prn migraine.  Repeat once in 2 hours if necessary    tazarotene (TAZORAC) 0.05 % gel APPLY TOPICALLY EVERY EVENING. WASH OFF IN THE MORNING, AND APPLY SUNSCREEN    traMADol (ULTRAM) 50 mg tablet take 1 tablet by mouth every 8 hours if needed    valACYclovir (VALTREX) 1000 MG tablet Take 1 tablet (1,000 mg total) by mouth once daily.     Psychotherapeutics     Start     Stop Route Frequency Ordered    03/24/18 0846  OLANZapine tablet 10 mg      03/24 2359 Oral Once as needed 03/24/18 0746    03/24/18 0823  haloperidol lactate injection 5 mg      03/24 2359 IM Once as needed 03/24/18 0724        Review of Systems   Constitutional: Positive for activity change and appetite change.   Psychiatric/Behavioral: Positive for agitation, decreased concentration, dysphoric mood, hallucinations, sleep disturbance and suicidal ideas.     Strengths and Liabilities: Strength: Patient is physically healthy., Liability: Patient has poor judgment, Liability: Patient lacks coping skills.    Objective:     Vital Signs (Most  "Recent):  Temp: 98.5 °F (36.9 °C) (03/24/18 0750)  Pulse: 90 (03/24/18 0750)  Resp: 18 (03/24/18 0750)  BP: 124/73 (03/24/18 0750)  SpO2: 99 % (03/24/18 0700) Vital Signs (24h Range):  Temp:  [97.3 °F (36.3 °C)-98.5 °F (36.9 °C)] 98.5 °F (36.9 °C)  Pulse:  [] 90  Resp:  [18-19] 18  SpO2:  [99 %] 99 %  BP: (124-142)/(70-75) 124/73     Height: 5' 7" (170.2 cm)  Weight: 54.4 kg (120 lb)  Body mass index is 18.79 kg/m².    No intake or output data in the 24 hours ending 03/24/18 0901    Physical Exam   Psychiatric:   Mental Status Exam:  Appearance: no apparent distress, disheveled, dressed in hospital scrubs, average weight  Behavior/Cooperation: irritable, guarded, minimally cooperative  Speech: fast, normal tone and volume  Mood: "depressed"  Affect: labile  Thought Process: tangential  Thought Content: +SI, +HI towards mother, +AH, +VH, +delusions, +IOR  Sensorium: alert, awake   Orientation: person, place, situation  Memory: grossly intact  Attention/Concentration: easily distracted  Fund of Knowledge: intact and appropriate to age and level of education   Abstraction: intact to conversation  Insight: limited  Judgement: poor  Impulse Control: limited  Reliability: questionable     Nursing note and vitals reviewed.       Significant Labs:   Last 24 Hours:   Recent Lab Results       03/24/18  0533 03/24/18  0523 03/23/18  2226      Benzodiazepines Presumptive Positive       Methadone metabolites Negative       Phencyclidine Negative       Immature Granulocytes   0.3     Immature Grans (Abs)   0.04  Comment:  Mild elevation in immature granulocytes is non specific and   can be seen in a variety of conditions including stress response,   acute inflammation, trauma and pregnancy. Correlation with other   laboratory and clinical findings is essential.       Acetaminophen (Tylenol), Serum   <3.0  Comment:  Toxic Levels:  Adults (4 hr post-ingestion).........>150 ug/mL  Adults (12 hr post-ingestion)........>40 " ug/mL  Peds (2 hr post-ingestion, liquid)...>225 ug/mL  (L)     Albumin   4.7     Alcohol, Medical, Serum   <10     Alkaline Phosphatase   69     ALT   19     Amphetamine Screen, Ur Negative       Anion Gap   12     Appearance, UA  Clear      AST   21     Barbiturate Screen, Ur Negative       Baso #   0.05     Basophil%   0.4     Bilirubin (UA)  Negative      Total Bilirubin   0.8  Comment:  For infants and newborns, interpretation of results should be based  on gestational age, weight and in agreement with clinical  observations.  Premature Infant recommended reference ranges:  Up to 24 hours.............<8.0 mg/dL  Up to 48 hours............<12.0 mg/dL  3-5 days..................<15.0 mg/dL  6-29 days.................<15.0 mg/dL       BUN, Bld   16     Calcium   10.1     Chloride   102     CO2   24     Cocaine (Metab.) Negative       Color, UA  Straw      Creatinine   0.9     Creatinine, Random Ur 36.0  Comment:  The random urine reference ranges provided were established   for 24 hour urine collections.  No reference ranges exist for  random urine specimens.  Correlate clinically.         Differential Method   Automated     eGFR if    >60.0     eGFR if non    >60.0  Comment:  Calculation used to obtain the estimated glomerular filtration  rate (eGFR) is the CKD-EPI equation.        Eos #   0.2     Eosinophil%   1.2     Glucose   76     Glucose, UA  Negative      Gran # (ANC)   8.1(H)     Gran%   65.1     Hematocrit   47.8     Hemoglobin   16.1(H)     Ketones, UA  1+(A)      Leukocytes, UA  2+(A)      Lymph #   3.2     Lymph%   25.3     MCH   29.7     MCHC   33.7     MCV   88     Microscopic Comment  SEE COMMENT  Comment:  Other formed elements not mentioned in the report are not   present in the microscopic examination.         Mono #   1.0     Mono%   7.7     MPV   10.6     Nitrite, UA  Negative      nRBC   0     Occult Blood UA  1+(A)      Opiate Scrn, Ur Negative       pH, UA   6.0      Platelets   419(H)     Potassium   3.9     Preg Test, Ur Negative       Total Protein   7.6     Protein, UA  Negative  Comment:  Recommend a 24 hour urine protein or a urine   protein/creatinine ratio if globulin induced proteinuria is  clinically suspected.         Acceptable Yes       RBC   5.43(H)     RBC, UA  1      RDW   12.2     Sodium   138     Specific Gravity, UA  1.005      Specimen UA  Urine, Clean Catch      Squam Epithel, UA  2      Marijuana (THC) Metabolite Negative       Toxicology Information SEE COMMENT  Comment:  This screen includes the following classes of drugs at the   listed cut-off:  Benzodiazepines                  200 ng/ml  Methadone                        300 ng/ml  Cocaine metabolite               300 ng/ml  Opiates                          300 ng/ml  Barbiturates                     200 ng/ml  Amphetamines                    1000 ng/ml  Marijuana metabs (THC)            50 ng/ml  Phencyclidine (PCP)               25 ng/ml  High concentrations of Diphenhydramine may cross-react with  Phencyclidine PCP screening immunoassay giving a false   positive result.  High concentrations of Methylenedioxymethamphetamine (MDMA aka  Ectasy) and other structurally similar compounds may cross-   react with the Amphetamine/Methamphetamine screening   immunoassay giving a false positive result.  A metabolite of the anti-HIV drug Sustiva () may cause  false positive results in the Marijuana metabolite (THC)   screening assay.  Note: This exception list includes only more common   interferants in toxicology screen testing.  Because of many   cross-reactantspositive results on toxicology drug screens   should be confirmed whenever results do not correlate with   clinical presentation.  This report is intended for use in clinical monitoring and  management of patients. It is not intended for use in   employment related drug testing.  Because of any cross-reactants, positive  results on toxicology  drug screens should be confirmed whenever results do not  correlate with clinical presentation.  Presumptive positive results are unconfirmed and may be used   only for medical purposes.         TSH   0.626     Urobilinogen, UA  Negative      WBC, UA  2      WBC   12.50           Significant Imaging: I have reviewed all pertinent imaging results/findings within the past 24 hours.

## 2018-03-25 PROBLEM — N30.00 ACUTE CYSTITIS WITHOUT HEMATURIA: Status: ACTIVE | Noted: 2018-03-25

## 2018-03-25 PROBLEM — G44.89 OTHER HEADACHE SYNDROME: Status: ACTIVE | Noted: 2018-03-25

## 2018-04-04 ENCOUNTER — DOCUMENTATION ONLY (OUTPATIENT)
Dept: PSYCHIATRY | Facility: HOSPITAL | Age: 33
End: 2018-04-04

## 2018-04-04 NOTE — PSYCH
Name: Vianney Callahan                               MRN: [id number]    Referred Program: Behavioral Medicine Unit    Contact Phone Number: [phone] (home)     Referring Provider: Sheng    Psychosocial Stressors: SI and admit to acute psych

## 2018-04-10 RX ORDER — TRAMADOL HYDROCHLORIDE 50 MG/1
TABLET ORAL
Qty: 60 TABLET | Refills: 0 | Status: SHIPPED | OUTPATIENT
Start: 2018-04-10 | End: 2018-07-10 | Stop reason: SDUPTHER

## 2018-04-11 ENCOUNTER — DOCUMENTATION ONLY (OUTPATIENT)
Dept: PSYCHIATRY | Facility: HOSPITAL | Age: 33
End: 2018-04-11

## 2018-04-11 NOTE — PSYCH
SW contacted pt regarding insurance benefits, referral to BMU, and to schedule BMU start date. Pt expressed understanding of insurance coverage. Pt discussed prior attendance in BMU program in 2017 and reported learning helpful ways of coping. SW reviewed structure of BMU. Pt discussed longer time therapy. SW discussed BMU 10 day tx program. Pt inquired about longer term IOP programs as pt reported recent pursuit of ACT team and denial. SW discussed [facility] and extended IOP. Pt expressed interest and reported that she will pursue tx through [facility]. SW encouraged pt to follow up with BMU should pursuit of extended IOP fall through.

## 2018-04-24 ENCOUNTER — DOCUMENTATION ONLY (OUTPATIENT)
Dept: PSYCHIATRY | Facility: HOSPITAL | Age: 33
End: 2018-04-24

## 2018-04-24 NOTE — PSYCH
SW contacted pt regarding BMU start date. Pt reported that she enrolled into [facility] for long term IOP.

## 2018-04-27 ENCOUNTER — OFFICE VISIT (OUTPATIENT)
Dept: URGENT CARE | Facility: CLINIC | Age: 33
End: 2018-04-27
Payer: MEDICARE

## 2018-04-27 VITALS
HEART RATE: 96 BPM | TEMPERATURE: 98 F | DIASTOLIC BLOOD PRESSURE: 78 MMHG | WEIGHT: 122 LBS | OXYGEN SATURATION: 100 % | SYSTOLIC BLOOD PRESSURE: 120 MMHG | BODY MASS INDEX: 19.15 KG/M2 | RESPIRATION RATE: 18 BRPM | HEIGHT: 67 IN

## 2018-04-27 DIAGNOSIS — R11.2 NAUSEA AND VOMITING, INTRACTABILITY OF VOMITING NOT SPECIFIED, UNSPECIFIED VOMITING TYPE: ICD-10-CM

## 2018-04-27 DIAGNOSIS — R10.13 EPIGASTRIC PAIN: ICD-10-CM

## 2018-04-27 DIAGNOSIS — K29.70 GASTRITIS, PRESENCE OF BLEEDING UNSPECIFIED, UNSPECIFIED CHRONICITY, UNSPECIFIED GASTRITIS TYPE: Primary | ICD-10-CM

## 2018-04-27 PROCEDURE — 96372 THER/PROPH/DIAG INJ SC/IM: CPT | Mod: S$GLB,,, | Performed by: FAMILY MEDICINE

## 2018-04-27 PROCEDURE — 99214 OFFICE O/P EST MOD 30 MIN: CPT | Mod: 25,S$GLB,, | Performed by: FAMILY MEDICINE

## 2018-04-27 RX ORDER — ONDANSETRON 4 MG/1
4 TABLET, ORALLY DISINTEGRATING ORAL EVERY 8 HOURS PRN
Qty: 20 TABLET | Refills: 0 | Status: SHIPPED | OUTPATIENT
Start: 2018-04-27 | End: 2018-05-04

## 2018-04-27 RX ORDER — SUCRALFATE 1 G/1
1 TABLET ORAL 4 TIMES DAILY
Qty: 60 TABLET | Refills: 0 | Status: SHIPPED | OUTPATIENT
Start: 2018-04-27 | End: 2018-06-14

## 2018-04-27 RX ORDER — ONDANSETRON 2 MG/ML
4 INJECTION INTRAMUSCULAR; INTRAVENOUS
Status: COMPLETED | OUTPATIENT
Start: 2018-04-27 | End: 2018-04-27

## 2018-04-27 RX ORDER — BUPROPION HYDROCHLORIDE 100 MG/1
100 TABLET ORAL 2 TIMES DAILY
COMMUNITY
End: 2018-06-14

## 2018-04-27 RX ORDER — PANTOPRAZOLE SODIUM 40 MG/1
40 TABLET, DELAYED RELEASE ORAL DAILY
Qty: 30 TABLET | Refills: 1 | Status: SHIPPED | OUTPATIENT
Start: 2018-04-27 | End: 2018-10-04

## 2018-04-27 RX ADMIN — ONDANSETRON 4 MG: 2 INJECTION INTRAMUSCULAR; INTRAVENOUS at 10:04

## 2018-04-27 NOTE — PATIENT INSTRUCTIONS
Understanding Gastritis    Gastritis is a painful inflammation of the stomach lining. It has a number of causes. Gastritis and its symptoms can be relieved with treatment. Work with your healthcare provider to find ways to treat your symptoms.  The Stomach  To digest the food you eat, your stomach makes strong acids and enzymes. A healthy stomach has built-in defenses that protect its lining from damage by these acids and enzymes.  When you have gastritis  Acids may damage the stomach lining when the built-in defenses of the stomach dont function as they should. The stomach lining can then become inflamed. When this happens, it is called gastritis.  Causes of gastritis  Gastritis has many causes. They may include:  · Aspirin and anti-inflammatory medicines  · Tobacco use  · Alcohol use  · Helicobacter pylori (H. pylori) bacteria  · Trauma from injuries, burns, or major surgery  · Critical illness or autoimmune disorders  Common symptoms  With gastritis, you may notice one or more of the following:  · A burning feeling in your upper belly  · Pain that happens after eating certain foods  · Gas or a bloated feeling in your stomach  · Frequent belching  · Nausea with or without vomiting  · Loss of appetite  · Feeling full quickly  · Fatigue  Date Last Reviewed: 7/1/2016  © 4518-3239 [employer]. [address] [place] [address]. All rights reserved. This information is not intended as a substitute for professional medical care. Always follow your healthcare professional's instructions.    Follow up with your doctor in a few days.  Return to the urgent care or go to the ER if symptoms get worse.    Prince Mac MD

## 2018-04-27 NOTE — PROGRESS NOTES
"Subjective:       Patient ID: Vianney Callahan is a 32 y.o. female.    Vitals:  height is 5' 6.5" (1.689 m) and weight is 55.3 kg (122 lb). Her temperature is 98.1 °F (36.7 °C). Her blood pressure is 120/78 and her pulse is 96. Her respiration is 18 and oxygen saturation is 100%.     Chief Complaint: Nausea    Pt states that she has had nausea and vomiting since 7 am, and she has vomited 6 times      Nausea   This is a new problem. The current episode started today. The problem occurs constantly. The problem has been gradually worsening. Associated symptoms include nausea and vomiting. Pertinent negatives include no abdominal pain, chest pain, chills or fever. The symptoms are aggravated by drinking. She has tried nothing for the symptoms.   Emesis    This is a new problem. The current episode started today. The problem occurs 5 to 10 times per day. The problem has been unchanged. The emesis has an appearance of bile. There has been no fever. Associated symptoms include dizziness and sweats. Pertinent negatives include no abdominal pain, chest pain, chills, diarrhea or fever. She has tried nothing for the symptoms.     Review of Systems   Constitution: Negative for chills and fever.   Cardiovascular: Negative for chest pain.   Respiratory: Negative for shortness of breath.    Musculoskeletal: Negative for back pain.   Gastrointestinal: Positive for nausea and vomiting. Negative for abdominal pain, constipation, diarrhea, hematochezia and melena.   Genitourinary: Negative for dysuria.   Neurological: Positive for dizziness.       Objective:      Physical Exam   Constitutional: She is oriented to person, place, and time. She appears well-developed and well-nourished.   HENT:   Head: Normocephalic and atraumatic.   Right Ear: External ear normal.   Left Ear: External ear normal.   Eyes: EOM are normal. Pupils are equal, round, and reactive to light.   Neck: Normal range of motion. Neck supple. No JVD present. No tracheal " deviation present. No thyromegaly present.   Cardiovascular: Normal rate, regular rhythm and normal heart sounds.  Exam reveals no gallop and no friction rub.    No murmur heard.  Pulmonary/Chest: Breath sounds normal. No respiratory distress. She has no wheezes. She has no rales. She exhibits no tenderness.   Abdominal: Soft. Bowel sounds are normal. She exhibits no distension and no mass. There is tenderness in the epigastric area. There is no rigidity, no rebound, no guarding, no CVA tenderness, no tenderness at McBurney's point and negative Orellana's sign. No hernia.       Epigastric tenderness with palpation   Musculoskeletal: Normal range of motion. She exhibits no edema, tenderness or deformity.   Lymphadenopathy:     She has no cervical adenopathy.   Neurological: She is alert and oriented to person, place, and time. She displays normal reflexes. No cranial nerve deficit. She exhibits normal muscle tone. Coordination normal.   Skin: Skin is warm. Capillary refill takes less than 2 seconds. No rash noted. No erythema. No pallor.   Psychiatric: She has a normal mood and affect. Her behavior is normal. Judgment and thought content normal.   Vitals reviewed.      Assessment:       1. Gastritis, presence of bleeding unspecified, unspecified chronicity, unspecified gastritis type    2. Nausea and vomiting, intractability of vomiting not specified, unspecified vomiting type    3. Epigastric pain        Plan:         Gastritis, presence of bleeding unspecified, unspecified chronicity, unspecified gastritis type  -     sucralfate (CARAFATE) 1 gram tablet; Take 1 tablet (1 g total) by mouth 4 (four) times daily.  Dispense: 60 tablet; Refill: 0  -     pantoprazole (PROTONIX) 40 MG tablet; Take 1 tablet (40 mg total) by mouth once daily.  Dispense: 30 tablet; Refill: 1    Nausea and vomiting, intractability of vomiting not specified, unspecified vomiting type  -     ondansetron injection 4 mg; Inject 4 mg into the muscle  one time.  -     ondansetron (ZOFRAN-ODT) 4 MG TbDL; Take 1 tablet (4 mg total) by mouth every 8 (eight) hours as needed (nausea/vomit).  Dispense: 20 tablet; Refill: 0    Epigastric pain  -     (pyxis) gi cocktail (mylanta 30 mL, lidocaine 2 % viscous 10 mL, dicyclomine 10 mL) 50 mL; Take by mouth one time.      Follow up with your doctor in a few days as needed.  Return to the urgent care or go to the ER if symptoms get worse.    Prince Mac MD]

## 2018-05-08 ENCOUNTER — HOSPITAL ENCOUNTER (EMERGENCY)
Facility: HOSPITAL | Age: 33
Discharge: PSYCHIATRIC OR SUBSTANCE USE FACILITY | End: 2018-05-09
Attending: EMERGENCY MEDICINE
Payer: MEDICARE

## 2018-05-08 VITALS
RESPIRATION RATE: 18 BRPM | HEIGHT: 66 IN | SYSTOLIC BLOOD PRESSURE: 100 MMHG | WEIGHT: 125 LBS | HEART RATE: 67 BPM | DIASTOLIC BLOOD PRESSURE: 54 MMHG | OXYGEN SATURATION: 98 % | BODY MASS INDEX: 20.09 KG/M2 | TEMPERATURE: 99 F

## 2018-05-08 DIAGNOSIS — R45.851 SUICIDAL IDEATION: Primary | ICD-10-CM

## 2018-05-08 LAB
ALBUMIN SERPL BCP-MCNC: 3.8 G/DL
ALP SERPL-CCNC: 66 U/L
ALT SERPL W/O P-5'-P-CCNC: 13 U/L
AMPHET+METHAMPHET UR QL: NEGATIVE
ANION GAP SERPL CALC-SCNC: 5 MMOL/L
APAP SERPL-MCNC: <3 UG/ML
AST SERPL-CCNC: 14 U/L
B-HCG UR QL: NEGATIVE
BACTERIA #/AREA URNS AUTO: ABNORMAL /HPF
BARBITURATES UR QL SCN>200 NG/ML: NEGATIVE
BASOPHILS # BLD AUTO: 0.04 K/UL
BASOPHILS NFR BLD: 0.3 %
BENZODIAZ UR QL SCN>200 NG/ML: NORMAL
BILIRUB SERPL-MCNC: 0.4 MG/DL
BILIRUB UR QL STRIP: NEGATIVE
BUN SERPL-MCNC: 8 MG/DL
BZE UR QL SCN: NEGATIVE
CALCIUM SERPL-MCNC: 9.4 MG/DL
CANNABINOIDS UR QL SCN: NORMAL
CHLORIDE SERPL-SCNC: 106 MMOL/L
CLARITY UR REFRACT.AUTO: CLEAR
CO2 SERPL-SCNC: 27 MMOL/L
COLOR UR AUTO: YELLOW
CREAT SERPL-MCNC: 0.9 MG/DL
CREAT UR-MCNC: 135 MG/DL
CTP QC/QA: YES
DIFFERENTIAL METHOD: ABNORMAL
EOSINOPHIL # BLD AUTO: 0.4 K/UL
EOSINOPHIL NFR BLD: 2.9 %
ERYTHROCYTE [DISTWIDTH] IN BLOOD BY AUTOMATED COUNT: 13.2 %
EST. GFR  (AFRICAN AMERICAN): >60 ML/MIN/1.73 M^2
EST. GFR  (NON AFRICAN AMERICAN): >60 ML/MIN/1.73 M^2
ETHANOL SERPL-MCNC: <10 MG/DL
GLUCOSE SERPL-MCNC: 87 MG/DL
GLUCOSE UR QL STRIP: NEGATIVE
HCT VFR BLD AUTO: 41.2 %
HGB BLD-MCNC: 13.4 G/DL
HGB UR QL STRIP: ABNORMAL
IMM GRANULOCYTES # BLD AUTO: 0.06 K/UL
IMM GRANULOCYTES NFR BLD AUTO: 0.4 %
KETONES UR QL STRIP: NEGATIVE
LEUKOCYTE ESTERASE UR QL STRIP: NEGATIVE
LITHIUM SERPL-SCNC: 0.5 MMOL/L
LYMPHOCYTES # BLD AUTO: 4.2 K/UL
LYMPHOCYTES NFR BLD: 28.2 %
MCH RBC QN AUTO: 30.1 PG
MCHC RBC AUTO-ENTMCNC: 32.5 G/DL
MCV RBC AUTO: 93 FL
METHADONE UR QL SCN>300 NG/ML: NEGATIVE
MICROSCOPIC COMMENT: ABNORMAL
MONOCYTES # BLD AUTO: 1 K/UL
MONOCYTES NFR BLD: 6.4 %
NEUTROPHILS # BLD AUTO: 9.1 K/UL
NEUTROPHILS NFR BLD: 61.8 %
NITRITE UR QL STRIP: NEGATIVE
NON-SQ EPI CELLS #/AREA URNS AUTO: <1 /HPF
NRBC BLD-RTO: 0 /100 WBC
OPIATES UR QL SCN: NEGATIVE
PCP UR QL SCN>25 NG/ML: NEGATIVE
PH UR STRIP: 6 [PH] (ref 5–8)
PLATELET # BLD AUTO: 374 K/UL
PMV BLD AUTO: 10 FL
POTASSIUM SERPL-SCNC: 4 MMOL/L
PROT SERPL-MCNC: 6.5 G/DL
PROT UR QL STRIP: NEGATIVE
RBC # BLD AUTO: 4.45 M/UL
RBC #/AREA URNS AUTO: 16 /HPF (ref 0–4)
SALICYLATES SERPL-MCNC: <5 MG/DL
SODIUM SERPL-SCNC: 138 MMOL/L
SP GR UR STRIP: 1.01 (ref 1–1.03)
SQUAMOUS #/AREA URNS AUTO: 5 /HPF
TOXICOLOGY INFORMATION: NORMAL
TSH SERPL DL<=0.005 MIU/L-ACNC: 2.04 UIU/ML
URN SPEC COLLECT METH UR: ABNORMAL
UROBILINOGEN UR STRIP-ACNC: 2 EU/DL
WBC # BLD AUTO: 14.8 K/UL

## 2018-05-08 PROCEDURE — 81001 URINALYSIS AUTO W/SCOPE: CPT

## 2018-05-08 PROCEDURE — 84443 ASSAY THYROID STIM HORMONE: CPT

## 2018-05-08 PROCEDURE — 80178 ASSAY OF LITHIUM: CPT

## 2018-05-08 PROCEDURE — 25000003 PHARM REV CODE 250: Performed by: STUDENT IN AN ORGANIZED HEALTH CARE EDUCATION/TRAINING PROGRAM

## 2018-05-08 PROCEDURE — 81025 URINE PREGNANCY TEST: CPT | Performed by: STUDENT IN AN ORGANIZED HEALTH CARE EDUCATION/TRAINING PROGRAM

## 2018-05-08 PROCEDURE — 99285 EMERGENCY DEPT VISIT HI MDM: CPT | Mod: 25

## 2018-05-08 PROCEDURE — 80320 DRUG SCREEN QUANTALCOHOLS: CPT

## 2018-05-08 PROCEDURE — 80307 DRUG TEST PRSMV CHEM ANLYZR: CPT

## 2018-05-08 PROCEDURE — S4991 NICOTINE PATCH NONLEGEND: HCPCS | Performed by: STUDENT IN AN ORGANIZED HEALTH CARE EDUCATION/TRAINING PROGRAM

## 2018-05-08 PROCEDURE — 80329 ANALGESICS NON-OPIOID 1 OR 2: CPT

## 2018-05-08 PROCEDURE — 85025 COMPLETE CBC W/AUTO DIFF WBC: CPT

## 2018-05-08 PROCEDURE — 80053 COMPREHEN METABOLIC PANEL: CPT

## 2018-05-08 RX ORDER — IBUPROFEN 200 MG
1 TABLET ORAL DAILY
Status: DISCONTINUED | OUTPATIENT
Start: 2018-05-08 | End: 2018-05-09 | Stop reason: HOSPADM

## 2018-05-08 RX ADMIN — NICOTINE 1 PATCH: 14 PATCH, EXTENDED RELEASE TRANSDERMAL at 05:05
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
## 2018-05-08 NOTE — ED NOTES
Pt given turkey sandwich, dede crackers, and apple juice per request. Sitter at bedside doing q15 minute checks & has no personal belongings in the room. Pt was provided with an ED stretcher for comfort

## 2018-05-08 NOTE — ED NOTES
Patient belongings that are in one labeled belongings bag: $17 plus coins, DL x 2, [employer] credit and debit card, purse, cell phone, book, makeup, birth control, pill bottles, shoes, bra, sweatshirt, jeans.

## 2018-05-08 NOTE — ED PROVIDER NOTES
Encounter Date: 5/8/2018       History     Chief Complaint   Patient presents with    Suicidal     has plan to take pills     33yo F with bipolar affective disorder, mood disorder, personality disorder, history of psychiatric hospitalizations, suicide attempts, depression presents to the ED with suicidal ideation. Patient is currently enrolled in intensive outpatient program at [facility], today she went to see her counselor and she states she left in tears feeling like she wanted to go home and cut herself and take all her anxiety medications with alcohol. She reports drinking alcohol and smoking weed the last few days, which she knows she is not supposed to. Denies any substance use today. Reports auditory hallucinations of hearing her ex-boyfriend talking to her, denies other hallucinations or paranoia. Denies HI. Currently living with her mother in [place].          Review of patient's allergies indicates:   Allergen Reactions    Dilantin [phenytoin sodium extended] Rash    Saphris [asenapine]      Confusion and depressed mood.     Past Medical History:   Diagnosis Date    Abnormal cervical Papanicolaou smear 2012    Colposcopy    ADHD (attention deficit hyperactivity disorder) 8/16/2012    Allergy     Anxiety     Back pain 5/19/2014    Bipolar affective disorder     Cholecystitis     Chronic migraine without aura, with intractable migraine, so stated, without mention of status migrainosus 9/24/2013    Depression     Fever blister     Gallstones 5/26/2014    Headache(784.0)     Hepatitis C antibody positive in blood     History of hepatitis C     History of psychiatric hospitalization     Suicide attempt    Personality disorder 4/11/2013    Psychosis 10/7/2013    Therapy     Tobacco abuse 9/24/2013     Past Surgical History:   Procedure Laterality Date    ESOPHAGOGASTRODUODENOSCOPY      FRACTURE SURGERY      MOLE REMOVAL      SKIN BIOPSY       Family History   Problem Relation Age of  Onset    Melanoma Mother     ADD / ADHD Mother     Bipolar disorder Mother     Schizophrenia Mother     Alcohol abuse Father     Drug abuse Father     Depression Father     No Known Problems Sister     Acne Maternal Aunt     Depression Maternal Aunt     Migraines Maternal Aunt     Anxiety disorder Paternal Aunt     Depression Paternal Aunt     Breast cancer Neg Hx     Colon cancer Neg Hx     Ovarian cancer Neg Hx      Social History   Substance Use Topics    Smoking status: Current Some Day Smoker     Packs/day: 1.00     Types: Cigarettes    Smokeless tobacco: Never Used    Alcohol use No     Review of Systems   Constitutional: Negative for chills and fever.   HENT: Negative for sore throat.    Eyes: Negative for photophobia and visual disturbance.   Respiratory: Negative for cough and shortness of breath.    Cardiovascular: Negative for chest pain and leg swelling.   Gastrointestinal: Negative for abdominal pain, diarrhea, nausea and vomiting.   Genitourinary: Negative for dysuria.   Musculoskeletal: Negative for myalgias.   Skin: Negative for rash and wound.   Neurological: Negative for dizziness, seizures, weakness, numbness and headaches.   Psychiatric/Behavioral: Positive for hallucinations and suicidal ideas. Negative for confusion. The patient is nervous/anxious.        Physical Exam     Initial Vitals [05/08/18 1710]   BP Pulse Resp Temp SpO2   112/64 74 18 98.2 °F (36.8 °C) 99 %      MAP       80         Physical Exam    Nursing note and vitals reviewed.  Constitutional: She appears well-developed and well-nourished. She is not diaphoretic. No distress.   HENT:   Head: Normocephalic and atraumatic.   Eyes: Pupils are equal, round, and reactive to light.   Neck: Neck supple.   Cardiovascular: Normal rate, regular rhythm, normal heart sounds and intact distal pulses.   Pulmonary/Chest: Breath sounds normal. No respiratory distress. She has no wheezes. She has no rales.   Abdominal: Soft.  Bowel sounds are normal. She exhibits no distension. There is no tenderness.   Musculoskeletal: Normal range of motion. She exhibits no edema.   Neurological: She is alert and oriented to person, place, and time.   Skin: Skin is warm and dry.   Psychiatric: Her mood appears anxious. Her affect is labile. Her speech is rapid and/or pressured. She is not combative. She expresses suicidal ideation. She expresses suicidal plans. She expresses no homicidal plans.         ED Course   Procedures  Labs Reviewed   CBC W/ AUTO DIFFERENTIAL   COMPREHENSIVE METABOLIC PANEL   TSH   DRUG SCREEN PANEL, URINE EMERGENCY   ALCOHOL,MEDICAL (ETHANOL)   ACETAMINOPHEN LEVEL   URINALYSIS, REFLEX TO URINE CULTURE   SALICYLATE LEVEL   LITHIUM LEVEL   POCT URINE PREGNANCY             Medical Decision Making:   Initial Assessment:   31yo F with suicidal ideation. Has firm plan to hurt herself if discharged, currently in intensive outpatient program but agreeable to inpatient treatment.  Clinical Tests:   Lab Tests: Ordered and Reviewed  The following lab test(s) were unremarkable: CBC, CMP and B-HCG  ED Management:  - CBC, CMP, TSH, UA all wnl  - Drug screen positive for benzos and THC as expected  - Ethanol, acetaminophen and salicylate levels negative  - Lithium level low  - Patient is medically cleared  - No female psych beds at [facility], will start looking for bed elsewhere  Other:   I have discussed this case with another health care provider.              Attending Attestation:   Physician Attestation Statement for Resident:  As the supervising MD   Physician Attestation Statement: I have personally seen and examined this patient.   I agree with the above history. -: Wants to OD  Feels meds not working  Multiple recent stressors causing her to feel worse  No acute physical complaint  etoh over the weekend but doesn't typically drink  Recent inpt admit and now in intensive outpt psych program.    As the supervising MD I agree with the above  PE.    As the supervising MD I agree with the above treatment, course, plan, and disposition.   -: Worsening depression/anxiety/ptsd, w/ active SI and plan to OD.  No acute physical complaint.  PEC, screening labs, psych admit/transfer.    7:30 PM no acute medical process evident on screening studies.    I have reviewed and agree with the residents interpretation of the following: lab data.  I have reviewed the following: old records at this facility.                       Clinical Impression:   The encounter diagnosis was Suicidal ideation.                           Mary Perales MD  Resident  05/08/18 1905       Roman Teixeira MD  05/08/18 1934

## 2018-05-09 NOTE — ED NOTES
Report received from alessandro portillo rn . Pt care assumed. Pt remains in paper scrubs, resting in stretcher comfortably. No signs of distress noted. Sitter remains at bedside in direct visual contact, charting per protocol every 15 minutes. No equipment or belongings are in the patients room. Pt aware of plan of care. Will continue to monitor.

## 2018-05-09 NOTE — ED NOTES
Pt received to [facility] dept via wheelchair and staff/. Pt in hospital provided paper scrubs. Pt has been searched for contraband and all personal belongings retrieved and secured in locked area away from pt. Original PEC document filed in chart. Room is free of environmental hazards, direct visual observation maintained.

## 2018-05-09 NOTE — ED NOTES
[facility] here to  patient. Patient stated she does not want anyone notified at this time. Patient belongings secured into SPD vehicle separate from patient. Patient transferred and secured into [facility] vehicle with Staff/Security Guards in attendance. Safety maintained.

## 2018-05-09 NOTE — ED NOTES
PEC has been faxed to psych facilities. Received call from [facility] at [facility]. Patient accepted under the care of Dr Jacobo.    Number to call report after 8:45 PM:  [phone].    [facility]  [address] [place] [id number]

## 2018-05-09 NOTE — ED NOTES
Resting comfortably in bed with eyes closed. Respirations even and non labored; skin warm and dry. Aware of plan of care. Safety maintained. Awaiting transport.

## 2018-06-11 ENCOUNTER — TELEPHONE (OUTPATIENT)
Dept: INTERNAL MEDICINE | Facility: CLINIC | Age: 33
End: 2018-06-11

## 2018-06-12 ENCOUNTER — TELEPHONE (OUTPATIENT)
Dept: INTERNAL MEDICINE | Facility: CLINIC | Age: 33
End: 2018-06-12

## 2018-06-12 NOTE — TELEPHONE ENCOUNTER
Third attempt trying to ferny pt and each time pt's phone goes straight to . Pedro for pt to return my call. And sent MO msg.

## 2018-06-14 ENCOUNTER — LAB VISIT (OUTPATIENT)
Dept: LAB | Facility: HOSPITAL | Age: 33
End: 2018-06-14
Attending: INTERNAL MEDICINE
Payer: MEDICARE

## 2018-06-14 ENCOUNTER — OFFICE VISIT (OUTPATIENT)
Dept: INTERNAL MEDICINE | Facility: CLINIC | Age: 33
End: 2018-06-14
Payer: MEDICARE

## 2018-06-14 VITALS
WEIGHT: 129 LBS | DIASTOLIC BLOOD PRESSURE: 58 MMHG | SYSTOLIC BLOOD PRESSURE: 90 MMHG | BODY MASS INDEX: 20.73 KG/M2 | HEIGHT: 66 IN

## 2018-06-14 DIAGNOSIS — R30.0 DYSURIA: ICD-10-CM

## 2018-06-14 DIAGNOSIS — N89.8 VAGINAL DISCHARGE: ICD-10-CM

## 2018-06-14 DIAGNOSIS — Z72.51 UNPROTECTED SEX: ICD-10-CM

## 2018-06-14 DIAGNOSIS — Z11.8 ENCOUNTER FOR SCREENING EXAMINATION FOR CHLAMYDIAL INFECTION: ICD-10-CM

## 2018-06-14 DIAGNOSIS — Z72.51 HIGH RISK HETEROSEXUAL BEHAVIOR: ICD-10-CM

## 2018-06-14 DIAGNOSIS — E16.2 HYPOGLYCEMIA: ICD-10-CM

## 2018-06-14 DIAGNOSIS — Z11.4 ENCOUNTER FOR SCREENING FOR HIV: ICD-10-CM

## 2018-06-14 DIAGNOSIS — Z11.3 SCREEN FOR STD (SEXUALLY TRANSMITTED DISEASE): Primary | ICD-10-CM

## 2018-06-14 DIAGNOSIS — Z11.3 SCREEN FOR STD (SEXUALLY TRANSMITTED DISEASE): ICD-10-CM

## 2018-06-14 LAB
ANION GAP SERPL CALC-SCNC: 4 MMOL/L
BUN SERPL-MCNC: 15 MG/DL
CALCIUM SERPL-MCNC: 9.2 MG/DL
CHLORIDE SERPL-SCNC: 107 MMOL/L
CO2 SERPL-SCNC: 27 MMOL/L
CREAT SERPL-MCNC: 0.8 MG/DL
EST. GFR  (AFRICAN AMERICAN): >60 ML/MIN/1.73 M^2
EST. GFR  (NON AFRICAN AMERICAN): >60 ML/MIN/1.73 M^2
GLUCOSE SERPL-MCNC: 67 MG/DL
POTASSIUM SERPL-SCNC: 4.1 MMOL/L
SODIUM SERPL-SCNC: 138 MMOL/L

## 2018-06-14 PROCEDURE — 80048 BASIC METABOLIC PNL TOTAL CA: CPT

## 2018-06-14 PROCEDURE — 99499 UNLISTED E&M SERVICE: CPT | Mod: S$GLB,,, | Performed by: INTERNAL MEDICINE

## 2018-06-14 PROCEDURE — 99999 PR PBB SHADOW E&M-EST. PATIENT-LVL III: CPT | Mod: PBBFAC,,, | Performed by: INTERNAL MEDICINE

## 2018-06-14 PROCEDURE — 86592 SYPHILIS TEST NON-TREP QUAL: CPT

## 2018-06-14 PROCEDURE — 87491 CHLMYD TRACH DNA AMP PROBE: CPT

## 2018-06-14 PROCEDURE — 99214 OFFICE O/P EST MOD 30 MIN: CPT | Mod: S$GLB,,, | Performed by: INTERNAL MEDICINE

## 2018-06-14 PROCEDURE — 3008F BODY MASS INDEX DOCD: CPT | Mod: CPTII,S$GLB,, | Performed by: INTERNAL MEDICINE

## 2018-06-14 PROCEDURE — 86703 HIV-1/HIV-2 1 RESULT ANTBDY: CPT

## 2018-06-14 PROCEDURE — 36415 COLL VENOUS BLD VENIPUNCTURE: CPT

## 2018-06-14 RX ORDER — QUETIAPINE FUMARATE 100 MG/1
100 TABLET, FILM COATED ORAL NIGHTLY
Refills: 0 | COMMUNITY
Start: 2018-05-18 | End: 2018-10-28

## 2018-06-14 RX ORDER — NORETHINDRONE ACETATE AND ETHINYL ESTRADIOL .02; 1 MG/1; MG/1
1 TABLET ORAL NIGHTLY
Refills: 0 | COMMUNITY
Start: 2018-05-18 | End: 2018-10-02 | Stop reason: SDUPTHER

## 2018-06-14 RX ORDER — CLONAZEPAM 0.5 MG/1
TABLET ORAL
Refills: 0 | COMMUNITY
Start: 2018-05-18 | End: 2018-10-04

## 2018-06-14 RX ORDER — FLUOXETINE HYDROCHLORIDE 20 MG/1
20 CAPSULE ORAL EVERY MORNING
Refills: 0 | COMMUNITY
Start: 2018-06-01 | End: 2018-10-04

## 2018-06-14 RX ORDER — OXCARBAZEPINE 600 MG/1
TABLET, FILM COATED ORAL
Refills: 0 | COMMUNITY
Start: 2018-05-25 | End: 2019-08-12

## 2018-06-14 NOTE — PROGRESS NOTES
Subjective:       Patient ID: Vianney Callahan is a 32 y.o. female.    Chief Complaint: Follow-up    HPI   She is enrolled in [facility] Therapy for bipolar dz. Attends  3 times a week through July 11.        1045 glucose was 23. She felt fine.         6/11 was 87.       2 psych hospitalizations last 2 months.  Restraining order against current boyfriend.     Other stressors involving living situation.      She feels a lot better psychologically.         Continue GERD.  protonix not obviously helpful.        Unprotected sex x 2.  No vaginal discharge.  Both encounters were in APril.       Mild dysuria and vaginal discharge, which she expects is physiologic..  Had pelvic pain , now improved.  Review of Systems   Constitutional: Negative for fever and unexpected weight change.   HENT: Negative for congestion and postnasal drip.    Eyes: Negative for pain, discharge and visual disturbance.   Respiratory: Negative for cough, chest tightness, shortness of breath and wheezing.    Cardiovascular: Negative for chest pain and leg swelling.   Gastrointestinal: Negative for abdominal pain, constipation, diarrhea and nausea.   Genitourinary: Positive for dysuria and vaginal discharge. Negative for difficulty urinating and hematuria.   Skin: Negative for rash.   Neurological: Negative for headaches.   Psychiatric/Behavioral: Negative for dysphoric mood and sleep disturbance. The patient is not nervous/anxious.        Objective:      Physical Exam   Constitutional: She is oriented to person, place, and time. She appears well-developed and well-nourished. No distress.   Neurological: She is alert and oriented to person, place, and time.   Psychiatric: She has a normal mood and affect. Her behavior is normal. Thought content normal.       Assessment:       1. Screen for STD (sexually transmitted disease)    2. Encounter for screening for HIV     3. Encounter for screening examination for chlamydial infection    4.  Unprotected sex    5. High risk heterosexual behavior    6. Vaginal discharge    7. Hypoglycemia    8. Dysuria         Plan:       Vianney was seen today for follow-up.    Diagnoses and all orders for this visit:    Screen for STD (sexually transmitted disease)  -     RPR; Future  -     C. trachomatis/N. gonorrhoeae by AMP DNA Urine    Encounter for screening for HIV   -     HIV-1 and HIV-2 antibodies; Future  -     C. trachomatis/N. gonorrhoeae by AMP DNA Urine    Encounter for screening examination for chlamydial infection  -     C. trachomatis/N. gonorrhoeae by AMP DNA Urine    Unprotected sex  -     C. trachomatis/N. gonorrhoeae by AMP DNA Urine    High risk heterosexual behavior  -     HIV-1 and HIV-2 antibodies; Future  -     RPR; Future  -     C. trachomatis/N. gonorrhoeae by AMP DNA Urine    Vaginal discharge  -     HIV-1 and HIV-2 antibodies; Future  -     RPR; Future  -     C. trachomatis/N. gonorrhoeae by AMP DNA Urine    Hypoglycemia  -     Basic metabolic panel; Future    Dysuria   -     C. trachomatis/N. gonorrhoeae by AMP DNA Urine       Counseled on safe sex

## 2018-06-15 LAB
C TRACH DNA SPEC QL NAA+PROBE: NOT DETECTED
HIV 1+2 AB+HIV1 P24 AG SERPL QL IA: NEGATIVE
N GONORRHOEA DNA SPEC QL NAA+PROBE: NOT DETECTED
RPR SER QL: NORMAL

## 2018-07-10 RX ORDER — TRAMADOL HYDROCHLORIDE 50 MG/1
TABLET ORAL
Qty: 60 TABLET | Refills: 0 | Status: SHIPPED | OUTPATIENT
Start: 2018-07-10 | End: 2018-10-16 | Stop reason: SDUPTHER

## 2018-07-31 RX ORDER — SUMATRIPTAN SUCCINATE 100 MG/1
TABLET ORAL
Qty: 9 TABLET | Refills: 2 | Status: SHIPPED | OUTPATIENT
Start: 2018-07-31 | End: 2018-08-19 | Stop reason: SDUPTHER

## 2018-07-31 NOTE — TELEPHONE ENCOUNTER
"----- Message from Karly Bernabe sent at 7/31/2018 10:39 AM CDT -----  Contact: Pt [phone]  RX request - refill or new RX.  Is this a refill or new RX:  Refill  RX name and strength: sumatriptan (IMITREX) 100 MG tablet  Directions:   Is this a 30 day or 90 day RX:    Local pharmacy or mail order pharmacy:    Pharmacy name and phone # (DON'T enter "on file" or "in chart"): [facility] located within [facility] [address] [facility] [phone] Fax [phone]  Comments:        "

## 2018-08-19 RX ORDER — SUMATRIPTAN SUCCINATE 100 MG/1
TABLET ORAL
Qty: 30 TABLET | Refills: 11 | Status: SHIPPED | OUTPATIENT
Start: 2018-08-19 | End: 2019-09-21 | Stop reason: SDUPTHER

## 2018-09-05 ENCOUNTER — OFFICE VISIT (OUTPATIENT)
Dept: URGENT CARE | Facility: CLINIC | Age: 33
End: 2018-09-05
Payer: MEDICARE

## 2018-09-05 VITALS
DIASTOLIC BLOOD PRESSURE: 60 MMHG | HEART RATE: 74 BPM | SYSTOLIC BLOOD PRESSURE: 110 MMHG | BODY MASS INDEX: 20.57 KG/M2 | OXYGEN SATURATION: 98 % | TEMPERATURE: 98 F | WEIGHT: 128 LBS | HEIGHT: 66 IN

## 2018-09-05 DIAGNOSIS — J06.9 UPPER RESPIRATORY TRACT INFECTION, UNSPECIFIED TYPE: Primary | ICD-10-CM

## 2018-09-05 DIAGNOSIS — R50.9 FEVER, UNSPECIFIED FEVER CAUSE: ICD-10-CM

## 2018-09-05 LAB
CTP QC/QA: YES
S PYO RRNA THROAT QL PROBE: NEGATIVE

## 2018-09-05 PROCEDURE — 87880 STREP A ASSAY W/OPTIC: CPT | Mod: QW,S$GLB,, | Performed by: FAMILY MEDICINE

## 2018-09-05 PROCEDURE — 99214 OFFICE O/P EST MOD 30 MIN: CPT | Mod: S$GLB,,, | Performed by: FAMILY MEDICINE

## 2018-09-05 RX ORDER — PREDNISONE 20 MG/1
40 TABLET ORAL DAILY
Qty: 10 TABLET | Refills: 0 | Status: SHIPPED | OUTPATIENT
Start: 2018-09-05 | End: 2018-09-10

## 2018-09-05 NOTE — PATIENT INSTRUCTIONS
Viral Upper Respiratory Illness (Adult)  You have a viral upper respiratory illness (URI), which is another term for the common cold. This illness is contagious during the first few days. It is spread through the air by coughing and sneezing. It may also be spread by direct contact (touching the sick person and then touching your own eyes, nose, or mouth). Frequent handwashing will decrease risk of spread. Most viral illnesses go away within 7 to 10 days with rest and simple home remedies. Sometimes the illness may last for several weeks. Antibiotics will not kill a virus, and they are generally not prescribed for this condition.    Home care  · If symptoms are severe, rest at home for the first 2 to 3 days. When you resume activity, don't let yourself get too tired.  · Avoid being exposed to cigarette smoke (yours or others).  · You may use acetaminophen or ibuprofen to control pain and fever, unless another medicine was prescribed. (Note: If you have chronic liver or kidney disease, have ever had a stomach ulcer or gastrointestinal bleeding, or are taking blood-thinning medicines, talk with your healthcare provider before using these medicines.) Aspirin should never be given to anyone under 18 years of age who is ill with a viral infection or fever. It may cause severe liver or brain damage.  · Your appetite may be poor, so a light diet is fine. Avoid dehydration by drinking 6 to 8 glasses of fluids per day (water, soft drinks, juices, tea, or soup). Extra fluids will help loosen secretions in the nose and lungs.  · Over-the-counter cold medicines will not shorten the length of time youre sick, but they may be helpful for the following symptoms: cough, sore throat, and nasal and sinus congestion. (Note: Do not use decongestants if you have high blood pressure.)  Follow-up care  Follow up with your healthcare provider, or as advised.  When to seek medical advice  Call your healthcare provider right away if any  of these occur:  · Cough with lots of colored sputum (mucus)  · Severe headache; face, neck, or ear pain  · Difficulty swallowing due to throat pain  · Fever of 100.4°F (38°C)  Call 911, or get immediate medical care  Call emergency services right away if any of these occur:  · Chest pain, shortness of breath, wheezing, or difficulty breathing  · Coughing up blood  · Inability to swallow due to throat pain  Date Last Reviewed: 9/13/2015  © 9316-4977 [employer]. [address] [place] [address]. All rights reserved. This information is not intended as a substitute for professional medical care. Always follow your healthcare professional's instructions.

## 2018-09-05 NOTE — PROGRESS NOTES
"Subjective:       Patient ID: Vianney Callahan is a 33 y.o. female.    Vitals:  height is 5' 6" (1.676 m) and weight is 58.1 kg (128 lb). Her temperature is 98.2 °F (36.8 °C). Her blood pressure is 110/60 and her pulse is 74. Her oxygen saturation is 98%.     Chief Complaint: Sinus Problem    Sinus Problem   This is a new problem. Episode onset: 2 weeks. There has been no fever. Associated symptoms include congestion, coughing, ear pain, headaches, sinus pressure, sneezing and a sore throat. Pertinent negatives include no chills or shortness of breath.     Review of Systems   Constitution: Negative for chills and fever.   HENT: Positive for congestion, ear pain, sinus pressure, sneezing and sore throat.    Eyes: Negative for blurred vision.   Cardiovascular: Negative for chest pain.   Respiratory: Positive for cough. Negative for shortness of breath.    Skin: Negative for rash.   Musculoskeletal: Negative for back pain and joint pain.   Gastrointestinal: Negative for abdominal pain, diarrhea, nausea and vomiting.   Neurological: Positive for headaches.   Psychiatric/Behavioral: The patient is not nervous/anxious.        Objective:      Physical Exam   Constitutional: She is oriented to person, place, and time. She appears well-developed and well-nourished. She is cooperative.  Non-toxic appearance. She does not appear ill. No distress.   HENT:   Head: Normocephalic and atraumatic.   Right Ear: Hearing, tympanic membrane, external ear and ear canal normal.   Left Ear: Hearing, tympanic membrane, external ear and ear canal normal.   Nose: Mucosal edema and rhinorrhea present. No nasal deformity. No epistaxis. Right sinus exhibits no maxillary sinus tenderness and no frontal sinus tenderness. Left sinus exhibits no maxillary sinus tenderness and no frontal sinus tenderness.   Mouth/Throat: Uvula is midline and mucous membranes are normal. No trismus in the jaw. Normal dentition. No uvula swelling. Posterior oropharyngeal " edema and posterior oropharyngeal erythema present.   Eyes: Conjunctivae and lids are normal. No scleral icterus.   Sclera clear bilat   Neck: Trachea normal, full passive range of motion without pain and phonation normal. Neck supple.   Cardiovascular: Normal rate, regular rhythm, normal heart sounds, intact distal pulses and normal pulses.   Pulmonary/Chest: Effort normal and breath sounds normal. No respiratory distress.   Abdominal: Soft. Normal appearance and bowel sounds are normal. She exhibits no distension. There is no tenderness.   Musculoskeletal: Normal range of motion. She exhibits no edema or deformity.   Neurological: She is alert and oriented to person, place, and time. She exhibits normal muscle tone. Coordination normal.   Skin: Skin is warm, dry and intact. She is not diaphoretic. No pallor.   Psychiatric: She has a normal mood and affect. Her speech is normal and behavior is normal. Judgment and thought content normal. Cognition and memory are normal.   Nursing note and vitals reviewed.      Office Visit on 09/05/2018   Component Date Value Ref Range Status    Rapid Strep A Screen 09/05/2018 Negative  Negative Final     Acceptable 09/05/2018 Yes   Final       Assessment:       1. Upper respiratory tract infection, unspecified type    2. Fever, unspecified fever cause        Plan:         Upper respiratory tract infection, unspecified type  -     (Magic mouthwash) 1:1:1 Benadryl 12.5mg/5ml liq, aluminum & magnesium hydroxide-simehticone (Maalox), lidocaine viscous 2%; Swish and spit 10 mLs every 4 (four) hours as needed. for mouth sores  Dispense: 180 mL; Refill: 0  -     predniSONE (DELTASONE) 20 MG tablet; Take 2 tablets (40 mg total) by mouth once daily. for 5 days  Dispense: 10 tablet; Refill: 0    Fever, unspecified fever cause  -     POCT rapid strep A      Patient Instructions     Viral Upper Respiratory Illness (Adult)  You have a viral upper respiratory illness (URI),  which is another term for the common cold. This illness is contagious during the first few days. It is spread through the air by coughing and sneezing. It may also be spread by direct contact (touching the sick person and then touching your own eyes, nose, or mouth). Frequent handwashing will decrease risk of spread. Most viral illnesses go away within 7 to 10 days with rest and simple home remedies. Sometimes the illness may last for several weeks. Antibiotics will not kill a virus, and they are generally not prescribed for this condition.    Home care  · If symptoms are severe, rest at home for the first 2 to 3 days. When you resume activity, don't let yourself get too tired.  · Avoid being exposed to cigarette smoke (yours or others).  · You may use acetaminophen or ibuprofen to control pain and fever, unless another medicine was prescribed. (Note: If you have chronic liver or kidney disease, have ever had a stomach ulcer or gastrointestinal bleeding, or are taking blood-thinning medicines, talk with your healthcare provider before using these medicines.) Aspirin should never be given to anyone under 18 years of age who is ill with a viral infection or fever. It may cause severe liver or brain damage.  · Your appetite may be poor, so a light diet is fine. Avoid dehydration by drinking 6 to 8 glasses of fluids per day (water, soft drinks, juices, tea, or soup). Extra fluids will help loosen secretions in the nose and lungs.  · Over-the-counter cold medicines will not shorten the length of time youre sick, but they may be helpful for the following symptoms: cough, sore throat, and nasal and sinus congestion. (Note: Do not use decongestants if you have high blood pressure.)  Follow-up care  Follow up with your healthcare provider, or as advised.  When to seek medical advice  Call your healthcare provider right away if any of these occur:  · Cough with lots of colored sputum (mucus)  · Severe headache; face, neck,  or ear pain  · Difficulty swallowing due to throat pain  · Fever of 100.4°F (38°C)  Call 911, or get immediate medical care  Call emergency services right away if any of these occur:  · Chest pain, shortness of breath, wheezing, or difficulty breathing  · Coughing up blood  · Inability to swallow due to throat pain  Date Last Reviewed: 9/13/2015  © 4455-3895 [employer]. [address] [place] [address], [place] [address]. All rights reserved. This information is not intended as a substitute for professional medical care. Always follow your healthcare professional's instructions.

## 2018-10-02 RX ORDER — NORETHINDRONE ACETATE AND ETHINYL ESTRADIOL .02; 1 MG/1; MG/1
1 TABLET ORAL NIGHTLY
Qty: 63 TABLET | Refills: 2 | Status: SHIPPED | OUTPATIENT
Start: 2018-10-02 | End: 2018-10-10 | Stop reason: SDUPTHER

## 2018-10-04 ENCOUNTER — OFFICE VISIT (OUTPATIENT)
Dept: INTERNAL MEDICINE | Facility: CLINIC | Age: 33
End: 2018-10-04
Payer: MEDICARE

## 2018-10-04 VITALS
DIASTOLIC BLOOD PRESSURE: 70 MMHG | TEMPERATURE: 99 F | BODY MASS INDEX: 20.5 KG/M2 | SYSTOLIC BLOOD PRESSURE: 118 MMHG | OXYGEN SATURATION: 99 % | HEART RATE: 93 BPM | WEIGHT: 130.63 LBS | HEIGHT: 67 IN

## 2018-10-04 DIAGNOSIS — F19.21: ICD-10-CM

## 2018-10-04 DIAGNOSIS — R39.11 URINARY HESITANCY: Primary | ICD-10-CM

## 2018-10-04 DIAGNOSIS — R11.0 NAUSEA: ICD-10-CM

## 2018-10-04 LAB
BILIRUB SERPL-MCNC: NORMAL MG/DL
BILIRUB UR QL STRIP: NEGATIVE
BLOOD URINE, POC: NORMAL
CLARITY UR REFRACT.AUTO: CLEAR
COLOR UR AUTO: YELLOW
COLOR, POC UA: YELLOW
GLUCOSE UR QL STRIP: NEGATIVE
GLUCOSE UR QL STRIP: NORMAL
HGB UR QL STRIP: NEGATIVE
KETONES UR QL STRIP: NEGATIVE
KETONES UR QL STRIP: NORMAL
LEUKOCYTE ESTERASE UR QL STRIP: NEGATIVE
LEUKOCYTE ESTERASE URINE, POC: NORMAL
NITRITE UR QL STRIP: NEGATIVE
NITRITE, POC UA: NORMAL
PH UR STRIP: 7 [PH] (ref 5–8)
PH, POC UA: 6
PROT UR QL STRIP: NEGATIVE
PROTEIN, POC: NORMAL
SP GR UR STRIP: 1 (ref 1–1.03)
SPECIFIC GRAVITY, POC UA: 1.02
URN SPEC COLLECT METH UR: NORMAL
UROBILINOGEN UR STRIP-ACNC: NEGATIVE EU/DL
UROBILINOGEN, POC UA: NORMAL

## 2018-10-04 PROCEDURE — 99499 UNLISTED E&M SERVICE: CPT | Mod: S$GLB,,, | Performed by: INTERNAL MEDICINE

## 2018-10-04 PROCEDURE — 99214 OFFICE O/P EST MOD 30 MIN: CPT | Mod: S$PBB,,, | Performed by: INTERNAL MEDICINE

## 2018-10-04 PROCEDURE — 3008F BODY MASS INDEX DOCD: CPT | Mod: CPTII,,, | Performed by: INTERNAL MEDICINE

## 2018-10-04 PROCEDURE — 99214 OFFICE O/P EST MOD 30 MIN: CPT | Mod: PBBFAC | Performed by: INTERNAL MEDICINE

## 2018-10-04 PROCEDURE — 99999 PR PBB SHADOW E&M-EST. PATIENT-LVL IV: CPT | Mod: PBBFAC,,, | Performed by: INTERNAL MEDICINE

## 2018-10-04 PROCEDURE — 81003 URINALYSIS AUTO W/O SCOPE: CPT

## 2018-10-04 PROCEDURE — 87086 URINE CULTURE/COLONY COUNT: CPT

## 2018-10-04 PROCEDURE — 81001 URINALYSIS AUTO W/SCOPE: CPT | Mod: PBBFAC | Performed by: INTERNAL MEDICINE

## 2018-10-04 RX ORDER — QUETIAPINE FUMARATE 50 MG/1
75 TABLET, FILM COATED ORAL NIGHTLY
Refills: 0 | COMMUNITY
Start: 2018-09-27 | End: 2018-10-04

## 2018-10-04 RX ORDER — BENZTROPINE MESYLATE 0.5 MG/1
1 TABLET ORAL 2 TIMES DAILY
COMMUNITY
End: 2019-08-12 | Stop reason: SDUPTHER

## 2018-10-04 RX ORDER — DIVALPROEX SODIUM 500 MG/1
500 TABLET, DELAYED RELEASE ORAL 2 TIMES DAILY
Refills: 0 | COMMUNITY
Start: 2018-09-27 | End: 2019-05-21

## 2018-10-04 RX ORDER — LIDOCAINE HYDROCHLORIDE 20 MG/ML
SOLUTION ORAL; TOPICAL
COMMUNITY
Start: 2018-09-06 | End: 2019-02-07

## 2018-10-04 RX ORDER — FLUOXETINE HYDROCHLORIDE 40 MG/1
80 CAPSULE ORAL EVERY MORNING
Refills: 0 | COMMUNITY
Start: 2018-09-10 | End: 2020-09-10

## 2018-10-04 RX ORDER — CLONAZEPAM 1 MG/1
1 TABLET ORAL 3 TIMES DAILY
Refills: 1 | COMMUNITY
Start: 2018-09-24 | End: 2019-02-07

## 2018-10-04 RX ORDER — SUCRALFATE 1 G/1
1 TABLET ORAL 4 TIMES DAILY
COMMUNITY
End: 2019-02-07

## 2018-10-04 RX ORDER — ONDANSETRON 4 MG/1
8 TABLET, FILM COATED ORAL 2 TIMES DAILY
COMMUNITY
End: 2019-02-07

## 2018-10-04 RX ORDER — ARIPIPRAZOLE 300 MG
KIT INTRAMUSCULAR
COMMUNITY
Start: 2018-09-27 | End: 2018-10-28

## 2018-10-04 RX ORDER — VALACYCLOVIR HYDROCHLORIDE 500 MG/1
1000 TABLET, FILM COATED ORAL DAILY
Refills: 0 | COMMUNITY
Start: 2018-09-27 | End: 2018-10-04

## 2018-10-04 NOTE — PROGRESS NOTES
Subjective:       Patient ID: Vianney Callahan is a 33 y.o. female.    Chief Complaint: Nausea and Abdominal Pain    HPI   Dx with uti at [facility], tx with omnicef.      Persistent symptoms when transferred to [facility] sept 18.  Discharged 26th.    Meds changed to depakote, abilify there.        C/o difficulty with empyting urine.  No dysuria.          Sometimes must strain to empty bladder.         Frequency increased.  Urgency.  No incontinence.        C/o am nausea.  May be triggered by certain foods.  Both depakote and abilify may cause nausea.  Zofran may help.  Sucralfate helpful. Also aware of gerd.    Was prescribed Protonix in April, but not helpful.      She has a h/o polydrug dependence, in remission for some time.  Has bipolar DO and borderline personality DO.    Review of Systems   Constitutional: Negative for fever and unexpected weight change.   HENT: Negative for congestion and postnasal drip.    Eyes: Negative for pain, discharge and visual disturbance.   Respiratory: Negative for cough, chest tightness, shortness of breath and wheezing.    Cardiovascular: Negative for chest pain and leg swelling.   Gastrointestinal: Negative for abdominal pain, constipation, diarrhea and nausea.   Genitourinary: Negative for difficulty urinating, dysuria and hematuria.   Skin: Negative for rash.   Neurological: Negative for headaches.   Psychiatric/Behavioral: Negative for dysphoric mood and sleep disturbance. The patient is not nervous/anxious.        Objective:      Physical Exam   Constitutional: She is oriented to person, place, and time. She appears well-developed and well-nourished. No distress.   Abdominal: Soft. She exhibits no distension and no mass. There is no tenderness. There is no rebound and no guarding.   Neurological: She is alert and oriented to person, place, and time.   Psychiatric: She has a normal mood and affect. Her behavior is normal.       Assessment:       1. Urinary hesitancy    2. Nausea -  r/o medication effect.  Discuss with psych.  In the meantime, try prilosec.   3. Polydrug dependence excluding opioid type drug, in remission        Plan:       Vianney was seen today for nausea and abdominal pain.    Diagnoses and all orders for this visit:    Urinary hesitancy  -     Urine culture  -     POCT urinalysis, dipstick or tablet reag  -     Urinalysis    Nausea    Polydrug dependence excluding opioid type drug, in remission       addendum- lab ua - normal

## 2018-10-05 LAB — BACTERIA UR CULT: NO GROWTH

## 2018-10-09 ENCOUNTER — TELEPHONE (OUTPATIENT)
Dept: INTERNAL MEDICINE | Facility: CLINIC | Age: 33
End: 2018-10-09

## 2018-10-09 DIAGNOSIS — R11.0 NAUSEA: Primary | ICD-10-CM

## 2018-10-09 DIAGNOSIS — R19.7 DIARRHEA, UNSPECIFIED TYPE: ICD-10-CM

## 2018-10-09 NOTE — TELEPHONE ENCOUNTER
----- Message from Jami S Srini sent at 10/9/2018 10:01 AM CDT -----  Contact: Pt Mobile/Home [phone]   Patient would like to get a referral.  Does the patient already have the specialty clinic appointment scheduled:  No  If yes, what date is the appointment scheduled:   N/A  Referral to what specialty:  Gastroenterologist  Reason (be specific):  Patient is having problems with throwing up and diarrhea  Did you confirm the insurance currently in Epic is correct (important for a referral):  Yes  Does the patient want the referral with a specific physician:  No  Is the specialist an [facility]:  Patient does not know.

## 2018-10-09 NOTE — TELEPHONE ENCOUNTER
"Pt would like a referral to gastro because " Patient is having problems with throwing up and diarrhea" Pended referral    "

## 2018-10-10 ENCOUNTER — OFFICE VISIT (OUTPATIENT)
Dept: OBSTETRICS AND GYNECOLOGY | Facility: CLINIC | Age: 33
End: 2018-10-10
Payer: MEDICARE

## 2018-10-10 VITALS
HEART RATE: 75 BPM | BODY MASS INDEX: 20.1 KG/M2 | HEIGHT: 67 IN | RESPIRATION RATE: 15 BRPM | DIASTOLIC BLOOD PRESSURE: 55 MMHG | SYSTOLIC BLOOD PRESSURE: 112 MMHG | WEIGHT: 128.06 LBS

## 2018-10-10 DIAGNOSIS — Z30.012 EMERGENCY CONTRACEPTIVE COUNSELING AND PRESCRIPTION: Primary | ICD-10-CM

## 2018-10-10 PROCEDURE — 99214 OFFICE O/P EST MOD 30 MIN: CPT | Mod: S$PBB,,, | Performed by: OBSTETRICS & GYNECOLOGY

## 2018-10-10 PROCEDURE — 99214 OFFICE O/P EST MOD 30 MIN: CPT | Mod: PBBFAC | Performed by: OBSTETRICS & GYNECOLOGY

## 2018-10-10 PROCEDURE — 3008F BODY MASS INDEX DOCD: CPT | Mod: CPTII,,, | Performed by: OBSTETRICS & GYNECOLOGY

## 2018-10-10 PROCEDURE — 99999 PR PBB SHADOW E&M-EST. PATIENT-LVL IV: CPT | Mod: PBBFAC,,, | Performed by: OBSTETRICS & GYNECOLOGY

## 2018-10-10 RX ORDER — NORETHINDRONE ACETATE AND ETHINYL ESTRADIOL .02; 1 MG/1; MG/1
1 TABLET ORAL NIGHTLY
Qty: 63 TABLET | Refills: 5 | Status: SHIPPED | OUTPATIENT
Start: 2018-10-10 | End: 2019-03-30 | Stop reason: SDUPTHER

## 2018-10-10 NOTE — PROGRESS NOTES
SUBJECTIVE:   33 y.o. female   for birth control refill    No LMP recorded. Patient is not currently having periods (Reason: Birth Control)..    Pt is new  To me.  She has a WWE in 2018 and had normal pap smear  Pt has been on OCP x years, loestrin 1/20 x 21 tablet. Does not get period with this.  She recently was hospitalized for bipolar x 10 days, missed her ocp and had BTB.  She is currently restart her rui and bleeding resolved.  Last coitus was several months ago    OB History    Para Term  AB Living   0 0           SAB TAB Ectopic Multiple Live Births                       Past Medical History:   Diagnosis Date    Abnormal cervical Papanicolaou smear     Colposcopy    ADHD (attention deficit hyperactivity disorder) 2012    Allergy     Anxiety     Back pain 2014    Bipolar affective disorder     Cholecystitis     Chronic migraine without aura, with intractable migraine, so stated, without mention of status migrainosus 2013    Depression     Fever blister     Gallstones 2014    Headache(784.0)     Hepatitis C antibody positive in blood     History of hepatitis C     History of psychiatric hospitalization     Suicide attempt    Personality disorder 2013    Psychosis 10/7/2013    Therapy     Tobacco abuse 2013     Past Surgical History:   Procedure Laterality Date    CHOLECYSTECTOMY-LAPAROSCOPIC N/A 2014    Performed by Joshua Goldberg, [facility] at [facility] OR 2ND FLR    ESOPHAGOGASTRODUODENOSCOPY      ESOPHAGOGASTRODUODENOSCOPY (EGD) N/A 2014    Performed by Wayne Wang MD at [facility] ENDO (4TH FLR)    FRACTURE SURGERY      MOLE REMOVAL      SKIN BIOPSY       Social History     Socioeconomic History    Marital status: Single     Spouse name: Not on file    Number of children: Not on file    Years of education: Not on file    Highest education level: Not on file   Social Needs    Financial resource strain: Not on file    Food  insecurity - worry: Not on file    Food insecurity - inability: Not on file    Transportation needs - medical: Not on file    Transportation needs - non-medical: Not on file   Occupational History    Occupation: disabled     Employer: [employer]     Employer: disabled    Tobacco Use    Smoking status: Former Smoker     Packs/day: 1.00     Types: Cigarettes     Last attempt to quit: 2018     Years since quittin.3    Smokeless tobacco: Never Used   Substance and Sexual Activity    Alcohol use: No     Alcohol/week: 0.0 oz    Drug use: No    Sexual activity: Yes     Partners: Male     Birth control/protection: OCP   Other Topics Concern    Are you pregnant or think you may be? No    Breast-feeding No    Caffeine Use: Excessive No    Financial Status: Unemployed No    Legal: Other No    Childhood History: Adopted No    Financial Status: Other No    Leisure: Exercise No    Childhood History: Early trauma No    Firearms: Does patient have access to a firearm? No    Leisure: Fishing No    Childhood History: Raised by parents Yes    Home situation: homeless No    Leisure: Hunting No    Childhood History: Uneventful No    Home situation: lives alone No    Leisure: Movie Watching Yes    Childhood History: Other No    Home situation: lives in group home No    Leisure: Shopping Yes    Education: Unfinished High School No    Home situation: lives in nursing home No    Leisure: Sports No    Education: High School Graduate Yes    Home situation: lives in shelter No    Leisure: Time with family No    Education: Unfinished college Yes    Home situation: lives with family No     Service No    Education: Trade School No    Home situation: lives with friends No    Spirituality: Active Participation No    Education: Associate's Degree No    Home situation: lives with significant other Yes    Spirituality: Organized Latter-day No    Education: Bachelor's Degree No    Home  situation: lives with spouse No    Spirituality: Private Participation No    Education: More than one Bachelor's or Professional No    Legal consequences of chemical use No    Patient feels they ought to cut down on drinking/drug use No    Education: Master's, PhD No    Legal: Arrest history No    Patient annoyed by others criticizing their drinking/drug use No    Financial Status: Disabled Yes    Legal: Involved in civil litigation No    Patient has felt bad or guilty about drinking/drug use No    Financial Status: Employed No    Legal: Involved in criminal litigation No    Patient has had a drink/used drugs as an eye opener in the AM No   Social History Narrative    Lives with boyfriend.      Exercise:  Stretching.      Family History   Problem Relation Age of Onset    Melanoma Mother     ADD / ADHD Mother     Bipolar disorder Mother     Schizophrenia Mother     Alcohol abuse Father     Drug abuse Father     Depression Father     No Known Problems Sister     Acne Maternal Aunt     Depression Maternal Aunt     Migraines Maternal Aunt     Anxiety disorder Paternal Aunt     Depression Paternal Aunt     Breast cancer Neg Hx     Colon cancer Neg Hx     Ovarian cancer Neg Hx          Current Outpatient Medications   Medication Sig Dispense Refill    ABILIFY MAINTENA 300 mg sers       benztropine (COGENTIN) 0.5 MG tablet Take 0.5 mg by mouth 2 (two) times daily.      ciclopirox (PENLAC) 8 % Soln Apply daily to affected nail. Must remove with rubbing alcohol once weekly and then re-start. 1 Bottle 5    clonazePAM (KLONOPIN) 1 MG tablet Take 1 mg by mouth 3 (three) times daily.  1    divalproex (DEPAKOTE) 500 MG TbEC Take 500 mg by mouth 2 (two) times daily.  0    FLUoxetine (PROZAC) 40 MG capsule Take 40 mg by mouth every morning.  0    LIDOCAINE VISCOUS 2 % solution       norethindrone-ethinyl estradiol (MICROGESTIN 1/20) 1-20 mg-mcg per tablet Take 1 tablet by mouth every evening. 63  "tablet 5    ondansetron (ZOFRAN) 4 MG tablet Take 8 mg by mouth 2 (two) times daily.      OXcarbazepine (TRILEPTAL) 600 MG Tab   0    QUEtiapine (SEROQUEL) 100 MG Tab Take 100 mg by mouth nightly.  0    sucralfate (CARAFATE) 1 gram tablet Take 1 g by mouth 4 (four) times daily.      sumatriptan (IMITREX) 100 MG tablet TAKE ONE TABLET oi EVERY DAY AS NEEDED FOR migrane 30 tablet 11    traMADol (ULTRAM) 50 mg tablet TAKE 1 TABLET BY MOUTH EVERY 8 HRS IF NEEDED 60 tablet 0    valACYclovir (VALTREX) 1000 MG tablet Take 1 tablet (1,000 mg total) by mouth once daily. 90 tablet 3     No current facility-administered medications for this visit.      Allergies: Dilantin [phenytoin sodium extended] and Saphris [asenapine]       ROS:  GENERAL: Denies weight gain or weight loss. Feeling well overall.   SKIN: Denies rash or lesions.   HEAD: Denies head injury or headache.   NODES: Denies enlarged lymph nodes.   CHEST: Denies chest pain or shortness of breath.   CARDIOVASCULAR: Denies palpitations or left sided chest pain.   ABDOMEN: No abdominal pain, constipation, diarrhea, nausea, vomiting or rectal bleeding.   URINARY: No frequency, dysuria, hematuria, or burning on urination.  REPRODUCTIVE: Denies vaginal discharge, abnormal vaginal bleeding, lesions, pelvic pain  BREASTS: The patient performs breast self-examination and denies pain, lumps, or nipple discharge.   HEMATOLOGIC: No easy bruisability or excessive bleeding.  MUSCULOSKELETAL: Denies joint pain or swelling.   NEUROLOGIC: Denies syncope or weakness.   PSYCHIATRIC: Denies depression, anxiety or mood swings.    OBJECTIVE:   BP (!) 112/55   Pulse 75   Resp 15   Ht 5' 7" (1.702 m)   Wt 58.1 kg (128 lb 1.4 oz)   BMI 20.06 kg/m²   The patient appears well, alert, oriented x 3, in no distress.  NECK: negative, no thyromegaly, trachea midline  SKIN: normal and no acne, striae, hirsutism  BREAST EXAM: breasts appear normal, no suspicious masses, no skin or nipple " changes or axillary nodes  ABDOMEN: soft, non-tender; bowel sounds normal; no masses,  no organomegaly and no hernias, masses, or hepatosplenomegaly    ASSESSMENT:   1.  Refilled on OCP, advised allow withdrawal bleeding every 3-4 months to prevent BTB  2.  rtc in 1/2019 for [facility]

## 2018-10-17 RX ORDER — TRAMADOL HYDROCHLORIDE 50 MG/1
TABLET ORAL
Qty: 60 TABLET | Refills: 0 | Status: SHIPPED | OUTPATIENT
Start: 2018-10-17 | End: 2018-12-17 | Stop reason: SDUPTHER

## 2018-10-28 ENCOUNTER — OFFICE VISIT (OUTPATIENT)
Dept: URGENT CARE | Facility: CLINIC | Age: 33
End: 2018-10-28
Payer: MEDICARE

## 2018-10-28 VITALS
HEART RATE: 78 BPM | BODY MASS INDEX: 20.09 KG/M2 | OXYGEN SATURATION: 100 % | WEIGHT: 128 LBS | RESPIRATION RATE: 14 BRPM | HEIGHT: 67 IN | SYSTOLIC BLOOD PRESSURE: 113 MMHG | TEMPERATURE: 99 F | DIASTOLIC BLOOD PRESSURE: 62 MMHG

## 2018-10-28 DIAGNOSIS — R68.89 FLU-LIKE SYMPTOMS: Primary | ICD-10-CM

## 2018-10-28 DIAGNOSIS — J06.9 UPPER RESPIRATORY TRACT INFECTION, UNSPECIFIED TYPE: ICD-10-CM

## 2018-10-28 LAB
CTP QC/QA: YES
CTP QC/QA: YES
FLUAV AG NPH QL: NEGATIVE
FLUBV AG NPH QL: NEGATIVE
S PYO RRNA THROAT QL PROBE: NEGATIVE

## 2018-10-28 PROCEDURE — 3008F BODY MASS INDEX DOCD: CPT | Mod: CPTII,S$GLB,, | Performed by: NURSE PRACTITIONER

## 2018-10-28 PROCEDURE — 87880 STREP A ASSAY W/OPTIC: CPT | Mod: QW,S$GLB,, | Performed by: NURSE PRACTITIONER

## 2018-10-28 PROCEDURE — 99214 OFFICE O/P EST MOD 30 MIN: CPT | Mod: S$GLB,,, | Performed by: NURSE PRACTITIONER

## 2018-10-28 PROCEDURE — 87804 INFLUENZA ASSAY W/OPTIC: CPT | Mod: 59,QW,S$GLB, | Performed by: NURSE PRACTITIONER

## 2018-10-28 RX ORDER — METHYLPREDNISOLONE 4 MG/1
TABLET ORAL
Qty: 1 PACKAGE | Refills: 0 | Status: SHIPPED | OUTPATIENT
Start: 2018-10-28 | End: 2019-02-07

## 2018-10-28 RX ORDER — PROMETHAZINE HYDROCHLORIDE AND DEXTROMETHORPHAN HYDROBROMIDE 6.25; 15 MG/5ML; MG/5ML
5 SYRUP ORAL NIGHTLY PRN
Qty: 120 ML | Refills: 0 | Status: SHIPPED | OUTPATIENT
Start: 2018-10-28 | End: 2018-11-07

## 2018-10-28 RX ORDER — BENZONATATE 100 MG/1
200 CAPSULE ORAL 3 TIMES DAILY PRN
Qty: 60 CAPSULE | Refills: 0 | Status: SHIPPED | OUTPATIENT
Start: 2018-10-28 | End: 2019-02-07

## 2018-10-28 NOTE — PROGRESS NOTES
"Subjective:       Patient ID: Vianney Callahan is a 33 y.o. female.    Vitals:  height is 5' 7" (1.702 m) and weight is 58.1 kg (128 lb). Her oral temperature is 98.6 °F (37 °C). Her blood pressure is 113/62 and her pulse is 78. Her respiration is 14 and oxygen saturation is 100%.     Chief Complaint: Cough    Cough for 3 days.      Cough   This is a new problem. The current episode started in the past 7 days. The problem has been unchanged. The cough is non-productive. Associated symptoms include nasal congestion, postnasal drip and a sore throat. Pertinent negatives include no chest pain, chills, ear pain, eye redness, fever, headaches, myalgias, shortness of breath or wheezing. Nothing aggravates the symptoms. She has tried OTC cough suppressant for the symptoms. The treatment provided mild relief. Her past medical history is significant for asthma. There is no history of bronchitis, COPD or pneumonia.     Review of Systems   Constitution: Negative for chills, fever and malaise/fatigue.   HENT: Positive for congestion, postnasal drip and sore throat. Negative for ear pain and hoarse voice.    Eyes: Negative for discharge and redness.   Cardiovascular: Negative for chest pain, dyspnea on exertion and leg swelling.   Respiratory: Positive for cough. Negative for shortness of breath, sputum production and wheezing.    Musculoskeletal: Negative for myalgias.   Gastrointestinal: Negative for abdominal pain and nausea.   Neurological: Negative for headaches.       Objective:      Physical Exam   Constitutional: She is oriented to person, place, and time. She appears well-developed and well-nourished. She is cooperative.  Non-toxic appearance. She does not appear ill. No distress.   HENT:   Head: Normocephalic and atraumatic.   Right Ear: Hearing, tympanic membrane, external ear and ear canal normal.   Left Ear: Hearing, tympanic membrane, external ear and ear canal normal.   Nose: Nose normal. No mucosal edema, rhinorrhea " or nasal deformity. No epistaxis. Right sinus exhibits no maxillary sinus tenderness and no frontal sinus tenderness. Left sinus exhibits no maxillary sinus tenderness and no frontal sinus tenderness.   Mouth/Throat: Uvula is midline and mucous membranes are normal. No trismus in the jaw. Normal dentition. No uvula swelling. Posterior oropharyngeal erythema present.   Eyes: Conjunctivae and lids are normal. Right eye exhibits no discharge. Left eye exhibits no discharge. No scleral icterus.   Sclera clear bilat   Neck: Trachea normal, normal range of motion, full passive range of motion without pain and phonation normal. Neck supple.   Cardiovascular: Normal rate, regular rhythm, normal heart sounds, intact distal pulses and normal pulses.   Pulmonary/Chest: Effort normal and breath sounds normal. No respiratory distress.   Abdominal: Soft. Normal appearance and bowel sounds are normal. She exhibits no distension and no pulsatile midline mass. There is no tenderness.   Musculoskeletal: Normal range of motion. She exhibits no edema or deformity.   Neurological: She is alert and oriented to person, place, and time. She exhibits normal muscle tone. Coordination normal.   Skin: Skin is warm, dry and intact. She is not diaphoretic. No pallor.   Psychiatric: She has a normal mood and affect. Her speech is normal and behavior is normal. Judgment and thought content normal. Cognition and memory are normal.   Nursing note and vitals reviewed.      Assessment:       1. Flu-like symptoms    2. Upper respiratory tract infection, unspecified type        Plan:         Flu-like symptoms  -     POCT Influenza A/B  -     POCT rapid strep A    Upper respiratory tract infection, unspecified type    Other orders  -     methylPREDNISolone (MEDROL DOSEPACK) 4 mg tablet; Take all pills on one row at one time. 24 hours later take the second row etc till all meds taken  Dispense: 1 Package; Refill: 0  -     promethazine-dextromethorphan  (PROMETHAZINE-DM) 6.25-15 mg/5 mL Syrp; Take 5 mLs by mouth nightly as needed.  Dispense: 120 mL; Refill: 0  -     benzonatate (TESSALON PERLES) 100 MG capsule; Take 2 capsules (200 mg total) by mouth 3 (three) times daily as needed for Cough.  Dispense: 60 capsule; Refill: 0      Patient Instructions   Strep and flu negative

## 2018-10-31 ENCOUNTER — OFFICE VISIT (OUTPATIENT)
Dept: URGENT CARE | Facility: CLINIC | Age: 33
End: 2018-10-31
Payer: MEDICARE

## 2018-10-31 VITALS
BODY MASS INDEX: 20.09 KG/M2 | SYSTOLIC BLOOD PRESSURE: 109 MMHG | OXYGEN SATURATION: 98 % | RESPIRATION RATE: 16 BRPM | TEMPERATURE: 97 F | HEART RATE: 87 BPM | HEIGHT: 67 IN | WEIGHT: 128 LBS | DIASTOLIC BLOOD PRESSURE: 64 MMHG

## 2018-10-31 DIAGNOSIS — J01.00 ACUTE NON-RECURRENT MAXILLARY SINUSITIS: Primary | ICD-10-CM

## 2018-10-31 PROCEDURE — 3008F BODY MASS INDEX DOCD: CPT | Mod: CPTII,S$GLB,, | Performed by: NURSE PRACTITIONER

## 2018-10-31 PROCEDURE — 99214 OFFICE O/P EST MOD 30 MIN: CPT | Mod: S$GLB,,, | Performed by: NURSE PRACTITIONER

## 2018-10-31 RX ORDER — AMOXICILLIN AND CLAVULANATE POTASSIUM 875; 125 MG/1; MG/1
1 TABLET, FILM COATED ORAL 2 TIMES DAILY
Qty: 14 TABLET | Refills: 0 | Status: SHIPPED | OUTPATIENT
Start: 2018-10-31 | End: 2018-11-07

## 2018-10-31 NOTE — PROGRESS NOTES
"Subjective:       Patient ID: Vianney Callahan is a 33 y.o. female.    Vitals:  height is 5' 7" (1.702 m) and weight is 58.1 kg (128 lb). Her temperature is 97.1 °F (36.2 °C). Her blood pressure is 109/64 and her pulse is 87. Her respiration is 16 and oxygen saturation is 98%.     Chief Complaint: Nasal Congestion and Cough    Pt was seen 4 days ago and returns today because symptoms have become worse      Cough   This is a new problem. The current episode started in the past 7 days. The problem has been gradually worsening. The problem occurs constantly. The cough is productive of sputum. Pertinent negatives include no chest pain, chills, ear pain, eye redness, fever, headaches, myalgias, sore throat, shortness of breath or wheezing. She has tried prescription cough suppressant for the symptoms. The treatment provided no relief.     Review of Systems   Constitution: Negative for chills, fever and malaise/fatigue.   HENT: Positive for congestion. Negative for ear pain, hoarse voice and sore throat.    Eyes: Negative for discharge and redness.   Cardiovascular: Negative for chest pain, dyspnea on exertion and leg swelling.   Respiratory: Positive for cough. Negative for shortness of breath, sputum production and wheezing.    Musculoskeletal: Negative for myalgias.   Gastrointestinal: Negative for abdominal pain and nausea.   Neurological: Negative for headaches.   All other systems reviewed and are negative.      Objective:      Physical Exam   Constitutional: She is oriented to person, place, and time. She appears well-developed and well-nourished. She is cooperative.  Non-toxic appearance. She does not appear ill. No distress.   HENT:   Head: Normocephalic and atraumatic.   Right Ear: Hearing, tympanic membrane, external ear and ear canal normal.   Left Ear: Hearing, tympanic membrane, external ear and ear canal normal.   Nose: Mucosal edema and rhinorrhea present. No nasal deformity. No epistaxis. Right sinus exhibits " maxillary sinus tenderness. Right sinus exhibits no frontal sinus tenderness. Left sinus exhibits maxillary sinus tenderness. Left sinus exhibits no frontal sinus tenderness.   Mouth/Throat: Uvula is midline and mucous membranes are normal. No trismus in the jaw. Normal dentition. No uvula swelling. Posterior oropharyngeal erythema present. Tonsils are 2+ on the right. Tonsils are 2+ on the left. Tonsillar exudate.       Eyes: Conjunctivae and lids are normal. No scleral icterus.   Sclera clear bilat   Neck: Trachea normal, full passive range of motion without pain and phonation normal. Neck supple.   Cardiovascular: Normal rate, regular rhythm, normal heart sounds, intact distal pulses and normal pulses.   Pulmonary/Chest: Effort normal and breath sounds normal. No respiratory distress. She has no decreased breath sounds. She has no wheezes.   Abdominal: Soft. Normal appearance and bowel sounds are normal. She exhibits no distension. There is no tenderness.   Musculoskeletal: Normal range of motion. She exhibits no edema or deformity.   Neurological: She is alert and oriented to person, place, and time. She exhibits normal muscle tone. Coordination normal.   Skin: Skin is warm, dry and intact. She is not diaphoretic. No pallor.   Psychiatric: She has a normal mood and affect. Her speech is normal and behavior is normal. Judgment and thought content normal. Cognition and memory are normal.   Nursing note and vitals reviewed.      Assessment:       1. Acute non-recurrent maxillary sinusitis        Plan:         Acute non-recurrent maxillary sinusitis  -     loratadine-pseudoephedrine 5-120 mg (CLARITIN-D 12-HOUR) 5-120 mg per tablet; Take 1 tablet by mouth 2 (two) times daily. for 10 days  Dispense: 20 tablet; Refill: 0  -     CULTURE, STREP A,  THROAT  -     amoxicillin-clavulanate 875-125mg (AUGMENTIN) 875-125 mg per tablet; Take 1 tablet by mouth 2 (two) times daily. for 7 days  Dispense: 14 tablet; Refill:  0            Patient Instructions     You must understand that you've received an Urgent Care treatment only and that you may be released before all your medical problems are known or treated. You, the patient, will arrange for follow up care as instructed.  If your condition worsens we recommend that you receive another evaluation at the emergency room immediately or contact your primary medical clinics after hours call service to discuss your concerns.  Please return here or go to the Emergency Department for any concerns or worsening of condition.      Sinusitis (Antibiotic Treatment)    The sinuses are air-filled spaces within the bones of the face. They connect to the inside of the nose. Sinusitis is an inflammation of the tissue lining the sinus cavity. Sinus inflammation can occur during a cold. It can also be due to allergies to pollens and other particles in the air. Sinusitis can cause symptoms of sinus congestion and fullness. A sinus infection causes fever, headache and facial pain. There is often green or yellow drainage from the nose or into the back of the throat (post-nasal drip). You have been given antibiotics to treat this condition.  Home care:  · Take the full course of antibiotics as instructed. Do not stop taking them, even if you feel better.  · Drink plenty of water, hot tea, and other liquids. This may help thin mucus. It also may promote sinus drainage.  · Heat may help soothe painful areas of the face. Use a towel soaked in hot water. Or,  the shower and direct the hot spray onto your face. Using a vaporizer along with a menthol rub at night may also help.   · An expectorant containing guaifenesin may help thin the mucus and promote drainage from the sinuses.  · Over-the-counter decongestants may be used unless a similar medicine was prescribed. Nasal sprays work the fastest. Use one that contains phenylephrine or oxymetazoline. First blow the nose gently. Then use the spray. Do  not use these medicines more often than directed on the label or symptoms may get worse. You may also use tablets containing pseudoephedrine. Avoid products that combine ingredients, because side effects may be increased. Read labels. You can also ask the pharmacist for help. (NOTE: Persons with high blood pressure should not use decongestants. They can raise blood pressure.)  · Over-the-counter antihistamines may help if allergies contributed to your sinusitis.    · Do not use nasal rinses or irrigation during an acute sinus infection, unless told to by your health care provider. Rinsing may spread the infection to other sinuses.  · Use acetaminophen or ibuprofen to control pain, unless another pain medicine was prescribed. (If you have chronic liver or kidney disease or ever had a stomach ulcer, talk with your doctor before using these medicines. Aspirin should never be used in anyone under 18 years of age who is ill with a fever. It may cause severe liver damage.)  · Don't smoke. This can worsen symptoms.  Follow-up care  Follow up with your healthcare provider or our staff if you are not improving within the next week.  When to seek medical advice  Call your healthcare provider if any of these occur:  · Facial pain or headache becoming more severe  · Stiff neck  · Unusual drowsiness or confusion  · Swelling of the forehead or eyelids  · Vision problems, including blurred or double vision  · Fever of 100.4ºF (38ºC) or higher, or as directed by your healthcare provider  · Seizure  · Breathing problems  · Symptoms not resolving within 10 days  Date Last Reviewed: 4/13/2015  © [id number] [employer]. [address] [place] [address], [place] [address]. All rights reserved. This information is not intended as a substitute for professional medical care. Always follow your healthcare professional's instructions.

## 2018-10-31 NOTE — PATIENT INSTRUCTIONS
You must understand that you've received an Urgent Care treatment only and that you may be released before all your medical problems are known or treated. You, the patient, will arrange for follow up care as instructed.  If your condition worsens we recommend that you receive another evaluation at the emergency room immediately or contact your primary medical clinics after hours call service to discuss your concerns.  Please return here or go to the Emergency Department for any concerns or worsening of condition.      Sinusitis (Antibiotic Treatment)    The sinuses are air-filled spaces within the bones of the face. They connect to the inside of the nose. Sinusitis is an inflammation of the tissue lining the sinus cavity. Sinus inflammation can occur during a cold. It can also be due to allergies to pollens and other particles in the air. Sinusitis can cause symptoms of sinus congestion and fullness. A sinus infection causes fever, headache and facial pain. There is often green or yellow drainage from the nose or into the back of the throat (post-nasal drip). You have been given antibiotics to treat this condition.  Home care:  · Take the full course of antibiotics as instructed. Do not stop taking them, even if you feel better.  · Drink plenty of water, hot tea, and other liquids. This may help thin mucus. It also may promote sinus drainage.  · Heat may help soothe painful areas of the face. Use a towel soaked in hot water. Or,  the shower and direct the hot spray onto your face. Using a vaporizer along with a menthol rub at night may also help.   · An expectorant containing guaifenesin may help thin the mucus and promote drainage from the sinuses.  · Over-the-counter decongestants may be used unless a similar medicine was prescribed. Nasal sprays work the fastest. Use one that contains phenylephrine or oxymetazoline. First blow the nose gently. Then use the spray. Do not use these medicines more often  than directed on the label or symptoms may get worse. You may also use tablets containing pseudoephedrine. Avoid products that combine ingredients, because side effects may be increased. Read labels. You can also ask the pharmacist for help. (NOTE: Persons with high blood pressure should not use decongestants. They can raise blood pressure.)  · Over-the-counter antihistamines may help if allergies contributed to your sinusitis.    · Do not use nasal rinses or irrigation during an acute sinus infection, unless told to by your health care provider. Rinsing may spread the infection to other sinuses.  · Use acetaminophen or ibuprofen to control pain, unless another pain medicine was prescribed. (If you have chronic liver or kidney disease or ever had a stomach ulcer, talk with your doctor before using these medicines. Aspirin should never be used in anyone under 18 years of age who is ill with a fever. It may cause severe liver damage.)  · Don't smoke. This can worsen symptoms.  Follow-up care  Follow up with your healthcare provider or our staff if you are not improving within the next week.  When to seek medical advice  Call your healthcare provider if any of these occur:  · Facial pain or headache becoming more severe  · Stiff neck  · Unusual drowsiness or confusion  · Swelling of the forehead or eyelids  · Vision problems, including blurred or double vision  · Fever of 100.4ºF (38ºC) or higher, or as directed by your healthcare provider  · Seizure  · Breathing problems  · Symptoms not resolving within 10 days  Date Last Reviewed: 4/13/2015  © 9448-9769 [email or web address]. [address] [place] [address], [place] [address]. All rights reserved. This information is not intended as a substitute for professional medical care. Always follow your healthcare professional's instructions.

## 2018-11-03 LAB — S PYO THROAT QL CULT: NEGATIVE

## 2018-11-07 ENCOUNTER — TELEPHONE (OUTPATIENT)
Dept: URGENT CARE | Facility: CLINIC | Age: 33
End: 2018-11-07

## 2018-11-07 NOTE — TELEPHONE ENCOUNTER
----- Message from Hakan Mckeon NP sent at 11/3/2018  8:59 AM CDT -----  Please call pt with normal results-negative for strep throat. Can continue augmentin

## 2018-12-17 RX ORDER — TRAMADOL HYDROCHLORIDE 50 MG/1
TABLET ORAL
Qty: 60 TABLET | Refills: 0 | Status: SHIPPED | OUTPATIENT
Start: 2018-12-17 | End: 2019-03-01 | Stop reason: SDUPTHER

## 2019-01-02 ENCOUNTER — OFFICE VISIT (OUTPATIENT)
Dept: URGENT CARE | Facility: CLINIC | Age: 34
End: 2019-01-02
Payer: MEDICARE

## 2019-01-02 VITALS
BODY MASS INDEX: 21.97 KG/M2 | DIASTOLIC BLOOD PRESSURE: 72 MMHG | RESPIRATION RATE: 19 BRPM | TEMPERATURE: 98 F | HEART RATE: 101 BPM | SYSTOLIC BLOOD PRESSURE: 112 MMHG | WEIGHT: 140 LBS | OXYGEN SATURATION: 96 % | HEIGHT: 67 IN

## 2019-01-02 DIAGNOSIS — R52 BODY ACHES: ICD-10-CM

## 2019-01-02 DIAGNOSIS — J41.1 PURULENT BRONCHITIS: Primary | ICD-10-CM

## 2019-01-02 LAB
CTP QC/QA: YES
FLUAV AG NPH QL: NEGATIVE
FLUBV AG NPH QL: NEGATIVE

## 2019-01-02 PROCEDURE — 99499 RISK ADDL DX/OHS AUDIT: ICD-10-PCS | Mod: S$GLB,,, | Performed by: NURSE PRACTITIONER

## 2019-01-02 PROCEDURE — 87804 INFLUENZA ASSAY W/OPTIC: CPT | Mod: QW,S$GLB,, | Performed by: NURSE PRACTITIONER

## 2019-01-02 PROCEDURE — 99499 UNLISTED E&M SERVICE: CPT | Mod: S$GLB,,, | Performed by: NURSE PRACTITIONER

## 2019-01-02 PROCEDURE — 96372 PR INJECTION,THERAP/PROPH/DIAG2ST, IM OR SUBCUT: ICD-10-PCS | Mod: S$GLB,,, | Performed by: NURSE PRACTITIONER

## 2019-01-02 PROCEDURE — 87804 POCT INFLUENZA A/B: ICD-10-PCS | Mod: 59,QW,S$GLB, | Performed by: NURSE PRACTITIONER

## 2019-01-02 PROCEDURE — 96372 THER/PROPH/DIAG INJ SC/IM: CPT | Mod: S$GLB,,, | Performed by: NURSE PRACTITIONER

## 2019-01-02 RX ORDER — BETAMETHASONE SODIUM PHOSPHATE AND BETAMETHASONE ACETATE 3; 3 MG/ML; MG/ML
9 INJECTION, SUSPENSION INTRA-ARTICULAR; INTRALESIONAL; INTRAMUSCULAR; SOFT TISSUE
Status: COMPLETED | OUTPATIENT
Start: 2019-01-02 | End: 2019-01-02

## 2019-01-02 RX ORDER — AZITHROMYCIN 250 MG/1
TABLET, FILM COATED ORAL
Qty: 6 TABLET | Refills: 0 | Status: SHIPPED | OUTPATIENT
Start: 2019-01-02 | End: 2019-02-07

## 2019-01-02 RX ADMIN — BETAMETHASONE SODIUM PHOSPHATE AND BETAMETHASONE ACETATE 9 MG: 3; 3 INJECTION, SUSPENSION INTRA-ARTICULAR; INTRALESIONAL; INTRAMUSCULAR; SOFT TISSUE at 01:01

## 2019-01-02 NOTE — PATIENT INSTRUCTIONS
Bronchitis, Antibiotic Treatment (Adult)    Bronchitis is an infection of the air passages (bronchial tubes) in your lungs. It often occurs when you have a cold. This illness is contagious during the first few days and is spread through the air by coughing and sneezing, or by direct contact (touching the sick person and then touching your own eyes, nose, or mouth).  Symptoms of bronchitis include cough with mucus (phlegm) and low-grade fever. Bronchitis usually lasts 7 to 14 days. Mild cases can be treated with simple home remedies. More severe infection is treated with an antibiotic.  Home care  Follow these guidelines when caring for yourself at home:  · If your symptoms are severe, rest at home for the first 2 to 3 days. When you go back to your usual activities, don't let yourself get too tired.  · Do not smoke. Also avoid being exposed to secondhand smoke.  · You may use over-the-counter medicines to control fever or pain, unless another medicine was prescribed. (Note: If you have chronic liver or kidney disease or have ever had a stomach ulcer or gastrointestinal bleeding, talk with your healthcare provider before using these medicines. Also talk to your provider if you are taking medicine to prevent blood clots.) Aspirin should never be given to anyone younger than 18 years of age who is ill with a viral infection or fever. It may cause severe liver or brain damage.  · Your appetite may be poor, so a light diet is fine. Avoid dehydration by drinking 6 to 8 glasses of fluids per day (such as water, soft drinks, sports drinks, juices, tea, or soup). Extra fluids will help loosen secretions in the nose and lungs.  · Over-the-counter cough, cold, and sore-throat medicines will not shorten the length of the illness, but they may be helpful to reduce symptoms. (Note: Do not use decongestants if you have high blood pressure.)  · Finish all antibiotic medicine. Do this even if you are feeling better after only a  few days.  Follow-up care  Follow up with your healthcare provider, or as advised. If you had an X-ray or ECG (electrocardiogram), a specialist will review it. You will be notified of any new findings that may affect your care.  Note: If you are age 65 or older, or if you have a chronic lung disease or condition that affects your immune system, or you smoke, talk to your healthcare provider about having pneumococcal vaccinations and a yearly influenza vaccination (flu shot).  When to seek medical advice  Call your healthcare provider right away if any of these occur:  · Fever of 100.4°F (38°C) or higher  · Coughing up increased amounts of colored sputum  · Weakness, drowsiness, headache, facial pain, ear pain, or a stiff neck  Call 911, or get immediate medical care  Contact emergency services right away if any of these occur.  · Coughing up blood  · Worsening weakness, drowsiness, headache, or stiff neck  · Trouble breathing, wheezing, or pain with breathing  Date Last Reviewed: 9/13/2015 © 2000-2017 [employer]. [address] [place] [address]. All rights reserved. This information is not intended as a substitute for professional medical care. Always follow your healthcare professional's instructions.    Please drink plenty of fluids.  Please get plenty of rest.  Clear liquid diet as instructed for 2 - 3 days or until symptoms improve.  Please return here or go to the Emergency Department for any concerns or worsening of condition.  If you were prescribed antibiotics, please take them to completion.  If not allergic, please take over the counter Tylenol (Acetaminophen) as directed for control of pain and/or fever.  Motrin (advil) or Alleve may help a fever, but they may also worsen your Nausea and Vomiting.    Please follow up with your primary care doctor or specialist as needed.    Nellie Coreas MD  [phone]    If you  smoke, please stop smoking.

## 2019-01-02 NOTE — PROGRESS NOTES
"Subjective:       Patient ID: Vianney Callahan is a 33 y.o. female.    Vitals:  height is 5' 7" (1.702 m) and weight is 63.5 kg (140 lb). Her oral temperature is 97.8 °F (36.6 °C). Her blood pressure is 112/72 and her pulse is 101. Her respiration is 19 and oxygen saturation is 96%.     Chief Complaint: URI    URI    This is a new problem. The current episode started 1 to 4 weeks ago (a week ). The problem has been gradually worsening. There has been no fever. Associated symptoms include congestion, coughing, headaches and sinus pain. Pertinent negatives include no abdominal pain, chest pain, diarrhea, dysuria (                    ), ear pain, joint pain, joint swelling, nausea, neck pain, plugged ear sensation, rash, rhinorrhea, sneezing, sore throat, swollen glands, vomiting or wheezing. She has tried decongestant for the symptoms. The treatment provided no relief.       Constitution: Positive for appetite change and fatigue. Negative for chills, sweating and fever.   HENT: Positive for congestion, sinus pain, sinus pressure and voice change. Negative for ear pain and sore throat.    Neck: Negative for neck pain and painful lymph nodes.   Cardiovascular: Negative for chest pain.   Eyes: Negative for eye redness.   Respiratory: Positive for cough and sputum production. Negative for chest tightness, bloody sputum, COPD, shortness of breath, stridor, wheezing and asthma.    Gastrointestinal: Negative for abdominal pain, nausea, vomiting and diarrhea.   Genitourinary: Negative for dysuria (                    ).   Musculoskeletal: Negative for muscle ache.   Skin: Negative for rash.   Allergic/Immunologic: Negative for seasonal allergies, asthma and sneezing.   Neurological: Positive for headaches.   Hematologic/Lymphatic: Negative for swollen lymph nodes.       Objective:      Physical Exam   Constitutional: She is oriented to person, place, and time. She appears well-developed and well-nourished. She is cooperative.  " Non-toxic appearance. She appears ill. No distress.   HENT:   Head: Normocephalic and atraumatic.   Right Ear: Hearing, tympanic membrane, external ear and ear canal normal.   Left Ear: Hearing, tympanic membrane, external ear and ear canal normal.   Nose: Nose normal. No mucosal edema, rhinorrhea or nasal deformity. No epistaxis. Right sinus exhibits no maxillary sinus tenderness and no frontal sinus tenderness. Left sinus exhibits no maxillary sinus tenderness and no frontal sinus tenderness.   Mouth/Throat: Uvula is midline, oropharynx is clear and moist and mucous membranes are normal. No trismus in the jaw. Normal dentition. No uvula swelling. No posterior oropharyngeal erythema.   Eyes: Conjunctivae and lids are normal. No scleral icterus.   Sclera clear bilat   Neck: Trachea normal, full passive range of motion without pain and phonation normal. Neck supple.   Cardiovascular: Normal rate, regular rhythm, normal heart sounds, intact distal pulses and normal pulses.   Pulmonary/Chest: Effort normal and breath sounds normal. No respiratory distress.   Bronchial cough present   Abdominal: Soft. Normal appearance and bowel sounds are normal. She exhibits no distension. There is no tenderness.   Musculoskeletal: Normal range of motion. She exhibits no edema or deformity.   Lymphadenopathy:     She has cervical adenopathy.        Right cervical: Superficial cervical adenopathy present.        Left cervical: Superficial cervical adenopathy present.   Neurological: She is alert and oriented to person, place, and time. She exhibits normal muscle tone. Coordination normal.   Skin: Skin is warm, dry and intact. She is not diaphoretic. No pallor.   Psychiatric: She has a normal mood and affect. Her speech is normal and behavior is normal. Judgment and thought content normal. Cognition and memory are normal.   Nursing note and vitals reviewed.      Results for orders placed or performed in visit on 01/02/19   POCT  Influenza A/B   Result Value Ref Range    Rapid Influenza A Ag Negative Negative    Rapid Influenza B Ag Negative Negative     Acceptable Yes      Assessment:       1. Purulent bronchitis    2. Body aches        Plan:         Purulent bronchitis    Body aches  -     POCT Influenza A/B    Other orders  -     azithromycin (ZITHROMAX Z-LESLY) 250 MG tablet; Take 2 tablets (500 mg) on  Day 1,  followed by 1 tablet (250 mg) once daily on Days 2 through 5.  Dispense: 6 tablet; Refill: 0  -     betamethasone acetate-betamethasone sodium phosphate injection 9 mg      Patient Instructions     Bronchitis, Antibiotic Treatment (Adult)    Bronchitis is an infection of the air passages (bronchial tubes) in your lungs. It often occurs when you have a cold. This illness is contagious during the first few days and is spread through the air by coughing and sneezing, or by direct contact (touching the sick person and then touching your own eyes, nose, or mouth).  Symptoms of bronchitis include cough with mucus (phlegm) and low-grade fever. Bronchitis usually lasts 7 to 14 days. Mild cases can be treated with simple home remedies. More severe infection is treated with an antibiotic.  Home care  Follow these guidelines when caring for yourself at home:  · If your symptoms are severe, rest at home for the first 2 to 3 days. When you go back to your usual activities, don't let yourself get too tired.  · Do not smoke. Also avoid being exposed to secondhand smoke.  · You may use over-the-counter medicines to control fever or pain, unless another medicine was prescribed. (Note: If you have chronic liver or kidney disease or have ever had a stomach ulcer or gastrointestinal bleeding, talk with your healthcare provider before using these medicines. Also talk to your provider if you are taking medicine to prevent blood clots.) Aspirin should never be given to anyone younger than 18 years of age who is ill with a viral infection or  fever. It may cause severe liver or brain damage.  · Your appetite may be poor, so a light diet is fine. Avoid dehydration by drinking 6 to 8 glasses of fluids per day (such as water, soft drinks, sports drinks, juices, tea, or soup). Extra fluids will help loosen secretions in the nose and lungs.  · Over-the-counter cough, cold, and sore-throat medicines will not shorten the length of the illness, but they may be helpful to reduce symptoms. (Note: Do not use decongestants if you have high blood pressure.)  · Finish all antibiotic medicine. Do this even if you are feeling better after only a few days.  Follow-up care  Follow up with your healthcare provider, or as advised. If you had an X-ray or ECG (electrocardiogram), a specialist will review it. You will be notified of any new findings that may affect your care.  Note: If you are age 65 or older, or if you have a chronic lung disease or condition that affects your immune system, or you smoke, talk to your healthcare provider about having pneumococcal vaccinations and a yearly influenza vaccination (flu shot).  When to seek medical advice  Call your healthcare provider right away if any of these occur:  · Fever of 100.4°F (38°C) or higher  · Coughing up increased amounts of colored sputum  · Weakness, drowsiness, headache, facial pain, ear pain, or a stiff neck  Call 911, or get immediate medical care  Contact emergency services right away if any of these occur.  · Coughing up blood  · Worsening weakness, drowsiness, headache, or stiff neck  · Trouble breathing, wheezing, or pain with breathing  Date Last Reviewed: 9/13/2015 © 2000-2017 [employer]. [address] [place] [address]. All rights reserved. This information is not intended as a substitute for professional medical care. Always follow your healthcare professional's instructions.    Please drink plenty of fluids.  Please get plenty of rest.  Clear liquid diet as instructed for 2 -  3 days or until symptoms improve.  Please return here or go to the Emergency Department for any concerns or worsening of condition.  If you were prescribed antibiotics, please take them to completion.  If not allergic, please take over the counter Tylenol (Acetaminophen) as directed for control of pain and/or fever.  Motrin (advil) or Alleve may help a fever, but they may also worsen your Nausea and Vomiting.    Please follow up with your primary care doctor or specialist as needed.    Nellie Coreas MD  [phone]    If you  smoke, please stop smoking.

## 2019-02-07 ENCOUNTER — LAB VISIT (OUTPATIENT)
Dept: LAB | Facility: HOSPITAL | Age: 34
End: 2019-02-07
Attending: INTERNAL MEDICINE
Payer: MEDICARE

## 2019-02-07 ENCOUNTER — OFFICE VISIT (OUTPATIENT)
Dept: INTERNAL MEDICINE | Facility: CLINIC | Age: 34
End: 2019-02-07
Payer: MEDICARE

## 2019-02-07 VITALS
BODY MASS INDEX: 23.88 KG/M2 | OXYGEN SATURATION: 98 % | SYSTOLIC BLOOD PRESSURE: 110 MMHG | DIASTOLIC BLOOD PRESSURE: 66 MMHG | HEIGHT: 67 IN | HEART RATE: 72 BPM | WEIGHT: 152.13 LBS

## 2019-02-07 DIAGNOSIS — F31.9 BIPOLAR I DISORDER: Chronic | ICD-10-CM

## 2019-02-07 DIAGNOSIS — R53.83 FATIGUE, UNSPECIFIED TYPE: ICD-10-CM

## 2019-02-07 DIAGNOSIS — E16.2 HYPOGLYCEMIA: ICD-10-CM

## 2019-02-07 DIAGNOSIS — R53.83 FATIGUE, UNSPECIFIED TYPE: Primary | ICD-10-CM

## 2019-02-07 DIAGNOSIS — F17.210 CIGARETTE SMOKER: ICD-10-CM

## 2019-02-07 DIAGNOSIS — F19.21: ICD-10-CM

## 2019-02-07 DIAGNOSIS — F25.0 SCHIZOAFFECTIVE DISORDER, BIPOLAR TYPE: ICD-10-CM

## 2019-02-07 LAB
ALBUMIN SERPL BCP-MCNC: 3.8 G/DL
ALP SERPL-CCNC: 66 U/L
ALT SERPL W/O P-5'-P-CCNC: 15 U/L
ANION GAP SERPL CALC-SCNC: 4 MMOL/L
AST SERPL-CCNC: 16 U/L
BASOPHILS # BLD AUTO: 0.02 K/UL
BASOPHILS NFR BLD: 0.2 %
BILIRUB SERPL-MCNC: 0.2 MG/DL
BUN SERPL-MCNC: 15 MG/DL
CALCIUM SERPL-MCNC: 9.3 MG/DL
CHLORIDE SERPL-SCNC: 106 MMOL/L
CO2 SERPL-SCNC: 29 MMOL/L
CREAT SERPL-MCNC: 0.8 MG/DL
DIFFERENTIAL METHOD: NORMAL
EOSINOPHIL # BLD AUTO: 0.4 K/UL
EOSINOPHIL NFR BLD: 4.1 %
ERYTHROCYTE [DISTWIDTH] IN BLOOD BY AUTOMATED COUNT: 13.1 %
EST. GFR  (AFRICAN AMERICAN): >60 ML/MIN/1.73 M^2
EST. GFR  (NON AFRICAN AMERICAN): >60 ML/MIN/1.73 M^2
ESTIMATED AVG GLUCOSE: 94 MG/DL
GLUCOSE SERPL-MCNC: 86 MG/DL
HBA1C MFR BLD HPLC: 4.9 %
HCT VFR BLD AUTO: 42.9 %
HGB BLD-MCNC: 13.8 G/DL
LYMPHOCYTES # BLD AUTO: 3.5 K/UL
LYMPHOCYTES NFR BLD: 36.9 %
MCH RBC QN AUTO: 30.1 PG
MCHC RBC AUTO-ENTMCNC: 32.2 G/DL
MCV RBC AUTO: 94 FL
MONOCYTES # BLD AUTO: 0.9 K/UL
MONOCYTES NFR BLD: 9.4 %
NEUTROPHILS # BLD AUTO: 4.6 K/UL
NEUTROPHILS NFR BLD: 48.8 %
PLATELET # BLD AUTO: 330 K/UL
PMV BLD AUTO: 9.8 FL
POTASSIUM SERPL-SCNC: 4.4 MMOL/L
PROT SERPL-MCNC: 6.6 G/DL
RBC # BLD AUTO: 4.59 M/UL
SODIUM SERPL-SCNC: 139 MMOL/L
WBC # BLD AUTO: 9.45 K/UL

## 2019-02-07 PROCEDURE — 80053 COMPREHEN METABOLIC PANEL: CPT

## 2019-02-07 PROCEDURE — 99214 OFFICE O/P EST MOD 30 MIN: CPT | Mod: S$GLB,,, | Performed by: INTERNAL MEDICINE

## 2019-02-07 PROCEDURE — 99214 PR OFFICE/OUTPT VISIT, EST, LEVL IV, 30-39 MIN: ICD-10-PCS | Mod: S$GLB,,, | Performed by: INTERNAL MEDICINE

## 2019-02-07 PROCEDURE — 36415 COLL VENOUS BLD VENIPUNCTURE: CPT

## 2019-02-07 PROCEDURE — 3008F PR BODY MASS INDEX (BMI) DOCUMENTED: ICD-10-PCS | Mod: CPTII,S$GLB,, | Performed by: INTERNAL MEDICINE

## 2019-02-07 PROCEDURE — 85025 COMPLETE CBC W/AUTO DIFF WBC: CPT

## 2019-02-07 PROCEDURE — 99499 RISK ADDL DX/OHS AUDIT: ICD-10-PCS | Mod: S$GLB,,, | Performed by: INTERNAL MEDICINE

## 2019-02-07 PROCEDURE — 99999 PR PBB SHADOW E&M-EST. PATIENT-LVL III: CPT | Mod: PBBFAC,,, | Performed by: INTERNAL MEDICINE

## 2019-02-07 PROCEDURE — 83036 HEMOGLOBIN GLYCOSYLATED A1C: CPT

## 2019-02-07 PROCEDURE — 99499 UNLISTED E&M SERVICE: CPT | Mod: S$GLB,,, | Performed by: INTERNAL MEDICINE

## 2019-02-07 PROCEDURE — 3008F BODY MASS INDEX DOCD: CPT | Mod: CPTII,S$GLB,, | Performed by: INTERNAL MEDICINE

## 2019-02-07 PROCEDURE — 99999 PR PBB SHADOW E&M-EST. PATIENT-LVL III: ICD-10-PCS | Mod: PBBFAC,,, | Performed by: INTERNAL MEDICINE

## 2019-02-07 RX ORDER — CLORAZEPATE DIPOTASSIUM 7.5 MG/1
7.5 TABLET ORAL 2 TIMES DAILY
Refills: 0 | COMMUNITY
Start: 2019-01-30 | End: 2019-08-12

## 2019-02-07 RX ORDER — ARIPIPRAZOLE 5 MG/1
10 TABLET ORAL DAILY
Refills: 2 | COMMUNITY
Start: 2019-01-25 | End: 2019-05-21

## 2019-02-07 NOTE — PROGRESS NOTES
Subjective:       Patient ID: Vianney Callahan is a 33 y.o. female.    Chief Complaint: Hot Flashes; Fatigue; and Polydipsia    HPI   Got diaphoretic washing dishes..  Also sweaty in am for last week or two..  Feels like she is burning up.    Vision blurry.  Recent eye exam.  Wears mild prescription at times.  She has gained 30 lbs and fears she may have DM.    Sugar was as low at 40 in group therapy.   She is taking ocp.  TSH normal few months ago.    In summer Klonopin changed to Tranxene, and she believes tranxene is cause of many of her complaints..     Is being managed by [facility] for Schizoaffective DO, bipolar type.   She has h/o poly drug dependence in remission.      C/o feeling depressed and fatigued and having difficulty focusing, however she is not suicidal.  Her mood DO has improved from when she was last hospitalized.  Eating helps fatigue.  Sleeps well at night.  Naps sometimes.     She is on a number of psychiatric drugs which may be contributing to her symptoms (cogentin, abilify, tranxene, depakote,prozac, trileptal).         Lives in [facility] apartment.  Makes own meals.    Review of Systems   Constitutional: Negative for fever and unexpected weight change.   HENT: Negative for congestion and postnasal drip.    Eyes: Negative for pain, discharge and visual disturbance.   Respiratory: Negative for cough, chest tightness, shortness of breath and wheezing.    Cardiovascular: Negative for chest pain and leg swelling.   Gastrointestinal: Negative for abdominal pain, constipation, diarrhea and nausea.   Genitourinary: Negative for difficulty urinating, dysuria and hematuria.   Skin: Negative for rash.   Neurological: Negative for headaches.   Psychiatric/Behavioral: Negative for dysphoric mood and sleep disturbance. The patient is not nervous/anxious.        Objective:      Physical Exam   Constitutional: She is oriented to person, place, and time. She appears  well-developed and well-nourished.   Eyes: No scleral icterus.   Neck: No JVD present. No thyromegaly present.   Cardiovascular: Normal rate, regular rhythm and normal heart sounds.   Pulmonary/Chest: Effort normal and breath sounds normal. No respiratory distress. She has no wheezes. She has no rales.   Abdominal: Soft. She exhibits no mass. There is no tenderness.   Musculoskeletal: She exhibits no edema.   Neurological: She is alert and oriented to person, place, and time.   Psychiatric: She has a normal mood and affect. Her behavior is normal.       Assessment:       1. Fatigue, unspecified type    2. Hypoglycemia in past, by hx.   3. Cigarette smoker    4. Bipolar I disorder    5. Polydrug dependence excluding opioid type drug, in remission    6. Schizoaffective disorder, bipolar type     4.      Wt gain  Plan:       Vianney was seen today for hot flashes, fatigue and polydipsia.    Diagnoses and all orders for this visit:    Fatigue, unspecified type  -     CBC auto differential; Future  -     Comprehensive metabolic panel; Future  -     Hemoglobin A1c; Future    Hypoglycemia   -     Hemoglobin A1c; Future    Cigarette smoker    Bipolar I disorder    Polydrug dependence excluding opioid type drug, in remission    Schizoaffective disorder, bipolar type       Reassurance.     Exercise regularly.  Weight loss diet reviewed.     Advised to discuss above concerns with her psychiatrist, who may be able to adjust meds.

## 2019-03-04 RX ORDER — TRAMADOL HYDROCHLORIDE 50 MG/1
TABLET ORAL
Qty: 60 TABLET | Refills: 0 | Status: SHIPPED | OUTPATIENT
Start: 2019-03-04 | End: 2019-05-13 | Stop reason: SDUPTHER

## 2019-03-06 ENCOUNTER — TELEPHONE (OUTPATIENT)
Dept: INTERNAL MEDICINE | Facility: CLINIC | Age: 34
End: 2019-03-06

## 2019-03-06 NOTE — TELEPHONE ENCOUNTER
----- Message from Annetta Garcia sent at 3/6/2019 12:21 PM CST -----  Contact: [facility] [phone]  Prescription Clarification:     The pharmacy needs clarity on the following Rx:     traMADol (ULTRAM) 50 mg tablet     Patient is also taking TRANXENE(Benzo) from another doctor. Please advise.    Pharmacy: [facility] [id number] [facility] [place]    Thank You

## 2019-03-06 NOTE — TELEPHONE ENCOUNTER
Pharmacy needs to knwo if it is ok for pt to take traMADol (ULTRAM) 50 mg tablet and TRANXENE(Benzo) from another doctor. Please advise.

## 2019-04-02 ENCOUNTER — TELEPHONE (OUTPATIENT)
Dept: OBSTETRICS AND GYNECOLOGY | Facility: CLINIC | Age: 34
End: 2019-04-02

## 2019-04-02 RX ORDER — NORETHINDRONE ACETATE AND ETHINYL ESTRADIOL .02; 1 MG/1; MG/1
TABLET ORAL
Qty: 28 TABLET | Refills: 0 | Status: SHIPPED | OUTPATIENT
Start: 2019-04-02 | End: 2019-05-13 | Stop reason: SDUPTHER

## 2019-04-15 ENCOUNTER — OFFICE VISIT (OUTPATIENT)
Dept: ORTHOPEDICS | Facility: CLINIC | Age: 34
End: 2019-04-15
Payer: MEDICARE

## 2019-04-15 ENCOUNTER — HOSPITAL ENCOUNTER (OUTPATIENT)
Dept: RADIOLOGY | Facility: HOSPITAL | Age: 34
Discharge: HOME | End: 2019-04-15
Attending: PHYSICIAN ASSISTANT
Payer: MEDICARE

## 2019-04-15 VITALS
HEIGHT: 67 IN | SYSTOLIC BLOOD PRESSURE: 110 MMHG | BODY MASS INDEX: 24.97 KG/M2 | HEART RATE: 63 BPM | WEIGHT: 159.06 LBS | DIASTOLIC BLOOD PRESSURE: 73 MMHG

## 2019-04-15 DIAGNOSIS — M25.361 INSTABILITY OF RIGHT KNEE JOINT: Primary | ICD-10-CM

## 2019-04-15 DIAGNOSIS — M25.561 CHRONIC PAIN OF RIGHT KNEE: ICD-10-CM

## 2019-04-15 DIAGNOSIS — M25.561 ACUTE PAIN OF RIGHT KNEE: ICD-10-CM

## 2019-04-15 DIAGNOSIS — G89.29 CHRONIC PAIN OF RIGHT KNEE: ICD-10-CM

## 2019-04-15 DIAGNOSIS — S89.90XA KNEE INJURY, INITIAL ENCOUNTER: ICD-10-CM

## 2019-04-15 PROCEDURE — 99203 PR OFFICE/OUTPT VISIT, NEW, LEVL III, 30-44 MIN: ICD-10-PCS | Mod: S$GLB,,, | Performed by: PHYSICIAN ASSISTANT

## 2019-04-15 PROCEDURE — 99999 PR PBB SHADOW E&M-EST. PATIENT-LVL III: CPT | Mod: PBBFAC,,, | Performed by: PHYSICIAN ASSISTANT

## 2019-04-15 PROCEDURE — 3008F BODY MASS INDEX DOCD: CPT | Mod: CPTII,S$GLB,, | Performed by: PHYSICIAN ASSISTANT

## 2019-04-15 PROCEDURE — 99203 OFFICE O/P NEW LOW 30 MIN: CPT | Mod: S$GLB,,, | Performed by: PHYSICIAN ASSISTANT

## 2019-04-15 PROCEDURE — 73564 X-RAY EXAM KNEE 4 OR MORE: CPT | Mod: TC,50

## 2019-04-15 PROCEDURE — 73564 XR KNEE ORTHO BILAT WITH FLEXION: ICD-10-PCS | Mod: 26,50,, | Performed by: RADIOLOGY

## 2019-04-15 PROCEDURE — 99999 PR PBB SHADOW E&M-EST. PATIENT-LVL III: ICD-10-PCS | Mod: PBBFAC,,, | Performed by: PHYSICIAN ASSISTANT

## 2019-04-15 PROCEDURE — 3008F PR BODY MASS INDEX (BMI) DOCUMENTED: ICD-10-PCS | Mod: CPTII,S$GLB,, | Performed by: PHYSICIAN ASSISTANT

## 2019-04-15 PROCEDURE — 73564 X-RAY EXAM KNEE 4 OR MORE: CPT | Mod: 26,50,, | Performed by: RADIOLOGY

## 2019-04-15 NOTE — PROGRESS NOTES
SUBJECTIVE:     Chief Complaint & History of Present Illness:  Vianney Callahan is a 33 y.o. year old female, new patient, here with a history of constant bilateral knee pain (right worse than left) which started about one week ago.  Patient states about 13 years ago she broke her right femur.  She also reports about one week ago she twisted her right knee which caused severe pain to present.  She states her right knee felt very unstable and she could not put weight on her knee. She believes her left knee is bothering her due to overcompensating with her left side. The pain is located in the medial aspect of the right knee.  The pain is described as sharp, 10/10.  There is not radiation.  There is catching or locking.  Aggravating factors include any weight bearing.  Associated symptoms include instability.  There is not numbness or tingling of the lower extremity.  There is not back pain. Previous treatments include brace, OTC NSAIDs and rest which have provided minimal relief.  There is not a history of previous injury or surgery to the knee.  The patient does not use an assistive device.    Review of patient's allergies indicates:   Allergen Reactions    Dilantin [phenytoin sodium extended] Rash    Saphris [asenapine]      Confusion and depressed mood.         Current Outpatient Medications   Medication Sig Dispense Refill    ARIPiprazole (ABILIFY) 5 MG Tab Take 10 mg by mouth once daily.   2    benztropine (COGENTIN) 0.5 MG tablet Take 0.5 mg by mouth 2 (two) times daily.      clorazepate (TRANXENE) 7.5 MG Tab Take 7.5 mg by mouth 2 (two) times daily.  0    divalproex (DEPAKOTE) 500 MG TbEC Take 500 mg by mouth 2 (two) times daily.  0    FLUoxetine (PROZAC) 40 MG capsule Take 60 mg by mouth every morning.   0    OXcarbazepine (TRILEPTAL) 600 MG Tab   0    sumatriptan (IMITREX) 100 MG tablet TAKE ONE TABLET oi EVERY DAY AS NEEDED FOR migrane 30 tablet 11    traMADol (ULTRAM) 50 mg tablet TAKE 1 TABLET BY  MOUTH EVERY 8 HRS IF NEEDED 60 tablet 0    valACYclovir (VALTREX) 1000 MG tablet Take 1 tablet (1,000 mg total) by mouth once daily. 90 tablet 3    norethindrone-ethinyl estradiol (MICROGESTIN 1/20) 1-20 mg-mcg per tablet TAKE 1 TABLET BY MOUTH EVERY DAY IN PM 28 tablet 0     No current facility-administered medications for this visit.        Past Medical History:   Diagnosis Date    Abnormal cervical Papanicolaou smear 2012    Colposcopy    ADHD (attention deficit hyperactivity disorder) 8/16/2012    Allergy     Anxiety     Asthma     childhood    Back pain 5/19/2014    Bipolar affective disorder     Cholecystitis     Chronic migraine without aura, with intractable migraine, so stated, without mention of status migrainosus 9/24/2013    Depression     Fever blister     Gallstones 5/26/2014    Headache(784.0)     Hepatitis C antibody positive in blood     History of hepatitis C     History of psychiatric hospitalization     Suicide attempt    Personality disorder 4/11/2013    Psychosis 10/7/2013    Therapy     Tobacco abuse 9/24/2013       Past Surgical History:   Procedure Laterality Date    CHOLECYSTECTOMY-LAPAROSCOPIC N/A 5/26/2014    Performed by Joshua [facility] at [facility] OR 2ND FLR    ESOPHAGOGASTRODUODENOSCOPY      ESOPHAGOGASTRODUODENOSCOPY (EGD) N/A 9/5/2014    Performed by Wayne Wang MD at [facility] ENDO (4TH FLR)    FRACTURE SURGERY      MOLE REMOVAL      SKIN BIOPSY         Vital Signs (Most Recent)  Vitals:    04/15/19 1330   BP: 110/73   Pulse: 63           Review of Systems:  ROS:  Constitutional: no fever or chills  Eyes: no visual changes  ENT: no nasal congestion or sore throat  Respiratory: no cough or shortness of breath  Cardiovascular: no chest pain or palpitations  Gastrointestinal: no nausea or vomiting, tolerating diet  Genitourinary: no hematuria or dysuria  Integument/Breast: no rash or pruritis  Hematologic/Lymphatic: no easy bruising or  "lymphadenopathy  Musculoskeletal: positive for bilateral knee pain  Neurological: no seizures or tremors  Behavioral/Psych: no auditory or visual hallucinations, positive for anxiety and bipolar, ADHD  Endocrine: no heat or cold intolerance                OBJECTIVE:     PHYSICAL EXAM:  Height: 5' 7" (170.2 cm) Weight: 72.1 kg (159 lb 1 oz), General Appearance: Well nourished, well developed, in no acute distress.  Neurological: Mood & affect are normal.  Left Knee Exam:  Knee Range of Motion:0-120 degrees flexion   Effusion:not significant  Condition of skin:intact  Location of tenderness: none  Strength:5 of 5  Stability:  Lachman: stable, LCL: stable, MCL: stable, PCL: stable and posteromedial (dial): stable  Varus /Valgus stress:  normal  Bret:   negative/negative    right Knee Exam:  Knee Range of Motion:0-120 degrees flexion   Effusion:not significant  Condition of skin:intact  Location of tenderness:Medial joint line   Strength:4 of 5  Stability:  Lachman: stable, LCL: stable, MCL: stable, PCL: stable and posteromedial (dial): stable  Varus /Valgus stress:  normal  Bret:   positive    RADIOGRAPHS:  Xray of bilateral knee taken in clinic today, reviewed by me, demonstrates well maintained joint spaces without evidence of fracture or dislocation.  Post operative hardware visualized in right femur.    ASSESSMENT/PLAN:     Plan: We discussed with the patient at length all the different treatment options available for the knee including anti-inflammatories, acetaminophen, rest, ice, knee strengthening exercise, occasional cortisone injections for temporary relief, Viscosupplimentation injections, arthroscopic debridement osteotomy, and finally knee arthroplasty.   Patient placed in hinged knee brace and given crutches in clinic today.  She is to take Tylenol for pain.  I will order MRI of right knee to assess for meniscus injury.  I will contact her with results and further medical decision making once the " results are obtained.  She is to rest, ice and elevate knee for pain and swelling.  We discussed that her left knee pain is likely from overcompensation for the pain she is having in her right knee. Patient verbalized understanding and agrees.    Final Diagnosis: Right knee pain.  Knee instability, right. Right knee injury.

## 2019-04-16 ENCOUNTER — HOSPITAL ENCOUNTER (OUTPATIENT)
Dept: RADIOLOGY | Facility: HOSPITAL | Age: 34
Discharge: HOME | End: 2019-04-16
Attending: PHYSICIAN ASSISTANT
Payer: MEDICARE

## 2019-04-16 DIAGNOSIS — S89.90XA KNEE INJURY, INITIAL ENCOUNTER: ICD-10-CM

## 2019-04-16 DIAGNOSIS — M25.361 INSTABILITY OF RIGHT KNEE JOINT: ICD-10-CM

## 2019-04-16 PROCEDURE — 73721 MRI JNT OF LWR EXTRE W/O DYE: CPT | Mod: TC,RT

## 2019-04-16 PROCEDURE — 73721 MRI JNT OF LWR EXTRE W/O DYE: CPT | Mod: 26,RT,, | Performed by: RADIOLOGY

## 2019-04-16 PROCEDURE — 73721 MRI KNEE WITHOUT CONTRAST RIGHT: ICD-10-PCS | Mod: 26,RT,, | Performed by: RADIOLOGY

## 2019-04-16 RX ORDER — NORETHINDRONE ACETATE AND ETHINYL ESTRADIOL .02; 1 MG/1; MG/1
TABLET ORAL
Qty: 21 TABLET | Refills: 0 | OUTPATIENT
Start: 2019-04-16

## 2019-04-17 ENCOUNTER — DOCUMENTATION ONLY (OUTPATIENT)
Dept: ORTHOPEDICS | Facility: CLINIC | Age: 34
End: 2019-04-17

## 2019-04-17 DIAGNOSIS — S89.90XA KNEE INJURY, INITIAL ENCOUNTER: Primary | ICD-10-CM

## 2019-04-17 NOTE — PROGRESS NOTES
Contacted patient to discuss MRI results.  Patient referral placed for PT at [facility].  I offered cortisone injection however she would like to try PT and Tylenol first as her knee is starting to feel better.  If symptoms worsen during PT, we discussed making her an appointment for a cortisone injection at that time.  She is to follow up in 4 weeks for re-evaluation of knee injury.  Patient verbalized understanding.    Luiz Pizarro PA-C  4/17/19

## 2019-04-18 ENCOUNTER — OFFICE VISIT (OUTPATIENT)
Dept: ORTHOPEDICS | Facility: CLINIC | Age: 34
End: 2019-04-18
Payer: MEDICARE

## 2019-04-18 ENCOUNTER — TELEPHONE (OUTPATIENT)
Dept: ORTHOPEDICS | Facility: CLINIC | Age: 34
End: 2019-04-18

## 2019-04-18 VITALS
DIASTOLIC BLOOD PRESSURE: 81 MMHG | SYSTOLIC BLOOD PRESSURE: 131 MMHG | HEART RATE: 116 BPM | BODY MASS INDEX: 24.96 KG/M2 | HEIGHT: 67 IN | WEIGHT: 159 LBS

## 2019-04-18 DIAGNOSIS — S89.90XA KNEE INJURY, INITIAL ENCOUNTER: Primary | ICD-10-CM

## 2019-04-18 PROCEDURE — 99499 UNLISTED E&M SERVICE: CPT | Mod: S$GLB,,, | Performed by: PHYSICIAN ASSISTANT

## 2019-04-18 PROCEDURE — 20610 PR DRAIN/INJECT LARGE JOINT/BURSA: ICD-10-PCS | Mod: RT,S$GLB,, | Performed by: PHYSICIAN ASSISTANT

## 2019-04-18 PROCEDURE — 99999 PR PBB SHADOW E&M-EST. PATIENT-LVL III: ICD-10-PCS | Mod: PBBFAC,,, | Performed by: PHYSICIAN ASSISTANT

## 2019-04-18 PROCEDURE — 20610 DRAIN/INJ JOINT/BURSA W/O US: CPT | Mod: RT,S$GLB,, | Performed by: PHYSICIAN ASSISTANT

## 2019-04-18 PROCEDURE — 99999 PR PBB SHADOW E&M-EST. PATIENT-LVL III: CPT | Mod: PBBFAC,,, | Performed by: PHYSICIAN ASSISTANT

## 2019-04-18 PROCEDURE — 99499 NO LOS: ICD-10-PCS | Mod: S$GLB,,, | Performed by: PHYSICIAN ASSISTANT

## 2019-04-18 RX ORDER — BETAMETHASONE SODIUM PHOSPHATE AND BETAMETHASONE ACETATE 3; 3 MG/ML; MG/ML
6 INJECTION, SUSPENSION INTRA-ARTICULAR; INTRALESIONAL; INTRAMUSCULAR; SOFT TISSUE
Status: COMPLETED | OUTPATIENT
Start: 2019-04-18 | End: 2019-04-18

## 2019-04-18 RX ADMIN — BETAMETHASONE SODIUM PHOSPHATE AND BETAMETHASONE ACETATE 6 MG: 3; 3 INJECTION, SUSPENSION INTRA-ARTICULAR; INTRALESIONAL; INTRAMUSCULAR; SOFT TISSUE at 01:04

## 2019-04-18 NOTE — TELEPHONE ENCOUNTER
Informed pt clinic will be closing at 3pm and asked if she was able to come at 1pm or 2pm and she stated she can come at 1pm.

## 2019-04-18 NOTE — PROGRESS NOTES
"Vianney Giles is a 33 year old here today for steroid injection of her Right knee . She was last seen and treated in the clinic on 4/15/19.  There has been no significant change in her medical status since last visit.    At last visit, patient offered option of PT vs. Cortisone injection.  She initially opted for PT, however states she would like to get cortisone injection instead today.      ROS:  Constitutional: no fever or chills  Eyes: no visual changes  ENT: no nasal congestion or sore throat  Respiratory: no cough or shortness of breath  Cardiovascular: no chest pain or palpitations  Gastrointestinal: no nausea or vomiting, tolerating diet  Genitourinary: no hematuria or dysuria  Integument/Breast: no rash or pruritis  Hematologic/Lymphatic: no easy bruising or lymphadenopathy  Musculoskeletal: positive for bilateral knee pain  Neurological: no seizures or tremors  Behavioral/Psych: no auditory or visual hallucinations, positive for anxiety and bipolar, ADHD  Endocrine: no heat or cold intolerance    right Knee Exam:  Knee Range of Motion:0-120 degrees flexion   Effusion:not significant  Condition of skin:intact  Location of tenderness:Medial joint line   Strength:4 of 5  Stability:  Lachman: stable, LCL: stable, MCL: stable, PCL: stable and posteromedial (dial): stable  Varus /Valgus stress:  normal  Bret:   positive    Radiograph:  MRI of right knee taken on 4/16/19, reviewed by radiologist, demonstrates: "Mucoid intrasubstance signal of the posterior horn medial meniscus.  No discrete tear."    Assessment and plan:  I will proceed with cortisone injection of right knee.  She is to rest, ice and elevate as needed for pain and swelling. She is to start PT for knee symptoms as well.  She is to follow up as needed.      Knee Injection Procedure Note    Diagnosis: right knee injury  Indications: right knee pain  Procedure Details: Verbal consent was obtained for the procedure. The injection site was identified " and the skin was prepared with alcohol and betadine prep. The right knee was injected from an anterolateral approach with 1 ml of Celestone and 5 ml Lidocaine under sterile technique using a 22 gauge needle. The needle was removed and the area cleansed and dressed.  Complications:  Patient tolerated the procedure well.    she was advised to rest the knee today, using ice and elevation as needed for comfort and swelling.Immediate relief of the knee pain may be short lived and secondary to the lidocaine. she may have an increase in discomfort tonight followed by steady improvement over the next several days. It may take 1-2 weeks following the injection to get the full benefit of the medication.

## 2019-04-18 NOTE — TELEPHONE ENCOUNTER
Spoke to pt and she stated she prefers to get an injection in her knee rather than PT, she chose the appt for today at 4pm.----- Message from Cecilio Stoll sent at 4/18/2019  8:02 AM CDT -----  Contact: patient   Please call pt at [phone]    Patient prefers to get injections instead of physical therapy. She is afraid    Thank you

## 2019-05-06 ENCOUNTER — TELEPHONE (OUTPATIENT)
Dept: ORTHOPEDICS | Facility: CLINIC | Age: 34
End: 2019-05-06

## 2019-05-06 NOTE — TELEPHONE ENCOUNTER
Ms. Faith stated she would like to come in for a sooner appt due to her right knee pain spreading to her whole leg and lower back. I spoke to Jerrell and he stated to get her in sooner for a appt, pt preferred 3:30pm tomorrow. Pt thanked me and verbalized understanding.----- Message from Sharon Cardoza sent at 5/6/2019  3:57 PM CDT -----  Contact: Self/ [phone]  Patient Requesting Sooner Appointment.     Reason for sooner appt.: Patient would like a call back to come in sooner for her appt due to her pain level.   When is the first available appointment? 5/15/19.   Communication Preference: [phone].

## 2019-05-07 ENCOUNTER — OFFICE VISIT (OUTPATIENT)
Dept: ORTHOPEDICS | Facility: CLINIC | Age: 34
End: 2019-05-07
Payer: MEDICARE

## 2019-05-07 ENCOUNTER — HOSPITAL ENCOUNTER (OUTPATIENT)
Dept: RADIOLOGY | Facility: HOSPITAL | Age: 34
Discharge: HOME | End: 2019-05-07
Attending: PHYSICIAN ASSISTANT
Payer: MEDICARE

## 2019-05-07 VITALS — WEIGHT: 158.94 LBS | HEIGHT: 67 IN | BODY MASS INDEX: 24.94 KG/M2

## 2019-05-07 DIAGNOSIS — M54.5 RIGHT LOW BACK PAIN, UNSPECIFIED CHRONICITY, WITH SCIATICA PRESENCE UNSPECIFIED: Primary | ICD-10-CM

## 2019-05-07 DIAGNOSIS — M54.5 RIGHT LOW BACK PAIN, UNSPECIFIED CHRONICITY, WITH SCIATICA PRESENCE UNSPECIFIED: ICD-10-CM

## 2019-05-07 DIAGNOSIS — M79.604 RIGHT LEG PAIN: ICD-10-CM

## 2019-05-07 DIAGNOSIS — M54.16 LUMBAR RADICULOPATHY: ICD-10-CM

## 2019-05-07 PROCEDURE — 99499 UNLISTED E&M SERVICE: CPT | Mod: S$GLB,,, | Performed by: PHYSICIAN ASSISTANT

## 2019-05-07 PROCEDURE — 3008F BODY MASS INDEX DOCD: CPT | Mod: CPTII,S$GLB,, | Performed by: PHYSICIAN ASSISTANT

## 2019-05-07 PROCEDURE — 73502 X-RAY EXAM HIP UNI 2-3 VIEWS: CPT | Mod: 26,RT,, | Performed by: RADIOLOGY

## 2019-05-07 PROCEDURE — 72100 XR LUMBAR SPINE AP AND LAT WITH FLEX/EXT: ICD-10-PCS | Mod: 26,,, | Performed by: RADIOLOGY

## 2019-05-07 PROCEDURE — 99999 PR PBB SHADOW E&M-EST. PATIENT-LVL III: ICD-10-PCS | Mod: PBBFAC,,, | Performed by: PHYSICIAN ASSISTANT

## 2019-05-07 PROCEDURE — 99999 PR PBB SHADOW E&M-EST. PATIENT-LVL III: CPT | Mod: PBBFAC,,, | Performed by: PHYSICIAN ASSISTANT

## 2019-05-07 PROCEDURE — 72100 X-RAY EXAM L-S SPINE 2/3 VWS: CPT | Mod: 26,,, | Performed by: RADIOLOGY

## 2019-05-07 PROCEDURE — 3008F PR BODY MASS INDEX (BMI) DOCUMENTED: ICD-10-PCS | Mod: CPTII,S$GLB,, | Performed by: PHYSICIAN ASSISTANT

## 2019-05-07 PROCEDURE — 72100 X-RAY EXAM L-S SPINE 2/3 VWS: CPT | Mod: TC

## 2019-05-07 PROCEDURE — 99213 OFFICE O/P EST LOW 20 MIN: CPT | Mod: S$GLB,,, | Performed by: PHYSICIAN ASSISTANT

## 2019-05-07 PROCEDURE — 73502 XR HIP 2 VIEW RIGHT: ICD-10-PCS | Mod: 26,RT,, | Performed by: RADIOLOGY

## 2019-05-07 PROCEDURE — 99213 PR OFFICE/OUTPT VISIT, EST, LEVL III, 20-29 MIN: ICD-10-PCS | Mod: S$GLB,,, | Performed by: PHYSICIAN ASSISTANT

## 2019-05-07 PROCEDURE — 72120 XR LUMBAR SPINE AP AND LAT WITH FLEX/EXT: ICD-10-PCS | Mod: 26,,, | Performed by: RADIOLOGY

## 2019-05-07 PROCEDURE — 99499 RISK ADDL DX/OHS AUDIT: ICD-10-PCS | Mod: S$GLB,,, | Performed by: PHYSICIAN ASSISTANT

## 2019-05-07 PROCEDURE — 73502 X-RAY EXAM HIP UNI 2-3 VIEWS: CPT | Mod: TC,RT

## 2019-05-07 PROCEDURE — 72120 X-RAY BEND ONLY L-S SPINE: CPT | Mod: 26,,, | Performed by: RADIOLOGY
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
## 2019-05-13 ENCOUNTER — TELEPHONE (OUTPATIENT)
Dept: INTERNAL MEDICINE | Facility: CLINIC | Age: 34
End: 2019-05-13

## 2019-05-13 RX ORDER — TRAMADOL HYDROCHLORIDE 50 MG/1
TABLET ORAL
Qty: 60 TABLET | Refills: 0 | Status: SHIPPED | OUTPATIENT
Start: 2019-05-13 | End: 2019-05-14 | Stop reason: SDUPTHER

## 2019-05-14 RX ORDER — TRAMADOL HYDROCHLORIDE 50 MG/1
TABLET ORAL
Qty: 60 TABLET | Refills: 0 | Status: SHIPPED | OUTPATIENT
Start: 2019-05-14 | End: 2019-07-09 | Stop reason: SDUPTHER

## 2019-05-14 RX ORDER — NORETHINDRONE ACETATE AND ETHINYL ESTRADIOL .02; 1 MG/1; MG/1
TABLET ORAL
Qty: 21 TABLET | Refills: 0 | Status: SHIPPED | OUTPATIENT
Start: 2019-05-14 | End: 2019-05-21 | Stop reason: SDUPTHER

## 2019-05-14 NOTE — TELEPHONE ENCOUNTER
----- Message from Ludmila Brown sent at 5/14/2019 12:14 PM CDT -----  Contact: [phone]  Patient called to report that she needs the tramadol for severe back pain.    Please advise, thank you

## 2019-05-15 NOTE — TELEPHONE ENCOUNTER
The pharmacist was informed the physician is aware that the pt takes Clorazepate. Was informed the rx is ready for  and the pt was advised.      Please advise,

## 2019-05-15 NOTE — TELEPHONE ENCOUNTER
----- Message from Annetta Garcia sent at 5/15/2019  2:17 PM CDT -----  Contact: Patient [phone]  Patient is stating that the pharmacy needs an override to fill Rx traMADol (ULTRAM) 50 mg tablet.    [facility] [id number] [facility] [place] [address]    Thank you

## 2019-05-21 ENCOUNTER — TELEPHONE (OUTPATIENT)
Dept: OBSTETRICS AND GYNECOLOGY | Facility: CLINIC | Age: 34
End: 2019-05-21

## 2019-05-21 ENCOUNTER — OFFICE VISIT (OUTPATIENT)
Dept: OBSTETRICS AND GYNECOLOGY | Facility: CLINIC | Age: 34
End: 2019-05-21
Payer: MEDICARE

## 2019-05-21 VITALS
DIASTOLIC BLOOD PRESSURE: 70 MMHG | SYSTOLIC BLOOD PRESSURE: 111 MMHG | HEIGHT: 67 IN | WEIGHT: 165.38 LBS | BODY MASS INDEX: 25.96 KG/M2

## 2019-05-21 DIAGNOSIS — Z87.891 FORMER SMOKER: ICD-10-CM

## 2019-05-21 DIAGNOSIS — Z30.41 ENCOUNTER FOR SURVEILLANCE OF CONTRACEPTIVE PILLS: ICD-10-CM

## 2019-05-21 DIAGNOSIS — Z01.419 WELL WOMAN EXAM WITH ROUTINE GYNECOLOGICAL EXAM: Primary | ICD-10-CM

## 2019-05-21 DIAGNOSIS — Z87.42 HISTORY OF ABNORMAL CERVICAL PAP SMEAR: ICD-10-CM

## 2019-05-21 PROBLEM — F17.210 CIGARETTE SMOKER: Status: RESOLVED | Noted: 2017-01-30 | Resolved: 2019-05-21

## 2019-05-21 PROCEDURE — 99999 PR PBB SHADOW E&M-EST. PATIENT-LVL III: ICD-10-PCS | Mod: PBBFAC,,, | Performed by: NURSE PRACTITIONER

## 2019-05-21 PROCEDURE — G0101 CA SCREEN;PELVIC/BREAST EXAM: HCPCS | Mod: S$GLB,,, | Performed by: NURSE PRACTITIONER

## 2019-05-21 PROCEDURE — 99999 PR PBB SHADOW E&M-EST. PATIENT-LVL III: CPT | Mod: PBBFAC,,, | Performed by: NURSE PRACTITIONER

## 2019-05-21 PROCEDURE — G0101 PR CA SCREEN;PELVIC/BREAST EXAM: ICD-10-PCS | Mod: S$GLB,,, | Performed by: NURSE PRACTITIONER

## 2019-05-21 RX ORDER — FLUOXETINE HYDROCHLORIDE 20 MG/1
CAPSULE ORAL
Refills: 1 | COMMUNITY
Start: 2019-04-18 | End: 2019-08-12

## 2019-05-21 RX ORDER — DIVALPROEX SODIUM 250 MG/1
500 TABLET, FILM COATED, EXTENDED RELEASE ORAL 2 TIMES DAILY
Refills: 2 | COMMUNITY
Start: 2019-04-18 | End: 2019-10-04

## 2019-05-21 RX ORDER — NORETHINDRONE ACETATE AND ETHINYL ESTRADIOL .02; 1 MG/1; MG/1
1 TABLET ORAL DAILY
Qty: 63 TABLET | Refills: 4 | Status: SHIPPED | OUTPATIENT
Start: 2019-05-21 | End: 2020-03-17 | Stop reason: SDUPTHER

## 2019-05-21 RX ORDER — ARIPIPRAZOLE 10 MG/1
10 TABLET ORAL DAILY
Refills: 0 | COMMUNITY
Start: 2019-04-28 | End: 2019-08-12

## 2019-05-21 NOTE — PROGRESS NOTES
HISTORY OF PRESENT ILLNESS:    Vianney Callahan is a 33 y.o. female, , No LMP recorded. (Menstrual status: Birth Control).,  presents for a routine exam and OCP refills.  -Has stopped smoking because she broke up with her boyfriend.  -She attributes weight gain due to change in medications, not exercising due to back pain and leg pain (seeing Orthopedist).    Past Medical History:   Diagnosis Date    Abnormal cervical Papanicolaou smear     Colposcopy    ADHD (attention deficit hyperactivity disorder) 2012    Allergy     Anxiety     Asthma     childhood    Back pain 2014    Bipolar affective disorder     Cholecystitis     Chronic migraine without aura, with intractable migraine, so stated, without mention of status migrainosus 2013    Depression     Fever blister     Gallstones 2014    Headache(784.0)     Hepatitis C antibody positive in blood     History of hepatitis C     History of psychiatric hospitalization     Suicide attempt    Personality disorder 2013    Psychosis 10/7/2013    Therapy     Tobacco abuse 2013       Past Surgical History:   Procedure Laterality Date    CHOLECYSTECTOMY-LAPAROSCOPIC N/A 2014    Performed by Joshua Goldberg, [facility] at [facility] OR 2ND FLR    ESOPHAGOGASTRODUODENOSCOPY      ESOPHAGOGASTRODUODENOSCOPY (EGD) N/A 2014    Performed by Wayne Wang MD at [facility] ENDO (4TH FLR)    FRACTURE SURGERY      MOLE REMOVAL      SKIN BIOPSY         MEDICATIONS AND ALLERGIES:      Current Outpatient Medications:     ARIPiprazole (ABILIFY) 10 MG Tab, Take 10 mg by mouth once daily., Disp: , Rfl: 0    benztropine (COGENTIN) 0.5 MG tablet, Take 0.5 mg by mouth 2 (two) times daily., Disp: , Rfl:     clorazepate (TRANXENE) 7.5 MG Tab, Take 7.5 mg by mouth 2 (two) times daily., Disp: , Rfl: 0    divalproex ER (DEPAKOTE ER) 250 MG 24 hr tablet, Take 500 mg by mouth 2 (two) times daily., Disp: , Rfl: 2    FLUoxetine (PROZAC) 40 MG  capsule, Take 60 mg by mouth every morning. , Disp: , Rfl: 0    FLUoxetine 20 MG capsule, TAKE ONE (1) CAPSULE BY MOUTH DAILY WITH 40MG CAPSULE TO =60MG TOTAL DOSE, Disp: , Rfl: 1    norethindrone-ethinyl estradiol (MICROGESTIN 1/20) 1-20 mg-mcg per tablet, Take 1 tablet by mouth once daily., Disp: 63 tablet, Rfl: 4    OXcarbazepine (TRILEPTAL) 600 MG Tab, , Disp: , Rfl: 0    sumatriptan (IMITREX) 100 MG tablet, TAKE ONE TABLET oi EVERY DAY AS NEEDED FOR migrane, Disp: 30 tablet, Rfl: 11    traMADol (ULTRAM) 50 mg tablet, TAKE 1 TABLET BY MOUTH EVERY 8 HOURS IF NEEDED.[id number], Disp: 60 tablet, Rfl: 0    valACYclovir (VALTREX) 1000 MG tablet, Take 1 tablet (1,000 mg total) by mouth once daily., Disp: 90 tablet, Rfl: 3    Review of patient's allergies indicates:   Allergen Reactions    Dilantin [phenytoin sodium extended] Rash    Saphris [asenapine]      Confusion and depressed mood.       Family History   Problem Relation Age of Onset    Melanoma Mother     ADD / ADHD Mother     Bipolar disorder Mother     Schizophrenia Mother     Alcohol abuse Father     Drug abuse Father     Depression Father     No Known Problems Sister     Acne Maternal Aunt     Depression Maternal Aunt     Migraines Maternal Aunt     Anxiety disorder Paternal Aunt     Depression Paternal Aunt     Breast cancer Neg Hx     Colon cancer Neg Hx     Ovarian cancer Neg Hx        Social History     Socioeconomic History    Marital status: Single     Spouse name: Not on file    Number of children: Not on file    Years of education: Not on file    Highest education level: Not on file   Occupational History    Occupation: disabled     Employer: [employer]     Employer: disabled    Social Needs    Financial resource strain: Not on file    Food insecurity:     Worry: Not on file     Inability: Not on file    Transportation needs:     Medical: Not on file     Non-medical: Not on file   Tobacco Use    Smoking status: Current  Every Day Smoker     Packs/day: 0.50     Types: Cigarettes     Last attempt to quit: 2018     Years since quittin.0    Smokeless tobacco: Never Used   Substance and Sexual Activity    Alcohol use: No     Alcohol/week: 0.0 oz    Drug use: No    Sexual activity: Not Currently     Partners: Male     Birth control/protection: OCP   Lifestyle    Physical activity:     Days per week: Not on file     Minutes per session: Not on file    Stress: Not on file   Relationships    Social connections:     Talks on phone: Not on file     Gets together: Not on file     Attends Catholic service: Not on file     Active member of club or organization: Not on file     Attends meetings of clubs or organizations: Not on file     Relationship status: Not on file   Other Topics Concern    Are you pregnant or think you may be? No    Breast-feeding No    Caffeine Use: Excessive No    Financial Status: Unemployed No    Legal: Other No    Childhood History: Adopted No    Financial Status: Other No    Leisure: Exercise No    Childhood History: Early trauma No    Firearms: Does patient have access to a firearm? No    Leisure: Fishing No    Childhood History: Raised by parents Yes    Home situation: homeless No    Leisure: Hunting No    Childhood History: Uneventful No    Home situation: lives alone No    Leisure: Movie Watching Yes    Childhood History: Other No    Home situation: lives in group home No    Leisure: Shopping Yes    Education: Unfinished High School No    Home situation: lives in nursing home No    Leisure: Sports No    Education: High School Graduate Yes    Home situation: lives in shelter No    Leisure: Time with family No    Education: Unfinished college Yes    Home situation: lives with family No     Service No    Education: Trade School No    Home situation: lives with friends No    Spirituality: Active Participation No    Education: Associate's Degree No    Home  situation: lives with significant other Yes    Spirituality: Organized Mu-ism No    Education: Bachelor's Degree No    Home situation: lives with spouse No    Spirituality: Private Participation No    Education: More than one Bachelor's or Professional No    Legal consequences of chemical use No    Patient feels they ought to cut down on drinking/drug use No    Education: Master's, PhD No    Legal: Arrest history No    Patient annoyed by others criticizing their drinking/drug use No    Financial Status: Disabled Yes    Legal: Involved in civil litigation No    Patient has felt bad or guilty about drinking/drug use No    Financial Status: Employed No    Legal: Involved in criminal litigation No    Patient has had a drink/used drugs as an eye opener in the AM No   Social History Narrative        Exercise:  walking       OB HISTORY: None     COMPREHENSIVE GYN HISTORY:  PAP History: History of abnormal Paps: 2012. Treatment: Colposcopy. LAST PAP 1-30-18 Normal  Infection History: Reports STDs: HSV (fever blister), HPV, HEP C. Denies PID.  Benign History: Denies uterine fibroids. Denies ovarian cysts. Denies endometriosis. Denies other conditions.  Cancer History: Denies cervical cancer. Denies uterine cancer or hyperplasia. Denies ovarian cancer. Denies vulvar cancer or pre-cancer. Denies vaginal cancer or pre-cancer. Denies breast cancer. Denies colon cancer.  Sexual Activity History: Denies currently being sexually active  Menstrual History: Denies menses.   Dysmenorrhea History: Denies dysmenorrhea.  Contraception: OCPs - takes back to back.     ROS:  GENERAL: + WT GAIN. No swelling. No fatigue. No fever.  CARDIOVASCULAR: No chest pain. No shortness of breath. No leg cramps.   NEUROLOGICAL: No headaches. No vision changes.  MSK: + BACK and LEG PAIN.  BREASTS: No pain. No lumps. No discharge.  ABDOMEN: No pain. No nausea. No vomiting. No diarrhea. No constipation.  REPRODUCTIVE: No abnormal bleeding.  "  VULVA: No pain. No lesions. No itching.  VAGINA: No relaxation. No itching. No odor. No discharge. No lesions.  URINARY: No incontinence. No nocturia. No frequency. No dysuria.    /70 (BP Location: Right arm, Patient Position: Sitting, BP Method: Medium (Automatic))   Ht 5' 7" (1.702 m)   Wt 75 kg (165 lb 5.5 oz)   BMI 25.90 kg/m²   1-30-18 Wt 59.8 kg (131 lb 13.4 oz)    PE:  APPEARANCE: Well nourished, well developed, in no acute distress.  AFFECT: WNL, alert and oriented x 3.  SKIN: No acne or hirsutism.  NECK: Neck symmetric, without masses or thyromegaly.  NODES: No inguinal, cervical, axillary or femoral lymph node enlargement.  CHEST: Good respiratory effort.   ABDOMEN: Soft. No tenderness or masses.   BREASTS: Symmetrical, no skin changes, visible lesions, palpable masses or nipple discharge bilaterally.  PELVIC: External female genitalia without lesions.  Female hair distribution. Adequate perineal body, Normal urethral meatus. Vagina moist and well rugated without lesions or discharge.  No significant cystocele or rectocele present. Cervix pink without lesions, discharge or tenderness. Uterus is 4-6 week size, regular, mobile and nontender. Adnexa without masses or tenderness.  EXTREMITIES: No edema    DIAGNOSIS:  1. Well woman exam with routine gynecological exam    2. Encounter for surveillance of contraceptive pills    3. Former smoker    4. History of abnormal cervical Pap smear        PLAN:    Orders Placed This Encounter    norethindrone-ethinyl estradiol (MICROGESTIN 1/20) 1-20 mg-mcg per tablet   Declined STD testing    COUNSELING:  The patient was counseled today on:  -OCP use and potential side effects;  -A.C.S. Pap and pelvic exam guidelines (pap every 3 years);  -to follow up with her PCP for other health maintenance.    FOLLOW-UP with Dr Rico annually.     "
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
## 2019-05-24 ENCOUNTER — CLINICAL SUPPORT (OUTPATIENT)
Dept: REHABILITATION | Facility: HOSPITAL | Age: 34
End: 2019-05-24
Payer: MEDICARE

## 2019-05-24 DIAGNOSIS — M54.41 RIGHT-SIDED LOW BACK PAIN WITH RIGHT-SIDED SCIATICA, UNSPECIFIED CHRONICITY: Primary | ICD-10-CM

## 2019-05-24 DIAGNOSIS — M54.16 LUMBAR BACK PAIN WITH RADICULOPATHY AFFECTING RIGHT LOWER EXTREMITY: ICD-10-CM

## 2019-05-24 DIAGNOSIS — R29.898 RIGHT LEG WEAKNESS: ICD-10-CM

## 2019-05-24 PROCEDURE — G8979 MOBILITY GOAL STATUS: HCPCS | Mod: CJ

## 2019-05-24 PROCEDURE — 97110 THERAPEUTIC EXERCISES: CPT

## 2019-05-24 PROCEDURE — 97161 PT EVAL LOW COMPLEX 20 MIN: CPT

## 2019-05-24 PROCEDURE — G8978 MOBILITY CURRENT STATUS: HCPCS | Mod: CL

## 2019-05-28 ENCOUNTER — CLINICAL SUPPORT (OUTPATIENT)
Dept: REHABILITATION | Facility: HOSPITAL | Age: 34
End: 2019-05-28
Payer: MEDICARE

## 2019-05-28 DIAGNOSIS — R29.898 RIGHT LEG WEAKNESS: ICD-10-CM

## 2019-05-28 DIAGNOSIS — M54.16 LUMBAR BACK PAIN WITH RADICULOPATHY AFFECTING RIGHT LOWER EXTREMITY: ICD-10-CM

## 2019-05-28 PROCEDURE — 97110 THERAPEUTIC EXERCISES: CPT

## 2019-05-28 NOTE — PROGRESS NOTES
"  Physical Therapy Daily Treatment Note     Name: Vianney Callahan  Clinic Number: [id number]    Therapy Diagnosis:   Encounter Diagnoses   Name Primary?    Lumbar back pain with radiculopathy affecting right lower extremity     Right leg weakness      Physician: Luiz Pizarro PA*    Visit Date: 5/28/2019  Physician Orders: Pt Eval and Treat  Medical Diagnosis:  M54.41 Right-sided low back pain with right-sided sciatica, unspecified chronicity  Evaluation Date: 5/24/19  Authorization Period Expiration: 6/30/19  Plan of Care Certification Period: 7/05/19  Visit #/Visits authorized: 2/ 12     Time In: 3:45  Time Out: 4:50  Total Billable Time: 60 minutes    Precautions: Standard    Subjective     Pt reports: that she is w/o c/o pn at this time. states "it feels like I need to crack my back".  She was compliant with home exercise program.  Response to previous treatment: no adverse effects  Functional change: too soon to assess    Pain: 0/10  Location: bilateral back      Objective     Vianney received therapeutic exercises to develop strength, endurance, ROM, flexibility, posture and core stabilization for 60 minutes including:  LX rotation stretch 3 x 20 sec  LX oscillation x 2 min  Hip ADD with ball with PPT with 3 sec hold x 3 min  Piriformis stretch 3 x 20 sec  DKTC 3 x 20 sec  HS curl with red ball 20x  Bridging 20x  Clamshell 20x  PPU 10x 10 sec hold  LAQ 20x  Horiz Row orange t-band 20x  B shld ext orange t-band 20x  Parloff press orange t-band 20x  mini Squat 20x  Core stab push on t-ball 3 sec hold 20x VCs for posture    Home Exercises Provided and Patient Education Provided     Education provided:   - proper posture, body mechanics with lifting    Written Home Exercises Provided: Patient instructed to cont prior HEP.  Exercises were reviewed and Vianney was able to demonstrate them prior to the end of the session.  Vianney demonstrated good  understanding of the education provided.     See EMR under Patient " Instructions for exercises provided prior visit.    Assessment     Pt with good carlin to tx with no provocation of LBP sx . Pt was able to demonstrate good return of education of proper lifting tech post insruction  Vianney is progressing well towards her goals.   Pt prognosis is Good.     Pt will continue to benefit from skilled outpatient physical therapy to address the deficits listed in the problem list box on initial evaluation, provide pt/family education and to maximize pt's level of independence in the home and community environment.     Pt's spiritual, cultural and educational needs considered and pt agreeable to plan of care and goals.    Anticipated barriers to physical therapy:pain, psychiatric history     Goals:   Short Term GOALS: 3 weeks. Pt agrees with goals set.  1. Patient demonstrates independence with HEP.   2. Patient demonstrates independence with Postural Awareness.   3. Patient demonstrates independence with body mechanics.      Long Term GOALS: 6 weeks. Pt agrees with goals set.  1. Patient demonstrates increased R HS (90/90) to at least -30 degrees to improve tolerance to functional activities.   2. Patient demonstrates increased strength BLE's to 4-/5 or greater to improve tolerance to functional activities.   3. Patient demonstrates improved overall function per FOTO Lumbar Survey to 35% or less  Plan     Outpatient physical therapy 2 times/week for 12 visits to include: pt ed, hep, therapeutic exercises, neuromuscular re-education/ balance exercises, joint mobilizations, and modalities prn.Pt may be seen by PTA as part of the rehabilitation team.     Dylan Caldwell, SHA

## 2019-05-28 NOTE — PROGRESS NOTES
"  Physical Therapy Daily Treatment Note     Name: Vianney Callahan  Clinic Number: [id number]    Therapy Diagnosis: No diagnosis found.  Physician: Luiz Pizarro PA*    Visit Date: 5/30/2019    Physician Orders: PT eval & treat    Medical Diagnosis: M54.41 Right-sided low back pain with right-sided sciatica, unspecified chronicity  Evaluation Date: 5/24/19  Authorization Period Expiration: 4/30/19-6/30/19   Plan of Care Certification Period: 7/5/19  Visit #/Visits authorized: 2/ 12   FOTO: 2/5    Gcode: ***/10  Visit:  ***  Total: ***    Time In: ***  Time Out: ***  Total Billable Time: *** minutes    Precautions: Standard and Fall    Subjective     Pt reports: ***.  She {Actions; was/was not:81389} compliant with home exercise program.  Response to previous treatment: ***  Functional change: ***    Pain: {0-10:16559::"0"}/10  Location: {RIGHT/LEFT/BILATERAL:72708} {LOCATION ON BODY:00597}     Objective     Vianney received the following manual therapy techniques: {AMB PT PROGRESS MANUAL THERAPY:28365} were applied to the: *** for *** minutes, including:  ***    Vianney received therapeutic exercises to develop {AMB PT PROGRESS OBJECTIVE:35398} for *** minutes including:  ***    Vianney participated in neuromuscular re-education activities to improve: {AMB PT PROGRESS NEURO RE-ED:14165} for *** minutes. The following activities were included:  ***    Home Exercises Provided and Patient Education Provided     Education provided:   - ***    Written Home Exercises Provided: {Blank single:22519::"yes","Patient instructed to cont prior HEP"}.  Exercises were reviewed and Vianney was able to demonstrate them prior to the end of the session.  Vianney demonstrated {Desc; good/fair/poor:72955} understanding of the education provided.     See EMR under {Blank single:72227::"Media","Patient Instructions"} for exercises provided {Blank single:19197::"5/30/2019","prior visit"}.      Assessment     ***  Vianney {IS/IS NOT:90439} progressing well towards " her goals.   Pt prognosis is {REHAB PROGNOSIS OHS:73875}.     Pt will continue to benefit from skilled outpatient physical therapy to address the deficits listed in the problem list box on initial evaluation, provide pt/family education and to maximize pt's level of independence in the home and community environment.     Pt's spiritual, cultural and educational needs considered and pt agreeable to plan of care and goals.    Anticipated barriers to physical therapy:  pain, psychiatric history    Goals:   Short Term GOALS: 3 weeks. Pt agrees with goals set.  1. Patient demonstrates independence with HEP.   2. Patient demonstrates independence with Postural Awareness.   3. Patient demonstrates independence with body mechanics.      Long Term GOALS: 6 weeks. Pt agrees with goals set.  1. Patient demonstrates increased R HS (90/90) to at least -30 degrees to improve tolerance to functional activities.   2. Patient demonstrates increased strength BLE's to 4-/5 or greater to improve tolerance to functional activities.   3. Patient demonstrates improved overall function per FOTO Lumbar Survey to 35% or less.     Plan     ***    Chanel Fleming, PT

## 2019-05-30 ENCOUNTER — CLINICAL SUPPORT (OUTPATIENT)
Dept: REHABILITATION | Facility: HOSPITAL | Age: 34
End: 2019-05-30
Payer: MEDICARE

## 2019-05-30 DIAGNOSIS — R29.898 RIGHT LEG WEAKNESS: ICD-10-CM

## 2019-05-30 DIAGNOSIS — M54.16 LUMBAR BACK PAIN WITH RADICULOPATHY AFFECTING RIGHT LOWER EXTREMITY: ICD-10-CM

## 2019-05-30 PROCEDURE — 97110 THERAPEUTIC EXERCISES: CPT

## 2019-05-30 NOTE — PROGRESS NOTES
"  Physical Therapy Daily Treatment Note     Name: Vianney Callahan  Clinic Number: [id number]    Therapy Diagnosis:   Encounter Diagnoses   Name Primary?    Lumbar back pain with radiculopathy affecting right lower extremity     Right leg weakness      Physician: Luiz Pizarro PA*    Visit Date: 5/30/2019  Physician Orders: Pt Eval and Treat  Medical Diagnosis:  M54.41 Right-sided low back pain with right-sided sciatica, unspecified chronicity  Evaluation Date: 5/24/19  Authorization Period Expiration: 6/30/19  Plan of Care Certification Period: 7/05/19  Visit #/Visits authorized: 3/ 12     Time In: 8:00  Time Out: 9:00  Total Billable Time: 60 minutes    Precautions: Standard    Subjective     Pt reports: that she is w/o c/o pn at this time. states that she felt "a big difference" in her back post last tx, stating that her legs don't hurt as much..  She was compliant with home exercise program.  Response to previous treatment: no adverse effects  Functional change: too soon to assess    Pain: 0/10  Location: bilateral back      Objective     Vianney received therapeutic exercises to develop strength, endurance, ROM, flexibility, posture and core stabilization for 60 minutes including:  LX rotation stretch 3 x 20 sec  LX oscillation x 2 min with red ball  Hip ADD with ball with PPT with 3 sec hold x 3 min  Piriformis stretch 3 x 20 sec  DKTC 3 x 20 sec  HS curl with red ball 20x  Bridging 20x with ball  Clamshell 20x orange t-band  prone on elbows 1 min x 3  PPU 10x 10 sec hold  LAQ 20x  Horiz Row orange t-band 20x  B shld ext orange t-band 20x  Parloff press orange t-band 20x  mini Squat 20x holding red ball  Core stab push on t-ball 3 sec hold 20x VCs for posture  Prayer stretch 3 ways 3 x 20 sec each    Home Exercises Provided and Patient Education Provided     Education provided:   - proper posture, body mechanics with lifting    Written Home Exercises Provided: Patient instructed to cont prior HEP.  Exercises were " reviewed and Vianney was able to demonstrate them prior to the end of the session.  Vianney demonstrated good  understanding of the education provided.     See EMR under Patient Instructions for exercises provided prior visit.    Assessment     Pt with good carlin to progress of tx with no provocation of LBP sx . Pt was able to demonstrate good return of education of proper lifting tech post insruction  Vianney is progressing well towards her goals. Pt appears to be benefiting from tx based on subjective above  Pt prognosis is Good.     Pt will continue to benefit from skilled outpatient physical therapy to address the deficits listed in the problem list box on initial evaluation, provide pt/family education and to maximize pt's level of independence in the home and community environment.     Pt's spiritual, cultural and educational needs considered and pt agreeable to plan of care and goals.    Anticipated barriers to physical therapy:pain, psychiatric history     Goals:   Short Term GOALS: 3 weeks. Pt agrees with goals set.  1. Patient demonstrates independence with HEP.   2. Patient demonstrates independence with Postural Awareness.   3. Patient demonstrates independence with body mechanics.      Long Term GOALS: 6 weeks. Pt agrees with goals set.  1. Patient demonstrates increased R HS (90/90) to at least -30 degrees to improve tolerance to functional activities.   2. Patient demonstrates increased strength BLE's to 4-/5 or greater to improve tolerance to functional activities.   3. Patient demonstrates improved overall function per FOTO Lumbar Survey to 35% or less  Plan     Outpatient physical therapy 2 times/week for 12 visits to include: pt ed, hep, therapeutic exercises, neuromuscular re-education/ balance exercises, joint mobilizations, and modalities prn.Pt may be seen by PTA as part of the rehabilitation team.     Dylan Caldwell, SHA

## 2019-06-10 ENCOUNTER — OFFICE VISIT (OUTPATIENT)
Dept: ORTHOPEDICS | Facility: CLINIC | Age: 34
End: 2019-06-10
Payer: MEDICARE

## 2019-06-10 VITALS — BODY MASS INDEX: 26.64 KG/M2 | WEIGHT: 169.75 LBS | HEIGHT: 67 IN

## 2019-06-10 DIAGNOSIS — R51.9 CHRONIC INTRACTABLE HEADACHE, UNSPECIFIED HEADACHE TYPE: Primary | ICD-10-CM

## 2019-06-10 DIAGNOSIS — M54.5 CHRONIC BILATERAL LOW BACK PAIN, WITH SCIATICA PRESENCE UNSPECIFIED: ICD-10-CM

## 2019-06-10 DIAGNOSIS — G89.29 CHRONIC BILATERAL LOW BACK PAIN, WITH SCIATICA PRESENCE UNSPECIFIED: ICD-10-CM

## 2019-06-10 DIAGNOSIS — G89.29 CHRONIC INTRACTABLE HEADACHE, UNSPECIFIED HEADACHE TYPE: Primary | ICD-10-CM

## 2019-06-10 PROCEDURE — 3008F BODY MASS INDEX DOCD: CPT | Mod: CPTII,S$GLB,, | Performed by: PHYSICIAN ASSISTANT

## 2019-06-10 PROCEDURE — 3008F PR BODY MASS INDEX (BMI) DOCUMENTED: ICD-10-PCS | Mod: CPTII,S$GLB,, | Performed by: PHYSICIAN ASSISTANT

## 2019-06-10 PROCEDURE — 99999 PR PBB SHADOW E&M-EST. PATIENT-LVL III: ICD-10-PCS | Mod: PBBFAC,,, | Performed by: PHYSICIAN ASSISTANT

## 2019-06-10 PROCEDURE — 99214 PR OFFICE/OUTPT VISIT, EST, LEVL IV, 30-39 MIN: ICD-10-PCS | Mod: S$GLB,,, | Performed by: PHYSICIAN ASSISTANT

## 2019-06-10 PROCEDURE — 99999 PR PBB SHADOW E&M-EST. PATIENT-LVL III: CPT | Mod: PBBFAC,,, | Performed by: PHYSICIAN ASSISTANT

## 2019-06-10 PROCEDURE — 99214 OFFICE O/P EST MOD 30 MIN: CPT | Mod: S$GLB,,, | Performed by: PHYSICIAN ASSISTANT

## 2019-06-10 RX ORDER — MELOXICAM 15 MG/1
15 TABLET ORAL DAILY
Qty: 30 TABLET | Refills: 0 | Status: SHIPPED | OUTPATIENT
Start: 2019-06-10 | End: 2019-07-09 | Stop reason: SDUPTHER

## 2019-06-10 NOTE — PROGRESS NOTES
"DATE: 6/10/2019  PATIENT: Vianney Callahan    Supervising Physician: Lucas Hyde M.D.    CHIEF COMPLAINT: back pain    HISTORY:  Vianney Callahan is a 33 y.o. female previously seen by Jerrell Pizarro PA-C for her knee here for initial evaluation of low back and occasional right leg pain (Back - 4, Leg - 0).  The pain in the back is what bothers her most.  The pain has been present since she was 18 years old. The patient describes the pain as aching and throbbing.  The pain is worse with walking and standing and improved by sitting. There is no associated numbness and tingling. There is subjective weakness.  She says sometimes her legs feel like "jello."  Prior treatments have included tramadol, ibuprofen, tylenol and she recent started physical therapy, but no ESIs or surgery.  She also reports worsening migraines.  She takes sumatriptan but says this isn't helping anymore.  She has never seen neurology.     The patient denies myelopathic symptoms such as handwriting changes or difficulty with buttons/coins/keys. Denies perineal paresthesias, bowel/bladder dysfunction.    PAST MEDICAL/SURGICAL HISTORY:  Past Medical History:   Diagnosis Date    Abnormal cervical Papanicolaou smear 2012    Colposcopy    ADHD (attention deficit hyperactivity disorder) 8/16/2012    Allergy     Anxiety     Asthma     childhood    Back pain 5/19/2014    Bipolar affective disorder     Cholecystitis     Chronic migraine without aura, with intractable migraine, so stated, without mention of status migrainosus 9/24/2013    Depression     Fever blister     Gallstones 5/26/2014    Headache(784.0)     Hepatitis C antibody positive in blood     History of hepatitis C     History of psychiatric hospitalization     Suicide attempt    Personality disorder 4/11/2013    Psychosis 10/7/2013    Therapy     Tobacco abuse 9/24/2013     Past Surgical History:   Procedure Laterality Date    CHOLECYSTECTOMY-LAPAROSCOPIC N/A 5/26/2014    " Performed by Joshua Goldberg, MD at [facility] OR 2ND FLR    ESOPHAGOGASTRODUODENOSCOPY      ESOPHAGOGASTRODUODENOSCOPY (EGD) N/A 9/5/2014    Performed by Wayne Wang MD at [facility] (4TH FLR)    FRACTURE SURGERY      MOLE REMOVAL      SKIN BIOPSY         Medications:   Current Outpatient Medications on File Prior to Visit   Medication Sig Dispense Refill    ARIPiprazole (ABILIFY) 10 MG Tab Take 10 mg by mouth once daily.  0    benztropine (COGENTIN) 0.5 MG tablet Take 0.5 mg by mouth 2 (two) times daily.      clorazepate (TRANXENE) 7.5 MG Tab Take 7.5 mg by mouth 2 (two) times daily.  0    divalproex ER (DEPAKOTE ER) 250 MG 24 hr tablet Take 500 mg by mouth 2 (two) times daily.  2    FLUoxetine (PROZAC) 40 MG capsule Take 60 mg by mouth every morning.   0    FLUoxetine 20 MG capsule TAKE ONE (1) CAPSULE BY MOUTH DAILY WITH 40MG CAPSULE TO =60MG TOTAL DOSE  1    norethindrone-ethinyl estradiol (MICROGESTIN 1/20) 1-20 mg-mcg per tablet Take 1 tablet by mouth once daily. 63 tablet 4    OXcarbazepine (TRILEPTAL) 600 MG Tab   0    sumatriptan (IMITREX) 100 MG tablet TAKE ONE TABLET oi EVERY DAY AS NEEDED FOR migrane 30 tablet 11    traMADol (ULTRAM) 50 mg tablet TAKE 1 TABLET BY MOUTH EVERY 8 HOURS IF NEEDED.[id number] 60 tablet 0    valACYclovir (VALTREX) 1000 MG tablet Take 1 tablet (1,000 mg total) by mouth once daily. 90 tablet 3     No current facility-administered medications on file prior to visit.        Social History:   Social History     Socioeconomic History    Marital status: Single     Spouse name: Not on file    Number of children: Not on file    Years of education: Not on file    Highest education level: Not on file   Occupational History    Occupation: disabled     Employer: [employer]     Employer: disabled    Social Needs    Financial resource strain: Not on file    Food insecurity:     Worry: Not on file     Inability: Not on file    Transportation needs:     Medical: Not on  file     Non-medical: Not on file   Tobacco Use    Smoking status: Current Every Day Smoker     Packs/day: 0.50     Types: Cigarettes     Last attempt to quit: 2018     Years since quittin.0    Smokeless tobacco: Never Used   Substance and Sexual Activity    Alcohol use: No     Alcohol/week: 0.0 oz    Drug use: No    Sexual activity: Not Currently     Partners: Male     Birth control/protection: OCP   Lifestyle    Physical activity:     Days per week: Not on file     Minutes per session: Not on file    Stress: Not on file   Relationships    Social connections:     Talks on phone: Not on file     Gets together: Not on file     Attends Mandaeism service: Not on file     Active member of club or organization: Not on file     Attends meetings of clubs or organizations: Not on file     Relationship status: Not on file   Other Topics Concern    Are you pregnant or think you may be? No    Breast-feeding No    Caffeine Use: Excessive No    Financial Status: Unemployed No    Legal: Other No    Childhood History: Adopted No    Financial Status: Other No    Leisure: Exercise No    Childhood History: Early trauma No    Firearms: Does patient have access to a firearm? No    Leisure: Fishing No    Childhood History: Raised by parents Yes    Home situation: homeless No    Leisure: Hunting No    Childhood History: Uneventful No    Home situation: lives alone No    Leisure: Movie Watching Yes    Childhood History: Other No    Home situation: lives in group home No    Leisure: Shopping Yes    Education: Unfinished High School No    Home situation: lives in nursing home No    Leisure: Sports No    Education: High School Graduate Yes    Home situation: lives in shelter No    Leisure: Time with family No    Education: Unfinished college Yes    Home situation: lives with family No     Service No    Education: Trade School No    Home situation: lives with friends No    Spirituality:  "Active Participation No    Education: Associate's Degree No    Home situation: lives with significant other Yes    Spirituality: Organized Scientology No    Education: Bachelor's Degree No    Home situation: lives with spouse No    Spirituality: Private Participation No    Education: More than one Bachelor's or Professional No    Legal consequences of chemical use No    Patient feels they ought to cut down on drinking/drug use No    Education: Master's, PhD No    Legal: Arrest history No    Patient annoyed by others criticizing their drinking/drug use No    Financial Status: Disabled Yes    Legal: Involved in civil litigation No    Patient has felt bad or guilty about drinking/drug use No    Financial Status: Employed No    Legal: Involved in criminal litigation No    Patient has had a drink/used drugs as an eye opener in the AM No   Social History Narrative        Exercise:  walking       REVIEW OF SYSTEMS:  Constitution: Negative. Negative for chills, fever and night sweats.   Cardiovascular: Negative for chest pain and syncope.   Respiratory: Negative for cough and shortness of breath.   Gastrointestinal: See HPI. Negative for nausea/vomiting. Negative for abdominal pain.  Genitourinary: See HPI. Negative for discoloration or dysuria.  Skin: Negative for dry skin, itching and rash.   Hematologic/Lymphatic: Negative for bleeding problem. Does not bruise/bleed easily.   Musculoskeletal: Negative for falls and muscle weakness.   Neurological: See HPI. No seizures.   Endocrine: Negative for polydipsia, polyphagia and polyuria.   Allergic/Immunologic: Negative for hives and persistent infections.     EXAM:  Ht 5' 7" (1.702 m)   Wt 77 kg (169 lb 12.1 oz)   BMI 26.59 kg/m²     General: The patient is a pleasant 33 y.o. female in no apparent distress, the patient is oriented to person, place and time.  Psych: Normal mood and affect  HEENT: Vision grossly intact, hearing intact to the spoken word.  Lungs: " Respirations unlabored.  Gait: Normal station and gait, no difficulty with toe or heel walk.   Skin: Dorsal lumbar skin negative for rashes, lesions, hairy patches and surgical scars. There is mild lumbar tenderness to palpation.  Range of motion: Lumbar range of motion is acceptable.  Spinal Balance: Global saggital and coronal spinal balance acceptable, not significant for scoliosis and kyphosis.  Musculoskeletal: No pain with the range of motion of the bilateral hips. No trochanteric tenderness to palpation.  Vascular: Bilateral lower extremities warm and well perfused, dorsalis pedis pulses 2+ bilaterally.  Neurological: Normal strength and tone in all major motor groups in the bilateral lower extremities. Normal sensation to light touch in the L2-S1 dermatomes bilaterally.  Deep tendon reflexes symmetric 2+ in the bilateral lower extremities.  Negative Babinski bilaterally. Straight leg raise negative bilaterally.    IMAGING:      Today I personally reviewed AP, Lat and Flex/Ex  upright L-spine films that demonstrate mild thoracolumbar scoliosis.  Normal disc spacing and alignment.  No acute abnormalities.      Body mass index is 26.59 kg/m².    Hemoglobin A1C   Date Value Ref Range Status   02/07/2019 4.9 4.0 - 5.6 % Final     Comment:     ADA Screening Guidelines:  5.7-6.4%  Consistent with prediabetes  >or=6.5%  Consistent with diabetes  High levels of fetal hemoglobin interfere with the HbA1C  assay. Heterozygous hemoglobin variants (HbS, HgC, etc)do  not significantly interfere with this assay.   However, presence of multiple variants may affect accuracy.             ASSESSMENT/PLAN:    Vianney was seen today for scoliosis, leg pain and low-back pain.    Diagnoses and all orders for this visit:    Chronic intractable headache, unspecified headache type  -     Ambulatory Referral to Neurology    Chronic bilateral low back pain, with sciatica presence unspecified    Other orders  -     meloxicam (MOBIC) 15 MG  tablet; Take 1 tablet (15 mg total) by mouth once daily.        The patient is having some relief with physical therapy which she just started a couple weeks ago.  She will continue physical therapy for a few more weeks.  We will also try some mobic.  Follow up after therapy if symptoms persist.  We will consider an MRI at that time.     We will also schedule her with neurology to address her headaches.       Follow up if symptoms worsen or fail to improve.

## 2019-06-10 NOTE — PROGRESS NOTES
Physical Therapy Daily Treatment Note     Name: Vianney Callahan  Clinic Number: [id number]    Therapy Diagnosis:   Encounter Diagnoses   Name Primary?    Lumbar back pain with radiculopathy affecting right lower extremity     Right leg weakness      Physician: Luiz Pizarro PA*    Visit Date: 6/11/2019  Physician Orders: Pt Eval and Treat  Medical Diagnosis:  M54.41 Right-sided low back pain with right-sided sciatica, unspecified chronicity  Evaluation Date: 5/24/19  Authorization Period Expiration: 6/30/19  Plan of Care Certification Period: 7/05/19  Visit #/Visits authorized: 4/ 12     Time In: 0650  Time Out: 0750  Total Billable Time: 60 minutes    Precautions: Standard    Subjective     Pt reports: pt saw her spine MD yesterday. Has not had radicular pain recently and states the press up exercise has really helped with her back pain. Pt continues to c/o R knee pain, states it may be due to increased weight gain after starting Psych medications.   She was compliant with home exercise program.  Response to previous treatment: good, reduced radicular pain   Functional change: improved ability to walk     Pain: 0/10, 4/10 R knee pain   Location: bilateral back      Objective     Vianney received therapeutic exercises to develop strength, endurance, ROM, flexibility, posture and core stabilization for 60 minutes including:  LX rotation stretch 3 x 20 sec NP  LX oscillation x 2 min with red ball NP  Hip ADD with ball with PPT with 3 sec hold x 3 min NP  Piriformis stretch 3 x 20 sec  DKTC 3 x 20 sec NP  HS curl with red ball 20x  Bridging 20x with ball NP  Bridge 2x10 OTB   Clamshell 3x10 orange t-band  prone on elbows 1 min x 3 NP  PPU 10x 10 sec hold NP  LAQ 20x NP  Horiz Row orange t-band 20x NP  B shld ext orange t-band 20x NP  Parloff press orange t-band 20x  mini Squat 20x holding red ball  Core stab push on t-ball 3 sec hold 20x VCs for posture NP  Prayer stretch 3 ways 3 x 20 sec each NP    Recumbent bike 5  "min  HS strap stretch 2x30"     Home Exercises Provided and Patient Education Provided     Education provided:   - DOMS, frequency of prone press ups to manage symptoms, importance of nutrition & hydration with therapy     Written Home Exercises Provided: Patient instructed to cont prior HEP.  Exercises were reviewed and Vianney was able to demonstrate them prior to the end of the session.  Vianney demonstrated good  understanding of the education provided.     See EMR under Patient Instructions for exercises provided prior visit.    Assessment     Pt with fair exercise tolerance today, limited by occasional dizziness due to not eating prior to therapy; required frequent rest & water breaks today. Pt progressing fxl strength training in order to perform functional activities.   Vianney is progressing well towards her goals. Pt appears to be benefiting from tx based on subjective above  Pt prognosis is Good.     Pt will continue to benefit from skilled outpatient physical therapy to address the deficits listed in the problem list box on initial evaluation, provide pt/family education and to maximize pt's level of independence in the home and community environment.     Pt's spiritual, cultural and educational needs considered and pt agreeable to plan of care and goals.    Anticipated barriers to physical therapy:pain, psychiatric history     Goals:   Short Term GOALS: 3 weeks. Pt agrees with goals set.  1. Patient demonstrates independence with HEP.   2. Patient demonstrates independence with Postural Awareness.   3. Patient demonstrates independence with body mechanics.      Long Term GOALS: 6 weeks. Pt agrees with goals set.  1. Patient demonstrates increased R HS (90/90) to at least -30 degrees to improve tolerance to functional activities.   2. Patient demonstrates increased strength BLE's to 4-/5 or greater to improve tolerance to functional activities.   3. Patient demonstrates improved overall function per FOTO Lumbar " Survey to 35% or less  Plan     Outpatient physical therapy 2 times/week for 12 visits to include: pt ed, hep, therapeutic exercises, neuromuscular re-education/ balance exercises, joint mobilizations, and modalities prn.Pt may be seen by PTA as part of the rehabilitation team.     Chanel Fleming, PT

## 2019-06-10 NOTE — LETTER
[place] 10, 2019      Luiz Pizarro PA-C  [address] [facility] [address]           [facility]  [address]  [place] [address]  Phone: [phone]          Patient: Vianney Callahan   MR Number: [id number]   YOB: 1985   Date of Visit: 6/10/2019       Dear Luiz Pizarro:    Thank you for referring Vianney Callahan to me for evaluation. Attached you will find relevant portions of my assessment and plan of care.    If you have questions, please do not hesitate to call me. I look forward to following Vianney Callahan along with you.    Sincerely,    Ella Patino PA-C    Enclosure  CC:  No Recipients    If you would like to receive this communication electronically, please contact [email or web address] or [phone] to request more information on [employer] Link access.    For providers and/or their staff who would like to refer a patient to [facility], please contact us through our one-stop-shop provider referral line, William Fernando, at [phone].    If you feel you have received this communication in error or would no longer like to receive these types of communications, please e-mail [email or web address]

## 2019-06-11 ENCOUNTER — CLINICAL SUPPORT (OUTPATIENT)
Dept: REHABILITATION | Facility: HOSPITAL | Age: 34
End: 2019-06-11
Payer: MEDICARE

## 2019-06-11 DIAGNOSIS — R29.898 RIGHT LEG WEAKNESS: ICD-10-CM

## 2019-06-11 DIAGNOSIS — M54.16 LUMBAR BACK PAIN WITH RADICULOPATHY AFFECTING RIGHT LOWER EXTREMITY: ICD-10-CM

## 2019-06-11 PROCEDURE — 97110 THERAPEUTIC EXERCISES: CPT

## 2019-06-12 NOTE — PROGRESS NOTES
"  Physical Therapy Daily Treatment Note     Name: Vianney Callahan  Clinic Number: [id number]    Therapy Diagnosis:   No diagnosis found.  Physician: Luiz Pizarro, PA*    Visit Date: 6/13/2019  Physician Orders: Pt Eval and Treat  Medical Diagnosis:  M54.41 Right-sided low back pain with right-sided sciatica, unspecified chronicity  Evaluation Date: 5/24/19  Authorization Period Expiration: 6/30/19  Plan of Care Certification Period: 7/05/19  Visit #/Visits authorized: 5/ 12     Time In: 0800  Time Out: 0850  Total Billable Time: 50 minutes    Precautions: Standard    Subjective     Pt reports: pt felt R leg pain since yesterday, states she does not feel pain during activity or exercise, but feels pain the next day.   She was compliant with home exercise program.  Response to previous treatment: R leg pain   Functional change: pain with walking     Pain: 0/10 lumbar, 4/10 R thigh pain   Location: anterior R thigh    Objective     Vianney received therapeutic exercises to develop strength, endurance, ROM, flexibility, posture and core stabilization for 30 minutes including:    LX rotation stretch 3 x 20 sec NP  LX oscillation x 2 min with red ball NP  Hip ADD with ball with PPT with 3 sec hold x 3 min NP  Piriformis stretch 3 x 20 sec  DKTC 3 x 20 sec NP  HS curl with red ball 20x  Bridging 20x with ball NP  Bridge 2x10 OTB  NP  Clamshell 3x10 orange t-band  PPU 10x 10 sec hold NP  LAQ 20x NP  Horiz Row orange t-band 20x NP  B shld ext orange t-band 20x NP  Parloff press orange t-band 20x NP  mini Squat 20x holding red ball NP  Core stab push on t-ball 3 sec hold 20x VCs for posture NP  Prayer stretch 3 ways 3 x 20 sec each NP  Supine R hip flexor stretch off EOM 2x1'    Prone press ups 4x10  prone on elbows 1 min x 3 NP    Recumbent bike 5 min  HS strap stretch 2x30"       Vianney received the following supervised modalities after being cleared for contradictions: TENS:  Vianney received TENS electrical stimulation for pain to " the R anterior thigh. Pt received continuous mode at a rate of 150 pps for 20 minutes. Vianney tolerated treatment well without any adverse effects.         Home Exercises Provided and Patient Education Provided     Education provided:   - DOMS, frequency of prone press ups to manage symptoms, use of TENS unit for pain management     Written Home Exercises Provided: Patient instructed to cont prior HEP.  Exercises were reviewed and Vianney was able to demonstrate them prior to the end of the session.  Vianney demonstrated good  understanding of the education provided.     See EMR under Patient Instructions for exercises provided prior visit.    Assessment     Pt with fair exercise tolerance today, pt tearful at times due to frustration with pain & weight gain. Pt required frequent rest breaks due to assessment of symptoms between sets of prone press-ups and breaks for emotional reasons. Pt reports no change with pain during gait after prone press-ups, minor relief after supine hip flexor stretch. R thigh pain resolved to 0/10 with TENS today. Pt may benefit from use of home TENS unit to manage pain symptoms.   Vianney is progressing well towards her goals. Pt appears to be benefiting from tx based on subjective above  Pt prognosis is Good.     Pt will continue to benefit from skilled outpatient physical therapy to address the deficits listed in the problem list box on initial evaluation, provide pt/family education and to maximize pt's level of independence in the home and community environment.     Pt's spiritual, cultural and educational needs considered and pt agreeable to plan of care and goals.    Anticipated barriers to physical therapy:pain, psychiatric history     Goals:   Short Term GOALS: 3 weeks. Pt agrees with goals set.  1. Patient demonstrates independence with HEP.  Progressing, not met  2. Patient demonstrates independence with Postural Awareness. Progressing, not met  3. Patient demonstrates independence with body  mechanics. Progressing, not met     Long Term GOALS: 6 weeks. Pt agrees with goals set.  1. Patient demonstrates increased R HS (90/90) to at least -30 degrees to improve tolerance to functional activities. Progressing, not met  2. Patient demonstrates increased strength BLE's to 4-/5 or greater to improve tolerance to functional activities. Progressing, not met  3. Patient demonstrates improved overall function per FOTO Lumbar Survey to 35% or lessProgressing, not met  Plan     Outpatient physical therapy 2 times/week for 12 visits to include: pt ed, hep, therapeutic exercises, neuromuscular re-education/ balance exercises, joint mobilizations, and modalities prn.Pt may be seen by PTA as part of the rehabilitation team.     Chanel Fleming, PT

## 2019-06-13 ENCOUNTER — CLINICAL SUPPORT (OUTPATIENT)
Dept: REHABILITATION | Facility: HOSPITAL | Age: 34
End: 2019-06-13
Payer: MEDICARE

## 2019-06-13 DIAGNOSIS — R29.898 RIGHT LEG WEAKNESS: ICD-10-CM

## 2019-06-13 DIAGNOSIS — M54.16 LUMBAR BACK PAIN WITH RADICULOPATHY AFFECTING RIGHT LOWER EXTREMITY: ICD-10-CM

## 2019-06-13 PROCEDURE — 97110 THERAPEUTIC EXERCISES: CPT

## 2019-06-19 ENCOUNTER — CLINICAL SUPPORT (OUTPATIENT)
Dept: REHABILITATION | Facility: HOSPITAL | Age: 34
End: 2019-06-19
Payer: MEDICARE

## 2019-06-19 DIAGNOSIS — M54.16 LUMBAR BACK PAIN WITH RADICULOPATHY AFFECTING RIGHT LOWER EXTREMITY: ICD-10-CM

## 2019-06-19 DIAGNOSIS — R29.898 RIGHT LEG WEAKNESS: ICD-10-CM

## 2019-06-19 PROCEDURE — 97110 THERAPEUTIC EXERCISES: CPT

## 2019-06-19 NOTE — PROGRESS NOTES
"  Physical Therapy Daily Treatment Note     Name: Vianney Callahan  Clinic Number: [id number]    Therapy Diagnosis:   Encounter Diagnoses   Name Primary?    Lumbar back pain with radiculopathy affecting right lower extremity     Right leg weakness      Physician: Luiz Pizarro PA*    Visit Date: 6/21/2019    Physician Orders: Pt Eval and Treat  Medical Diagnosis:  M54.41 Right-sided low back pain with right-sided sciatica, unspecified chronicity  Evaluation Date: 5/24/19  Authorization Period Expiration: 6/30/19  Plan of Care Certification Period: 7/05/19  Visit #/Visits authorized: 6/ 12       Time In: 7:45am  Time Out: 8:50 am   Total Billable Time: 40 minutes    Precautions: Standard    Subjective     Pt reports:pt wearing R knee brace today; aggravated it after using stairs at home. Pt reports she feels  RLE pain the day after doing therapy or any exercise at the gym (ie swimming). Pt is frustrated with recent 50lb weight gain.   She was compliant with home exercise program.  Response to previous treatment: no adverse effects  Functional change: none     Pain: 7/10  Location: right knee      Objective     Vianney received therapeutic exercises to develop strength, endurance, ROM, flexibility, posture and core stabilization for 65 minutes including:    TA supine 5"x15  TA supine BLE march x15  Hip ADD with ball 5"x15 NP  Piriformis stretch 3 x 20 sec  Bridge 3x10   Clamshell 3x10   Supine R hip flexor stretch off EOM 1x1', 1x30"  w/pillow under lower back  NP  HS strap stretch 2x30"   Supine hip abduction w/peach TB 2x10 NP  Prone press ups 4x10  Prone quad strap stretch 3x30"      Recumbent bike 8 min      Home Exercises Provided and Patient Education Provided     Education provided:   - Continue HEP, DOMS, follow-up with Psychiatrist and nutritionist regarding recent weight gain, safe progression to exercises in gym     Written Home Exercises Provided: Patient instructed to cont prior HEP.  Exercises were reviewed " and Vianney was able to demonstrate them prior to the end of the session.  Vianney demonstrated good  understanding of the education provided.     See EMR under Patient Instructions for exercises provided initial evaluation.      Assessment   Pt reports lumbar soreness after performing bridge exercise, relieved with prone press ups. Pt demonstrated weakness in R hip external rotators, required verbal & tactile cues for technique with clamshells. Pt reported reduced R knee pain after bike & no adverse effects with treatment today.     Vianney is progressing well towards her goals.   Pt prognosis is Good.     Pt will continue to benefit from skilled outpatient physical therapy to address the deficits listed in the problem list box on initial evaluation, provide pt/family education and to maximize pt's level of independence in the home and community environment.     Pt's spiritual, cultural and educational needs considered and pt agreeable to plan of care and goals.    Anticipated barriers to physical therapy: psychiatric hx, pain    Goals:     Short Term GOALS: 3 weeks. Pt agrees with goals set.  1. Patient demonstrates independence with HEP.  Progressing, not met  2. Patient demonstrates independence with Postural Awareness. Progressing, not met  3. Patient demonstrates independence with body mechanics. Progressing, not met     Long Term GOALS: 6 weeks. Pt agrees with goals set.  1. Patient demonstrates increased R HS (90/90) to at least -30 degrees to improve tolerance to functional activities. Progressing, not met  2. Patient demonstrates increased strength BLE's to 4-/5 or greater to improve tolerance to functional activities. Progressing, not met  3. Patient demonstrates improved overall function per FOTO Lumbar Survey to 35% or lessProgressing, not met      Plan     Continue per POC, progress as tolerated    Chanel Fleming, PT

## 2019-06-19 NOTE — PROGRESS NOTES
"  Physical Therapy Daily Treatment Note     Name: Vianney Callahan  Clinic Number: [id number]    Therapy Diagnosis:   Encounter Diagnoses   Name Primary?    Lumbar back pain with radiculopathy affecting right lower extremity     Right leg weakness      Physician: Luiz Pizarro PA*    Visit Date: 6/19/2019    Physician Orders: Pt Eval and Treat  Medical Diagnosis:  M54.41 Right-sided low back pain with right-sided sciatica, unspecified chronicity  Evaluation Date: 5/24/19  Authorization Period Expiration: 6/30/19  Plan of Care Certification Period: 7/05/19  Visit #/Visits authorized: 6/ 12       Time In: 8:02 am   Time Out: 8:50 am   Total Billable Time: 28 minutes    Precautions: Standard    Subjective     Pt reports: that her (R) knee is bothering her. Pt stated that she gained 50Lbs recently and thinks this may be a factor contributing to (R) knee pain. Pt stated that her lower back is not hurting this morning. Pt reports temporary pain relief from TENs last session.   She was compliant with home exercise program.  Response to previous treatment: no adverse effects  Functional change: none     Pain: 5/10  Location: right knee      Objective     Vianney received therapeutic exercises to develop strength, endurance, ROM, flexibility, posture and core stabilization for 48 minutes including:    TA supine 5"x10   Hip ADD with ball 5"x15  Piriformis stretch 3 x 20 sec  Bridge 2x10  Clamshell 3x10   Supine R hip flexor stretch off EOM 1x1', 1x30"  w/pillow under lower back   HS strap stretch 2x30"   Supine hip abduction w/peach TB 2x10   Prone press ups 4x10      Recumbent bike 5 min      Home Exercises Provided and Patient Education Provided     Education provided:   - Continue HEP. Educated pt to hold on performing LAQ since pt reported that she experiences increased pain when performing this therex. Pt verbalized understanding.     Written Home Exercises Provided: Patient instructed to cont prior HEP.  Exercises were " reviewed and Vianney was able to demonstrate them prior to the end of the session.  Vianney demonstrated good  understanding of the education provided.     See EMR under Patient Instructions for exercises provided initial evaluation.      Assessment     Pt attempted to perform (R) hip flexor stretch off EOM but c/o lumbar pain. Pt was then able to perform an additional rep of (R) hip flexor stretch off EOM with pillow under lumbar region, however after performing this rep pt stated that this position was starting to aggravate her lower back so additional rep was held. Pt was able to tolerate all other therex noted above without c/o increased pain. Pt reported that her legs felt heavy at the end of therapy session, but reported no increased pain at the end of therapy session.   Vianney is progressing well towards her goals.   Pt prognosis is Good.     Pt will continue to benefit from skilled outpatient physical therapy to address the deficits listed in the problem list box on initial evaluation, provide pt/family education and to maximize pt's level of independence in the home and community environment.     Pt's spiritual, cultural and educational needs considered and pt agreeable to plan of care and goals.    Anticipated barriers to physical therapy: psychiatric hx, pain    Goals:     Short Term GOALS: 3 weeks. Pt agrees with goals set.  1. Patient demonstrates independence with HEP.  Progressing, not met  2. Patient demonstrates independence with Postural Awareness. Progressing, not met  3. Patient demonstrates independence with body mechanics. Progressing, not met     Long Term GOALS: 6 weeks. Pt agrees with goals set.  1. Patient demonstrates increased R HS (90/90) to at least -30 degrees to improve tolerance to functional activities. Progressing, not met  2. Patient demonstrates increased strength BLE's to 4-/5 or greater to improve tolerance to functional activities. Progressing, not met  3. Patient demonstrates improved  overall function per FOTO Lumbar Survey to 35% or lessProgressing, not met      Plan     Continue per POC, progress as tolerated    Marily Chapa, PT

## 2019-06-21 ENCOUNTER — CLINICAL SUPPORT (OUTPATIENT)
Dept: REHABILITATION | Facility: HOSPITAL | Age: 34
End: 2019-06-21
Payer: MEDICARE

## 2019-06-21 DIAGNOSIS — M54.16 LUMBAR BACK PAIN WITH RADICULOPATHY AFFECTING RIGHT LOWER EXTREMITY: ICD-10-CM

## 2019-06-21 DIAGNOSIS — R29.898 RIGHT LEG WEAKNESS: ICD-10-CM

## 2019-06-21 PROCEDURE — 97110 THERAPEUTIC EXERCISES: CPT

## 2019-06-26 ENCOUNTER — CLINICAL SUPPORT (OUTPATIENT)
Dept: REHABILITATION | Facility: HOSPITAL | Age: 34
End: 2019-06-26
Payer: MEDICARE

## 2019-06-26 DIAGNOSIS — M54.16 LUMBAR BACK PAIN WITH RADICULOPATHY AFFECTING RIGHT LOWER EXTREMITY: ICD-10-CM

## 2019-06-26 DIAGNOSIS — R29.898 RIGHT LEG WEAKNESS: ICD-10-CM

## 2019-06-26 PROCEDURE — 97110 THERAPEUTIC EXERCISES: CPT

## 2019-06-26 NOTE — PROGRESS NOTES
"  Physical Therapy Daily Treatment Note     Name: Vianney Callahan  Clinic Number: [id number]    Therapy Diagnosis:   Encounter Diagnoses   Name Primary?    Lumbar back pain with radiculopathy affecting right lower extremity     Right leg weakness      Physician: Luiz Pizarro PA*    Visit Date: 6/26/2019    Physician Orders: Pt Eval and Treat  Medical Diagnosis:  M54.41 Right-sided low back pain with right-sided sciatica, unspecified chronicity  Evaluation Date: 5/24/19  Authorization Period Expiration: 6/30/19  Plan of Care Certification Period: 7/05/19  Visit #/Visits authorized:7/ 12     Time In: 8:01 am   Time Out: 8:41 am   Total Billable Time: 24 minutes    Precautions: Standard    Subjective     Pt reports: that she is not experiencing pain today. Pt stated that she is taking Mobic.   She was compliant with home exercise program.  Response to previous treatment: no adverse effects  Functional change: none    Pain: 0/10  Location: right knee and back     Objective     Vianney received therapeutic exercises to develop strength, endurance, ROM, flexibility, posture and core stabilization for 40 minutes including:  Bike x 5'   TA supine 5"x15  TA supine BLE march x15  Hip ADD with ball 5"x15 Not performed today  Piriformis stretch 3 x 20 sec  Bridge 3x10 - not performed today  Clamshell 3x10     HS strap stretch 3x30"   Supine hip abduction w/peach TB 2x10 Not performed today  Prone press ups 4x10  Prone quad strap stretch 3x30"    Home Exercises Provided and Patient Education Provided     Education provided:   - Continue with HEP     Written Home Exercises Provided: Patient instructed to cont prior HEP.  Exercises were reviewed and Vianney was able to demonstrate them prior to the end of the session.  Vianney demonstrated good  understanding of the education provided.     See EMR under Patient Instructions for exercises provided initial evaluation.      Assessment     Pt was able to tolerate all therex noted above without " c/o increased pain or adverse reactions. Pt reported no increased pain at the end of therapy session.   Vianney is progressing well towards her goals.   Pt prognosis is Good.     Pt will continue to benefit from skilled outpatient physical therapy to address the deficits listed in the problem list box on initial evaluation, provide pt/family education and to maximize pt's level of independence in the home and community environment.     Pt's spiritual, cultural and educational needs considered and pt agreeable to plan of care and goals.    Anticipated barriers to physical therapy: psychiatric hx, pain    Goals:     Short Term GOALS: 3 weeks. Pt agrees with goals set.  1. Patient demonstrates independence with HEP.  Progressing, not met  2. Patient demonstrates independence with Postural Awareness. Progressing, not met  3. Patient demonstrates independence with body mechanics. Progressing, not met     Long Term GOALS: 6 weeks. Pt agrees with goals set.  1. Patient demonstrates increased R HS (90/90) to at least -30 degrees to improve tolerance to functional activities. Progressing, not met  2. Patient demonstrates increased strength BLE's to 4-/5 or greater to improve tolerance to functional activities. Progressing, not met  3. Patient demonstrates improved overall function per FOTO Lumbar Survey to 35% or lessProgressing, not met    Plan     Continue per POC, progress as tolerated    Marily Chapa, PT

## 2019-06-28 NOTE — PROGRESS NOTES
"  Physical Therapy Daily Treatment Note     Name: Vianney Callahan  Clinic Number: [id number]    Therapy Diagnosis:   Encounter Diagnoses   Name Primary?    Lumbar back pain with radiculopathy affecting right lower extremity     Right leg weakness      Physician: Luiz Pizarro PA*    Visit Date: 7/1/2019    Physician Orders: Pt Eval and Treat  Medical Diagnosis:  M54.41 Right-sided low back pain with right-sided sciatica, unspecified chronicity  Evaluation Date: 5/24/19  Authorization Period Expiration: 6/30/19  Plan of Care Certification Period: 7/05/19, 9/1/19  Visit #/Visits authorized:8/ 12     Time In: 8:45am  Time Out: 9:45am  Total Billable Time: 55 minutes    Precautions: Standard    Subjective     Pt reports: "I feel a great improvement since I started PT. My right leg still bothers me, but it feels like my bone is hurting; I need to strengthen my legs" pt is no longer having radicular pain to right leg; she doesn't feel like she needs her crutch anymore. Pt reports her back is hurting after using Ab machine in gym this morning   She was compliant with home exercise program.  Response to previous treatment: no adverse effects  Functional change: none    Pain: 5/10  Location: lumbar spine     Objective   LOWER EXTREMITY STRENGTH 5/24/19    Left Right   Quadriceps 4-/5 3+/5   Hamstrings 4-/5 3+/5      Iliopsoas 4-/5 3+/5   PGM 3+/5 3+/5   Hip IR 4-/5 4-/5   Hip ER 4-/5 4-/5   Hip Ext 4-/5 3+/5       LOWER EXTREMITY STRENGTH 7/1/19    Left Right   Quadriceps 4-/5 4-/5   Hamstrings 4-/5 4-/5      Iliopsoas 4-/5 3+/5   PGM 3+/5 3+/5   Hip IR 4-/5 4-/5   Hip ER 4-/5 4-/5   Hip Ext 4-/5 3+/5     FLEXIBILITY 5/24/19:  HS 90/90: L -16 degrees, R -50 degrees     FLEXIBILITY 7/1/19: HS 90/90: L -16 degrees, R -18 degrees     Special Tests 5/24/19     Left Right   SLR Negative  +        Special Tests 7/1/19    Left Right   SLR Negative  Negative       Functional Limitations Reports - G Codes 7/1/19  Category: " "Mobility  Tool: FOTO Lumbar Survey  Score: 63% Limitation  Current/: CL = 63% Limitation  Goal/ : CJ = 35% Limitation     Vianney received assessment & therapeutic exercises to develop strength, endurance, ROM, flexibility, posture and core stabilization for 55 minutes including:  Bike x 5'  NP  TA supine 5"x15 NP  TA supine BLE march x20   Hip ADD with ball 5"x15 Not performed today  Piriformis stretch 3 x 30sec  Bridge 3x10 - not performed today  Clamshell 3x10 OTB   Supine dead bug x10   Quadruped alt hip Ext x5  Lvl 2 forward step-ups x15 each       HS strap stretch 3x30"   Supine hip abduction w/peach TB 2x10 Not performed today  Prone press ups 4x10 NP  Prone quad strap stretch 3x30"    Home Exercises Provided and Patient Education Provided     Education provided:   - Continue with HEP, D/C planning, group fitness/pilates class    Written Home Exercises Provided: Patient instructed to cont prior HEP.  Exercises were reviewed and Vianney was able to demonstrate them prior to the end of the session.  Vianney demonstrated good  understanding of the education provided.     See EMR under Patient Instructions for exercises provided initial evaluation.      Assessment     Pt is making good progress with PT, reports reduced radicular pain and has been able to initiate some exercise in the gym for weight loss and general wellness. Pt demonstrates improved RLE strength & flexibility. Pt continues to have some LBP with activity and would benefit from further PT in order to reduce that pain & instruct patient on self-pain management techniques in order to manage future episodes of LBP. Pt demonstrated rapid muscle fatigue with quadruped exercise today; required verbal & tactile cues to maintain neutral pelvis & core stability. Pt reported LBP resolved with prone press ups; tolerated all exercises well without adverse effects.   Vianney is progressing well towards her goals.   Pt prognosis is Good.     Pt will continue to " benefit from skilled outpatient physical therapy to address the deficits listed in the problem list box on initial evaluation, provide pt/family education and to maximize pt's level of independence in the home and community environment.     Pt's spiritual, cultural and educational needs considered and pt agreeable to plan of care and goals.    Anticipated barriers to physical therapy: psychiatric hx, pain    Goals:     Short Term GOALS: 3 weeks. Pt agrees with goals set.  1. Patient demonstrates independence with HEP.  Met 7/1/19  2. Patient demonstrates independence with Postural Awareness. Progressing, not met  3. Patient demonstrates independence with body mechanics. Progressing, not met     Long Term GOALS: 6 weeks. Pt agrees with goals set.  1. Patient demonstrates increased R HS (90/90) to at least -30 degrees to improve tolerance to functional activities. Met 7/1/19  2. Patient demonstrates increased strength BLE's to 4-/5 or greater to improve tolerance to functional activities. Progressing, not met  3. Patient demonstrates improved overall function per FOTO Lumbar Survey to 35% or less Progressing, not met    Plan     Continue per POC, progress as tolerated. Extend POC 2x weekly for 6 weeks.     Chanel Fleming, PT

## 2019-07-01 ENCOUNTER — CLINICAL SUPPORT (OUTPATIENT)
Dept: REHABILITATION | Facility: HOSPITAL | Age: 34
End: 2019-07-01
Payer: MEDICARE

## 2019-07-01 DIAGNOSIS — M54.16 LUMBAR BACK PAIN WITH RADICULOPATHY AFFECTING RIGHT LOWER EXTREMITY: ICD-10-CM

## 2019-07-01 DIAGNOSIS — R29.898 RIGHT LEG WEAKNESS: ICD-10-CM

## 2019-07-01 PROCEDURE — 97110 THERAPEUTIC EXERCISES: CPT

## 2019-07-09 ENCOUNTER — TELEPHONE (OUTPATIENT)
Dept: INTERNAL MEDICINE | Facility: CLINIC | Age: 34
End: 2019-07-09

## 2019-07-09 RX ORDER — MELOXICAM 15 MG/1
TABLET ORAL
Qty: 30 TABLET | Refills: 0 | Status: SHIPPED | OUTPATIENT
Start: 2019-07-09 | End: 2019-08-12

## 2019-07-09 RX ORDER — TRAMADOL HYDROCHLORIDE 50 MG/1
TABLET ORAL
Qty: 60 TABLET | Refills: 0 | Status: SHIPPED | OUTPATIENT
Start: 2019-07-09 | End: 2019-09-04 | Stop reason: SDUPTHER

## 2019-08-12 ENCOUNTER — OFFICE VISIT (OUTPATIENT)
Dept: INTERNAL MEDICINE | Facility: CLINIC | Age: 34
End: 2019-08-12
Payer: MEDICARE

## 2019-08-12 VITALS
BODY MASS INDEX: 25.96 KG/M2 | DIASTOLIC BLOOD PRESSURE: 64 MMHG | WEIGHT: 165.38 LBS | HEIGHT: 67 IN | HEART RATE: 82 BPM | SYSTOLIC BLOOD PRESSURE: 110 MMHG | OXYGEN SATURATION: 98 %

## 2019-08-12 DIAGNOSIS — F39 EPISODIC MOOD DISORDER: ICD-10-CM

## 2019-08-12 DIAGNOSIS — G89.29 CHRONIC LOW BACK PAIN WITHOUT SCIATICA, UNSPECIFIED BACK PAIN LATERALITY: ICD-10-CM

## 2019-08-12 DIAGNOSIS — M54.50 CHRONIC LOW BACK PAIN WITHOUT SCIATICA, UNSPECIFIED BACK PAIN LATERALITY: ICD-10-CM

## 2019-08-12 DIAGNOSIS — E78.5 HYPERLIPIDEMIA, UNSPECIFIED HYPERLIPIDEMIA TYPE: Primary | ICD-10-CM

## 2019-08-12 PROCEDURE — 3008F PR BODY MASS INDEX (BMI) DOCUMENTED: ICD-10-PCS | Mod: CPTII,S$GLB,, | Performed by: INTERNAL MEDICINE

## 2019-08-12 PROCEDURE — 99999 PR PBB SHADOW E&M-EST. PATIENT-LVL III: CPT | Mod: PBBFAC,,, | Performed by: INTERNAL MEDICINE

## 2019-08-12 PROCEDURE — 99214 PR OFFICE/OUTPT VISIT, EST, LEVL IV, 30-39 MIN: ICD-10-PCS | Mod: S$GLB,,, | Performed by: INTERNAL MEDICINE

## 2019-08-12 PROCEDURE — 99999 PR PBB SHADOW E&M-EST. PATIENT-LVL III: ICD-10-PCS | Mod: PBBFAC,,, | Performed by: INTERNAL MEDICINE

## 2019-08-12 PROCEDURE — 3008F BODY MASS INDEX DOCD: CPT | Mod: CPTII,S$GLB,, | Performed by: INTERNAL MEDICINE

## 2019-08-12 PROCEDURE — 99214 OFFICE O/P EST MOD 30 MIN: CPT | Mod: S$GLB,,, | Performed by: INTERNAL MEDICINE

## 2019-08-12 RX ORDER — BENZTROPINE MESYLATE 0.5 MG/1
TABLET ORAL
Qty: 2 TABLET | Refills: 0
Start: 2019-08-12 | End: 2019-09-18

## 2019-08-12 RX ORDER — CLONAZEPAM 0.5 MG/1
0.5 TABLET ORAL 2 TIMES DAILY
Refills: 0 | COMMUNITY
Start: 2019-08-05 | End: 2020-09-10

## 2019-08-12 RX ORDER — FLUPHENAZINE HYDROCHLORIDE 5 MG/1
2.5 TABLET ORAL DAILY
COMMUNITY
End: 2019-09-18

## 2019-08-12 NOTE — PROGRESS NOTES
Subjective:       Patient ID: Vianney Callahan is a 34 y.o. female.    Chief Complaint: Follow-up                Chronic back pain  HPI   She was told of elevated cholesterol during recent hospitalization.  Prolixia and cogentin started today.  She was told of hyperlipidemia.   lipids last year reviewed:  Fine.    She has chronic back pain, better in general.  She is working out at gym and back exercises inconsistently.    She had to stop PT temporarily due to mental health issues.  She typically takes tramadol twice a week.  Last filled July 9 - #60.   She believes her pharmacy is pushing the meds to be filled to increase income..      Review of Systems   Constitutional: Negative for fever and unexpected weight change.   HENT: Negative for congestion and postnasal drip.    Eyes: Negative for pain, discharge and visual disturbance.   Respiratory: Negative for cough, chest tightness, shortness of breath and wheezing.    Cardiovascular: Negative for chest pain and leg swelling.   Gastrointestinal: Negative for abdominal pain, constipation, diarrhea and nausea.   Genitourinary: Negative for difficulty urinating, dysuria and hematuria.   Skin: Negative for rash.   Neurological: Negative for headaches.   Psychiatric/Behavioral: Negative for dysphoric mood and sleep disturbance. The patient is not nervous/anxious.        Objective:      Physical Exam   Constitutional: She appears well-developed and well-nourished. No distress.   Psychiatric: She has a normal mood and affect. Her behavior is normal.       Assessment:       1. Hyperlipidemia, unspecified hyperlipidemia type    2. Chronic low back pain without sciatica, unspecified back pain laterality    3. Episodic mood disorder        Plan:       Vianney was seen today for follow-up.    Diagnoses and all orders for this visit:    Hyperlipidemia, unspecified hyperlipidemia type  -     Lipid panel; Future    Chronic low back pain without sciatica, unspecified back pain  laterality    Episodic mood disorder    Other orders  -     benztropine (COGENTIN) 0.5 MG tablet; 1 tab po qhs           Healthy back program when able.    Minimize tramadol     rtc 3 mo with labs     Low fat diet reviewed.

## 2019-09-03 NOTE — PROGRESS NOTES
Attempted to contact patient to discuss MRI results, patient did not answer.  Left VM to call clinic to discuss results.    Luiz Pizarro PA-C  4/17/19   Donor Site Anesthesia Type: same as repair anesthesia

## 2019-09-04 ENCOUNTER — OFFICE VISIT (OUTPATIENT)
Dept: URGENT CARE | Facility: CLINIC | Age: 34
End: 2019-09-04
Payer: MEDICARE

## 2019-09-04 VITALS
HEIGHT: 67 IN | WEIGHT: 165 LBS | OXYGEN SATURATION: 100 % | DIASTOLIC BLOOD PRESSURE: 67 MMHG | BODY MASS INDEX: 25.9 KG/M2 | HEART RATE: 84 BPM | SYSTOLIC BLOOD PRESSURE: 115 MMHG | TEMPERATURE: 98 F | RESPIRATION RATE: 12 BRPM

## 2019-09-04 DIAGNOSIS — R30.0 DYSURIA: ICD-10-CM

## 2019-09-04 DIAGNOSIS — Z11.3 SCREENING FOR STD (SEXUALLY TRANSMITTED DISEASE): ICD-10-CM

## 2019-09-04 DIAGNOSIS — N15.9 KIDNEY INFECTION: Primary | ICD-10-CM

## 2019-09-04 LAB
B-HCG UR QL: NEGATIVE
BILIRUB UR QL STRIP: NEGATIVE
CTP QC/QA: YES
GLUCOSE UR QL STRIP: NEGATIVE
KETONES UR QL STRIP: NEGATIVE
LEUKOCYTE ESTERASE UR QL STRIP: POSITIVE
PH, POC UA: 5.5 (ref 5–8)
POC BLOOD, URINE: POSITIVE
POC NITRATES, URINE: NEGATIVE
PROT UR QL STRIP: POSITIVE
SP GR UR STRIP: 1.02 (ref 1–1.03)
UROBILINOGEN UR STRIP-ACNC: ABNORMAL (ref 0.1–1.1)

## 2019-09-04 PROCEDURE — 87491 CHLMYD TRACH DNA AMP PROBE: CPT

## 2019-09-04 PROCEDURE — 3008F PR BODY MASS INDEX (BMI) DOCUMENTED: ICD-10-PCS | Mod: CPTII,S$GLB,, | Performed by: NURSE PRACTITIONER

## 2019-09-04 PROCEDURE — 87086 URINE CULTURE/COLONY COUNT: CPT

## 2019-09-04 PROCEDURE — 99214 PR OFFICE/OUTPT VISIT, EST, LEVL IV, 30-39 MIN: ICD-10-PCS | Mod: 25,S$GLB,, | Performed by: NURSE PRACTITIONER

## 2019-09-04 PROCEDURE — 81003 POCT URINALYSIS, DIPSTICK, MANUAL, W/O SCOPE: ICD-10-PCS | Mod: QW,S$GLB,, | Performed by: NURSE PRACTITIONER

## 2019-09-04 PROCEDURE — 96372 THER/PROPH/DIAG INJ SC/IM: CPT | Mod: S$GLB,,, | Performed by: EMERGENCY MEDICINE

## 2019-09-04 PROCEDURE — 96372 PR INJECTION,THERAP/PROPH/DIAG2ST, IM OR SUBCUT: ICD-10-PCS | Mod: S$GLB,,, | Performed by: EMERGENCY MEDICINE

## 2019-09-04 PROCEDURE — 81025 URINE PREGNANCY TEST: CPT | Mod: S$GLB,,, | Performed by: NURSE PRACTITIONER

## 2019-09-04 PROCEDURE — 3008F BODY MASS INDEX DOCD: CPT | Mod: CPTII,S$GLB,, | Performed by: NURSE PRACTITIONER

## 2019-09-04 PROCEDURE — 81025 POCT URINE PREGNANCY: ICD-10-PCS | Mod: S$GLB,,, | Performed by: NURSE PRACTITIONER

## 2019-09-04 PROCEDURE — 81003 URINALYSIS AUTO W/O SCOPE: CPT | Mod: QW,S$GLB,, | Performed by: NURSE PRACTITIONER

## 2019-09-04 PROCEDURE — 99214 OFFICE O/P EST MOD 30 MIN: CPT | Mod: 25,S$GLB,, | Performed by: NURSE PRACTITIONER

## 2019-09-04 RX ORDER — DOXYCYCLINE 100 MG/1
100 CAPSULE ORAL 2 TIMES DAILY
Qty: 20 CAPSULE | Refills: 0 | Status: SHIPPED | OUTPATIENT
Start: 2019-09-04 | End: 2019-09-14

## 2019-09-04 RX ORDER — CEFTRIAXONE 1 G/1
1 INJECTION, POWDER, FOR SOLUTION INTRAMUSCULAR; INTRAVENOUS
Status: COMPLETED | OUTPATIENT
Start: 2019-09-04 | End: 2019-09-04

## 2019-09-04 RX ORDER — TRAMADOL HYDROCHLORIDE 50 MG/1
TABLET ORAL
Qty: 60 TABLET | Refills: 0 | Status: SHIPPED | OUTPATIENT
Start: 2019-09-04 | End: 2019-09-06 | Stop reason: SDUPTHER

## 2019-09-04 RX ADMIN — CEFTRIAXONE 1 G: 1 INJECTION, POWDER, FOR SOLUTION INTRAMUSCULAR; INTRAVENOUS at 09:09

## 2019-09-04 NOTE — PROGRESS NOTES
"Subjective:       Patient ID: Vianney Callahan is a 34 y.o. female.    Vitals:  height is 5' 7" (1.702 m) and weight is 74.8 kg (165 lb). Her oral temperature is 98.3 °F (36.8 °C). Her blood pressure is 115/67 and her pulse is 84. Her respiration is 12 and oxygen saturation is 100%.     Chief Complaint: Urinary Tract Infection    Pt c/o dysuria, frequency, urgency, and vomiting, since yesterday  Pt wants to be check for UPT,UA, and STD  .  This is a 34-year-old female presents today with complaints of dysuria, frequency, incomplete emptying, urgency and 1 episode of vomiting that began yesterday.  Patient states she recently began having sex again and had 2 partners over the past couple of weeks and protected.  She also states she would like to have a urine pregnancy test and STDs checked.     Urinary Tract Infection    This is a new problem. The current episode started yesterday. The problem occurs every urination. The problem has been gradually worsening. The quality of the pain is described as burning. The pain is at a severity of 3/10. The pain is mild. There has been no fever. She is sexually active. There is no history of pyelonephritis. Associated symptoms include chills, flank pain, frequency, nausea, urgency and vomiting. Pertinent negatives include no hematuria or rash. She has tried nothing for the symptoms.       Constitution: Positive for chills. Negative for fever.   Neck: Negative for painful lymph nodes.   Gastrointestinal: Positive for nausea and vomiting. Negative for abdominal pain.   Genitourinary: Positive for dysuria, frequency, urgency, urine decreased and flank pain. Negative for hematuria, history of kidney stones, painful menstruation, irregular menstruation, missed menses, heavy menstrual bleeding, ovarian cysts, genital trauma, vaginal pain, vaginal discharge, vaginal bleeding, vaginal odor, painful intercourse, genital sore, painful ejaculation and pelvic pain.   Musculoskeletal: Negative " for back pain.   Skin: Negative for rash and lesion.   Hematologic/Lymphatic: Negative for swollen lymph nodes.       Objective:      Physical Exam   Constitutional: She is oriented to person, place, and time. She appears well-developed and well-nourished.   HENT:   Head: Normocephalic and atraumatic.   Right Ear: External ear normal.   Left Ear: External ear normal.   Nose: Nose normal.   Eyes: Lids are normal.   Neck: Trachea normal, normal range of motion and phonation normal. Neck supple.   Cardiovascular: Normal pulses.   Pulmonary/Chest: Effort normal.   Abdominal: Soft. Normal appearance and bowel sounds are normal. She exhibits no distension. There is no tenderness. There is no CVA tenderness.   Neurological: She is alert and oriented to person, place, and time.   Skin: Skin is warm, dry and intact.   Psychiatric: She has a normal mood and affect. Her speech is normal and behavior is normal. Cognition and memory are normal.   Nursing note and vitals reviewed.      Results for orders placed or performed in visit on 09/04/19   POCT Urinalysis, Dipstick, Manual, W/O Scope   Result Value Ref Range    POC Blood, Urine Positive (A) Negative    POC Bilirubin, Urine Negative Negative    POC Urobilinogen, Urine norm 0.1 - 1.1    POC Ketones, Urine Negative Negative    POC Protein, Urine Positive (A) Negative    POC Nitrates, Urine Negative Negative    POC Glucose, Urine Negative Negative    pH, UA 5.5 5 - 8    POC Specific Gravity, Urine 1.025 1.003 - 1.029    POC Leukocytes, Urine Positive (A) Negative   POCT urine pregnancy   Result Value Ref Range    POC Preg Test, Ur Negative Negative     Acceptable Yes        Assessment:       1. Kidney infection    2. Dysuria    3. Screening for STD (sexually transmitted disease)    4. Dysuria         Plan:         Kidney infection  -     cefTRIAXone injection 1 g  -     Urine culture  -     doxycycline (MONODOX) 100 MG capsule; Take 1 capsule (100 mg total) by  mouth 2 (two) times daily. for 10 days  Dispense: 20 capsule; Refill: 0    Dysuria  -     POCT Urinalysis, Dipstick, Manual, W/O Scope  -     POCT urine pregnancy  -     C. trachomatis/N. gonorrhoeae by AMP DNA [facility]; Urine    Screening for STD (sexually transmitted disease)  -     C. trachomatis/N. gonorrhoeae by AMP DNA [facility]; Urine    Dysuria   -     POCT Urinalysis, Dipstick, Manual, W/O Scope  -     POCT urine pregnancy  -     C. trachomatis/N. gonorrhoeae by AMP DNA [facility]; Urine      Patient Instructions     START ORAL ANTIBIOTIC TONIGHT  INCREASE FLUID INTAKE  AVOID SEXUAL ACTIVITY UNTIL RESULTS ARE RECEIVED  WE WILL CALL WITH LAB RESULTS OVER THE NEXT COUPLE OF DAYS      You must understand that you've received an Urgent Care treatment only and that you may be released before all your medical problems are known or treated. You, the patient, will arrange for follow up care as instructed.  If your condition worsens we recommend that you receive another evaluation at the emergency room immediately or contact your primary medical clinics after hours call service to discuss your concerns.  Please return here or go to the Emergency Department for any concerns or worsening of condition.        Understanding Urinary Tract Infections (UTIs)  Most UTIs are caused by bacteria, although they may also be caused by viruses or fungi. Bacteria from the bowel are the most common source of infection. The infection may start because of any of the following:  · Sexual activity. During sex, bacteria can travel from the penis, vagina, or rectum into the urethra.   · Bacteria on the skin outside the rectum may travel into the urethra. This is more common in women since the rectum and urethra are closer to each other than in men. Wiping from front to back after using the toilet and keeping the area clean can help prevent germs from getting to the urethra.  · Blockage of urine flow through the urinary tract. If urine sits too  long, germs may start to grow out of control.      Parts of the urinary tract  The infection can occur in any part of the urinary tract.  · The kidneys collect and store urine.  · The ureters carry urine from the kidneys to the bladder.  · The bladder holds urine until you are ready to let it out.  · The urethra carries urine from the bladder out of the body. It is shorter in women, so bacteria can move through it more easily. The urethra is longer in men, so a UTI is less likely to reach the bladder or kidneys in men.  Date Last Reviewed: 1/1/2017 © 2000-2017 [employer]. [address] [place] [id number]. All rights reserved. This information is not intended as a substitute for professional medical care. Always follow your healthcare professional's instructions.        What Are Sexually Transmitted Diseases (STDs)?  A sexually transmitted disease (STD) is a disease that is spread during sex. (An STD can also be called STI for sexually transmitted infection.) You can become infected with an STD if you have sex with someone who has an STD. Any sex that involves the penis, vagina, anus, or mouth can spread disease. Some STDs spread through body fluids such as semen, vaginal fluid, or blood. Others spread through contact with affected skin.  Who is at risk?     Places on or in the body where STDs cause damage include reproductive organs, the rectum, and the mouth.   It doesnt matter if youre straight or brown, male or female, young or old. Any person who has sex can get an STD. Your risk increases if:  · You have more than one partner. The more partners you have, the greater your risk.  · Your partner has other partners. If your partner is exposed to an STD, you could be, too.  · You or your partner have had sex with other people in the past. Either of you might be carrying an STD from an earlier partner.  · You have an STD. The STD may cause sores or other health problems that increase your  risk of new infections. Your risk will stay high unless you change the behaviors that put you at risk of the current infection.  Prevent future problems  Left untreated, certain STDs can lead to cancer or even death. Some can harm unborn babies whose mothers are infected. Others can cause you to not be able to have children (sterility) or can affect changes in behavior or your ability to think. You can prevent these problems with safer sex, regular checkups, and early treatment. Always use a latex condom when you have sex. Get tested if youre at risk. And get treated early if you have an STD.  Getting checked  The only sure way to know if you have an STD is to get checked by a healthcare provider. If you notice a change in how your body looks or feels, have it checked out. But keep in mind, STDs dont always show symptoms. So if youre at risk of STDs, get checked regularly. If you find you have an STD, be sure your partner gets treatment, too. If not, his or her health is at risk. And left untreated, your partner could pass the STD back to you, or on to others.  Common symptoms  Be alert to any changes in your body and your partners body. Symptoms may appear in or near the vagina, penis, rectum, mouth, or throat. They include:  · Unusual discharge  · Lumps, bumps, or rashes  · Sores that may be painful, itchy, or painless  · Itchy skin  · Burning with urination  · Pain in the pelvis, belly (abdomen), or rectum  Even if you dont have symptoms  You may have an STD, even if you dont have symptoms. If you think you are at risk, get checked. Go to a clinic or to your healthcare provider. If your partner has an STD, you need to be tested too, even if you feel fine.  Vaccines to prevent disease  Vaccines (also called immunizations) are available to prevent hepatitis A and hepatitis B. These are two kinds of STDs. There is also a vaccine to prevent HPV. This is a virus that can be passed from person to person through  sexual contact. Ask your healthcare provider whether any of these vaccines is right for you.   Date Last Reviewed: 11/1/2016  © 2207-8580 [employer]. [address] [place] [address], [place] [address]. All rights reserved. This information is not intended as a substitute for professional medical care. Always follow your healthcare professional's instructions.

## 2019-09-04 NOTE — PATIENT INSTRUCTIONS
START ORAL ANTIBIOTIC TONIGHT  INCREASE FLUID INTAKE  AVOID SEXUAL ACTIVITY UNTIL RESULTS ARE RECEIVED  WE WILL CALL WITH LAB RESULTS OVER THE NEXT COUPLE OF DAYS      You must understand that you've received an Urgent Care treatment only and that you may be released before all your medical problems are known or treated. You, the patient, will arrange for follow up care as instructed.  If your condition worsens we recommend that you receive another evaluation at the emergency room immediately or contact your primary medical clinics after hours call service to discuss your concerns.  Please return here or go to the Emergency Department for any concerns or worsening of condition.        Understanding Urinary Tract Infections (UTIs)  Most UTIs are caused by bacteria, although they may also be caused by viruses or fungi. Bacteria from the bowel are the most common source of infection. The infection may start because of any of the following:  · Sexual activity. During sex, bacteria can travel from the penis, vagina, or rectum into the urethra.   · Bacteria on the skin outside the rectum may travel into the urethra. This is more common in women since the rectum and urethra are closer to each other than in men. Wiping from front to back after using the toilet and keeping the area clean can help prevent germs from getting to the urethra.  · Blockage of urine flow through the urinary tract. If urine sits too long, germs may start to grow out of control.      Parts of the urinary tract  The infection can occur in any part of the urinary tract.  · The kidneys collect and store urine.  · The ureters carry urine from the kidneys to the bladder.  · The bladder holds urine until you are ready to let it out.  · The urethra carries urine from the bladder out of the body. It is shorter in women, so bacteria can move through it more easily. The urethra is longer in men, so a UTI is less likely to reach the bladder or kidneys in  men.  Date Last Reviewed: 1/1/2017 © 2000-2017 [employer]. [address] [place] [address], [place] [address]. All rights reserved. This information is not intended as a substitute for professional medical care. Always follow your healthcare professional's instructions.        What Are Sexually Transmitted Diseases (STDs)?  A sexually transmitted disease (STD) is a disease that is spread during sex. (An STD can also be called STI for sexually transmitted infection.) You can become infected with an STD if you have sex with someone who has an STD. Any sex that involves the penis, vagina, anus, or mouth can spread disease. Some STDs spread through body fluids such as semen, vaginal fluid, or blood. Others spread through contact with affected skin.  Who is at risk?     Places on or in the body where STDs cause damage include reproductive organs, the rectum, and the mouth.   It doesnt matter if youre straight or brown, male or female, young or old. Any person who has sex can get an STD. Your risk increases if:  · You have more than one partner. The more partners you have, the greater your risk.  · Your partner has other partners. If your partner is exposed to an STD, you could be, too.  · You or your partner have had sex with other people in the past. Either of you might be carrying an STD from an earlier partner.  · You have an STD. The STD may cause sores or other health problems that increase your risk of new infections. Your risk will stay high unless you change the behaviors that put you at risk of the current infection.  Prevent future problems  Left untreated, certain STDs can lead to cancer or even death. Some can harm unborn babies whose mothers are infected. Others can cause you to not be able to have children (sterility) or can affect changes in behavior or your ability to think. You can prevent these problems with safer sex, regular checkups, and early treatment. Always use a latex condom when you have  sex. Get tested if youre at risk. And get treated early if you have an STD.  Getting checked  The only sure way to know if you have an STD is to get checked by a healthcare provider. If you notice a change in how your body looks or feels, have it checked out. But keep in mind, STDs dont always show symptoms. So if youre at risk of STDs, get checked regularly. If you find you have an STD, be sure your partner gets treatment, too. If not, his or her health is at risk. And left untreated, your partner could pass the STD back to you, or on to others.  Common symptoms  Be alert to any changes in your body and your partners body. Symptoms may appear in or near the vagina, penis, rectum, mouth, or throat. They include:  · Unusual discharge  · Lumps, bumps, or rashes  · Sores that may be painful, itchy, or painless  · Itchy skin  · Burning with urination  · Pain in the pelvis, belly (abdomen), or rectum  Even if you dont have symptoms  You may have an STD, even if you dont have symptoms. If you think you are at risk, get checked. Go to a clinic or to your healthcare provider. If your partner has an STD, you need to be tested too, even if you feel fine.  Vaccines to prevent disease  Vaccines (also called immunizations) are available to prevent hepatitis A and hepatitis B. These are two kinds of STDs. There is also a vaccine to prevent HPV. This is a virus that can be passed from person to person through sexual contact. Ask your healthcare provider whether any of these vaccines is right for you.   Date Last Reviewed: 11/1/2016 © 2000-2017 [employer] [address] [place] [address], [place] [address]. All rights reserved. This information is not intended as a substitute for professional medical care. Always follow your healthcare professional's instructions.

## 2019-09-05 LAB
C TRACH DNA SPEC QL NAA+PROBE: NOT DETECTED
N GONORRHOEA DNA SPEC QL NAA+PROBE: NOT DETECTED

## 2019-09-06 ENCOUNTER — TELEPHONE (OUTPATIENT)
Dept: INTERNAL MEDICINE | Facility: CLINIC | Age: 34
End: 2019-09-06

## 2019-09-06 ENCOUNTER — TELEPHONE (OUTPATIENT)
Dept: URGENT CARE | Facility: CLINIC | Age: 34
End: 2019-09-06

## 2019-09-06 LAB
BACTERIA UR CULT: NORMAL
BACTERIA UR CULT: NORMAL

## 2019-09-06 RX ORDER — TRAMADOL HYDROCHLORIDE 50 MG/1
TABLET ORAL
Qty: 60 TABLET | Refills: 0 | Status: SHIPPED | OUTPATIENT
Start: 2019-09-06 | End: 2019-10-16 | Stop reason: SDUPTHER

## 2019-09-06 NOTE — TELEPHONE ENCOUNTER
----- Message from Anju Juan sent at 9/6/2019  3:09 PM CDT -----  Contact: pharmacy/shruti/[phone]  Pharmacy called in regards to getting clarification on the pt Rx for     tramadol (ULTRAM) 50 mg tablet 60 tablet 0 9/4/2019 No TAKE 1 TABLET BY MOUTH EVERY 8 HOURS IF NEEDED    It needs to say quantity medically necessary great than 7 day.     [facility]  Please advise

## 2019-09-08 ENCOUNTER — HOSPITAL ENCOUNTER (EMERGENCY)
Facility: HOSPITAL | Age: 34
Discharge: HOME | End: 2019-09-08
Attending: EMERGENCY MEDICINE
Payer: MEDICARE

## 2019-09-08 VITALS
HEIGHT: 67 IN | RESPIRATION RATE: 20 BRPM | OXYGEN SATURATION: 99 % | SYSTOLIC BLOOD PRESSURE: 107 MMHG | BODY MASS INDEX: 25.9 KG/M2 | TEMPERATURE: 98 F | DIASTOLIC BLOOD PRESSURE: 53 MMHG | WEIGHT: 165 LBS | HEART RATE: 70 BPM

## 2019-09-08 DIAGNOSIS — M54.50 ACUTE BILATERAL LOW BACK PAIN WITHOUT SCIATICA: ICD-10-CM

## 2019-09-08 DIAGNOSIS — N39.0 URINARY TRACT INFECTION WITHOUT HEMATURIA, SITE UNSPECIFIED: Primary | ICD-10-CM

## 2019-09-08 LAB
ANION GAP SERPL CALC-SCNC: 9 MMOL/L (ref 8–16)
B-HCG UR QL: NEGATIVE
BACTERIA #/AREA URNS AUTO: ABNORMAL /HPF
BACTERIA GENITAL QL WET PREP: ABNORMAL
BASOPHILS # BLD AUTO: 0.05 K/UL (ref 0–0.2)
BASOPHILS NFR BLD: 0.5 % (ref 0–1.9)
BILIRUB UR QL STRIP: NEGATIVE
BUN SERPL-MCNC: 10 MG/DL (ref 6–20)
CALCIUM SERPL-MCNC: 8.9 MG/DL (ref 8.7–10.5)
CHLORIDE SERPL-SCNC: 107 MMOL/L (ref 95–110)
CLARITY UR REFRACT.AUTO: ABNORMAL
CLUE CELLS VAG QL WET PREP: ABNORMAL
CO2 SERPL-SCNC: 21 MMOL/L (ref 23–29)
COLOR UR AUTO: ABNORMAL
CREAT SERPL-MCNC: 0.7 MG/DL (ref 0.5–1.4)
CTP QC/QA: YES
DIFFERENTIAL METHOD: ABNORMAL
EOSINOPHIL # BLD AUTO: 0.6 K/UL (ref 0–0.5)
EOSINOPHIL NFR BLD: 5.6 % (ref 0–8)
ERYTHROCYTE [DISTWIDTH] IN BLOOD BY AUTOMATED COUNT: 12.6 % (ref 11.5–14.5)
EST. GFR  (AFRICAN AMERICAN): >60 ML/MIN/1.73 M^2
EST. GFR  (NON AFRICAN AMERICAN): >60 ML/MIN/1.73 M^2
FILAMENT FUNGI VAG WET PREP-#/AREA: ABNORMAL
GLUCOSE SERPL-MCNC: 92 MG/DL (ref 70–110)
GLUCOSE UR QL STRIP: NEGATIVE
HCT VFR BLD AUTO: 42.4 % (ref 37–48.5)
HGB BLD-MCNC: 14.2 G/DL (ref 12–16)
HGB UR QL STRIP: ABNORMAL
IMM GRANULOCYTES # BLD AUTO: 0.14 K/UL (ref 0–0.04)
IMM GRANULOCYTES NFR BLD AUTO: 1.4 % (ref 0–0.5)
KETONES UR QL STRIP: NEGATIVE
LEUKOCYTE ESTERASE UR QL STRIP: ABNORMAL
LYMPHOCYTES # BLD AUTO: 3 K/UL (ref 1–4.8)
LYMPHOCYTES NFR BLD: 29.7 % (ref 18–48)
MCH RBC QN AUTO: 29.6 PG (ref 27–31)
MCHC RBC AUTO-ENTMCNC: 33.5 G/DL (ref 32–36)
MCV RBC AUTO: 88 FL (ref 82–98)
MICROSCOPIC COMMENT: ABNORMAL
MONOCYTES # BLD AUTO: 1 K/UL (ref 0.3–1)
MONOCYTES NFR BLD: 10.4 % (ref 4–15)
NEUTROPHILS # BLD AUTO: 5.2 K/UL (ref 1.8–7.7)
NEUTROPHILS NFR BLD: 52.4 % (ref 38–73)
NITRITE UR QL STRIP: NEGATIVE
NRBC BLD-RTO: 0 /100 WBC
PH UR STRIP: 6 [PH] (ref 5–8)
PLATELET # BLD AUTO: 311 K/UL (ref 150–350)
PMV BLD AUTO: 10.7 FL (ref 9.2–12.9)
POTASSIUM SERPL-SCNC: 4.1 MMOL/L (ref 3.5–5.1)
PROT UR QL STRIP: NEGATIVE
RBC # BLD AUTO: 4.8 M/UL (ref 4–5.4)
RBC #/AREA URNS AUTO: 12 /HPF (ref 0–4)
SODIUM SERPL-SCNC: 137 MMOL/L (ref 136–145)
SP GR UR STRIP: 1.01 (ref 1–1.03)
SPECIMEN SOURCE: ABNORMAL
SQUAMOUS #/AREA URNS AUTO: 6 /HPF
T VAGINALIS GENITAL QL WET PREP: ABNORMAL
URN SPEC COLLECT METH UR: ABNORMAL
WBC # BLD AUTO: 9.97 K/UL (ref 3.9–12.7)
WBC #/AREA URNS AUTO: 1 /HPF (ref 0–5)
WBC #/AREA VAG WET PREP: ABNORMAL
YEAST GENITAL QL WET PREP: ABNORMAL

## 2019-09-08 PROCEDURE — 81025 URINE PREGNANCY TEST: CPT | Performed by: PHYSICIAN ASSISTANT

## 2019-09-08 PROCEDURE — 96361 HYDRATE IV INFUSION ADD-ON: CPT

## 2019-09-08 PROCEDURE — 87210 SMEAR WET MOUNT SALINE/INK: CPT

## 2019-09-08 PROCEDURE — 99284 EMERGENCY DEPT VISIT MOD MDM: CPT | Mod: ,,, | Performed by: PHYSICIAN ASSISTANT

## 2019-09-08 PROCEDURE — 87491 CHLMYD TRACH DNA AMP PROBE: CPT

## 2019-09-08 PROCEDURE — 99285 EMERGENCY DEPT VISIT HI MDM: CPT | Mod: 25

## 2019-09-08 PROCEDURE — 85025 COMPLETE CBC W/AUTO DIFF WBC: CPT

## 2019-09-08 PROCEDURE — 96375 TX/PRO/DX INJ NEW DRUG ADDON: CPT

## 2019-09-08 PROCEDURE — 80048 BASIC METABOLIC PNL TOTAL CA: CPT

## 2019-09-08 PROCEDURE — 99284 PR EMERGENCY DEPT VISIT,LEVEL IV: ICD-10-PCS | Mod: ,,, | Performed by: PHYSICIAN ASSISTANT

## 2019-09-08 PROCEDURE — 96374 THER/PROPH/DIAG INJ IV PUSH: CPT

## 2019-09-08 PROCEDURE — 63600175 PHARM REV CODE 636 W HCPCS: Performed by: PHYSICIAN ASSISTANT

## 2019-09-08 PROCEDURE — 81001 URINALYSIS AUTO W/SCOPE: CPT

## 2019-09-08 RX ORDER — ONDANSETRON 2 MG/ML
4 INJECTION INTRAMUSCULAR; INTRAVENOUS
Status: COMPLETED | OUTPATIENT
Start: 2019-09-08 | End: 2019-09-08

## 2019-09-08 RX ORDER — KETOROLAC TROMETHAMINE 30 MG/ML
10 INJECTION, SOLUTION INTRAMUSCULAR; INTRAVENOUS
Status: COMPLETED | OUTPATIENT
Start: 2019-09-08 | End: 2019-09-08

## 2019-09-08 RX ORDER — NAPROXEN 500 MG/1
500 TABLET ORAL 2 TIMES DAILY WITH MEALS
Qty: 20 TABLET | Refills: 0 | Status: SHIPPED | OUTPATIENT
Start: 2019-09-08 | End: 2021-02-03

## 2019-09-08 RX ADMIN — SODIUM CHLORIDE 1000 ML: 0.9 INJECTION, SOLUTION INTRAVENOUS at 11:09

## 2019-09-08 RX ADMIN — KETOROLAC TROMETHAMINE 10 MG: 30 INJECTION, SOLUTION INTRAMUSCULAR at 11:09

## 2019-09-08 RX ADMIN — ONDANSETRON 4 MG: 2 INJECTION INTRAMUSCULAR; INTRAVENOUS at 10:09

## 2019-09-08 NOTE — DISCHARGE INSTRUCTIONS
There is a lesion noted on your liver on the CT scan performed today. Follow up with your PCP for further imaging/workup. Continue antibiotics as prescribed by urgent care. Follow up with your PCP and OBGYN.

## 2019-09-08 NOTE — ED TRIAGE NOTES
Pt states she was seen at urgent care on Wednesday for UTI symptoms.  States she was given doxycycline RX and Rocephin injection.  Pt states she is still having painful urination, frequency and dysuria.   Denies known fever-has had some chills.

## 2019-09-08 NOTE — ED PROVIDER NOTES
Encounter Date: 9/8/2019       History     Chief Complaint   Patient presents with    Urinary Tract Infection     on doxy, seen at  given im rocephin, i think maybe kidney stone     34-year-old white female with history of HSV, anxiety, bipolar, depression, ADHD, asthma presents to the ED complaining of UTI symptoms. She reports dysuria, urinary frequency, urinary urgency, sensation of incomplete emptying.  She was seen at urgent care 4 days ago and prescribed antibiotics, reports that her symptoms have somewhat improved.  1-2 days ago, she developed low back pain.  She has chronic low back pain so is unsure if this is secondary to that or secondary to her infection.  She reports chills, fatigue, nausea, headache, lightheadedness.  She denies any history of kidney stones.  She does report a new sexual partner.  She denies fever, chest pain, shortness of breath, abdominal pain, hematuria, diarrhea, URI symptoms.    The history is provided by the patient.     Review of patient's allergies indicates:   Allergen Reactions    Dilantin [phenytoin sodium extended] Rash    Saphris [asenapine]      Confusion and depressed mood.     Past Medical History:   Diagnosis Date    Abnormal cervical Papanicolaou smear 2012    Colposcopy    ADHD (attention deficit hyperactivity disorder) 8/16/2012    Allergy     Anxiety     Asthma     childhood    Back pain 5/19/2014    Bipolar affective disorder     Cholecystitis     Chronic migraine without aura, with intractable migraine, so stated, without mention of status migrainosus 9/24/2013    Depression     Fever blister     Gallstones 5/26/2014    Headache(784.0)     Hepatitis C antibody positive in blood     History of hepatitis C     History of psychiatric hospitalization     Suicide attempt    Personality disorder 4/11/2013    Psychosis 10/7/2013    Therapy     Tobacco abuse 9/24/2013     Past Surgical History:   Procedure Laterality Date     CHOLECYSTECTOMY-LAPAROSCOPIC N/A 2014    Performed by Joshua Goldberg, [facility] at [facility] OR 2ND FLR    ESOPHAGOGASTRODUODENOSCOPY      ESOPHAGOGASTRODUODENOSCOPY (EGD) N/A 2014    Performed by Wayne Wang MD at [facility] ENDO (4TH FLR)    FRACTURE SURGERY      MOLE REMOVAL      SKIN BIOPSY       Family History   Problem Relation Age of Onset    Melanoma Mother     ADD / ADHD Mother     Bipolar disorder Mother     Schizophrenia Mother     Alcohol abuse Father     Drug abuse Father     Depression Father     No Known Problems Sister     Acne Maternal Aunt     Depression Maternal Aunt     Migraines Maternal Aunt     Anxiety disorder Paternal Aunt     Depression Paternal Aunt     Breast cancer Neg Hx     Colon cancer Neg Hx     Ovarian cancer Neg Hx      Social History     Tobacco Use    Smoking status: Current Every Day Smoker     Packs/day: 0.50     Types: Cigarettes     Last attempt to quit: 2018     Years since quittin.3    Smokeless tobacco: Never Used   Substance Use Topics    Alcohol use: No     Alcohol/week: 0.0 oz    Drug use: No     Review of Systems   Constitutional: Positive for chills and fatigue. Negative for fever.   HENT: Negative for congestion, rhinorrhea and sore throat.    Eyes: Negative for photophobia and visual disturbance.   Respiratory: Negative for shortness of breath.    Cardiovascular: Negative for chest pain.   Gastrointestinal: Positive for nausea and vomiting. Negative for abdominal pain, constipation and diarrhea.   Genitourinary: Positive for dysuria, flank pain, frequency and urgency. Negative for hematuria, vaginal bleeding and vaginal discharge.   Musculoskeletal: Positive for back pain. Negative for neck pain and neck stiffness.   Skin: Negative for rash and wound.   Neurological: Positive for light-headedness and headaches. Negative for dizziness, weakness and numbness.   Psychiatric/Behavioral: Negative for confusion.       Physical Exam      Initial Vitals [09/08/19 1032]   BP Pulse Resp Temp SpO2   126/61 99 18 97.9 °F (36.6 °C) 99 %      MAP       --         Physical Exam    Nursing note and vitals reviewed.  Constitutional: She appears well-developed and well-nourished. She is not diaphoretic. No distress.   HENT:   Head: Normocephalic and atraumatic.   Neck: Normal range of motion. Neck supple.   Cardiovascular: Normal rate, regular rhythm and normal heart sounds. Exam reveals no gallop and no friction rub.    No murmur heard.  Pulmonary/Chest: Breath sounds normal. She has no wheezes. She has no rhonchi. She has no rales.   Abdominal: Soft. Bowel sounds are normal. There is no tenderness. There is CVA tenderness (bilateral). There is no rebound and no guarding.   Genitourinary: Cervix exhibits no motion tenderness, no discharge and no friability. Right adnexum displays no mass and no tenderness. Left adnexum displays no mass and no tenderness. No bleeding in the vagina. Vaginal discharge (minimal white) found.   Genitourinary Comments: Small herpetic lesion noted to the L labia.    Musculoskeletal: Normal range of motion.   Neurological: She is alert and oriented to person, place, and time.   Skin: Skin is warm and dry. No rash noted. No erythema.   Psychiatric: She has a normal mood and affect.         ED Course   Procedures  Labs Reviewed   CBC W/ AUTO DIFFERENTIAL - Abnormal; Notable for the following components:       Result Value    Immature Granulocytes 1.4 (*)     Immature Grans (Abs) 0.14 (*)     Eos # 0.6 (*)     All other components within normal limits   BASIC METABOLIC PANEL - Abnormal; Notable for the following components:    CO2 21 (*)     All other components within normal limits   URINALYSIS, REFLEX TO URINE CULTURE - Abnormal; Notable for the following components:    Appearance, UA Cloudy (*)     Occult Blood UA 2+ (*)     Leukocytes, UA 2+ (*)     All other components within normal limits    Narrative:     Preferred Collection  Type->Urine, Clean Catch   VAGINAL SCREEN - Abnormal; Notable for the following components:    Clue Cells Few (*)     WBC - Vaginal Screen Occasional (*)     Bacteria - Vaginal Screen Occasional (*)     All other components within normal limits   URINALYSIS MICROSCOPIC - Abnormal; Notable for the following components:    RBC, UA 12 (*)     All other components within normal limits    Narrative:     Preferred Collection Type->Urine, Clean Catch   C. TRACHOMATIS/N. GONORRHOEAE BY AMP DNA   POCT URINE PREGNANCY          Imaging Results           CT Renal Stone Study ABD Pelvis WO (Final result)  Result time 09/08/19 12:23:29    Final result by Jerod Cesar MD (09/08/19 12:23:29)                 Impression:      This report was flagged in Epic as abnormal.    1. No findings to suggest obstructive uropathy.  2. Moderate to large amount of stool in the right colon, could reflect developing constipation, correlation recommended.  3. Enlargement of right hepatic lobe nonspecific low attenuating lesion, with interval development of a smaller low attenuating lesion within the periphery of the right hepatic lobe.  Most recent comparative examination is from 01/05/2008.  Comparison with any more recent exams would be helpful.  Further evaluation with MRI with Eovist recommended on a nonemergent basis for further characterization if not previously performed.  4. Additional findings above.      Electronically signed by: Jerod Cesar MD  Date:    09/08/2019  Time:    12:23             Narrative:    EXAMINATION:  CT RENAL STONE STUDY ABD PELVIS WO    CLINICAL HISTORY:  Flank pain, stone disease suspected;    TECHNIQUE:  Low dose axial images, sagittal and coronal reformations were obtained from the lung bases to the pubic symphysis.  Contrast was not administered.    COMPARISON:  Ultrasound 05/19/2014, CT 01/05/2008    FINDINGS:  Images of the lower thorax are remarkable for minimal dependent atelectasis.    The spleen,  pancreas and adrenal glands have a grossly unremarkable noncontrast appearance.  There is a low attenuating lesion within the posterior aspect of the right hepatic lobe measuring 4.7 cm, enlarged since the previous examination, measuring 1.2 cm at that time.  Internal attenuation is nonspecific.  Please note, no recent comparative examinations are available.  There is an additional low attenuating focus within the periphery of the inferior right hepatic lobe, appears new since the prior exam, also nonspecific in appearance and measures 2.4 cm.  The gallbladder is surgically absent.  There is no biliary dilation or ascites.  The pancreatic duct is not dilated.  The stomach is decompressed.  No significant abdominal lymphadenopathy.    The kidneys have a grossly unremarkable noncontrast appearance without hydronephrosis or nephrolithiasis.  The bilateral ureters are unremarkable without calculi seen.  The urinary bladder is unremarkable.  The uterus and adnexa is grossly unremarkable.    There is moderate stool in the colon.  The terminal ileum and appendix are unremarkable.  The small bowel is grossly unremarkable.  Scattered shotty periaortic and paracaval lymph nodes are noted.  No focal organized pelvic fluid collection.    Partially visualized surgical change noted of the proximal right femur.  No focal osseous destructive process.  No significant inguinal lymphadenopathy.                                 Medical Decision Making:   History:   Old Medical Records: I decided to obtain old medical records.  Old Records Summarized: records from clinic visits.       <> Summary of Records: 9/4: urgent care appt. Given IM rocephin and started on po doxycycline. Urine culture - multiple organisms none in predominance. Gonorrhea/chlamydia from urine sample negative.  Clinical Tests:   Lab Tests: Ordered and Reviewed  Radiological Study: Ordered and Reviewed       APC / Resident Notes:   34-year-old white female with  history of HSV, anxiety, bipolar, depression, ADHD, asthma presents to the ED complaining of UTI symptoms. Vital signs stable. Abdomen is soft and nontender.  Bilateral CVA tenderness and lower back muscular tenderness. No rashes. Lungs are clear.  Small herpetic lesion noted to the L labia. Minimal white vaginal discharge, no vaginal bleeding, no CMT. I do not suspect PID. Differential diagnosis includes but is not limited to UTI, [place], pyelonephritis, nephrolithiasis.  Will obtain labs and CT renal stone.    No leukocytosis. Cr normal. UA with no evidence of infection. UPT negative.     CT renal stone with no ureteral stones. Lesion in the liver noted - patient was informed of this and will follow up with her PCP.    I do not feel that she needs any further labs or imaging at this time. Stable for discharge.     She was discharged with a prescription for naproxen.  She will continue all prescribed abx.  She will follow up with her OBGYN.  All of the patient's questions were answered.  I reviewed the patient's chart, labs, and imaging and discussed the case with my supervising physician.                    Clinical Impression:       ICD-10-CM ICD-9-CM   1. Urinary tract infection without hematuria, site unspecified N39.0 599.0   2. Acute bilateral low back pain without sciatica M54.5 724.2     338.19         Disposition:   Disposition: Discharged  Condition: Stable                        Pushpa Hussein PA-C  09/08/19 8111

## 2019-09-09 LAB
C TRACH DNA SPEC QL NAA+PROBE: NOT DETECTED
N GONORRHOEA DNA SPEC QL NAA+PROBE: NOT DETECTED

## 2019-09-18 ENCOUNTER — OFFICE VISIT (OUTPATIENT)
Dept: INTERNAL MEDICINE | Facility: CLINIC | Age: 34
End: 2019-09-18
Payer: MEDICARE

## 2019-09-18 VITALS
OXYGEN SATURATION: 98 % | HEART RATE: 84 BPM | HEIGHT: 67 IN | DIASTOLIC BLOOD PRESSURE: 64 MMHG | BODY MASS INDEX: 26.99 KG/M2 | WEIGHT: 171.94 LBS | SYSTOLIC BLOOD PRESSURE: 110 MMHG

## 2019-09-18 DIAGNOSIS — R39.15 URINARY URGENCY: Primary | ICD-10-CM

## 2019-09-18 DIAGNOSIS — G44.89 OTHER HEADACHE SYNDROME: ICD-10-CM

## 2019-09-18 DIAGNOSIS — F31.9 BIPOLAR I DISORDER: Chronic | ICD-10-CM

## 2019-09-18 DIAGNOSIS — K76.9 LIVER LESION: ICD-10-CM

## 2019-09-18 LAB
BILIRUB UR QL STRIP: NEGATIVE
CLARITY UR REFRACT.AUTO: ABNORMAL
COLOR UR AUTO: YELLOW
GLUCOSE UR QL STRIP: NEGATIVE
HGB UR QL STRIP: ABNORMAL
KETONES UR QL STRIP: ABNORMAL
LEUKOCYTE ESTERASE UR QL STRIP: NEGATIVE
MICROSCOPIC COMMENT: ABNORMAL
NITRITE UR QL STRIP: NEGATIVE
PH UR STRIP: 6 [PH] (ref 5–8)
PROT UR QL STRIP: NEGATIVE
RBC #/AREA URNS AUTO: 32 /HPF (ref 0–4)
SP GR UR STRIP: 1.02 (ref 1–1.03)
SQUAMOUS #/AREA URNS AUTO: 2 /HPF
URN SPEC COLLECT METH UR: ABNORMAL
WBC #/AREA URNS AUTO: 1 /HPF (ref 0–5)

## 2019-09-18 PROCEDURE — 87086 URINE CULTURE/COLONY COUNT: CPT

## 2019-09-18 PROCEDURE — 99214 PR OFFICE/OUTPT VISIT, EST, LEVL IV, 30-39 MIN: ICD-10-PCS | Mod: S$GLB,,, | Performed by: INTERNAL MEDICINE

## 2019-09-18 PROCEDURE — 3008F BODY MASS INDEX DOCD: CPT | Mod: CPTII,S$GLB,, | Performed by: INTERNAL MEDICINE

## 2019-09-18 PROCEDURE — 99214 OFFICE O/P EST MOD 30 MIN: CPT | Mod: S$GLB,,, | Performed by: INTERNAL MEDICINE

## 2019-09-18 PROCEDURE — 3008F PR BODY MASS INDEX (BMI) DOCUMENTED: ICD-10-PCS | Mod: CPTII,S$GLB,, | Performed by: INTERNAL MEDICINE

## 2019-09-18 PROCEDURE — 81001 URINALYSIS AUTO W/SCOPE: CPT

## 2019-09-18 PROCEDURE — 99999 PR PBB SHADOW E&M-EST. PATIENT-LVL IV: ICD-10-PCS | Mod: PBBFAC,,, | Performed by: INTERNAL MEDICINE

## 2019-09-18 PROCEDURE — 99999 PR PBB SHADOW E&M-EST. PATIENT-LVL IV: CPT | Mod: PBBFAC,,, | Performed by: INTERNAL MEDICINE

## 2019-09-18 RX ORDER — PROPRANOLOL HYDROCHLORIDE 20 MG/1
20 TABLET ORAL 3 TIMES DAILY PRN
Refills: 0 | COMMUNITY
Start: 2019-08-21 | End: 2019-09-18

## 2019-09-18 RX ORDER — OXYBUTYNIN CHLORIDE 5 MG/1
5 TABLET ORAL 3 TIMES DAILY
Qty: 30 TABLET | Refills: 1 | Status: SHIPPED | OUTPATIENT
Start: 2019-09-18 | End: 2019-10-11 | Stop reason: SDUPTHER

## 2019-09-18 NOTE — PROGRESS NOTES
Subjective:       Patient ID: Vianney Callahan is a 34 y.o. female.    Chief Complaint: Hospital Follow Up    HPI   9/4:  Dysuria evaluated at .  tx with rocephin, doxycycline.  Chlamydia and GC neg.  Urine culture contaminated.  ED 9/8  UA 12 red cells, no white cells.  CBC ok.    CT renal scan:  Reviewed     Now c/o urinary dribbling.  No longer burning, but c/o urgency.  Amenorrheic with ocp.    She has a migraine headache now.  Imitrex helpful typically, but unavailable now.    She feels her bipolar disease is beter controlled.    Review of Systems   Constitutional: Negative for fever and unexpected weight change.   HENT: Negative for congestion and postnasal drip.    Eyes: Negative for pain, discharge and visual disturbance.   Respiratory: Negative for cough, chest tightness, shortness of breath and wheezing.    Cardiovascular: Negative for chest pain and leg swelling.   Gastrointestinal: Negative for abdominal pain, constipation, diarrhea and nausea.   Genitourinary: Negative for difficulty urinating, dysuria and hematuria.   Skin: Negative for rash.   Neurological: Negative for headaches.   Psychiatric/Behavioral: Negative for dysphoric mood and sleep disturbance. The patient is not nervous/anxious.        Objective:      Physical Exam   Constitutional: She is oriented to person, place, and time. She appears well-developed and well-nourished. No distress.   Neurological: She is alert and oriented to person, place, and time.   Psychiatric: She has a normal mood and affect. Her behavior is normal.       Assessment:       1. Urinary urgency    2. Liver lesion    3. Bipolar I disorder    4. Other headache syndrome        Plan:       Vianney was seen today for hospital follow up.    Diagnoses and all orders for this visit:    Urinary urgency  -     Urinalysis  -     Urine culture    Liver lesion  -     MRI Abdomen W WO Contrast; Future    Bipolar I disorder    Other headache syndrome    Other orders  -     oxybutynin  (DITROPAN) 5 MG Tab; Take 1 tablet (5 mg total) by mouth 3 (three) times daily.  -     Urinalysis Microscopic

## 2019-09-20 ENCOUNTER — HOSPITAL ENCOUNTER (OUTPATIENT)
Dept: RADIOLOGY | Facility: HOSPITAL | Age: 34
Discharge: HOME | End: 2019-09-20
Attending: INTERNAL MEDICINE
Payer: MEDICARE

## 2019-09-20 DIAGNOSIS — K76.9 LIVER LESION: ICD-10-CM

## 2019-09-20 LAB
BACTERIA UR CULT: NORMAL
BACTERIA UR CULT: NORMAL

## 2019-09-20 PROCEDURE — 74183 MRI ABD W/O CNTR FLWD CNTR: CPT | Mod: TC

## 2019-09-20 PROCEDURE — 74183 MRI ABD W/O CNTR FLWD CNTR: CPT | Mod: 26,,, | Performed by: RADIOLOGY

## 2019-09-20 PROCEDURE — 74183 MRI ABDOMEN W WO CONTRAST: ICD-10-PCS | Mod: 26,,, | Performed by: RADIOLOGY

## 2019-09-20 PROCEDURE — 25500020 PHARM REV CODE 255: Performed by: INTERNAL MEDICINE

## 2019-09-20 PROCEDURE — A9585 GADOBUTROL INJECTION: HCPCS | Performed by: INTERNAL MEDICINE

## 2019-09-20 RX ORDER — GADOBUTROL 604.72 MG/ML
10 INJECTION INTRAVENOUS
Status: COMPLETED | OUTPATIENT
Start: 2019-09-20 | End: 2019-09-20

## 2019-09-20 RX ADMIN — GADOBUTROL 10 ML: 604.72 INJECTION INTRAVENOUS at 05:09

## 2019-09-22 DIAGNOSIS — R39.15 URINARY URGENCY: Primary | ICD-10-CM

## 2019-09-22 DIAGNOSIS — R31.29 MICROHEMATURIA: ICD-10-CM

## 2019-09-22 RX ORDER — SUMATRIPTAN SUCCINATE 100 MG/1
TABLET ORAL
Qty: 30 TABLET | Refills: 11 | Status: SHIPPED | OUTPATIENT
Start: 2019-09-22 | End: 2020-12-01 | Stop reason: SDUPTHER

## 2019-09-23 DIAGNOSIS — D18.00 MULTIPLE HEMANGIOMAS: Primary | ICD-10-CM

## 2019-09-23 DIAGNOSIS — K76.9 LIVER LESION: ICD-10-CM

## 2019-09-25 ENCOUNTER — TELEPHONE (OUTPATIENT)
Dept: INTERNAL MEDICINE | Facility: CLINIC | Age: 34
End: 2019-09-25

## 2019-09-25 NOTE — TELEPHONE ENCOUNTER
Spoke with pt and informed her of the response and recommendations from Dr. Coreas.,Lisa Rincon LPN

## 2019-09-25 NOTE — TELEPHONE ENCOUNTER
----- Message from Nellie Coreas MD sent at 9/22/2019  8:41 AM CDT -----  pls call - urine showed amild amount of red cells in urine, as she had with last urine study.     Culture was contaminated, as it was last time, so I really can't tell if she has an infection as cause of red cells.      So at her convenience I'd like her to return for cath ua and culture.  If no infection, and red cells persist, I will refer her to urology.

## 2019-09-30 ENCOUNTER — APPOINTMENT (OUTPATIENT)
Dept: INTERNAL MEDICINE | Facility: CLINIC | Age: 34
End: 2019-09-30
Payer: MEDICARE

## 2019-09-30 DIAGNOSIS — R30.0 DYSURIA: Primary | ICD-10-CM

## 2019-09-30 LAB
BILIRUB UR QL STRIP: NEGATIVE
CLARITY UR REFRACT.AUTO: CLEAR
COLOR UR AUTO: YELLOW
GLUCOSE UR QL STRIP: NEGATIVE
HGB UR QL STRIP: ABNORMAL
KETONES UR QL STRIP: NEGATIVE
LEUKOCYTE ESTERASE UR QL STRIP: NEGATIVE
MICROSCOPIC COMMENT: ABNORMAL
NITRITE UR QL STRIP: NEGATIVE
PH UR STRIP: 6 [PH] (ref 5–8)
PROT UR QL STRIP: NEGATIVE
RBC #/AREA URNS AUTO: 19 /HPF (ref 0–4)
SP GR UR STRIP: 1.02 (ref 1–1.03)
SQUAMOUS #/AREA URNS AUTO: 1 /HPF
URN SPEC COLLECT METH UR: ABNORMAL
WBC #/AREA URNS AUTO: 2 /HPF (ref 0–5)

## 2019-09-30 PROCEDURE — 81001 URINALYSIS AUTO W/SCOPE: CPT

## 2019-09-30 PROCEDURE — 87086 URINE CULTURE/COLONY COUNT: CPT

## 2019-10-02 DIAGNOSIS — R31.29 MICROHEMATURIA: Primary | ICD-10-CM

## 2019-10-02 LAB — BACTERIA UR CULT: NO GROWTH

## 2019-10-03 ENCOUNTER — TELEPHONE (OUTPATIENT)
Dept: INTERNAL MEDICINE | Facility: CLINIC | Age: 34
End: 2019-10-03

## 2019-10-03 NOTE — TELEPHONE ENCOUNTER
----- Message from Nellie Coreas MD sent at 10/2/2019  9:30 PM CDT -----  pls call - ur culture neg - put persistent microhematuria, so I'd like her to see Urology'(recent renal stone study neg).

## 2019-10-03 NOTE — TELEPHONE ENCOUNTER
----- Message from Ludmila Brown sent at 10/3/2019  3:10 PM CDT -----  Contact: [phone]  Type: Returning a call    Who left a message?  Donna    When did the practice call?  today    Comments:  Regarding urology appt.  Please advise, thank you.

## 2019-10-04 ENCOUNTER — OFFICE VISIT (OUTPATIENT)
Dept: UROLOGY | Facility: CLINIC | Age: 34
End: 2019-10-04
Payer: MEDICARE

## 2019-10-04 ENCOUNTER — HOSPITAL ENCOUNTER (OUTPATIENT)
Dept: RADIOLOGY | Facility: HOSPITAL | Age: 34
Discharge: HOME | End: 2019-10-04
Attending: STUDENT IN AN ORGANIZED HEALTH CARE EDUCATION/TRAINING PROGRAM
Payer: MEDICARE

## 2019-10-04 VITALS
SYSTOLIC BLOOD PRESSURE: 100 MMHG | WEIGHT: 169.75 LBS | DIASTOLIC BLOOD PRESSURE: 63 MMHG | HEART RATE: 69 BPM | HEIGHT: 67 IN | BODY MASS INDEX: 26.64 KG/M2

## 2019-10-04 DIAGNOSIS — Z87.891 FORMER SMOKER: ICD-10-CM

## 2019-10-04 DIAGNOSIS — K59.00 CONSTIPATION, UNSPECIFIED CONSTIPATION TYPE: ICD-10-CM

## 2019-10-04 DIAGNOSIS — R30.0 DYSURIA: ICD-10-CM

## 2019-10-04 DIAGNOSIS — N30.00 ACUTE CYSTITIS WITHOUT HEMATURIA: ICD-10-CM

## 2019-10-04 DIAGNOSIS — F41.1 ANXIETY STATE: Primary | ICD-10-CM

## 2019-10-04 DIAGNOSIS — R35.0 URINARY FREQUENCY: ICD-10-CM

## 2019-10-04 PROCEDURE — 99999 PR PBB SHADOW E&M-EST. PATIENT-LVL III: ICD-10-PCS | Mod: PBBFAC,,,

## 2019-10-04 PROCEDURE — 74018 XR KUB: ICD-10-PCS | Mod: 26,,, | Performed by: RADIOLOGY

## 2019-10-04 PROCEDURE — 99204 PR OFFICE/OUTPT VISIT, NEW, LEVL IV, 45-59 MIN: ICD-10-PCS | Mod: S$GLB,,, | Performed by: UROLOGY

## 2019-10-04 PROCEDURE — 74018 RADEX ABDOMEN 1 VIEW: CPT | Mod: TC

## 2019-10-04 PROCEDURE — 3008F PR BODY MASS INDEX (BMI) DOCUMENTED: ICD-10-PCS | Mod: CPTII,S$GLB,, | Performed by: UROLOGY

## 2019-10-04 PROCEDURE — 99204 OFFICE O/P NEW MOD 45 MIN: CPT | Mod: S$GLB,,, | Performed by: UROLOGY

## 2019-10-04 PROCEDURE — 74018 RADEX ABDOMEN 1 VIEW: CPT | Mod: 26,,, | Performed by: RADIOLOGY

## 2019-10-04 PROCEDURE — 3008F BODY MASS INDEX DOCD: CPT | Mod: CPTII,S$GLB,, | Performed by: UROLOGY

## 2019-10-04 PROCEDURE — 99999 PR PBB SHADOW E&M-EST. PATIENT-LVL III: CPT | Mod: PBBFAC,,,

## 2019-10-04 RX ORDER — DIVALPROEX SODIUM 500 MG/1
500 TABLET, DELAYED RELEASE ORAL 3 TIMES DAILY
Refills: 0 | COMMUNITY
Start: 2019-09-18 | End: 2020-09-12 | Stop reason: SDUPTHER

## 2019-10-04 NOTE — PROGRESS NOTES
Urology - [facility]  Resident Clinic  Staff - Roman Rivera MD    SUBJECTIVE:     Chief Complaint: microscopic hematuria    History of Present Illness:  Vianney Callahan is a 34 y.o. female who presents to clinic for microscopic hematuria. She is New to our clinic. Referral from Nellie Coreas MD     On 9/4 she went to urgent care with symptoms of dysuria and incomplete emptying. Her urine culture, gonorrhea, and chlamydia testing were all negative. She was given a dose of rocephin and a prescription for doxycycline which she completed, however she still felt no relief. Still has intermittent dysuria, frequency, urgency. Denies gross hematuria    Has tried and failed oxybutynin.     Since 2007, has had 11 urine cultures drawn that are recorded in our system. Only one grew e. Coli (2013).      She quit smoking recently, smoked previously for 15 years, <1 ppd.       Review of patient's allergies indicates:   Allergen Reactions    Dilantin [phenytoin sodium extended] Rash    Saphris [asenapine]      Confusion and depressed mood.       Past Medical History:   Diagnosis Date    Abnormal cervical Papanicolaou smear 2012    Colposcopy    ADHD (attention deficit hyperactivity disorder) 8/16/2012    Allergy     Anxiety     Asthma     childhood    Back pain 5/19/2014    Bipolar affective disorder     Cholecystitis     Chronic migraine without aura, with intractable migraine, so stated, without mention of status migrainosus 9/24/2013    Depression     Fever blister     Gallstones 5/26/2014    Headache(784.0)     Hepatitis C antibody positive in blood     History of hepatitis C     History of psychiatric hospitalization     Suicide attempt    Personality disorder 4/11/2013    Psychosis 10/7/2013    Therapy     Tobacco abuse 9/24/2013     Past Surgical History:   Procedure Laterality Date    ESOPHAGOGASTRODUODENOSCOPY      FRACTURE SURGERY      MOLE REMOVAL      SKIN BIOPSY       Family  "History   Problem Relation Age of Onset    Melanoma Mother     ADD / ADHD Mother     Bipolar disorder Mother     Schizophrenia Mother     Alcohol abuse Father     Drug abuse Father     Depression Father     No Known Problems Sister     Acne Maternal Aunt     Depression Maternal Aunt     Migraines Maternal Aunt     Anxiety disorder Paternal Aunt     Depression Paternal Aunt     Breast cancer Neg Hx     Colon cancer Neg Hx     Ovarian cancer Neg Hx      Social History     Tobacco Use    Smoking status: Current Every Day Smoker     Packs/day: 0.50     Types: Cigarettes     Last attempt to quit: 2018     Years since quittin.3    Smokeless tobacco: Never Used   Substance Use Topics    Alcohol use: No     Alcohol/week: 0.0 standard drinks    Drug use: No        Review of Systems   Constitutional: Negative for activity change, appetite change, fatigue and fever.   HENT: Negative for facial swelling and hearing loss.    Eyes: Negative for discharge and itching.   Respiratory: Negative for chest tightness and shortness of breath.    Cardiovascular: Negative for chest pain and leg swelling.   Gastrointestinal: Negative for abdominal distention, abdominal pain, constipation, diarrhea, nausea and vomiting.   Genitourinary: Positive for dyspareunia, dysuria, frequency and urgency. Negative for decreased urine volume, difficulty urinating, flank pain, hematuria, nocturia and vaginal pain.   Musculoskeletal: Negative for arthralgias, back pain and neck pain.   Skin: Negative for color change and pallor.   Neurological: Negative for dizziness, facial asymmetry and light-headedness.   Hematological: Negative for adenopathy.   Psychiatric/Behavioral: Negative for agitation and decreased concentration. The patient is not nervous/anxious.        OBJECTIVE:       Estimated body mass index is 26.93 kg/m² as calculated from the following:    Height as of 19: 5' 7" (1.702 m).    Weight as of 19: 78 kg " (171 lb 15.3 oz).    Vital Signs (Most Recent)       Physical Exam   Constitutional: She is oriented to person, place, and time. She appears well-developed and well-nourished. No distress.   HENT:   Head: Normocephalic and atraumatic.   Eyes: No scleral icterus.   Neck: No tracheal deviation present.   Cardiovascular: Normal rate.    Pulmonary/Chest: Effort normal. No respiratory distress.   Abdominal: Soft. She exhibits no distension. There is no tenderness.   Genitourinary: Vagina normal.   Genitourinary Comments: The external genitalia were normal. No rash. Atrophic vaginitis was not present. The urethral showed no evidence of a urethral diverticulum.  Perineum normal.  Levator was tender to palpation bilaterally.    Musculoskeletal: Normal range of motion.   Neurological: She is alert and oriented to person, place, and time.   Skin: Skin is warm and dry.     Psychiatric: She has a normal mood and affect. Her behavior is normal.       BMP  Lab Results   Component Value Date     09/08/2019    K 4.1 09/08/2019     09/08/2019    CO2 21 (L) 09/08/2019    BUN 10 09/08/2019    CREATININE 0.7 09/08/2019    CALCIUM 8.9 09/08/2019    ANIONGAP 9 09/08/2019    ESTGFRAFRICA >60.0 09/08/2019    EGFRNONAA >60.0 09/08/2019       Lab Results   Component Value Date    WBC 9.97 09/08/2019    HGB 14.2 09/08/2019    HCT 42.4 09/08/2019    MCV 88 09/08/2019     09/08/2019       Imaging:    I have reviewed the CT scan from 9/8.    Adrenal glands: unremarkable bilaterally.   Left kidney is free of stones or masses, no hydronephrosis apparent. Left ureter has a normal course and caliber. There are no left ureteral stones present.   Right kidney is free of stones or masses, no hydronephrosis apparent. Right ureter has a normal course and caliber. There are no right ureteral stones present.  Bladder is unremarkable.       ASSESSMENT     1. Anxiety state, unspecified    2. Former smoker    3. Acute cystitis without  hematuria    4. Dysuria    5. Urinary frequency        PLAN:     - will obtain KUB to assess constipation  - miralax daily   - discussed dietary changes that may be helpful, IC diet handout given  - will set up for cystoscopy in resident clinic in 1 week to assess hematuria   - discussed benefits of pelvic PT: referral sent   - follow up in 6 months to assess improvement     Elsa Cole MD     Letter to Nellie Coreas MD

## 2019-10-11 ENCOUNTER — HOSPITAL ENCOUNTER (OUTPATIENT)
Dept: UROLOGY | Facility: HOSPITAL | Age: 34
Discharge: HOME | End: 2019-10-11
Attending: UROLOGY
Payer: MEDICARE

## 2019-10-11 VITALS
DIASTOLIC BLOOD PRESSURE: 55 MMHG | HEIGHT: 67 IN | RESPIRATION RATE: 16 BRPM | HEART RATE: 74 BPM | TEMPERATURE: 99 F | WEIGHT: 169.06 LBS | SYSTOLIC BLOOD PRESSURE: 111 MMHG | BODY MASS INDEX: 26.53 KG/M2

## 2019-10-11 DIAGNOSIS — R30.0 DYSURIA: Primary | ICD-10-CM

## 2019-10-11 DIAGNOSIS — R35.0 URINARY FREQUENCY: ICD-10-CM

## 2019-10-11 PROCEDURE — 52000 CYSTOURETHROSCOPY: CPT

## 2019-10-11 PROCEDURE — 52000 PR CYSTOURETHROSCOPY: ICD-10-PCS | Mod: ,,, | Performed by: UROLOGY

## 2019-10-11 PROCEDURE — 52000 CYSTOURETHROSCOPY: CPT | Mod: ,,, | Performed by: UROLOGY

## 2019-10-11 RX ORDER — FESOTERODINE FUMARATE 8 MG/1
8 TABLET, EXTENDED RELEASE ORAL DAILY
Qty: 30 TABLET | Refills: 6 | Status: SHIPPED | OUTPATIENT
Start: 2019-10-11 | End: 2020-09-10

## 2019-10-11 RX ORDER — LIDOCAINE HYDROCHLORIDE 20 MG/ML
JELLY TOPICAL
Status: COMPLETED | OUTPATIENT
Start: 2019-10-11 | End: 2019-10-11

## 2019-10-11 RX ADMIN — LIDOCAINE HYDROCHLORIDE: 20 JELLY TOPICAL at 01:10

## 2019-10-11 NOTE — PROCEDURES
Office Cystourethroscopy Note    Date: 10/11/2019   Referring Provider: Elsa Cole MD     Reason for Cystoscopy: Interstitial Cystitis/Bladder Pain Syndrome    Upper Tract Evaluation:    Stone Protocol CT:  Normal upper urinary tract    Procedure Details: Informed consent was obtained and she was sterily prepped and 1% lidocaine jelly was injected per urethra. A flexible cystoscope was inserted into the bladder via the urethra. There was no evidence of stricture, stenosis, lesions, other obstruction or diverticulum. Significant findings included a normal unobstructed urethra only. Cystoscopic examination of the bladder revealed orthotopically positioned, normal bilateral ureteral orifices with clear yellow urine effluxing from each orifice. All mucosal surfaces were examined with no apparent stones, tumors, foreign bodies, erythema, trabeculation, diverticula or ulcers. The procedure was concluded without complications. The patient was not administered a post-procedure antibiotic.     Specimens: No Specimens    Findings: Normal bladder and urethra    Other Findings:  PVR - 0 mL    Pelvic Exam:  No Pelvic Exam Repeated Today     Assesment and Plan:   Urinary frequency:  While she initially presented with urinary frequency driven both by pain as well as urgency, she reports that since instituting behavioral modification including minimization of caffeine intake, she has noted significant improvement in her bladder pain.  However she continues with urinary frequency that today she feels is largely driven by urge to void.  She has seen limited benefit from oral oxybutynin.  We will try an alternate overactive bladder medication.  We will see her back in 4-6 weeks to evaluate her improvement.

## 2019-10-11 NOTE — PATIENT INSTRUCTIONS
What to Expect After a Cystoscopy  For the next 24-48 hours, you may feel a mild burning when you urinate. This burning is normal and expected. Drink plenty of water to dilute the urine to help relieve the burning sensation. You may also see a small amount of blood in your urine after the procedure.    Unless you are already taking antibiotics, you may be given an antibiotic after the test to prevent infection.    Signs and Symptoms to Report  Call the [facility] at [phone] if you develop any of the following:  · Fever of 101 degrees or higher  · Chills or persistent bleeding  · Inability to urinate

## 2019-10-16 ENCOUNTER — TELEPHONE (OUTPATIENT)
Dept: INTERNAL MEDICINE | Facility: CLINIC | Age: 34
End: 2019-10-16

## 2019-10-16 ENCOUNTER — PATIENT MESSAGE (OUTPATIENT)
Dept: INTERNAL MEDICINE | Facility: CLINIC | Age: 34
End: 2019-10-16

## 2019-10-16 RX ORDER — TRAMADOL HYDROCHLORIDE 50 MG/1
TABLET ORAL
Qty: 60 TABLET | Refills: 0 | Status: SHIPPED | OUTPATIENT
Start: 2019-10-16 | End: 2019-10-17

## 2019-10-16 NOTE — TELEPHONE ENCOUNTER
----- Message from Radha Hahn sent at 10/16/2019  2:38 PM CDT -----  Contact: R[place]a with [facility]    [phone]  Pharmacy is calling to clarify an RX.  RX name:  Tramadol   What do they need to clarify: Need to state more than 7 days and is medically necessary  Comments:

## 2019-10-17 ENCOUNTER — TELEPHONE (OUTPATIENT)
Dept: INTERNAL MEDICINE | Facility: CLINIC | Age: 34
End: 2019-10-17

## 2019-10-17 RX ORDER — TRAMADOL HYDROCHLORIDE 50 MG/1
TABLET ORAL
Qty: 60 TABLET | Refills: 0 | Status: SHIPPED | OUTPATIENT
Start: 2019-10-17 | End: 2020-01-23 | Stop reason: SDUPTHER

## 2019-10-17 NOTE — TELEPHONE ENCOUNTER
----- Message from Annetta Garcia sent at 10/17/2019  3:44 PM CDT -----  Contact: Patient [phone]  Patient calling to check the status of the Rx traMADol (ULTRAM) 50 mg tablet.    Please call and advise.    Thank You

## 2019-11-12 ENCOUNTER — OFFICE VISIT (OUTPATIENT)
Dept: INTERNAL MEDICINE | Facility: CLINIC | Age: 34
End: 2019-11-12
Payer: MEDICARE

## 2019-11-12 VITALS
SYSTOLIC BLOOD PRESSURE: 102 MMHG | HEIGHT: 67 IN | BODY MASS INDEX: 26.99 KG/M2 | HEART RATE: 72 BPM | WEIGHT: 171.94 LBS | OXYGEN SATURATION: 99 % | DIASTOLIC BLOOD PRESSURE: 66 MMHG

## 2019-11-12 DIAGNOSIS — K21.9 GASTROESOPHAGEAL REFLUX DISEASE, ESOPHAGITIS PRESENCE NOT SPECIFIED: ICD-10-CM

## 2019-11-12 DIAGNOSIS — M54.50 CHRONIC LOW BACK PAIN WITHOUT SCIATICA, UNSPECIFIED BACK PAIN LATERALITY: Primary | ICD-10-CM

## 2019-11-12 DIAGNOSIS — N32.81 OVERACTIVE BLADDER: ICD-10-CM

## 2019-11-12 DIAGNOSIS — G89.29 CHRONIC LOW BACK PAIN WITHOUT SCIATICA, UNSPECIFIED BACK PAIN LATERALITY: Primary | ICD-10-CM

## 2019-11-12 DIAGNOSIS — M54.50 LOW BACK PAIN WITHOUT SCIATICA, UNSPECIFIED BACK PAIN LATERALITY, UNSPECIFIED CHRONICITY: ICD-10-CM

## 2019-11-12 PROCEDURE — 99499 RISK ADDL DX/OHS AUDIT: ICD-10-PCS | Mod: S$GLB,,, | Performed by: INTERNAL MEDICINE

## 2019-11-12 PROCEDURE — 99999 PR PBB SHADOW E&M-EST. PATIENT-LVL IV: ICD-10-PCS | Mod: PBBFAC,,, | Performed by: INTERNAL MEDICINE

## 2019-11-12 PROCEDURE — 99999 PR PBB SHADOW E&M-EST. PATIENT-LVL IV: CPT | Mod: PBBFAC,,, | Performed by: INTERNAL MEDICINE

## 2019-11-12 PROCEDURE — 3008F BODY MASS INDEX DOCD: CPT | Mod: CPTII,S$GLB,, | Performed by: INTERNAL MEDICINE

## 2019-11-12 PROCEDURE — 99499 UNLISTED E&M SERVICE: CPT | Mod: S$GLB,,, | Performed by: INTERNAL MEDICINE

## 2019-11-12 PROCEDURE — 3008F PR BODY MASS INDEX (BMI) DOCUMENTED: ICD-10-PCS | Mod: CPTII,S$GLB,, | Performed by: INTERNAL MEDICINE

## 2019-11-12 PROCEDURE — 99214 OFFICE O/P EST MOD 30 MIN: CPT | Mod: S$GLB,,, | Performed by: INTERNAL MEDICINE

## 2019-11-12 PROCEDURE — 99214 PR OFFICE/OUTPT VISIT, EST, LEVL IV, 30-39 MIN: ICD-10-PCS | Mod: S$GLB,,, | Performed by: INTERNAL MEDICINE

## 2019-11-12 RX ORDER — OMEPRAZOLE 20 MG/1
20 CAPSULE, DELAYED RELEASE ORAL DAILY
Qty: 90 CAPSULE | Refills: 3 | Status: SHIPPED | OUTPATIENT
Start: 2019-11-12 | End: 2020-03-05

## 2019-11-12 NOTE — PROGRESS NOTES
Subjective:       Patient ID: Vianney Callahan is a 34 y.o. female.    Chief Complaint: Follow-up    HPI   Urology visit reviewed.  Cystoscope normal.    She was given Toviaz for overactive bladder.    Oxybutynin not helpful.    We reviewed last lipids.     She has gained a great deal of weight.    Chronic back pain.  Takes tramadol, but filling it less frequently.  Last filled #60 on Oct 17.         Also with thu in R leg, occas painful.      C/o reflux.    Review of Systems   Constitutional: Negative for fever and unexpected weight change.   HENT: Negative for congestion and postnasal drip.    Eyes: Negative for pain, discharge and visual disturbance.   Respiratory: Negative for cough, chest tightness, shortness of breath and wheezing.    Cardiovascular: Negative for chest pain and leg swelling.   Gastrointestinal: Negative for abdominal pain, constipation, diarrhea and nausea.   Genitourinary: Negative for difficulty urinating, dysuria and hematuria.   Musculoskeletal: Positive for back pain.   Skin: Negative for rash.   Neurological: Negative for headaches.   Psychiatric/Behavioral: Positive for dysphoric mood. Negative for sleep disturbance. The patient is nervous/anxious.        Objective:      Physical Exam   Constitutional: She appears well-developed and well-nourished. No distress.       Assessment:       1. Chronic low back pain without sciatica, unspecified back pain laterality    2. Gastroesophageal reflux disease, esophagitis presence not specified    3. Low back pain without sciatica, unspecified back pain laterality, unspecified chronicity    4. Overactive bladder        She is being judicious with tramadol  Plan:       Vianney was seen today for follow-up.    Diagnoses and all orders for this visit:    Chronic low back pain without sciatica, unspecified back pain laterality    Gastroesophageal reflux disease, esophagitis presence not specified    Low back pain without sciatica, unspecified back pain  laterality, unspecified chronicity  -     Ambulatory consult to Physical Therapy    Overactive bladder    Other orders  -     omeprazole (PRILOSEC) 20 MG capsule; Take 1 capsule (20 mg total) by mouth once daily.       Restart PT for back, regular exericise.

## 2019-12-05 LAB
CHOLESTEROL, TOTAL: 166
HDLC SERPL-MCNC: 39 MG/DL
LDLC SERPL CALC-MCNC: 82 MG/DL
TRIGLYCERIDE (LIPID PAN): 229
VLDL CHOLESTEROL: 46 MG/DL

## 2019-12-10 ENCOUNTER — PATIENT OUTREACH (OUTPATIENT)
Dept: ADMINISTRATIVE | Facility: OTHER | Age: 34
End: 2019-12-10

## 2019-12-23 ENCOUNTER — PATIENT OUTREACH (OUTPATIENT)
Dept: ADMINISTRATIVE | Facility: HOSPITAL | Age: 34
End: 2019-12-23

## 2019-12-24 NOTE — ED NOTES
Continued Stay Note   Denis     Patient Name: Taylor Price  MRN: [id number]  Today's Date: 12/24/2019    Admit Date: 12/22/2019    Discharge Plan     Row Name 12/24/19 1336       Plan    Plan  D/C Plan : [place] vs [facility] . [facility] order through [facility].     Plan Comments  Spoke with  today  and he is refusing rehab at this time . The nurse to speek with pt's daughter when she comes in . MWS notified .        Discharge Codes    No documentation.             Carmen Jiménez RN     Report given to nicky castellanos in mhere- pt medically cleared and stable transfer

## 2020-01-23 RX ORDER — TRAMADOL HYDROCHLORIDE 50 MG/1
TABLET ORAL
Qty: 60 TABLET | Refills: 0 | Status: SHIPPED | OUTPATIENT
Start: 2020-01-23 | End: 2020-06-09 | Stop reason: SDUPTHER

## 2020-01-24 ENCOUNTER — TELEPHONE (OUTPATIENT)
Dept: INTERNAL MEDICINE | Facility: CLINIC | Age: 35
End: 2020-01-24

## 2020-01-24 NOTE — TELEPHONE ENCOUNTER
----- Message from Annetta Garcia sent at 1/24/2020 11:26 AM CST -----  Contact: [facility] [phone]  Prescription Clarification:     The pharmacy needs clarity on the following Rx:    traMADol (ULTRAM) 50 mg tablet  clonazePAM (KLONOPIN) 0.5 MG tablet    Patient is on both Rx. Calling to notify of increased opioid risk.    Pharmacy:  [facility] [id number] [facility] - [place] - [address] [place] [address]    Thank You

## 2020-01-24 NOTE — TELEPHONE ENCOUNTER
Pharmacy calling to notify PCP of increase opoid risk of pt taking both medications. Please advise.

## 2020-03-05 ENCOUNTER — OFFICE VISIT (OUTPATIENT)
Dept: INTERNAL MEDICINE | Facility: CLINIC | Age: 35
End: 2020-03-05
Payer: MEDICARE

## 2020-03-05 VITALS
HEIGHT: 67 IN | DIASTOLIC BLOOD PRESSURE: 74 MMHG | WEIGHT: 170.19 LBS | HEART RATE: 97 BPM | SYSTOLIC BLOOD PRESSURE: 102 MMHG | OXYGEN SATURATION: 98 % | BODY MASS INDEX: 26.71 KG/M2

## 2020-03-05 DIAGNOSIS — F25.0 SCHIZOAFFECTIVE DISORDER, BIPOLAR TYPE: ICD-10-CM

## 2020-03-05 DIAGNOSIS — E78.1 HYPERTRIGLYCERIDEMIA: ICD-10-CM

## 2020-03-05 DIAGNOSIS — F19.21: ICD-10-CM

## 2020-03-05 DIAGNOSIS — Z00.00 ANNUAL PHYSICAL EXAM: Primary | ICD-10-CM

## 2020-03-05 DIAGNOSIS — F31.9 BIPOLAR I DISORDER: Chronic | ICD-10-CM

## 2020-03-05 DIAGNOSIS — R10.13 EPIGASTRIC ABDOMINAL PAIN: ICD-10-CM

## 2020-03-05 PROCEDURE — 99499 RISK ADDL DX/OHS AUDIT: ICD-10-PCS | Mod: S$GLB,,, | Performed by: INTERNAL MEDICINE

## 2020-03-05 PROCEDURE — 3008F PR BODY MASS INDEX (BMI) DOCUMENTED: ICD-10-PCS | Mod: CPTII,S$GLB,, | Performed by: INTERNAL MEDICINE

## 2020-03-05 PROCEDURE — 99999 PR PBB SHADOW E&M-EST. PATIENT-LVL III: CPT | Mod: PBBFAC,,, | Performed by: INTERNAL MEDICINE

## 2020-03-05 PROCEDURE — 3008F BODY MASS INDEX DOCD: CPT | Mod: CPTII,S$GLB,, | Performed by: INTERNAL MEDICINE

## 2020-03-05 PROCEDURE — 99499 UNLISTED E&M SERVICE: CPT | Mod: S$GLB,,, | Performed by: INTERNAL MEDICINE

## 2020-03-05 PROCEDURE — 99214 PR OFFICE/OUTPT VISIT, EST, LEVL IV, 30-39 MIN: ICD-10-PCS | Mod: S$GLB,,, | Performed by: INTERNAL MEDICINE

## 2020-03-05 PROCEDURE — 99214 OFFICE O/P EST MOD 30 MIN: CPT | Mod: S$GLB,,, | Performed by: INTERNAL MEDICINE

## 2020-03-05 PROCEDURE — 99999 PR PBB SHADOW E&M-EST. PATIENT-LVL III: ICD-10-PCS | Mod: PBBFAC,,, | Performed by: INTERNAL MEDICINE

## 2020-03-05 NOTE — PROGRESS NOTES
Subjective:       Patient ID: Vianney Callahan is a 34 y.o. female.    Chief Complaint: Abdominal Pain    HPI   Diminished appetite.    Excessive gas.  crampy .  Feels like food not digesting.  No fever.  GERD.  No n, vomiting.    Stressed - SS benefits were cut.  Inpower struggle with mother.    She missed appt in Feb for narcotic use f/u.  Take tramadol prn back pain.  Last filled # 60 on Jan 23.      Taking celebrex daily for leg pain.     HOspitalized again in Dec for suicidal ideation    Feeling better now.      Therapist comes to home.     SHehas shcizoaffective DO, bipolar type and polydrug depend excluding opiods,  In remission.    .    Review of Systems   Constitutional: Negative for fever and unexpected weight change.   HENT: Negative for congestion and postnasal drip.    Eyes: Negative for pain, discharge and visual disturbance.   Respiratory: Negative for cough, chest tightness, shortness of breath and wheezing.    Cardiovascular: Negative for chest pain and leg swelling.   Gastrointestinal: Positive for abdominal pain. Negative for constipation, diarrhea and nausea.   Genitourinary: Negative for difficulty urinating, dysuria and hematuria.   Musculoskeletal: Positive for back pain.   Skin: Negative for rash.   Neurological: Negative for headaches.   Psychiatric/Behavioral: Negative for dysphoric mood and sleep disturbance. The patient is not nervous/anxious.        Objective:      Physical Exam   Constitutional: She appears well-developed and well-nourished.   Neurological: She is alert.   Psychiatric: She has a normal mood and affect. Her behavior is normal.       Assessment:       1. Annual physical exam    2. Hypertriglyceridemia    3. Epigastric abdominal pain        Plan:       Vianney was seen today for abdominal pain.    Diagnoses and all orders for this visit:    Annual physical exam  -     CBC auto differential; Future  -     Comprehensive metabolic panel; Future  -     Lipid panel;  Future    Hypertriglyceridemia  -     Comprehensive metabolic panel; Future  -     Lipid panel; Future    Epigastric abdominal pain  -     CBC auto differential; Future  -     Comprehensive metabolic panel; Future       Stress reduction.  Walk daily     We discussed anti-spasmodics for abd pain - she defers fo now      She is using tradamol conservatively.  Will refill when necessary       F/u psychiatry

## 2020-03-17 ENCOUNTER — PATIENT MESSAGE (OUTPATIENT)
Dept: INTERNAL MEDICINE | Facility: CLINIC | Age: 35
End: 2020-03-17

## 2020-03-17 DIAGNOSIS — Z00.00 ANNUAL PHYSICAL EXAM: Primary | ICD-10-CM

## 2020-03-17 RX ORDER — NORETHINDRONE ACETATE AND ETHINYL ESTRADIOL .02; 1 MG/1; MG/1
1 TABLET ORAL DAILY
Qty: 63 TABLET | Refills: 0 | Status: SHIPPED | OUTPATIENT
Start: 2020-03-17 | End: 2020-06-17 | Stop reason: SDUPTHER

## 2020-03-18 ENCOUNTER — OFFICE VISIT (OUTPATIENT)
Dept: URGENT CARE | Facility: CLINIC | Age: 35
End: 2020-03-18
Payer: MEDICARE

## 2020-03-18 VITALS
SYSTOLIC BLOOD PRESSURE: 103 MMHG | WEIGHT: 170 LBS | BODY MASS INDEX: 26.68 KG/M2 | RESPIRATION RATE: 12 BRPM | HEART RATE: 92 BPM | TEMPERATURE: 97 F | OXYGEN SATURATION: 99 % | HEIGHT: 67 IN | DIASTOLIC BLOOD PRESSURE: 63 MMHG

## 2020-03-18 DIAGNOSIS — Z20.2 STD EXPOSURE: ICD-10-CM

## 2020-03-18 DIAGNOSIS — N30.01 ACUTE CYSTITIS WITH HEMATURIA: Primary | ICD-10-CM

## 2020-03-18 DIAGNOSIS — N89.8 VAGINAL DISCHARGE: ICD-10-CM

## 2020-03-18 DIAGNOSIS — R30.0 DYSURIA: ICD-10-CM

## 2020-03-18 LAB
BILIRUB UR QL STRIP: NEGATIVE
GLUCOSE UR QL STRIP: NEGATIVE
KETONES UR QL STRIP: NEGATIVE
LEUKOCYTE ESTERASE UR QL STRIP: POSITIVE
PH, POC UA: 5 (ref 5–8)
POC BLOOD, URINE: POSITIVE
POC NITRATES, URINE: NEGATIVE
PROT UR QL STRIP: POSITIVE
SP GR UR STRIP: 1.02 (ref 1–1.03)
UROBILINOGEN UR STRIP-ACNC: 1 (ref 0.1–1.1)

## 2020-03-18 PROCEDURE — 99214 PR OFFICE/OUTPT VISIT, EST, LEVL IV, 30-39 MIN: ICD-10-PCS | Mod: S$GLB,,, | Performed by: NURSE PRACTITIONER

## 2020-03-18 PROCEDURE — 87086 URINE CULTURE/COLONY COUNT: CPT

## 2020-03-18 PROCEDURE — 87088 URINE BACTERIA CULTURE: CPT

## 2020-03-18 PROCEDURE — 87491 CHLMYD TRACH DNA AMP PROBE: CPT | Mod: 59

## 2020-03-18 PROCEDURE — 99214 OFFICE O/P EST MOD 30 MIN: CPT | Mod: S$GLB,,, | Performed by: NURSE PRACTITIONER

## 2020-03-18 PROCEDURE — 87186 SC STD MICRODIL/AGAR DIL: CPT

## 2020-03-18 PROCEDURE — 81003 POCT URINALYSIS, DIPSTICK, AUTOMATED, W/O SCOPE: ICD-10-PCS | Mod: QW,S$GLB,, | Performed by: NURSE PRACTITIONER

## 2020-03-18 PROCEDURE — 81003 URINALYSIS AUTO W/O SCOPE: CPT | Mod: QW,S$GLB,, | Performed by: NURSE PRACTITIONER

## 2020-03-18 PROCEDURE — 87481 CANDIDA DNA AMP PROBE: CPT | Mod: 59

## 2020-03-18 PROCEDURE — 87077 CULTURE AEROBIC IDENTIFY: CPT

## 2020-03-18 RX ORDER — BUPROPION HYDROCHLORIDE 300 MG/1
300 TABLET ORAL DAILY
COMMUNITY
Start: 2020-03-06 | End: 2023-11-01 | Stop reason: SDUPTHER

## 2020-03-18 RX ORDER — CELECOXIB 200 MG/1
CAPSULE ORAL
COMMUNITY
Start: 2020-03-10 | End: 2021-02-03

## 2020-03-18 RX ORDER — PHENAZOPYRIDINE HYDROCHLORIDE 200 MG/1
200 TABLET, FILM COATED ORAL 3 TIMES DAILY PRN
Qty: 6 TABLET | Refills: 0 | Status: SHIPPED | OUTPATIENT
Start: 2020-03-18 | End: 2020-03-20

## 2020-03-18 RX ORDER — NITROFURANTOIN 25; 75 MG/1; MG/1
100 CAPSULE ORAL 2 TIMES DAILY
Qty: 10 CAPSULE | Refills: 0 | Status: SHIPPED | OUTPATIENT
Start: 2020-03-18 | End: 2020-03-23

## 2020-03-18 RX ORDER — RISPERIDONE 2 MG/1
TABLET ORAL
COMMUNITY
Start: 2020-03-16 | End: 2020-09-10

## 2020-03-18 NOTE — PROGRESS NOTES
"Subjective:       Patient ID: Vianney Callahan is a 34 y.o. female.    Vitals:  height is 5' 7" (1.702 m) and weight is 77.1 kg (170 lb). Her oral temperature is 97.3 °F (36.3 °C). Her blood pressure is 103/63 and her pulse is 92. Her respiration is 12 and oxygen saturation is 99%.     Chief Complaint: Urinary Tract Infection and STD CHECK    Pt c/o dysuria, urgency, and frequency x4 days. She endorses symptoms beginning after she had sexual intercourse including vaginal discharge, and would like to be tested for gonorrhea and chlamydia today. She has not taken anything for the symptoms. Denies fever, flank pain, hematuria, rash, pelvic pain, vaginal lesions, or N/V/D.     Urinary Tract Infection    The current episode started in the past 7 days. The problem has been gradually worsening. The quality of the pain is described as burning. The pain is at a severity of 6/10. The pain is moderate. There has been no fever. She is sexually active. There is no history of pyelonephritis. Associated symptoms include chills, frequency and urgency. Pertinent negatives include no hematuria, nausea, vomiting or rash. She has tried nothing for the symptoms. Her past medical history is significant for STD. There is no history of diabetes mellitus, hypertension, kidney stones or recurrent UTIs.       Constitution: Positive for chills. Negative for fever.   Neck: Negative for painful lymph nodes.   Gastrointestinal: Negative for abdominal pain, nausea and vomiting.   Genitourinary: Positive for dysuria, frequency and urgency. Negative for urine decreased, hematuria, history of kidney stones, painful menstruation, irregular menstruation, missed menses, heavy menstrual bleeding, ovarian cysts, genital trauma, vaginal pain, vaginal discharge, vaginal bleeding, vaginal odor, painful intercourse, genital sore, painful ejaculation and pelvic pain.   Musculoskeletal: Negative for back pain.   Skin: Negative for rash and lesion. "   Hematologic/Lymphatic: Negative for swollen lymph nodes.       Objective:      Physical Exam   Constitutional: She is oriented to person, place, and time. She appears well-developed and well-nourished.  Non-toxic appearance. She does not have a sickly appearance. She does not appear ill. No distress.   Pt calm and cooperative. NAD noted.   HENT:   Head: Normocephalic and atraumatic.   Right Ear: External ear normal.   Left Ear: External ear normal.   Nose: Nose normal. No nasal deformity. No epistaxis.   Mouth/Throat: Oropharynx is clear and moist and mucous membranes are normal.   Eyes: Lids are normal.   Neck: Trachea normal, normal range of motion and phonation normal. Neck supple.   Cardiovascular: Normal pulses.   Pulmonary/Chest: Effort normal. No stridor. No respiratory distress.   Abdominal: Soft. Normal appearance and bowel sounds are normal. She exhibits no distension. There is no tenderness. There is no rigidity, no rebound, no guarding and no CVA tenderness.   Abdomen soft, non-tender, and non-distended. No CVA tenderness.   Genitourinary:   Genitourinary Comments:  exam deferred. Pt self swabbed.   Neurological: She is alert and oriented to person, place, and time.   Skin: Skin is warm, dry and intact.   Psychiatric: She has a normal mood and affect. Her speech is normal and behavior is normal. Cognition and memory are normal.   Nursing note and vitals reviewed.        Results for orders placed or performed in visit on 03/18/20   POCT Urinalysis, Dipstick, Automated, W/O Scope   Result Value Ref Range    POC Blood, Urine Positive (A) Negative    POC Bilirubin, Urine Negative Negative    POC Urobilinogen, Urine 1.0 0.1 - 1.1    POC Ketones, Urine Negative Negative    POC Protein, Urine Positive (A) Negative    POC Nitrates, Urine Negative Negative    POC Glucose, Urine Negative Negative    pH, UA 5.0 5 - 8    POC Specific Gravity, Urine 1.025 1.003 - 1.029    POC Leukocytes, Urine Positive (A)  Negative     Assessment:       1. Acute cystitis with hematuria    2. Dysuria    3. STD exposure    4. Vaginal discharge        Plan:         Acute cystitis with hematuria  -     Urine culture  -     nitrofurantoin, macrocrystal-monohydrate, (MACROBID) 100 MG capsule; Take 1 capsule (100 mg total) by mouth 2 (two) times daily. for 5 days  Dispense: 10 capsule; Refill: 0  -     C. trachomatis/N. gonorrhoeae by AMP DNA [facility]; Urine    Dysuria  -     POCT Urinalysis, Dipstick, Automated, W/O Scope  -     Cancel: C. trachomatis/N. gonorrhoeae by AMP [facility]; Urine  -     phenazopyridine (PYRIDIUM) 200 MG tablet; Take 1 tablet (200 mg total) by mouth 3 (three) times daily as needed for Pain.  Dispense: 6 tablet; Refill: 0  -     C. trachomatis/N. gonorrhoeae by AMP [facility]; Urine    STD exposure  -     Cancel: C. trachomatis/N. gonorrhoeae by AMP [facility]; Urine  -     C. trachomatis/N. gonorrhoeae by AMP [facility]; Urine  -     Bv, Trich, Candida by DNA Probe (Swab Only)    Vaginal discharge  -     C. trachomatis/N. gonorrhoeae by AMP [facility]; Urine  -     Bv, Trich, Candida by DNA Probe (Swab Only)    Discussed urinalysis results, diagnosis, and treatment plan today. Instructed to follow up with PCP or go to ER if symptoms fail to improve or worsen. Pt verbalizes understanding.       Patient Instructions       PLEASE READ YOUR DISCHARGE INSTRUCTIONS ENTIRELY AS IT CONTAINS IMPORTANT INFORMATION.      Take the antibiotics to completion.     Drink plenty of fluids, wipe front to back, take showers not baths, no scented soaps, wear breathable cotton underwear, urinate after sexual intercourse.     A urine culture was sent. You will be contacted once it results and appropriate action will be taken if needed.     Take the pyridium three times a day with meals. It will turn your urine orange. You do not need to take the whole prescription you can stop this once the pain is better and finish out the  "antibiotics    Please go to the ER for worsening symptoms including fever, worsening flank pain, vomiting, etc.       Please return or see your primary care doctor if you develop new or worsening symptoms.     Please arrange follow up with your primary medical clinic as soon as possible. You must understand that you've received an Urgent Care treatment only and that you may be released before all of your medical problems are known or treated. You, the patient, will arrange for follow up as instructed. If your symptoms worsen or fail to improve you should go to the Emergency Room.  WE CANNOT RULE OUT ALL POSSIBLE CAUSES OF YOUR SYMPTOMS IN THE URGENT CARE SETTING PLEASE GO TO THE ER IF YOU FEELS YOUR CONDITION IS WORSENING OR YOU WOULD LIKE EMERGENT EVALUATION.        Bladder Infection, Female (Adult)    Urine is normally doesn't have any bacteria in it. But bacteria can get into the urinary tract from the skin around the rectum. Or they can travel in the blood from elsewhere in the body. Once they are in your urinary tract, they can cause infection in the urethra (urethritis), the bladder (cystitis), or the kidneys (pyelonephritis).  The most common place for an infection is in the bladder. This is called a bladder infection. This is one of the most common infections in women. Most bladder infections are easily treated. They are not serious unless the infection spreads to the kidney.  The phrases "bladder infection," "UTI," and "cystitis" are often used to describe the same thing. But they are not always the same. Cystitis is an inflammation of the bladder. The most common cause of cystitis is an infection.  Symptoms  The infection causes inflammation in the urethra and bladder. This causes many of the symptoms. The most common symptoms of a bladder infection are:  · Pain or burning when urinating  · Having to urinate more often than usual  · Urgent need to urinate  · Only a small amount of urine comes out  · Blood " in urine  · Abdominal discomfort. This is usually in the lower abdomen above the pubic bone.  · Cloudy urine  · Strong- or bad-smelling urine  · Unable to urinate (urinary retention)  · Unable to hold urine in (urinary incontinence)  · Fever  · Loss of appetite  · Confusion (in older adults)  Causes  Bladder infections are not contagious. You can't get one from someone else, from a toilet seat, or from sharing a bath.  The most common cause of bladder infections is bacteria from the bowels. The bacteria get onto the skin around the opening of the urethra. From there, they can get into the urine and travel up to the bladder, causing inflammation and infection. This usually happens because of:  · Wiping improperly after urinating. Always wipe from front to back.  · Bowel incontinence  · Pregnancy  · Procedures such as having a catheter inserted  · Older age  · Not emptying your bladder. This can allow bacteria a chance to grow in your urine.  · Dehydration  · Constipation  · Sex  · Use of a diaphragm for birth control   Treatment  Bladder infections are diagnosed by a urine test. They are treated with antibiotics and usually clear up quickly without complications. Treatment helps prevent a more serious kidney infection.  Medicines  Medicines can help in the treatment of a bladder infection:  · Take antibiotics until they are used up, even if you feel better. It is important to finish them to make sure the infection has cleared.  · You can use acetaminophen or ibuprofen for pain, fever, or discomfort, unless another medicine was prescribed. If you have chronic liver or kidney disease, talk with your healthcare provider before using these medicines. Also talk with your provider if you've ever had a stomach ulcer or gastrointestinal bleeding, or are taking blood-thinner medicines.  · If you are given phenazopydridine to reduce burning with urination, it will cause your urine to become a bright orange color. This can  stain clothing.  Care and prevention  These self-care steps can help prevent future infections:  · Drink plenty of fluids to prevent dehydration and flush out your bladder. Do this unless you must restrict fluids for other health reasons, or your doctor told you not to.  · Proper cleaning after going to the bathroom is important. Wipe from front to back after using the toilet to prevent the spread of bacteria.  · Urinate more often. Don't try to hold urine in for a long time.  · Wear loose-fitting clothes and cotton underwear. Avoid tight-fitting pants.  · Improve your diet and prevent constipation. Eat more fresh fruit and vegetables, and fiber, and less junk and fatty foods.  · Avoid sex until your symptoms are gone.  · Avoid caffeine, alcohol, and spicy foods. These can irritate your bladder.  · Urinate right after intercourse to flush out your bladder.  · If you use birth control pills and have frequent bladder infections, discuss it with your doctor.  Follow-up care  Call your healthcare provider if all symptoms are not gone after 3 days of treatment. This is especially important if you have repeat infections.  If a culture was done, you will be told if your treatment needs to be changed. If directed, you can call to find out the results.  If X-rays were done, you will be told if the results will affect your treatment.  Call 911  Call 911 if any of the following occur:  · Trouble breathing  · Hard to wake up or confusion  · Fainting or loss of consciousness  · Rapid heart rate  When to seek medical advice  Call your healthcare provider right away if any of these occur:  · Fever of 100.4ºF (38.0ºC) or higher, or as directed by your healthcare provider  · Symptoms are not better by the third day of treatment  · Back or belly (abdominal) pain that gets worse  · Repeated vomiting, or unable to keep medicine down  · Weakness or dizziness  · Vaginal discharge  · Pain, redness, or swelling in the outer vaginal area  (labia)  Date Last Reviewed: 10/1/2016  © 7076-5231 [employer]. [address] [place] [address], [place] [address]. All rights reserved. This information is not intended as a substitute for professional medical care. Always follow your healthcare professional's instructions.

## 2020-03-18 NOTE — PATIENT INSTRUCTIONS
PLEASE READ YOUR DISCHARGE INSTRUCTIONS ENTIRELY AS IT CONTAINS IMPORTANT INFORMATION.      Take the antibiotics to completion.     Drink plenty of fluids, wipe front to back, take showers not baths, no scented soaps, wear breathable cotton underwear, urinate after sexual intercourse.     A urine culture was sent. You will be contacted once it results and appropriate action will be taken if needed.     Take the pyridium three times a day with meals. It will turn your urine orange. You do not need to take the whole prescription you can stop this once the pain is better and finish out the antibiotics    Please go to the ER for worsening symptoms including fever, worsening flank pain, vomiting, etc.       Please return or see your primary care doctor if you develop new or worsening symptoms.     Please arrange follow up with your primary medical clinic as soon as possible. You must understand that you've received an Urgent Care treatment only and that you may be released before all of your medical problems are known or treated. You, the patient, will arrange for follow up as instructed. If your symptoms worsen or fail to improve you should go to the Emergency Room.  WE CANNOT RULE OUT ALL POSSIBLE CAUSES OF YOUR SYMPTOMS IN THE URGENT CARE SETTING PLEASE GO TO THE ER IF YOU FEELS YOUR CONDITION IS WORSENING OR YOU WOULD LIKE EMERGENT EVALUATION.        Bladder Infection, Female (Adult)    Urine is normally doesn't have any bacteria in it. But bacteria can get into the urinary tract from the skin around the rectum. Or they can travel in the blood from elsewhere in the body. Once they are in your urinary tract, they can cause infection in the urethra (urethritis), the bladder (cystitis), or the kidneys (pyelonephritis).  The most common place for an infection is in the bladder. This is called a bladder infection. This is one of the most common infections in women. Most bladder infections are easily treated. They are  "not serious unless the infection spreads to the kidney.  The phrases "bladder infection," "UTI," and "cystitis" are often used to describe the same thing. But they are not always the same. Cystitis is an inflammation of the bladder. The most common cause of cystitis is an infection.  Symptoms  The infection causes inflammation in the urethra and bladder. This causes many of the symptoms. The most common symptoms of a bladder infection are:  · Pain or burning when urinating  · Having to urinate more often than usual  · Urgent need to urinate  · Only a small amount of urine comes out  · Blood in urine  · Abdominal discomfort. This is usually in the lower abdomen above the pubic bone.  · Cloudy urine  · Strong- or bad-smelling urine  · Unable to urinate (urinary retention)  · Unable to hold urine in (urinary incontinence)  · Fever  · Loss of appetite  · Confusion (in older adults)  Causes  Bladder infections are not contagious. You can't get one from someone else, from a toilet seat, or from sharing a bath.  The most common cause of bladder infections is bacteria from the bowels. The bacteria get onto the skin around the opening of the urethra. From there, they can get into the urine and travel up to the bladder, causing inflammation and infection. This usually happens because of:  · Wiping improperly after urinating. Always wipe from front to back.  · Bowel incontinence  · Pregnancy  · Procedures such as having a catheter inserted  · Older age  · Not emptying your bladder. This can allow bacteria a chance to grow in your urine.  · Dehydration  · Constipation  · Sex  · Use of a diaphragm for birth control   Treatment  Bladder infections are diagnosed by a urine test. They are treated with antibiotics and usually clear up quickly without complications. Treatment helps prevent a more serious kidney infection.  Medicines  Medicines can help in the treatment of a bladder infection:  · Take antibiotics until they are used " up, even if you feel better. It is important to finish them to make sure the infection has cleared.  · You can use acetaminophen or ibuprofen for pain, fever, or discomfort, unless another medicine was prescribed. If you have chronic liver or kidney disease, talk with your healthcare provider before using these medicines. Also talk with your provider if you've ever had a stomach ulcer or gastrointestinal bleeding, or are taking blood-thinner medicines.  · If you are given phenazopydridine to reduce burning with urination, it will cause your urine to become a bright orange color. This can stain clothing.  Care and prevention  These self-care steps can help prevent future infections:  · Drink plenty of fluids to prevent dehydration and flush out your bladder. Do this unless you must restrict fluids for other health reasons, or your doctor told you not to.  · Proper cleaning after going to the bathroom is important. Wipe from front to back after using the toilet to prevent the spread of bacteria.  · Urinate more often. Don't try to hold urine in for a long time.  · Wear loose-fitting clothes and cotton underwear. Avoid tight-fitting pants.  · Improve your diet and prevent constipation. Eat more fresh fruit and vegetables, and fiber, and less junk and fatty foods.  · Avoid sex until your symptoms are gone.  · Avoid caffeine, alcohol, and spicy foods. These can irritate your bladder.  · Urinate right after intercourse to flush out your bladder.  · If you use birth control pills and have frequent bladder infections, discuss it with your doctor.  Follow-up care  Call your healthcare provider if all symptoms are not gone after 3 days of treatment. This is especially important if you have repeat infections.  If a culture was done, you will be told if your treatment needs to be changed. If directed, you can call to find out the results.  If X-rays were done, you will be told if the results will affect your treatment.  Call  911  Call 911 if any of the following occur:  · Trouble breathing  · Hard to wake up or confusion  · Fainting or loss of consciousness  · Rapid heart rate  When to seek medical advice  Call your healthcare provider right away if any of these occur:  · Fever of 100.4ºF (38.0ºC) or higher, or as directed by your healthcare provider  · Symptoms are not better by the third day of treatment  · Back or belly (abdominal) pain that gets worse  · Repeated vomiting, or unable to keep medicine down  · Weakness or dizziness  · Vaginal discharge  · Pain, redness, or swelling in the outer vaginal area (labia)  Date Last Reviewed: 10/1/2016  © 4978-9925 [employer]. [address] [place] [address], [place] [address]. All rights reserved. This information is not intended as a substitute for professional medical care. Always follow your healthcare professional's instructions.

## 2020-03-19 ENCOUNTER — TELEPHONE (OUTPATIENT)
Dept: URGENT CARE | Facility: CLINIC | Age: 35
End: 2020-03-19

## 2020-03-19 NOTE — TELEPHONE ENCOUNTER
Lab for  C/ GC/ Trach Urine was rejected for insufficient urine  Provider informed   Spoke with Sandra Mcintyre [facility] [phone]

## 2020-03-19 NOTE — TELEPHONE ENCOUNTER
Called pt to discuss inadequate sample to run gonorrhea/chlamydia test. Pt states she will stop by clinic today to provide another sample.

## 2020-03-20 ENCOUNTER — PATIENT MESSAGE (OUTPATIENT)
Dept: URGENT CARE | Facility: CLINIC | Age: 35
End: 2020-03-20

## 2020-03-20 ENCOUNTER — TELEPHONE (OUTPATIENT)
Dept: URGENT CARE | Facility: CLINIC | Age: 35
End: 2020-03-20

## 2020-03-20 DIAGNOSIS — N39.0 URINARY TRACT INFECTION WITHOUT HEMATURIA, SITE UNSPECIFIED: Primary | ICD-10-CM

## 2020-03-20 DIAGNOSIS — B37.31 CANDIDA VAGINITIS: ICD-10-CM

## 2020-03-20 LAB
BACTERIA UR CULT: ABNORMAL
BACTERIAL VAGINOSIS DNA: NEGATIVE
C TRACH DNA SPEC QL NAA+PROBE: NOT DETECTED
CANDIDA GLABRATA DNA: NEGATIVE
CANDIDA KRUSEI DNA: NEGATIVE
CANDIDA RRNA VAG QL PROBE: POSITIVE
N GONORRHOEA DNA SPEC QL NAA+PROBE: NOT DETECTED
T VAGINALIS RRNA GENITAL QL PROBE: NEGATIVE

## 2020-03-20 RX ORDER — FLUCONAZOLE 150 MG/1
150 TABLET ORAL DAILY
Qty: 1 TABLET | Refills: 0 | Status: SHIPPED | OUTPATIENT
Start: 2020-03-20 | End: 2020-03-21

## 2020-03-20 RX ORDER — AMOXICILLIN AND CLAVULANATE POTASSIUM 875; 125 MG/1; MG/1
1 TABLET, FILM COATED ORAL EVERY 12 HOURS
Qty: 14 TABLET | Refills: 0 | Status: SHIPPED | OUTPATIENT
Start: 2020-03-20 | End: 2020-03-27

## 2020-03-21 NOTE — TELEPHONE ENCOUNTER
Called patient to discuss results. Will call in diflucan and Augmentin for Candida and UTI. Pt reports feeling better.

## 2020-03-25 ENCOUNTER — TELEPHONE (OUTPATIENT)
Dept: URGENT CARE | Facility: CLINIC | Age: 35
End: 2020-03-25

## 2020-03-25 DIAGNOSIS — R82.79 URINE CULTURE POSITIVE: Primary | ICD-10-CM

## 2020-03-25 DIAGNOSIS — N39.0 URINARY TRACT INFECTION WITHOUT HEMATURIA, SITE UNSPECIFIED: ICD-10-CM

## 2020-03-25 RX ORDER — CIPROFLOXACIN 250 MG/1
250 TABLET, FILM COATED ORAL EVERY 12 HOURS
Qty: 6 TABLET | Refills: 0 | Status: SHIPPED | OUTPATIENT
Start: 2020-03-25 | End: 2020-03-28

## 2020-03-25 NOTE — TELEPHONE ENCOUNTER
Pt called in reference to Cipro that was supposed to be called in on Monday. States Augmentin was not helping. Will call in Cipro to pharmacy.

## 2020-04-11 ENCOUNTER — PATIENT MESSAGE (OUTPATIENT)
Dept: DERMATOLOGY | Facility: CLINIC | Age: 35
End: 2020-04-11

## 2020-04-11 DIAGNOSIS — L70.0 ACNE VULGARIS: Primary | ICD-10-CM

## 2020-04-15 ENCOUNTER — PATIENT MESSAGE (OUTPATIENT)
Dept: DERMATOLOGY | Facility: CLINIC | Age: 35
End: 2020-04-15

## 2020-04-15 RX ORDER — TRETINOIN 1 MG/G
CREAM TOPICAL
Qty: 45 G | Refills: 3 | Status: SHIPPED | OUTPATIENT
Start: 2020-04-15 | End: 2021-07-15 | Stop reason: SDUPTHER

## 2020-05-04 ENCOUNTER — DOCUMENTATION ONLY (OUTPATIENT)
Dept: DERMATOLOGY | Facility: CLINIC | Age: 35
End: 2020-05-04

## 2020-05-05 ENCOUNTER — HOSPITAL ENCOUNTER (OUTPATIENT)
Dept: RADIOLOGY | Facility: HOSPITAL | Age: 35
Discharge: HOME | End: 2020-05-05
Attending: INTERNAL MEDICINE
Payer: MEDICARE

## 2020-05-05 DIAGNOSIS — K76.9 LIVER LESION: ICD-10-CM

## 2020-05-05 DIAGNOSIS — D18.00 MULTIPLE HEMANGIOMAS: ICD-10-CM

## 2020-05-05 PROCEDURE — A9585 GADOBUTROL INJECTION: HCPCS | Performed by: INTERNAL MEDICINE

## 2020-05-05 PROCEDURE — 25500020 PHARM REV CODE 255: Performed by: INTERNAL MEDICINE

## 2020-05-05 PROCEDURE — 74183 MRI ABD W/O CNTR FLWD CNTR: CPT | Mod: TC

## 2020-05-05 PROCEDURE — 74183 MRI ABDOMEN W WO CONTRAST: ICD-10-PCS | Mod: 26,,, | Performed by: RADIOLOGY

## 2020-05-05 PROCEDURE — 74183 MRI ABD W/O CNTR FLWD CNTR: CPT | Mod: 26,,, | Performed by: RADIOLOGY

## 2020-05-05 RX ORDER — GADOBUTROL 604.72 MG/ML
10 INJECTION INTRAVENOUS
Status: COMPLETED | OUTPATIENT
Start: 2020-05-05 | End: 2020-05-05

## 2020-05-05 RX ADMIN — GADOBUTROL 10 ML: 604.72 INJECTION INTRAVENOUS at 06:05

## 2020-05-13 ENCOUNTER — PATIENT OUTREACH (OUTPATIENT)
Dept: ADMINISTRATIVE | Facility: OTHER | Age: 35
End: 2020-05-13

## 2020-06-09 ENCOUNTER — PATIENT MESSAGE (OUTPATIENT)
Dept: UROLOGY | Facility: CLINIC | Age: 35
End: 2020-06-09

## 2020-06-11 RX ORDER — FESOTERODINE FUMARATE 8 MG/1
8 TABLET, EXTENDED RELEASE ORAL DAILY
Qty: 30 EACH | Refills: 11 | Status: SHIPPED | OUTPATIENT
Start: 2020-06-11 | End: 2020-09-10

## 2020-06-16 ENCOUNTER — PATIENT OUTREACH (OUTPATIENT)
Dept: ADMINISTRATIVE | Facility: OTHER | Age: 35
End: 2020-06-16

## 2020-06-17 ENCOUNTER — OFFICE VISIT (OUTPATIENT)
Dept: OBSTETRICS AND GYNECOLOGY | Facility: CLINIC | Age: 35
End: 2020-06-17
Attending: OBSTETRICS & GYNECOLOGY
Payer: MEDICARE

## 2020-06-17 VITALS
SYSTOLIC BLOOD PRESSURE: 102 MMHG | DIASTOLIC BLOOD PRESSURE: 72 MMHG | HEIGHT: 67 IN | WEIGHT: 174.81 LBS | BODY MASS INDEX: 27.44 KG/M2

## 2020-06-17 DIAGNOSIS — Z12.4 PAP SMEAR FOR CERVICAL CANCER SCREENING: ICD-10-CM

## 2020-06-17 DIAGNOSIS — Z30.41 ENCOUNTER FOR SURVEILLANCE OF CONTRACEPTIVE PILLS: ICD-10-CM

## 2020-06-17 DIAGNOSIS — Z01.419 WELL WOMAN EXAM WITH ROUTINE GYNECOLOGICAL EXAM: Primary | ICD-10-CM

## 2020-06-17 PROCEDURE — 99999 PR PBB SHADOW E&M-EST. PATIENT-LVL IV: ICD-10-PCS | Mod: PBBFAC,,, | Performed by: OBSTETRICS & GYNECOLOGY

## 2020-06-17 PROCEDURE — G0101 CA SCREEN;PELVIC/BREAST EXAM: HCPCS | Mod: S$GLB,,, | Performed by: OBSTETRICS & GYNECOLOGY

## 2020-06-17 PROCEDURE — 87624 HPV HI-RISK TYP POOLED RSLT: CPT

## 2020-06-17 PROCEDURE — G0101 PR CA SCREEN;PELVIC/BREAST EXAM: ICD-10-PCS | Mod: S$GLB,,, | Performed by: OBSTETRICS & GYNECOLOGY

## 2020-06-17 PROCEDURE — 99999 PR PBB SHADOW E&M-EST. PATIENT-LVL IV: CPT | Mod: PBBFAC,,, | Performed by: OBSTETRICS & GYNECOLOGY

## 2020-06-17 PROCEDURE — 88175 CYTOPATH C/V AUTO FLUID REDO: CPT

## 2020-06-17 RX ORDER — NITROFURANTOIN 25; 75 MG/1; MG/1
CAPSULE ORAL
COMMUNITY
End: 2020-09-10

## 2020-06-17 RX ORDER — BUSPIRONE HYDROCHLORIDE 5 MG/1
TABLET ORAL
COMMUNITY
End: 2020-09-10

## 2020-06-17 RX ORDER — SERTRALINE HYDROCHLORIDE 100 MG/1
TABLET, FILM COATED ORAL
COMMUNITY
End: 2023-11-01 | Stop reason: SDUPTHER

## 2020-06-17 RX ORDER — METHYLPREDNISOLONE 4 MG/1
TABLET ORAL
COMMUNITY
Start: 2020-06-11 | End: 2020-09-10

## 2020-06-17 RX ORDER — PANTOPRAZOLE SODIUM 40 MG/1
40 TABLET, DELAYED RELEASE ORAL EVERY MORNING
COMMUNITY
Start: 2020-05-29 | End: 2020-09-12

## 2020-06-17 RX ORDER — NORETHINDRONE ACETATE AND ETHINYL ESTRADIOL .02; 1 MG/1; MG/1
1 TABLET ORAL DAILY
Qty: 63 TABLET | Refills: 4 | Status: SHIPPED | OUTPATIENT
Start: 2020-06-17 | End: 2021-02-03

## 2020-06-17 RX ORDER — PALIPERIDONE 3 MG/1
TABLET, EXTENDED RELEASE ORAL
COMMUNITY
End: 2020-09-12

## 2020-06-17 RX ORDER — PALIPERIDONE PALMITATE 234 MG/1.5ML
INJECTION INTRAMUSCULAR
COMMUNITY
Start: 2020-06-01 | End: 2020-09-12

## 2020-06-17 RX ORDER — BUSPIRONE HYDROCHLORIDE 10 MG/1
TABLET ORAL
COMMUNITY
End: 2020-09-10

## 2020-06-17 RX ORDER — OMEPRAZOLE 20 MG/1
CAPSULE, DELAYED RELEASE ORAL
COMMUNITY
Start: 2020-05-04 | End: 2021-01-06 | Stop reason: SDUPTHER

## 2020-06-17 NOTE — PROGRESS NOTES
Vianney Callahan is a 34 y.o. female  who presents for annual exam.  She has been on OCPs for more than 10 years and does not experience a withdrawal bleed secondary to continuous usage.  She stopped the pill about 2 months ago and would now like to return to OCPs.  In general, she felt much better while taking the pill with fewer mood swings.  Denies any recent changes in her medical or surgical history over the past year.  No GYN complaints.  No LMP recorded (lmp unknown). (Menstrual status: Birth Control).     UPT: Negative    Past Medical History:   Diagnosis Date    Abnormal cervical Papanicolaou smear 2012    Colposcopy    ADHD (attention deficit hyperactivity disorder) 2012    Allergy     Anxiety     Asthma     childhood    Back pain 2014    Bipolar affective disorder     Cholecystitis     Chronic migraine without aura, with intractable migraine, so stated, without mention of status migrainosus 2013    Depression     Fever blister     Gallstones 2014    Headache(784.0)     Hepatitis C antibody positive in blood     History of hepatitis C     History of psychiatric hospitalization     Suicide attempt    Personality disorder 2013    Psychosis 10/7/2013    Therapy     Tobacco abuse 2013       Past Surgical History:   Procedure Laterality Date    ESOPHAGOGASTRODUODENOSCOPY      FRACTURE SURGERY      MOLE REMOVAL      SKIN BIOPSY         OB History        0    Para   0    Term                AB        Living           SAB        TAB        Ectopic        Multiple        Live Births                     ROS:  GENERAL: Feeling well overall.   SKIN: Denies rash or lesions.   HEAD: Denies head injury or headache.   NODES: Denies enlarged lymph nodes.   CHEST: Denies chest pain or shortness of breath.   CARDIOVASCULAR: Denies palpitations or left sided chest pain.   ABDOMEN: No abdominal pain, nausea, vomiting or rectal bleeding.   URINARY: No dysuria  or hematuria.  REPRODUCTIVE: See HPI.   BREASTS: Denies pain, lumps, or nipple discharge.   HEMATOLOGIC: No easy bruisability or excessive bleeding.   MUSCULOSKELETAL: Reports some back discomfort.   NEUROLOGIC: Denies syncope or weakness.   PSYCHIATRIC: Reports mood swings.    PE:   (chaperone present during entire exam)  APPEARANCE: Well nourished, well developed, in no acute distress.  BREASTS: Symmetrical, no skin changes or visible lesions. No palpable masses, nipple discharge or adenopathy bilaterally.  ABDOMEN: Soft. No tenderness or masses. No hernias. No CVA tenderness.  VULVA: No lesions. Normal female genitalia.  URETHRAL MEATUS: Normal size and location, no lesions, no prolapse.  URETHRA: No masses, tenderness, prolapse or scarring.  VAGINA: Moist and well rugated, no abnormal discharge, no significant cystocele or rectocele.  CERVIX: No lesions and discharge. PAP done.  UTERUS: Normal size, regular shape, mobile, non-tender, bladder base nontender.  ADNEXA: No masses, tenderness or CDS nodularity.  ANUS PERINEUM: Normal.      Diagnosis:  1. Well woman exam with routine gynecological exam    2. Pap smear for cervical cancer screening    3. Encounter for surveillance of contraceptive pills          PLAN:    Orders Placed This Encounter    HPV High Risk Genotypes, PCR    Liquid-Based Pap Smear, Screening    norethindrone-ethinyl estradiol (MICROGESTIN 1/20) 1-20 mg-mcg per tablet       Patient was counseled today on the need for annual gyn exams.  We reviewed her usage of OCPs: risks / benefits.  She reports feeling much better while taking OCPs and wants to return to the Microgestin.    Follow-up in 1 year.

## 2020-06-24 ENCOUNTER — PATIENT MESSAGE (OUTPATIENT)
Dept: OBSTETRICS AND GYNECOLOGY | Facility: CLINIC | Age: 35
End: 2020-06-24

## 2020-06-24 LAB
FINAL PATHOLOGIC DIAGNOSIS: NORMAL
HPV HR 12 DNA SPEC QL NAA+PROBE: POSITIVE
HPV16 AG SPEC QL: NEGATIVE
HPV18 DNA SPEC QL NAA+PROBE: NEGATIVE
Lab: NORMAL

## 2020-06-25 ENCOUNTER — TELEPHONE (OUTPATIENT)
Dept: OBSTETRICS AND GYNECOLOGY | Facility: CLINIC | Age: 35
End: 2020-06-25

## 2020-06-25 NOTE — TELEPHONE ENCOUNTER
Called patient:    Discussed results of pap:  Normal cytology, other HR HPV positive.    REC:  Repeat pap in one year - I emphasized the importance of follow-up.

## 2020-08-19 ENCOUNTER — TELEPHONE (OUTPATIENT)
Dept: OBSTETRICS AND GYNECOLOGY | Facility: CLINIC | Age: 35
End: 2020-08-19

## 2020-08-19 RX ORDER — VALACYCLOVIR HYDROCHLORIDE 500 MG/1
500 TABLET, FILM COATED ORAL 2 TIMES DAILY
Qty: 10 TABLET | Refills: 0 | Status: SHIPPED | OUTPATIENT
Start: 2020-08-19 | End: 2020-08-24

## 2020-08-19 RX ORDER — VALACYCLOVIR HYDROCHLORIDE 500 MG/1
500 TABLET, FILM COATED ORAL DAILY
Qty: 30 TABLET | Refills: 2 | Status: SHIPPED | OUTPATIENT
Start: 2020-08-19 | End: 2020-09-18

## 2020-08-19 NOTE — TELEPHONE ENCOUNTER
Vianney Callahan William T., MD 1 hour ago (9:49 AM)        [facility] [address] in [place]   [phone]  I have been having a lot of breakouts lately. So maybe I need a maintenance dose and/or just for breakouts. I don't remember the exact dose.  Thank you so much.            You  Vianney Callahan 21 hours ago (1:33 PM)        Dr Abbott said we can send this in. Do you just take the medication with breakouts? Which pharmacy?            Vianney Callahan William T., MD Yesterday (9:41 AM)        Hello there.  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 I am hoping this  message gets to you soon. I usually take valcylovir 1g, but anything you recommend is greaty appreciated.  Thank you  Vianney Callahan

## 2020-09-03 ENCOUNTER — TELEPHONE (OUTPATIENT)
Dept: INTERNAL MEDICINE | Facility: CLINIC | Age: 35
End: 2020-09-03

## 2020-09-03 NOTE — PROGRESS NOTES
Refill Routing Note   Medication(s) are not appropriate for processing by [facility]:       - Outside of protocol           Medication reconciliation completed: No      Automatic Epic Generated Protocol Data:        Requested Prescriptions   Pending Prescriptions Disp Refills    traMADoL (ULTRAM) 50 mg tablet [Pharmacy Med Name: TRAMADOL HCL 50 MG TABLET] 60 tablet 0     Sig: TAKE 1 TABLET BY MOUTH EVERY 8 HOURS AS NEEDED FOR PAIN. MEDICALLY NECESSARY       Narcotics Refill Protocol Failed - 9/3/2020  1:51 PM        Failed - Patient seen within 3 months     Last visit with Nellie Coreas MD: 3/5/2020  Last visit in [facility]: Visit date not found    Patient's next visit in [facility]: Visit date not found           Passed - Patient not pregnant        Passed - Med not refilled within 4 weeks       Opiods Refill Protocol Failed - 9/3/2020  1:51 PM        Failed - Recent visit with authorizing provider in the past 3 months        Failed - Patient has signed pain contract        Passed - Patient not currently pregnant        Passed - No positive pregnancy test in past 12 months        Off-Protocol Failed - 9/3/2020  1:51 PM        Failed - Medication not assigned to a protocol, review manually.        Passed - Office visit in past 12 months or future 90 days.     Recent Outpatient Visits            2 months ago Well woman exam with routine gynecological exam    [facility] Morro Abbott MD    5 months ago Acute cystitis with hematuria    [facility] Elsy Pérez NP    6 months ago Annual physical exam    [facility] Nellie Coreas MD    9 months ago Chronic low back pain without sciatica, unspecified back pain laterality    [facility] mayank Coreas MD    11 months ago Anxiety state, unspecified    [facility]                           Appointments  past 12m or future 3m with PCP    Date Provider   Last Visit   3/5/2020 Nellie Coreas MD   Next Visit   Visit date not found Nellie Coreas MD   ED visits in past 90 days: 0     Note composed:4:50 PM 09/03/2020

## 2020-09-04 RX ORDER — TRAMADOL HYDROCHLORIDE 50 MG/1
TABLET ORAL
Qty: 60 TABLET | Refills: 0 | Status: SHIPPED | OUTPATIENT
Start: 2020-09-04 | End: 2020-11-09 | Stop reason: SDUPTHER

## 2020-09-04 NOTE — TELEPHONE ENCOUNTER
----- Message from Queta [place] sent at 9/4/2020  1:01 PM CDT -----  Contact: self [phone]  Patient is returning a phone call.  Who left a message for the patient: Roslyn Negron MA   Does patient know what this is regarding:  medication  Comments:

## 2020-09-10 ENCOUNTER — OFFICE VISIT (OUTPATIENT)
Dept: INTERNAL MEDICINE | Facility: CLINIC | Age: 35
End: 2020-09-10
Payer: MEDICARE

## 2020-09-10 VITALS
HEART RATE: 110 BPM | WEIGHT: 181.88 LBS | BODY MASS INDEX: 28.49 KG/M2 | DIASTOLIC BLOOD PRESSURE: 60 MMHG | OXYGEN SATURATION: 97 % | SYSTOLIC BLOOD PRESSURE: 120 MMHG

## 2020-09-10 DIAGNOSIS — K59.00 CONSTIPATION, UNSPECIFIED CONSTIPATION TYPE: ICD-10-CM

## 2020-09-10 DIAGNOSIS — R11.0 NAUSEA: ICD-10-CM

## 2020-09-10 DIAGNOSIS — K21.9 GASTROESOPHAGEAL REFLUX DISEASE, ESOPHAGITIS PRESENCE NOT SPECIFIED: ICD-10-CM

## 2020-09-10 DIAGNOSIS — R42 VERTIGO: ICD-10-CM

## 2020-09-10 DIAGNOSIS — E78.1 HYPERTRIGLYCERIDEMIA: Primary | ICD-10-CM

## 2020-09-10 PROCEDURE — 99999 PR PBB SHADOW E&M-EST. PATIENT-LVL IV: CPT | Mod: PBBFAC,,, | Performed by: PHYSICIAN ASSISTANT

## 2020-09-10 PROCEDURE — 3008F PR BODY MASS INDEX (BMI) DOCUMENTED: ICD-10-PCS | Mod: CPTII,S$GLB,, | Performed by: PHYSICIAN ASSISTANT

## 2020-09-10 PROCEDURE — 99214 OFFICE O/P EST MOD 30 MIN: CPT | Mod: S$GLB,,, | Performed by: PHYSICIAN ASSISTANT

## 2020-09-10 PROCEDURE — 3008F BODY MASS INDEX DOCD: CPT | Mod: CPTII,S$GLB,, | Performed by: PHYSICIAN ASSISTANT

## 2020-09-10 PROCEDURE — 99999 PR PBB SHADOW E&M-EST. PATIENT-LVL IV: ICD-10-PCS | Mod: PBBFAC,,, | Performed by: PHYSICIAN ASSISTANT

## 2020-09-10 PROCEDURE — 99214 PR OFFICE/OUTPT VISIT, EST, LEVL IV, 30-39 MIN: ICD-10-PCS | Mod: S$GLB,,, | Performed by: PHYSICIAN ASSISTANT

## 2020-09-10 RX ORDER — POLYETHYLENE GLYCOL 3350 17 G/17G
17 POWDER, FOR SOLUTION ORAL DAILY
Qty: 510 G | Refills: 0 | Status: SHIPPED | OUTPATIENT
Start: 2020-09-10 | End: 2021-02-03

## 2020-09-10 RX ORDER — MECLIZINE HYDROCHLORIDE 25 MG/1
25 TABLET ORAL 3 TIMES DAILY PRN
Qty: 20 TABLET | Refills: 0 | Status: SHIPPED | OUTPATIENT
Start: 2020-09-10 | End: 2021-02-03

## 2020-09-10 RX ORDER — HYDROCORTISONE 25 MG/G
CREAM TOPICAL 2 TIMES DAILY
Qty: 20 G | Refills: 0 | Status: SHIPPED | OUTPATIENT
Start: 2020-09-10 | End: 2022-04-28

## 2020-09-10 RX ORDER — BUSPIRONE HYDROCHLORIDE 15 MG/1
30 TABLET ORAL 2 TIMES DAILY
COMMUNITY
End: 2023-03-28

## 2020-09-10 NOTE — PATIENT INSTRUCTIONS

## 2020-09-12 PROBLEM — N30.00 ACUTE CYSTITIS WITHOUT HEMATURIA: Status: RESOLVED | Noted: 2018-03-25 | Resolved: 2020-09-12

## 2020-09-12 PROBLEM — R35.0 URINARY FREQUENCY: Status: RESOLVED | Noted: 2019-10-04 | Resolved: 2020-09-12

## 2020-09-12 PROBLEM — R30.0 DYSURIA: Status: RESOLVED | Noted: 2019-10-04 | Resolved: 2020-09-12

## 2020-09-12 RX ORDER — DIVALPROEX SODIUM 500 MG/1
TABLET, FILM COATED, EXTENDED RELEASE ORAL
COMMUNITY
Start: 2020-08-17 | End: 2021-02-03

## 2020-09-12 RX ORDER — BUPROPION HYDROCHLORIDE 150 MG/1
TABLET ORAL
COMMUNITY
Start: 2020-08-17 | End: 2022-02-15

## 2020-09-12 RX ORDER — BENZTROPINE MESYLATE 0.5 MG/1
1 TABLET ORAL
COMMUNITY
Start: 2020-09-08 | End: 2023-04-12

## 2020-09-12 RX ORDER — PALIPERIDONE 6 MG/1
6 TABLET, EXTENDED RELEASE ORAL DAILY
COMMUNITY
Start: 2020-08-23 | End: 2022-02-15

## 2020-09-12 NOTE — PROGRESS NOTES
Subjective:       Patient ID: Vianney Callahan is a 35 y.o. female.    Chief Complaint: Dizziness (vertigo), Constipation, Nausea, and Hyperlipidemia    HPI     Established pt of Nellie Coreas MD (new to me)    Here with a few concerns.      vertigo/nausea, onset about one week. States she was in her bedroom, getting up from bed and room spinning sensation started, brief. Much better today, may feel slight vertigo with certain movements.     constipation for the past 2 weeks, notes hemorrhoid, straining, hard stools. Tired OTC stool softner with mild improvement.     Also Having occ reflux/nasua. No abdominal pain, vomiting, or melena. On omeprazole Took Protonix in the past, unsure why PPI changed    Lastly concerned about triglyceride levels. Currently in Intensive outpatient program at [facility] for management of mood disorder States on her routine labs last week trig was in ?300s. Admits there can be some room for improvement with her foods choices.     There have been some medication changes.   -- No longer on Risperdal, Prozac, Toviaz, Invega injections  --- Now on Cogentin, Zoloft, Ingeva oral  -- Wellbutrin, Buspar and Depakote contined/unchanged      Past Medical History:   Diagnosis Date    Abnormal cervical Papanicolaou smear 2012    Colposcopy    ADHD (attention deficit hyperactivity disorder) 8/16/2012    Allergy     Anxiety     Asthma     childhood    Back pain 5/19/2014    Bipolar affective disorder     Cholecystitis     Chronic migraine without aura, with intractable migraine, so stated, without mention of status migrainosus 9/24/2013    Depression     Fever blister     Gallstones 5/26/2014    Headache(784.0)     Hepatitis C antibody positive in blood     History of hepatitis C     History of psychiatric hospitalization     Suicide attempt    Personality disorder 4/11/2013    Psychosis 10/7/2013    Therapy     Tobacco abuse 9/24/2013     Social History     Tobacco Use     Smoking status: Current Every Day Smoker     Packs/day: 0.50     Types: Cigarettes     Last attempt to quit: 2018     Years since quittin.3    Smokeless tobacco: Never Used   Substance Use Topics    Alcohol use: No     Alcohol/week: 0.0 standard drinks    Drug use: No     Review of patient's allergies indicates:   Allergen Reactions    Dilantin [phenytoin sodium extended] Rash    Saphris [asenapine]      Confusion and depressed mood.       Current Outpatient Medications:     buPROPion (WELLBUTRIN XL) 300 MG 24 hr tablet, Take 300 mg by mouth once daily., Disp: , Rfl:     celecoxib (CELEBREX) 200 MG capsule, , Disp: , Rfl:     naproxen (NAPROSYN) 500 MG tablet, Take 1 tablet (500 mg total) by mouth 2 (two) times daily with meals., Disp: 20 tablet, Rfl: 0    norethindrone-ethinyl estradiol (MICROGESTIN ) 1-20 mg-mcg per tablet, Take 1 tablet by mouth once daily., Disp: 63 tablet, Rfl: 4    omeprazole (PRILOSEC) 20 MG capsule, , Disp: , Rfl:     sertraline (ZOLOFT) 100 MG tablet, sertraline 100 mg tablet  TAKE 1 TABLET BY MOUTH EVERY DAY, Disp: , Rfl:     sumatriptan (IMITREX) 100 MG tablet, TAKE 1 TABLET BY MOUTH EVERY DAY AS NEEDED FOR MIGRAINE HEADACHE, Disp: 30 tablet, Rfl: 11    traMADoL (ULTRAM) 50 mg tablet, TAKE 1 TABLET BY MOUTH EVERY 8 HOURS AS NEEDED FOR PAIN. MEDICALLY NECESSARY, Disp: 60 tablet, Rfl: 0    tretinoin (RETIN-A) 0.1 % cream, Apply small amount to entire face qhs. Wash off qam and apply sunscreen., Disp: 45 g, Rfl: 3    valACYclovir (VALTREX) 1000 MG tablet, Take 1 tablet (1,000 mg total) by mouth once daily., Disp: 90 tablet, Rfl: 3    valACYclovir (VALTREX) 500 MG tablet, Take 1 tablet (500 mg total) by mouth once daily., Disp: 30 tablet, Rfl: 2    benztropine (COGENTIN) 0.5 MG tablet, , Disp: , Rfl:     buPROPion (WELLBUTRIN XL) 150 MG TB24 tablet, TAKE 1 TABLET BY MOUTH DAILY ALONG WITH 300 MG TABLET TO   450MG TOTAL DOSE, Disp: , Rfl:     busPIRone (BUSPAR)  15 MG tablet, 3 (three) times daily., Disp: , Rfl:     divalproex ER (DEPAKOTE) 500 MG Tb24, TAKE 1 TABLET BY MOUTH EVERY DAY IN THE MORNING AND TAKE 2 TABLETS AT BEDTIME, Disp: , Rfl:     hydrocortisone 2.5 % cream, Apply topically 2 (two) times daily., Disp: 20 g, Rfl: 0    meclizine (ANTIVERT) 25 mg tablet, Take 1 tablet (25 mg total) by mouth 3 (three) times daily as needed for Dizziness or Nausea., Disp: 20 tablet, Rfl: 0    paliperidone (INVEGA) 6 MG TR24, Take 6 mg by mouth once daily., Disp: , Rfl:     polyethylene glycol (GLYCOLAX) 17 gram/dose powder, Take 17 g by mouth once daily., Disp: 510 g, Rfl: 0    Review of Systems   Constitutional: Negative for chills, diaphoresis and fever.   Respiratory: Negative for cough and shortness of breath.    Cardiovascular: Negative for chest pain and leg swelling.   Gastrointestinal: Positive for constipation, nausea and reflux. Negative for abdominal pain, diarrhea and vomiting.   Neurological:        Vertigo         Objective: /60   Pulse 110   Wt 82.5 kg (181 lb 14.1 oz)   SpO2 97%   BMI 28.49 kg/m²         Physical Exam  Vitals signs reviewed.   Constitutional:       General: She is not in acute distress.     Appearance: She is well-developed. She is obese.   HENT:      Head: Normocephalic and atraumatic.      Mouth/Throat:      Mouth: Mucous membranes are moist.      Pharynx: Oropharynx is clear.   Eyes:      Extraocular Movements: Extraocular movements intact.      Pupils: Pupils are equal, round, and reactive to light.   Cardiovascular:      Rate and Rhythm: Normal rate and regular rhythm.      Heart sounds: No murmur. No friction rub.   Pulmonary:      Effort: Pulmonary effort is normal.      Breath sounds: Normal breath sounds. No wheezing or rales.   Abdominal:      General: Bowel sounds are normal.      Palpations: Abdomen is soft.      Tenderness: There is no abdominal tenderness.   Musculoskeletal:      Right lower leg: No edema.      Left  lower leg: No edema.   Skin:     General: Skin is warm and dry.      Findings: No rash.   Neurological:      General: No focal deficit present.      Mental Status: She is alert.      Cranial Nerves: No cranial nerve deficit.      Sensory: No sensory deficit.      Motor: No weakness.      Coordination: Coordination normal.      Gait: Gait normal.         Assessment:       1. Hypertriglyceridemia    2. Vertigo    3. Nausea    4. Gastroesophageal reflux disease, esophagitis presence not specified    5. Constipation, unspecified constipation type        Plan:           Vianney was seen today for dizziness, constipation, nausea and hyperlipidemia.    Diagnoses and all orders for this visit:    Hypertriglyceridemia  Obtain fasting lipid pain  Lifestyle mods (food choice, exercise an weight loss encouraged, handout provided on AVS)  -     Lipid Panel; Future    Vertigo  -Now improved  -     meclizine (ANTIVERT) 25 mg tablet; Take 1 tablet (25 mg total) by mouth 3 (three) times daily as needed for Dizziness or Nausea.    Gastroesophageal reflux disease  Continue PPI therapy  GERD food precautions discussed    Constipation, unspecified constipation type  Increase water intake  Miralax daily  -     polyethylene glycol (GLYCOLAX) 17 gram/dose powder; Take 17 g by mouth once daily.    Other orders  -     hydrocortisone 2.5 % cream; Apply topically 2 (two) times daily.        Yazmin Luis PA-C

## 2020-09-16 ENCOUNTER — LAB VISIT (OUTPATIENT)
Dept: LAB | Facility: HOSPITAL | Age: 35
End: 2020-09-16
Attending: INTERNAL MEDICINE
Payer: MEDICARE

## 2020-09-16 DIAGNOSIS — E78.1 HYPERTRIGLYCERIDEMIA: ICD-10-CM

## 2020-09-16 LAB
CHOLEST SERPL-MCNC: 209 MG/DL (ref 120–199)
CHOLEST/HDLC SERPL: 3.8 {RATIO} (ref 2–5)
HDLC SERPL-MCNC: 55 MG/DL (ref 40–75)
HDLC SERPL: 26.3 % (ref 20–50)
LDLC SERPL CALC-MCNC: 132.8 MG/DL (ref 63–159)
NONHDLC SERPL-MCNC: 154 MG/DL
TRIGL SERPL-MCNC: 106 MG/DL (ref 30–150)

## 2020-09-16 PROCEDURE — 36415 COLL VENOUS BLD VENIPUNCTURE: CPT

## 2020-09-16 PROCEDURE — 80061 LIPID PANEL: CPT

## 2021-01-06 RX ORDER — OMEPRAZOLE 20 MG/1
20 CAPSULE, DELAYED RELEASE ORAL DAILY
Qty: 90 CAPSULE | Refills: 1 | Status: SHIPPED | OUTPATIENT
Start: 2021-01-06 | End: 2021-05-06

## 2021-01-19 ENCOUNTER — TELEPHONE (OUTPATIENT)
Dept: INTERNAL MEDICINE | Facility: CLINIC | Age: 36
End: 2021-01-19

## 2021-01-19 RX ORDER — TRAMADOL HYDROCHLORIDE 50 MG/1
TABLET ORAL
Qty: 60 TABLET | Refills: 0 | Status: SHIPPED | OUTPATIENT
Start: 2021-01-19 | End: 2021-03-22

## 2021-02-03 ENCOUNTER — LAB VISIT (OUTPATIENT)
Dept: LAB | Facility: HOSPITAL | Age: 36
End: 2021-02-03
Attending: INTERNAL MEDICINE
Payer: MEDICARE

## 2021-02-03 ENCOUNTER — IMMUNIZATION (OUTPATIENT)
Dept: PHARMACY | Facility: CLINIC | Age: 36
End: 2021-02-03
Payer: MEDICARE

## 2021-02-03 ENCOUNTER — OFFICE VISIT (OUTPATIENT)
Dept: INTERNAL MEDICINE | Facility: CLINIC | Age: 36
End: 2021-02-03
Payer: MEDICARE

## 2021-02-03 VITALS
HEIGHT: 67 IN | WEIGHT: 179.38 LBS | RESPIRATION RATE: 18 BRPM | HEART RATE: 99 BPM | TEMPERATURE: 97 F | SYSTOLIC BLOOD PRESSURE: 118 MMHG | OXYGEN SATURATION: 98 % | BODY MASS INDEX: 28.16 KG/M2 | DIASTOLIC BLOOD PRESSURE: 74 MMHG

## 2021-02-03 DIAGNOSIS — G89.29 CHRONIC LOW BACK PAIN WITHOUT SCIATICA, UNSPECIFIED BACK PAIN LATERALITY: ICD-10-CM

## 2021-02-03 DIAGNOSIS — E78.1 HYPERTRIGLYCERIDEMIA: ICD-10-CM

## 2021-02-03 DIAGNOSIS — Z00.00 ANNUAL PHYSICAL EXAM: Primary | ICD-10-CM

## 2021-02-03 DIAGNOSIS — Z79.1 NSAID LONG-TERM USE: ICD-10-CM

## 2021-02-03 DIAGNOSIS — Z00.00 ANNUAL PHYSICAL EXAM: ICD-10-CM

## 2021-02-03 DIAGNOSIS — M54.50 CHRONIC LOW BACK PAIN WITHOUT SCIATICA, UNSPECIFIED BACK PAIN LATERALITY: ICD-10-CM

## 2021-02-03 DIAGNOSIS — F19.21: ICD-10-CM

## 2021-02-03 DIAGNOSIS — F25.0 SCHIZOAFFECTIVE DISORDER, BIPOLAR TYPE: ICD-10-CM

## 2021-02-03 LAB
BASOPHILS # BLD AUTO: 0.04 K/UL (ref 0–0.2)
BASOPHILS NFR BLD: 0.4 % (ref 0–1.9)
DIFFERENTIAL METHOD: ABNORMAL
EOSINOPHIL # BLD AUTO: 0.2 K/UL (ref 0–0.5)
EOSINOPHIL NFR BLD: 2.1 % (ref 0–8)
ERYTHROCYTE [DISTWIDTH] IN BLOOD BY AUTOMATED COUNT: 11.9 % (ref 11.5–14.5)
HCT VFR BLD AUTO: 43.3 % (ref 37–48.5)
HGB BLD-MCNC: 14.4 G/DL (ref 12–16)
IMM GRANULOCYTES # BLD AUTO: 0.08 K/UL (ref 0–0.04)
IMM GRANULOCYTES NFR BLD AUTO: 0.7 % (ref 0–0.5)
LYMPHOCYTES # BLD AUTO: 3.2 K/UL (ref 1–4.8)
LYMPHOCYTES NFR BLD: 29.1 % (ref 18–48)
MCH RBC QN AUTO: 29.8 PG (ref 27–31)
MCHC RBC AUTO-ENTMCNC: 33.3 G/DL (ref 32–36)
MCV RBC AUTO: 90 FL (ref 82–98)
MONOCYTES # BLD AUTO: 0.9 K/UL (ref 0.3–1)
MONOCYTES NFR BLD: 8.5 % (ref 4–15)
NEUTROPHILS # BLD AUTO: 6.5 K/UL (ref 1.8–7.7)
NEUTROPHILS NFR BLD: 59.2 % (ref 38–73)
NRBC BLD-RTO: 0 /100 WBC
PLATELET # BLD AUTO: 452 K/UL (ref 150–350)
PMV BLD AUTO: 10.5 FL (ref 9.2–12.9)
RBC # BLD AUTO: 4.83 M/UL (ref 4–5.4)
WBC # BLD AUTO: 11.04 K/UL (ref 3.9–12.7)

## 2021-02-03 PROCEDURE — 36415 COLL VENOUS BLD VENIPUNCTURE: CPT

## 2021-02-03 PROCEDURE — 99213 OFFICE O/P EST LOW 20 MIN: CPT | Mod: S$GLB,,, | Performed by: INTERNAL MEDICINE

## 2021-02-03 PROCEDURE — 80061 LIPID PANEL: CPT

## 2021-02-03 PROCEDURE — 80053 COMPREHEN METABOLIC PANEL: CPT

## 2021-02-03 PROCEDURE — 3008F BODY MASS INDEX DOCD: CPT | Mod: CPTII,S$GLB,, | Performed by: INTERNAL MEDICINE

## 2021-02-03 PROCEDURE — 99999 PR PBB SHADOW E&M-EST. PATIENT-LVL V: CPT | Mod: PBBFAC,,, | Performed by: INTERNAL MEDICINE

## 2021-02-03 PROCEDURE — 85025 COMPLETE CBC W/AUTO DIFF WBC: CPT

## 2021-02-03 PROCEDURE — 99499 RISK ADDL DX/OHS AUDIT: ICD-10-PCS | Mod: S$GLB,,, | Performed by: INTERNAL MEDICINE

## 2021-02-03 PROCEDURE — 99999 PR PBB SHADOW E&M-EST. PATIENT-LVL V: ICD-10-PCS | Mod: PBBFAC,,, | Performed by: INTERNAL MEDICINE

## 2021-02-03 PROCEDURE — 99499 UNLISTED E&M SERVICE: CPT | Mod: S$GLB,,, | Performed by: INTERNAL MEDICINE

## 2021-02-03 PROCEDURE — 1125F AMNT PAIN NOTED PAIN PRSNT: CPT | Mod: S$GLB,,, | Performed by: INTERNAL MEDICINE

## 2021-02-03 PROCEDURE — 1125F PR PAIN SEVERITY QUANTIFIED, PAIN PRESENT: ICD-10-PCS | Mod: S$GLB,,, | Performed by: INTERNAL MEDICINE

## 2021-02-03 PROCEDURE — 99213 PR OFFICE/OUTPT VISIT, EST, LEVL III, 20-29 MIN: ICD-10-PCS | Mod: S$GLB,,, | Performed by: INTERNAL MEDICINE

## 2021-02-03 PROCEDURE — 3008F PR BODY MASS INDEX (BMI) DOCUMENTED: ICD-10-PCS | Mod: CPTII,S$GLB,, | Performed by: INTERNAL MEDICINE

## 2021-02-03 RX ORDER — OXCARBAZEPINE 300 MG/1
300 TABLET, FILM COATED ORAL 2 TIMES DAILY
COMMUNITY
Start: 2021-01-21 | End: 2022-02-15

## 2021-02-04 PROBLEM — R29.898 RIGHT LEG WEAKNESS: Status: RESOLVED | Noted: 2019-05-24 | Resolved: 2021-02-04

## 2021-02-04 LAB
ALBUMIN SERPL BCP-MCNC: 4.2 G/DL (ref 3.5–5.2)
ALP SERPL-CCNC: 122 U/L (ref 55–135)
ALT SERPL W/O P-5'-P-CCNC: 15 U/L (ref 10–44)
ANION GAP SERPL CALC-SCNC: 12 MMOL/L (ref 8–16)
AST SERPL-CCNC: 17 U/L (ref 10–40)
BILIRUB SERPL-MCNC: 0.2 MG/DL (ref 0.1–1)
BUN SERPL-MCNC: 11 MG/DL (ref 6–20)
CALCIUM SERPL-MCNC: 9.3 MG/DL (ref 8.7–10.5)
CHLORIDE SERPL-SCNC: 104 MMOL/L (ref 95–110)
CHOLEST SERPL-MCNC: 267 MG/DL (ref 120–199)
CHOLEST/HDLC SERPL: 5.1 {RATIO} (ref 2–5)
CO2 SERPL-SCNC: 23 MMOL/L (ref 23–29)
CREAT SERPL-MCNC: 0.8 MG/DL (ref 0.5–1.4)
EST. GFR  (AFRICAN AMERICAN): >60 ML/MIN/1.73 M^2
EST. GFR  (NON AFRICAN AMERICAN): >60 ML/MIN/1.73 M^2
GLUCOSE SERPL-MCNC: 81 MG/DL (ref 70–110)
HDLC SERPL-MCNC: 52 MG/DL (ref 40–75)
HDLC SERPL: 19.5 % (ref 20–50)
LDLC SERPL CALC-MCNC: 171.4 MG/DL (ref 63–159)
NONHDLC SERPL-MCNC: 215 MG/DL
POTASSIUM SERPL-SCNC: 4.3 MMOL/L (ref 3.5–5.1)
PROT SERPL-MCNC: 7.1 G/DL (ref 6–8.4)
SODIUM SERPL-SCNC: 139 MMOL/L (ref 136–145)
TRIGL SERPL-MCNC: 218 MG/DL (ref 30–150)

## 2021-02-21 ENCOUNTER — PATIENT MESSAGE (OUTPATIENT)
Dept: OBSTETRICS AND GYNECOLOGY | Facility: CLINIC | Age: 36
End: 2021-02-21

## 2021-02-22 ENCOUNTER — TELEPHONE (OUTPATIENT)
Dept: OBSTETRICS AND GYNECOLOGY | Facility: CLINIC | Age: 36
End: 2021-02-22

## 2021-02-22 ENCOUNTER — PATIENT MESSAGE (OUTPATIENT)
Dept: OBSTETRICS AND GYNECOLOGY | Facility: CLINIC | Age: 36
End: 2021-02-22

## 2021-02-22 DIAGNOSIS — B00.9 HERPES: ICD-10-CM

## 2021-02-22 RX ORDER — VALACYCLOVIR HYDROCHLORIDE 1 G/1
1000 TABLET, FILM COATED ORAL DAILY
Qty: 90 TABLET | Refills: 3 | OUTPATIENT
Start: 2021-02-22

## 2021-02-23 RX ORDER — VALACYCLOVIR HYDROCHLORIDE 500 MG/1
500 TABLET, FILM COATED ORAL DAILY
Qty: 30 TABLET | Refills: 1 | Status: SHIPPED | OUTPATIENT
Start: 2021-02-23 | End: 2021-02-23

## 2021-03-22 RX ORDER — TRAMADOL HYDROCHLORIDE 50 MG/1
TABLET ORAL
Qty: 60 TABLET | Refills: 0 | Status: SHIPPED | OUTPATIENT
Start: 2021-03-22 | End: 2021-05-19

## 2021-05-06 RX ORDER — OMEPRAZOLE 20 MG/1
CAPSULE, DELAYED RELEASE ORAL
Qty: 90 CAPSULE | Refills: 2 | Status: SHIPPED | OUTPATIENT
Start: 2021-05-06 | End: 2022-03-14

## 2021-05-14 ENCOUNTER — OFFICE VISIT (OUTPATIENT)
Dept: URGENT CARE | Facility: CLINIC | Age: 36
End: 2021-05-14
Payer: MEDICARE

## 2021-05-14 VITALS
HEART RATE: 90 BPM | SYSTOLIC BLOOD PRESSURE: 130 MMHG | OXYGEN SATURATION: 98 % | HEIGHT: 67 IN | WEIGHT: 159 LBS | TEMPERATURE: 98 F | RESPIRATION RATE: 15 BRPM | BODY MASS INDEX: 24.96 KG/M2 | DIASTOLIC BLOOD PRESSURE: 82 MMHG

## 2021-05-14 DIAGNOSIS — R39.15 URINARY URGENCY: Primary | ICD-10-CM

## 2021-05-14 DIAGNOSIS — R35.0 URINARY FREQUENCY: ICD-10-CM

## 2021-05-14 DIAGNOSIS — R39.9 UTI SYMPTOMS: ICD-10-CM

## 2021-05-14 LAB
BILIRUB UR QL STRIP: NEGATIVE
GLUCOSE UR QL STRIP: NEGATIVE
KETONES UR QL STRIP: NEGATIVE
LEUKOCYTE ESTERASE UR QL STRIP: NEGATIVE
PH, POC UA: 6 (ref 5–8)
POC BLOOD, URINE: POSITIVE
POC NITRATES, URINE: NEGATIVE
PROT UR QL STRIP: NEGATIVE
SP GR UR STRIP: 1.02 (ref 1–1.03)
UROBILINOGEN UR STRIP-ACNC: ABNORMAL (ref 0.1–1.1)

## 2021-05-14 PROCEDURE — 81003 POCT URINALYSIS, DIPSTICK, AUTOMATED, W/O SCOPE: ICD-10-PCS | Mod: QW,S$GLB,, | Performed by: PHYSICIAN ASSISTANT

## 2021-05-14 PROCEDURE — 81003 URINALYSIS AUTO W/O SCOPE: CPT | Mod: QW,S$GLB,, | Performed by: PHYSICIAN ASSISTANT

## 2021-05-14 PROCEDURE — 3008F PR BODY MASS INDEX (BMI) DOCUMENTED: ICD-10-PCS | Mod: CPTII,S$GLB,, | Performed by: PHYSICIAN ASSISTANT

## 2021-05-14 PROCEDURE — 99214 PR OFFICE/OUTPT VISIT, EST, LEVL IV, 30-39 MIN: ICD-10-PCS | Mod: S$GLB,,, | Performed by: PHYSICIAN ASSISTANT

## 2021-05-14 PROCEDURE — 99214 OFFICE O/P EST MOD 30 MIN: CPT | Mod: S$GLB,,, | Performed by: PHYSICIAN ASSISTANT

## 2021-05-14 PROCEDURE — 87086 URINE CULTURE/COLONY COUNT: CPT | Performed by: PHYSICIAN ASSISTANT

## 2021-05-14 PROCEDURE — 3008F BODY MASS INDEX DOCD: CPT | Mod: CPTII,S$GLB,, | Performed by: PHYSICIAN ASSISTANT

## 2021-05-14 RX ORDER — PHENAZOPYRIDINE HYDROCHLORIDE 100 MG/1
100 TABLET, FILM COATED ORAL 3 TIMES DAILY PRN
Qty: 9 TABLET | Refills: 0 | Status: SHIPPED | OUTPATIENT
Start: 2021-05-14 | End: 2021-05-17

## 2021-05-16 ENCOUNTER — TELEPHONE (OUTPATIENT)
Dept: URGENT CARE | Facility: CLINIC | Age: 36
End: 2021-05-16

## 2021-05-16 LAB — BACTERIA UR CULT: NO GROWTH

## 2021-05-19 RX ORDER — TRAMADOL HYDROCHLORIDE 50 MG/1
TABLET ORAL
Qty: 60 TABLET | Refills: 0 | Status: SHIPPED | OUTPATIENT
Start: 2021-05-19 | End: 2021-07-13

## 2021-06-10 ENCOUNTER — OFFICE VISIT (OUTPATIENT)
Dept: URGENT CARE | Facility: CLINIC | Age: 36
End: 2021-06-10
Payer: MEDICARE

## 2021-06-10 VITALS
HEIGHT: 67 IN | WEIGHT: 160 LBS | HEART RATE: 96 BPM | BODY MASS INDEX: 25.11 KG/M2 | TEMPERATURE: 98 F | DIASTOLIC BLOOD PRESSURE: 65 MMHG | SYSTOLIC BLOOD PRESSURE: 103 MMHG | OXYGEN SATURATION: 99 %

## 2021-06-10 DIAGNOSIS — L03.115 CELLULITIS OF RIGHT LOWER LEG: Primary | ICD-10-CM

## 2021-06-10 PROCEDURE — 99214 OFFICE O/P EST MOD 30 MIN: CPT | Mod: S$GLB,,, | Performed by: INTERNAL MEDICINE

## 2021-06-10 PROCEDURE — 99214 PR OFFICE/OUTPT VISIT, EST, LEVL IV, 30-39 MIN: ICD-10-PCS | Mod: S$GLB,,, | Performed by: INTERNAL MEDICINE

## 2021-06-10 PROCEDURE — 3008F PR BODY MASS INDEX (BMI) DOCUMENTED: ICD-10-PCS | Mod: CPTII,S$GLB,, | Performed by: INTERNAL MEDICINE

## 2021-06-10 PROCEDURE — 3008F BODY MASS INDEX DOCD: CPT | Mod: CPTII,S$GLB,, | Performed by: INTERNAL MEDICINE

## 2021-06-10 RX ORDER — QUETIAPINE FUMARATE 50 MG/1
50 TABLET, FILM COATED ORAL NIGHTLY
COMMUNITY
Start: 2021-05-17 | End: 2022-02-15

## 2021-06-10 RX ORDER — SULFAMETHOXAZOLE AND TRIMETHOPRIM 800; 160 MG/1; MG/1
1 TABLET ORAL 2 TIMES DAILY
Qty: 14 TABLET | Refills: 0 | Status: SHIPPED | OUTPATIENT
Start: 2021-06-10 | End: 2021-10-09

## 2021-07-13 RX ORDER — TRAMADOL HYDROCHLORIDE 50 MG/1
TABLET ORAL
Qty: 60 TABLET | Refills: 0 | Status: SHIPPED | OUTPATIENT
Start: 2021-07-13 | End: 2021-09-06 | Stop reason: SDUPTHER

## 2021-07-15 DIAGNOSIS — L70.0 ACNE VULGARIS: ICD-10-CM

## 2021-07-16 RX ORDER — TRETINOIN 1 MG/G
CREAM TOPICAL
Qty: 45 G | Refills: 3 | Status: SHIPPED | OUTPATIENT
Start: 2021-07-16 | End: 2022-06-04

## 2021-10-09 ENCOUNTER — OFFICE VISIT (OUTPATIENT)
Dept: URGENT CARE | Facility: CLINIC | Age: 36
End: 2021-10-09
Payer: MEDICARE

## 2021-10-09 VITALS
SYSTOLIC BLOOD PRESSURE: 101 MMHG | DIASTOLIC BLOOD PRESSURE: 64 MMHG | WEIGHT: 170 LBS | RESPIRATION RATE: 16 BRPM | BODY MASS INDEX: 26.68 KG/M2 | TEMPERATURE: 98 F | HEART RATE: 77 BPM | HEIGHT: 67 IN | OXYGEN SATURATION: 98 %

## 2021-10-09 DIAGNOSIS — L03.90 CELLULITIS, UNSPECIFIED CELLULITIS SITE: Primary | ICD-10-CM

## 2021-10-09 PROCEDURE — 1160F PR REVIEW ALL MEDS BY PRESCRIBER/CLIN PHARMACIST DOCUMENTED: ICD-10-PCS | Mod: CPTII,S$GLB,, | Performed by: FAMILY MEDICINE

## 2021-10-09 PROCEDURE — 99214 OFFICE O/P EST MOD 30 MIN: CPT | Mod: S$GLB,,, | Performed by: FAMILY MEDICINE

## 2021-10-09 PROCEDURE — 3008F PR BODY MASS INDEX (BMI) DOCUMENTED: ICD-10-PCS | Mod: CPTII,S$GLB,, | Performed by: FAMILY MEDICINE

## 2021-10-09 PROCEDURE — 1159F PR MEDICATION LIST DOCUMENTED IN MEDICAL RECORD: ICD-10-PCS | Mod: CPTII,S$GLB,, | Performed by: FAMILY MEDICINE

## 2021-10-09 PROCEDURE — 3078F DIAST BP <80 MM HG: CPT | Mod: CPTII,S$GLB,, | Performed by: FAMILY MEDICINE

## 2021-10-09 PROCEDURE — 3078F PR MOST RECENT DIASTOLIC BLOOD PRESSURE < 80 MM HG: ICD-10-PCS | Mod: CPTII,S$GLB,, | Performed by: FAMILY MEDICINE

## 2021-10-09 PROCEDURE — 3074F PR MOST RECENT SYSTOLIC BLOOD PRESSURE < 130 MM HG: ICD-10-PCS | Mod: CPTII,S$GLB,, | Performed by: FAMILY MEDICINE

## 2021-10-09 PROCEDURE — 1159F MED LIST DOCD IN RCRD: CPT | Mod: CPTII,S$GLB,, | Performed by: FAMILY MEDICINE

## 2021-10-09 PROCEDURE — 3008F BODY MASS INDEX DOCD: CPT | Mod: CPTII,S$GLB,, | Performed by: FAMILY MEDICINE

## 2021-10-09 PROCEDURE — 1160F RVW MEDS BY RX/DR IN RCRD: CPT | Mod: CPTII,S$GLB,, | Performed by: FAMILY MEDICINE

## 2021-10-09 PROCEDURE — 99214 PR OFFICE/OUTPT VISIT, EST, LEVL IV, 30-39 MIN: ICD-10-PCS | Mod: S$GLB,,, | Performed by: FAMILY MEDICINE

## 2021-10-09 PROCEDURE — 3074F SYST BP LT 130 MM HG: CPT | Mod: CPTII,S$GLB,, | Performed by: FAMILY MEDICINE

## 2021-10-09 RX ORDER — CEPHALEXIN 500 MG/1
500 CAPSULE ORAL EVERY 12 HOURS
Qty: 20 CAPSULE | Refills: 0 | Status: SHIPPED | OUTPATIENT
Start: 2021-10-09 | End: 2021-10-19

## 2021-10-09 RX ORDER — MUPIROCIN 20 MG/G
OINTMENT TOPICAL
Qty: 22 G | Refills: 1 | Status: SHIPPED | OUTPATIENT
Start: 2021-10-09 | End: 2022-04-28

## 2021-11-02 ENCOUNTER — TELEPHONE (OUTPATIENT)
Dept: INTERNAL MEDICINE | Facility: CLINIC | Age: 36
End: 2021-11-02
Payer: MEDICARE

## 2021-11-03 ENCOUNTER — TELEPHONE (OUTPATIENT)
Dept: INTERNAL MEDICINE | Facility: CLINIC | Age: 36
End: 2021-11-03
Payer: MEDICARE

## 2021-11-04 ENCOUNTER — PES CALL (OUTPATIENT)
Dept: ADMINISTRATIVE | Facility: CLINIC | Age: 36
End: 2021-11-04
Payer: MEDICARE

## 2021-11-19 ENCOUNTER — OFFICE VISIT (OUTPATIENT)
Dept: URGENT CARE | Facility: CLINIC | Age: 36
End: 2021-11-19
Payer: MEDICARE

## 2021-11-19 VITALS
HEIGHT: 68 IN | WEIGHT: 168 LBS | SYSTOLIC BLOOD PRESSURE: 110 MMHG | BODY MASS INDEX: 25.46 KG/M2 | DIASTOLIC BLOOD PRESSURE: 71 MMHG | RESPIRATION RATE: 18 BRPM | HEART RATE: 79 BPM | OXYGEN SATURATION: 98 % | TEMPERATURE: 98 F

## 2021-11-19 DIAGNOSIS — J20.8 ACUTE VIRAL BRONCHITIS: Primary | ICD-10-CM

## 2021-11-19 LAB
CTP QC/QA: YES
SARS-COV-2 RDRP RESP QL NAA+PROBE: NEGATIVE

## 2021-11-19 PROCEDURE — 3078F DIAST BP <80 MM HG: CPT | Mod: CPTII,S$GLB,, | Performed by: STUDENT IN AN ORGANIZED HEALTH CARE EDUCATION/TRAINING PROGRAM

## 2021-11-19 PROCEDURE — 3078F PR MOST RECENT DIASTOLIC BLOOD PRESSURE < 80 MM HG: ICD-10-PCS | Mod: CPTII,S$GLB,, | Performed by: STUDENT IN AN ORGANIZED HEALTH CARE EDUCATION/TRAINING PROGRAM

## 2021-11-19 PROCEDURE — 3008F PR BODY MASS INDEX (BMI) DOCUMENTED: ICD-10-PCS | Mod: CPTII,S$GLB,, | Performed by: STUDENT IN AN ORGANIZED HEALTH CARE EDUCATION/TRAINING PROGRAM

## 2021-11-19 PROCEDURE — 99214 PR OFFICE/OUTPT VISIT, EST, LEVL IV, 30-39 MIN: ICD-10-PCS | Mod: S$GLB,,, | Performed by: STUDENT IN AN ORGANIZED HEALTH CARE EDUCATION/TRAINING PROGRAM

## 2021-11-19 PROCEDURE — 1159F MED LIST DOCD IN RCRD: CPT | Mod: CPTII,S$GLB,, | Performed by: STUDENT IN AN ORGANIZED HEALTH CARE EDUCATION/TRAINING PROGRAM

## 2021-11-19 PROCEDURE — 1160F PR REVIEW ALL MEDS BY PRESCRIBER/CLIN PHARMACIST DOCUMENTED: ICD-10-PCS | Mod: CPTII,S$GLB,, | Performed by: STUDENT IN AN ORGANIZED HEALTH CARE EDUCATION/TRAINING PROGRAM

## 2021-11-19 PROCEDURE — U0002 COVID-19 LAB TEST NON-CDC: HCPCS | Mod: QW,S$GLB,, | Performed by: STUDENT IN AN ORGANIZED HEALTH CARE EDUCATION/TRAINING PROGRAM

## 2021-11-19 PROCEDURE — 1160F RVW MEDS BY RX/DR IN RCRD: CPT | Mod: CPTII,S$GLB,, | Performed by: STUDENT IN AN ORGANIZED HEALTH CARE EDUCATION/TRAINING PROGRAM

## 2021-11-19 PROCEDURE — 99214 OFFICE O/P EST MOD 30 MIN: CPT | Mod: S$GLB,,, | Performed by: STUDENT IN AN ORGANIZED HEALTH CARE EDUCATION/TRAINING PROGRAM

## 2021-11-19 PROCEDURE — 3074F SYST BP LT 130 MM HG: CPT | Mod: CPTII,S$GLB,, | Performed by: STUDENT IN AN ORGANIZED HEALTH CARE EDUCATION/TRAINING PROGRAM

## 2021-11-19 PROCEDURE — 1159F PR MEDICATION LIST DOCUMENTED IN MEDICAL RECORD: ICD-10-PCS | Mod: CPTII,S$GLB,, | Performed by: STUDENT IN AN ORGANIZED HEALTH CARE EDUCATION/TRAINING PROGRAM

## 2021-11-19 PROCEDURE — 3008F BODY MASS INDEX DOCD: CPT | Mod: CPTII,S$GLB,, | Performed by: STUDENT IN AN ORGANIZED HEALTH CARE EDUCATION/TRAINING PROGRAM

## 2021-11-19 PROCEDURE — U0002: ICD-10-PCS | Mod: QW,S$GLB,, | Performed by: STUDENT IN AN ORGANIZED HEALTH CARE EDUCATION/TRAINING PROGRAM

## 2021-11-19 PROCEDURE — 3074F PR MOST RECENT SYSTOLIC BLOOD PRESSURE < 130 MM HG: ICD-10-PCS | Mod: CPTII,S$GLB,, | Performed by: STUDENT IN AN ORGANIZED HEALTH CARE EDUCATION/TRAINING PROGRAM

## 2021-11-19 RX ORDER — BENZONATATE 200 MG/1
200 CAPSULE ORAL 3 TIMES DAILY PRN
Qty: 30 CAPSULE | Refills: 0 | Status: SHIPPED | OUTPATIENT
Start: 2021-11-19 | End: 2022-02-15

## 2021-11-19 RX ORDER — IPRATROPIUM BROMIDE 42 UG/1
2 SPRAY, METERED NASAL 3 TIMES DAILY
Qty: 30 ML | Refills: 0 | Status: SHIPPED | OUTPATIENT
Start: 2021-11-19 | End: 2022-02-15

## 2021-11-22 ENCOUNTER — OFFICE VISIT (OUTPATIENT)
Dept: URGENT CARE | Facility: CLINIC | Age: 36
End: 2021-11-22
Payer: MEDICARE

## 2021-11-22 VITALS
BODY MASS INDEX: 25.46 KG/M2 | TEMPERATURE: 99 F | WEIGHT: 168 LBS | HEART RATE: 79 BPM | OXYGEN SATURATION: 98 % | HEIGHT: 68 IN | RESPIRATION RATE: 17 BRPM | SYSTOLIC BLOOD PRESSURE: 100 MMHG | DIASTOLIC BLOOD PRESSURE: 64 MMHG

## 2021-11-22 DIAGNOSIS — R19.8 ANAL DISCHARGE: Primary | ICD-10-CM

## 2021-11-22 PROCEDURE — 99214 PR OFFICE/OUTPT VISIT, EST, LEVL IV, 30-39 MIN: ICD-10-PCS | Mod: S$GLB,,, | Performed by: NURSE PRACTITIONER

## 2021-11-22 PROCEDURE — 74019 RADEX ABDOMEN 2 VIEWS: CPT | Mod: FY,S$GLB,, | Performed by: RADIOLOGY

## 2021-11-22 PROCEDURE — 74019 XR ABDOMEN FLAT AND ERECT: ICD-10-PCS | Mod: FY,S$GLB,, | Performed by: RADIOLOGY

## 2021-11-22 PROCEDURE — 99214 OFFICE O/P EST MOD 30 MIN: CPT | Mod: S$GLB,,, | Performed by: NURSE PRACTITIONER

## 2021-11-22 RX ORDER — CIPROFLOXACIN 500 MG/1
500 TABLET ORAL EVERY 12 HOURS
Qty: 6 TABLET | Refills: 0 | Status: SHIPPED | OUTPATIENT
Start: 2021-11-22 | End: 2021-11-25

## 2021-12-04 ENCOUNTER — HOSPITAL ENCOUNTER (EMERGENCY)
Facility: HOSPITAL | Age: 36
Discharge: HOME | End: 2021-12-04
Attending: EMERGENCY MEDICINE
Payer: MEDICARE

## 2021-12-04 VITALS
SYSTOLIC BLOOD PRESSURE: 129 MMHG | TEMPERATURE: 99 F | HEART RATE: 94 BPM | HEIGHT: 68 IN | BODY MASS INDEX: 25.46 KG/M2 | DIASTOLIC BLOOD PRESSURE: 75 MMHG | OXYGEN SATURATION: 98 % | WEIGHT: 168 LBS | RESPIRATION RATE: 16 BRPM

## 2021-12-04 DIAGNOSIS — R19.5 MUCOUS IN STOOLS: Primary | ICD-10-CM

## 2021-12-04 LAB
B-HCG UR QL: NEGATIVE
CTP QC/QA: YES

## 2021-12-04 PROCEDURE — 99284 EMERGENCY DEPT VISIT MOD MDM: CPT | Mod: ,,, | Performed by: PHYSICIAN ASSISTANT

## 2021-12-04 PROCEDURE — 99282 EMERGENCY DEPT VISIT SF MDM: CPT | Mod: 25

## 2021-12-04 PROCEDURE — 81025 URINE PREGNANCY TEST: CPT | Performed by: PHYSICIAN ASSISTANT

## 2021-12-04 PROCEDURE — 99284 PR EMERGENCY DEPT VISIT,LEVEL IV: ICD-10-PCS | Mod: ,,, | Performed by: PHYSICIAN ASSISTANT

## 2021-12-07 ENCOUNTER — OFFICE VISIT (OUTPATIENT)
Dept: GASTROENTEROLOGY | Facility: CLINIC | Age: 36
End: 2021-12-07
Payer: MEDICARE

## 2021-12-07 ENCOUNTER — PATIENT OUTREACH (OUTPATIENT)
Dept: ADMINISTRATIVE | Facility: OTHER | Age: 36
End: 2021-12-07
Payer: MEDICARE

## 2021-12-07 VITALS — HEIGHT: 68 IN | BODY MASS INDEX: 25.9 KG/M2 | WEIGHT: 170.88 LBS

## 2021-12-07 DIAGNOSIS — K59.04 CHRONIC IDIOPATHIC CONSTIPATION: Primary | ICD-10-CM

## 2021-12-07 PROCEDURE — 99204 OFFICE O/P NEW MOD 45 MIN: CPT | Mod: S$GLB,,, | Performed by: NURSE PRACTITIONER

## 2021-12-07 PROCEDURE — 99204 PR OFFICE/OUTPT VISIT, NEW, LEVL IV, 45-59 MIN: ICD-10-PCS | Mod: S$GLB,,, | Performed by: NURSE PRACTITIONER

## 2021-12-07 PROCEDURE — 99999 PR PBB SHADOW E&M-EST. PATIENT-LVL III: CPT | Mod: PBBFAC,,, | Performed by: NURSE PRACTITIONER

## 2021-12-07 PROCEDURE — 99999 PR PBB SHADOW E&M-EST. PATIENT-LVL III: ICD-10-PCS | Mod: PBBFAC,,, | Performed by: NURSE PRACTITIONER

## 2022-01-13 ENCOUNTER — PATIENT OUTREACH (OUTPATIENT)
Dept: ADMINISTRATIVE | Facility: OTHER | Age: 37
End: 2022-01-13
Payer: MEDICARE

## 2022-01-13 NOTE — PROGRESS NOTES
Health Maintenance Due   Topic Date Due    Pneumococcal Vaccines (Age 0-64) (1 of 2 - PPSV23) Never done    Influenza Vaccine (1) 09/01/2021    COVID-19 Vaccine (3 - Booster for Pfizer series) 12/13/2021     Updates were requested from care everywhere.  Chart was reviewed for overdue Proactive [facility] ([facility]) topics (CRS, Breast Cancer Screening, Eye exam)  Health Maintenance has been updated.  LINKS immunization registry triggered.  Immunizations were reconciled.

## 2022-01-18 ENCOUNTER — PES CALL (OUTPATIENT)
Dept: ADMINISTRATIVE | Facility: CLINIC | Age: 37
End: 2022-01-18
Payer: MEDICARE

## 2022-01-18 ENCOUNTER — OFFICE VISIT (OUTPATIENT)
Dept: GASTROENTEROLOGY | Facility: CLINIC | Age: 37
End: 2022-01-18
Payer: MEDICARE

## 2022-01-18 VITALS — BODY MASS INDEX: 26.4 KG/M2 | WEIGHT: 174.19 LBS | HEIGHT: 68 IN

## 2022-01-18 DIAGNOSIS — K59.04 CHRONIC IDIOPATHIC CONSTIPATION: Primary | ICD-10-CM

## 2022-01-18 PROCEDURE — 99999 PR PBB SHADOW E&M-EST. PATIENT-LVL III: ICD-10-PCS | Mod: PBBFAC,,, | Performed by: NURSE PRACTITIONER

## 2022-01-18 PROCEDURE — 3008F PR BODY MASS INDEX (BMI) DOCUMENTED: ICD-10-PCS | Mod: CPTII,S$GLB,, | Performed by: NURSE PRACTITIONER

## 2022-01-18 PROCEDURE — 3008F BODY MASS INDEX DOCD: CPT | Mod: CPTII,S$GLB,, | Performed by: NURSE PRACTITIONER

## 2022-01-18 PROCEDURE — 99999 PR PBB SHADOW E&M-EST. PATIENT-LVL III: CPT | Mod: PBBFAC,,, | Performed by: NURSE PRACTITIONER

## 2022-01-18 PROCEDURE — 1159F PR MEDICATION LIST DOCUMENTED IN MEDICAL RECORD: ICD-10-PCS | Mod: CPTII,S$GLB,, | Performed by: NURSE PRACTITIONER

## 2022-01-18 PROCEDURE — 1159F MED LIST DOCD IN RCRD: CPT | Mod: CPTII,S$GLB,, | Performed by: NURSE PRACTITIONER

## 2022-01-18 PROCEDURE — 99214 PR OFFICE/OUTPT VISIT, EST, LEVL IV, 30-39 MIN: ICD-10-PCS | Mod: S$GLB,,, | Performed by: NURSE PRACTITIONER

## 2022-01-18 PROCEDURE — 99214 OFFICE O/P EST MOD 30 MIN: CPT | Mod: S$GLB,,, | Performed by: NURSE PRACTITIONER

## 2022-01-18 NOTE — PROGRESS NOTES
GASTROENTEROLOGY CLINIC NOTE    Chief Complaint: The encounter diagnosis was Chronic idiopathic constipation.  Referring provider/PCP: Nellie Coreas MD    HPI:  Vianney Callahan is a 36 y.o. female who is a new patient to me with a PMH that is significant for Abnormal cervical Papanicolaou smear, ADHD (attention deficit hyperactivity disorder), Allergy, Anxiety, Asthma, Back pain, Bipolar affective disorder, Cholecystitis, Chronic migraine without aura, with intractable migraine, so stated, without mention of status migrainosus, Depression, Fever blister, Gallstones, Headache(784.0), Hepatitis C antibody positive in blood, History of hepatitis C, History of psychiatric hospitalization, Personality disorder, Psychosis, Therapy, and Tobacco abuse.  She is here today to establish care for constipation.  This is a new problem that began about two to three weeks ago.  She reports she has one bowel movement every three days that is soft, thin with mucus.  In between bowel movements, she will feel urge to have a bowel movement but will pass either gas or yellow/orange mucus no stool.  She reports increased straining with bowel movements.  Denies diarrhea, nocturnal symptoms, abdominal pain, hematochezia, nausea, vomiting, or fever. She does report starting wellbutrin prior to start of constipation.      Of note, patient sought care at urgent care and at the ER for symptoms.  KUB was obtained at urgent care. Stool noted throughout colon.  She was also given prescription for Cipro which she has completed.  In the ER, MELODY performed and was without any abnormal findings. It was recommended she follow up with GI.      Interval Note 1/18/2021  Vianney Callahan who is known to me presents to clinic for follow up visit regarding constipation.  Following her last visit, Linzess 145mcg was initiated.  She reports that the Linzess is helping with bowel movements but it is causing loose, frequent bowel movements.  One week ago, she  decreased Linzess to every other day and reports it has helped with frequency and consistency of bowel movements but bloating has returned.  Denies straining, abdominal pain, melena, or hematochezia.  No mucus noted in stool.  When taking Linzess daily, bloating was improved.     Prior Upper Endoscopy: 9/2014  Findings: The examined esophagus was normal. The Z-line was regular and was found 40 cm from the incisors. The entire examined stomach was normal. Biopsies were taken with a cold forceps for Helicobacter pylori testing. Estimated blood loss   was minimal. The examined duodenum was normal. Biopsies were taken with a cold   forceps for evaluation of celiac disease. Estimated blood loss was minimal. .    Impression:           - Normal Study. Stomach and duodenum biopsied.     Recommendation: - Discharge patient to home.                         - Resume previous diet.                         - Await pathology results.                         - Continue present medications.   Pathology:  1. DUODENUM, BIOPSY:  Duodenal mucosa with no significant pathologic abnormality  Villous architecture is maintained  2. STOMACH, BIOPSY:  Gastric body with mild chronic inflammation  No evidence of intestinal metaplasia, dysplasia or malignancy  No evidence of Helicobacter pylori organisms on H&E stain    Prior Colonoscopy: No  Family h/o Colon Cancer: No  Family h/o Crohn's Disease or Ulcerative Colitis: No  Abdominal Surgeries: 2014 Cholecystectomy    GI ROS:  Reflux: No  Dysphagia: No   Constipation: No  Diarrhea: No  Rectal bleeding/Melena/hematemesis: No  NSAIDs: No  Anticoagulation or Antiplatelet: No    Review of Systems   Constitutional: Negative for weight loss.   HENT: Negative for sore throat.    Eyes: Negative for blurred vision.   Respiratory: Negative for cough.    Cardiovascular: Negative for chest pain.   Gastrointestinal: Negative for abdominal pain, blood in stool, constipation, diarrhea, heartburn, melena,  nausea and vomiting.   Genitourinary: Negative for dysuria.   Musculoskeletal: Negative for myalgias.   Skin: Negative for rash.   Neurological: Negative for headaches.   Endo/Heme/Allergies: Negative for environmental allergies.   Psychiatric/Behavioral: Negative for suicidal ideas. The patient is not nervous/anxious.        Past Medical History: has a past medical history of Abnormal cervical Papanicolaou smear, ADHD (attention deficit hyperactivity disorder), Allergy, Anxiety, Asthma, Back pain, Bipolar affective disorder, Cholecystitis, Chronic migraine without aura, with intractable migraine, so stated, without mention of status migrainosus, Depression, Fever blister, Gallstones, Headache(784.0), Hepatitis C antibody positive in blood, History of hepatitis C, History of psychiatric hospitalization, Personality disorder, Psychosis, Therapy, and Tobacco abuse.    Past Surgical History: has a past surgical history that includes Fracture surgery; Skin biopsy; Mole removal; and Esophagogastroduodenoscopy.    Family History:family history includes ADD / ADHD in her mother; Acne in her maternal aunt; Alcohol abuse in her father; Anxiety disorder in her paternal aunt; Bipolar disorder in her mother; Depression in her father, maternal aunt, and paternal aunt; Drug abuse in her father; Melanoma in her mother; Migraines in her maternal aunt; No Known Problems in her sister; Schizophrenia in her mother.    Allergies:   Review of patient's allergies indicates:   Allergen Reactions    Dilantin [phenytoin sodium extended] Rash    Saphris [asenapine]      Confusion and depressed mood.       Social History: reports that she has been smoking vaping with nicotine. She has been smoking about 0.50 packs per day. She has never used smokeless tobacco. She reports that she does not drink alcohol and does not use drugs.    Home medications:   Current Outpatient Medications on File Prior to Visit   Medication Sig Dispense Refill     "benzonatate (TESSALON) 200 MG capsule Take 1 capsule (200 mg total) by mouth 3 (three) times daily as needed for Cough. 30 capsule 0    benztropine (COGENTIN) 0.5 MG tablet       buPROPion (WELLBUTRIN XL) 150 MG TB24 tablet TAKE 1 TABLET BY MOUTH DAILY ALONG WITH 300 MG TABLET TO   450MG TOTAL DOSE      buPROPion (WELLBUTRIN XL) 300 MG 24 hr tablet Take 300 mg by mouth once daily.      busPIRone (BUSPAR) 15 MG tablet 3 (three) times daily.      hydrocortisone 2.5 % cream Apply topically 2 (two) times daily. 20 g 0    ipratropium (ATROVENT) 42 mcg (0.06 %) nasal spray 2 sprays by Nasal route 3 (three) times daily. 30 mL 0    mupirocin (BACTROBAN) 2 % ointment Apply to affected area 3 times daily 22 g 1    norethindrone-ethinyl estradiol (MICROGESTIN 1/20) 1-20 mg-mcg per tablet TAKE 1 TABLET BY MOUTH EVERY DAY 63 tablet 1    omeprazole (PRILOSEC) 20 MG capsule TAKE 1 CAPSULE BY MOUTH EVERY DAY 90 capsule 2    OXcarbazepine (TRILEPTAL) 300 MG Tab Take 300 mg by mouth 2 (two) times daily.      paliperidone (INVEGA) 6 MG TR24 Take 6 mg by mouth once daily.      QUEtiapine (SEROQUEL) 50 MG tablet Take 50 mg by mouth nightly.      sertraline (ZOLOFT) 100 MG tablet sertraline 100 mg tablet   TAKE 1 TABLET BY MOUTH EVERY DAY      sumatriptan (IMITREX) 100 MG tablet TAKE 1 TABLET BY MOUTH EVERY DAY AS NEEDED FOR MIGRAINE HEADACHE 30 tablet 11    traMADoL (ULTRAM) 50 mg tablet TAKE 1 TABLET BY MOUTH EVERY 8 HOURS AS NEEDED FOR PAIN 60 tablet 0    tretinoin (RETIN-A) 0.1 % cream Apply small amount to entire face qhs. Wash off qam and apply sunscreen. 45 g 3    valACYclovir (VALTREX) 1000 MG tablet Take 1 tablet (1,000 mg total) by mouth once daily. 90 tablet 3    valACYclovir (VALTREX) 500 MG tablet TAKE 1 TABLET BY MOUTH EVERY DAY 90 tablet 1     No current facility-administered medications on file prior to visit.       Vital signs:  Ht 5' 8" (1.727 m)   Wt 79 kg (174 lb 2.6 oz)   BMI 26.48 kg/m² "     Physical Exam  Vitals reviewed.   Constitutional:       General: She is not in acute distress.     Appearance: Normal appearance. She is not ill-appearing.   HENT:      Head: Normocephalic.   Cardiovascular:      Rate and Rhythm: Normal rate and regular rhythm.      Heart sounds: Normal heart sounds. No murmur heard.      Pulmonary:      Effort: Pulmonary effort is normal. No respiratory distress.      Breath sounds: Normal breath sounds.   Chest:      Chest wall: No tenderness.   Abdominal:      General: Bowel sounds are normal. There is no distension.      Palpations: Abdomen is soft.      Tenderness: There is no abdominal tenderness. Negative signs include Orellana's sign.      Hernia: No hernia is present.   Skin:     General: Skin is warm.   Neurological:      Mental Status: She is alert and oriented to person, place, and time.   Psychiatric:         Mood and Affect: Mood normal.         Behavior: Behavior normal.         Routine labs:  Lab Results   Component Value Date    WBC 11.04 02/03/2021    HGB 14.4 02/03/2021    HCT 43.3 02/03/2021    MCV 90 02/03/2021     (H) 02/03/2021     No results found for: INR  No results found for: IRON, FERRITIN, TIBC, FESATURATED  Lab Results   Component Value Date     02/03/2021    K 4.3 02/03/2021     02/03/2021    CO2 23 02/03/2021    BUN 11 02/03/2021    CREATININE 0.8 02/03/2021     Lab Results   Component Value Date    ALBUMIN 4.2 02/03/2021    ALT 15 02/03/2021    AST 17 02/03/2021    ALKPHOS 122 02/03/2021    BILITOT 0.2 02/03/2021     No results found for: GLUCOSE  Lab Results   Component Value Date    TSH 2.038 05/08/2018     Lab Results   Component Value Date    CALCIUM 9.3 02/03/2021    PHOS 2.8 11/05/2014       Imaging:  XR ABDOMEN FLAT AND ERECT  Narrative: EXAMINATION:  XR ABDOMEN FLAT AND ERECT    CLINICAL HISTORY:  Constipation, unspecified    TECHNIQUE:  Flat and erect AP views of the abdomen were performed.    COMPARISON:  Abdominal  radiograph performed 10/04/2019    FINDINGS:  No definite dilated loops of small large bowel appreciated.  No convincing fluid levels are seen on the upright view.  No free air.  Surgical clips in right upper quadrant of the abdomen may relate to prior cholecystectomy.  Postsurgical changes are noted involving the right proximal femur.  Osseous soft tissue structures overall appear without definite acute finding.  Phleboliths appear to project within the pelvis.  Impression: No definite acute findings identified.    Electronically signed by: Bladimir Kaur  Date:    11/22/2021  Time:    19:02      I have reviewed prior labs, imaging, and notes.      Assessment:  1. Chronic idiopathic constipation        Plan:  Orders Placed This Encounter    linaCLOtide (LINZESS) 72 mcg Cap capsule     Decrease Linzess dose to 72mcg daily.     Increase water intake.    Suspect constipation r/t starting Wellbutrin. According to Up to Date, 8-26% of people experience constipation.  Will trial Linzess first. If no improvement in symptoms, consider colonoscopy.       Plan of care discussed with patient who is in agreement and verbalized understanding.       Follow Up: 6 weeks          PERRI Yap,FNP-BC  [facility] [place]

## 2022-01-21 NOTE — TELEPHONE ENCOUNTER
No new care gaps identified.  Powered by [employer]. Reference number: [id number].   1/21/2022 5:13:33 PM CST

## 2022-01-25 ENCOUNTER — TELEPHONE (OUTPATIENT)
Dept: INTERNAL MEDICINE | Facility: CLINIC | Age: 37
End: 2022-01-25
Payer: MEDICARE

## 2022-01-25 NOTE — TELEPHONE ENCOUNTER
No new care gaps identified.  Powered by [employer]. Reference number: [id number].   1/25/2022 4:07:44 PM CST

## 2022-01-26 RX ORDER — SUMATRIPTAN SUCCINATE 100 MG/1
TABLET ORAL
Qty: 30 TABLET | Refills: 11 | Status: SHIPPED | OUTPATIENT
Start: 2022-01-26 | End: 2022-02-15 | Stop reason: SDUPTHER

## 2022-01-26 RX ORDER — TRAMADOL HYDROCHLORIDE 50 MG/1
50 TABLET ORAL EVERY 8 HOURS PRN
Qty: 60 TABLET | Refills: 0 | OUTPATIENT
Start: 2022-01-26

## 2022-01-27 RX ORDER — SUMATRIPTAN SUCCINATE 100 MG/1
TABLET ORAL
Qty: 12 TABLET | Refills: 29 | OUTPATIENT
Start: 2022-01-27

## 2022-01-27 NOTE — TELEPHONE ENCOUNTER
Sofia DC. Request already responded to by other means (e.g. phone or fax)   Refill Authorization Note   Vianney Callahan  is requesting a refill authorization.  Brief Assessment and Rationale for Refill:  Quick Discontinue  Medication Therapy Plan:       Medication Reconciliation Completed:  No      Comments:   Pended Medication(s)       Requested Prescriptions     Refused Prescriptions Disp Refills    sumatriptan (IMITREX) 100 MG tablet [Pharmacy Med Name: SUMATRIPTAN SUCC 100 MG TABLET] 12 tablet 29     Sig: TAKE 1 TABLET BY MOUTH EVERY DAY AS NEEDED FOR MIGRAINE HEADACHE     Refused By: IGNACIO MERCADO     Reason for Refusal: Request already responded to by other means (e.g. phone or fax)        Duplicate Pended Encounter(s)/ Last Prescribed Details: (includes pharmacy & prescriber details)   Pharmacy    [facility] [id number] [place] [address]   [address] [facility] [address]   Phone:  [phone]  Fax:  [phone]   TONY #:  --   JIMY Reason: --       Outpatient Medication Detail     Disp Refills Start End JIMY   sumatriptan (IMITREX) 100 MG tablet 30 tablet 11 1/26/2022  No   Sig: TAKE 1 TABLET BY MOUTH EVERY DAY AS NEEDED FOR MIGRAINE HEADACHE   Sent to pharmacy as: sumatriptan (IMITREX) 100 MG tablet   Class: Normal   Order: [id number]   Date/Time Signed: 1/26/2022 17:34       E-Prescribing Status: Receipt confirmed by pharmacy (1/26/2022  5:34 PM CST)     Ordering Encounter Report    Associated Reports   View Encounter                Note composed:1:55 PM 01/27/2022

## 2022-02-04 NOTE — TELEPHONE ENCOUNTER
No new care gaps identified.  Powered by [employer]. Reference number: [id number].   2/04/2022 9:16:11 AM CST

## 2022-02-06 ENCOUNTER — PATIENT MESSAGE (OUTPATIENT)
Dept: INTERNAL MEDICINE | Facility: CLINIC | Age: 37
End: 2022-02-06
Payer: MEDICARE

## 2022-02-06 RX ORDER — TRAMADOL HYDROCHLORIDE 50 MG/1
50 TABLET ORAL EVERY 8 HOURS PRN
Qty: 60 TABLET | Refills: 0 | OUTPATIENT
Start: 2022-02-06

## 2022-02-15 ENCOUNTER — OFFICE VISIT (OUTPATIENT)
Dept: INTERNAL MEDICINE | Facility: CLINIC | Age: 37
End: 2022-02-15
Payer: MEDICARE

## 2022-02-15 ENCOUNTER — LAB VISIT (OUTPATIENT)
Dept: LAB | Facility: HOSPITAL | Age: 37
End: 2022-02-15
Attending: INTERNAL MEDICINE
Payer: MEDICARE

## 2022-02-15 VITALS
DIASTOLIC BLOOD PRESSURE: 72 MMHG | HEART RATE: 93 BPM | WEIGHT: 172 LBS | SYSTOLIC BLOOD PRESSURE: 96 MMHG | HEIGHT: 68 IN | BODY MASS INDEX: 26.07 KG/M2 | OXYGEN SATURATION: 96 %

## 2022-02-15 DIAGNOSIS — Z00.00 ANNUAL PHYSICAL EXAM: Primary | ICD-10-CM

## 2022-02-15 DIAGNOSIS — Z79.1 NSAID LONG-TERM USE: ICD-10-CM

## 2022-02-15 DIAGNOSIS — Z13.6 SCREENING FOR CARDIOVASCULAR CONDITION: ICD-10-CM

## 2022-02-15 DIAGNOSIS — F25.0 SCHIZOAFFECTIVE DISORDER, BIPOLAR TYPE: ICD-10-CM

## 2022-02-15 DIAGNOSIS — G89.29 CHRONIC LOW BACK PAIN WITHOUT SCIATICA, UNSPECIFIED BACK PAIN LATERALITY: ICD-10-CM

## 2022-02-15 DIAGNOSIS — G43.809 OTHER MIGRAINE WITHOUT STATUS MIGRAINOSUS, NOT INTRACTABLE: ICD-10-CM

## 2022-02-15 DIAGNOSIS — Z00.00 ANNUAL PHYSICAL EXAM: ICD-10-CM

## 2022-02-15 DIAGNOSIS — M54.50 CHRONIC LOW BACK PAIN WITHOUT SCIATICA, UNSPECIFIED BACK PAIN LATERALITY: ICD-10-CM

## 2022-02-15 LAB
ALBUMIN SERPL BCP-MCNC: 4.2 G/DL (ref 3.5–5.2)
ALP SERPL-CCNC: 98 U/L (ref 55–135)
ALT SERPL W/O P-5'-P-CCNC: 15 U/L (ref 10–44)
ANION GAP SERPL CALC-SCNC: 11 MMOL/L (ref 8–16)
AST SERPL-CCNC: 14 U/L (ref 10–40)
BILIRUB SERPL-MCNC: 0.4 MG/DL (ref 0.1–1)
BUN SERPL-MCNC: 12 MG/DL (ref 6–20)
CALCIUM SERPL-MCNC: 9.8 MG/DL (ref 8.7–10.5)
CHLORIDE SERPL-SCNC: 104 MMOL/L (ref 95–110)
CHOLEST SERPL-MCNC: 225 MG/DL (ref 120–199)
CHOLEST/HDLC SERPL: 4.4 {RATIO} (ref 2–5)
CO2 SERPL-SCNC: 23 MMOL/L (ref 23–29)
CREAT SERPL-MCNC: 0.9 MG/DL (ref 0.5–1.4)
ERYTHROCYTE [DISTWIDTH] IN BLOOD BY AUTOMATED COUNT: 12.7 % (ref 11.5–14.5)
EST. GFR  (AFRICAN AMERICAN): >60 ML/MIN/1.73 M^2
EST. GFR  (NON AFRICAN AMERICAN): >60 ML/MIN/1.73 M^2
GLUCOSE SERPL-MCNC: 83 MG/DL (ref 70–110)
HCT VFR BLD AUTO: 47.2 % (ref 37–48.5)
HDLC SERPL-MCNC: 51 MG/DL (ref 40–75)
HDLC SERPL: 22.7 % (ref 20–50)
HGB BLD-MCNC: 15.1 G/DL (ref 12–16)
LDLC SERPL CALC-MCNC: 135 MG/DL (ref 63–159)
MCH RBC QN AUTO: 29.3 PG (ref 27–31)
MCHC RBC AUTO-ENTMCNC: 32 G/DL (ref 32–36)
MCV RBC AUTO: 92 FL (ref 82–98)
NONHDLC SERPL-MCNC: 174 MG/DL
PLATELET # BLD AUTO: 493 K/UL (ref 150–450)
PMV BLD AUTO: 10.3 FL (ref 9.2–12.9)
POTASSIUM SERPL-SCNC: 4.4 MMOL/L (ref 3.5–5.1)
PROT SERPL-MCNC: 7.4 G/DL (ref 6–8.4)
RBC # BLD AUTO: 5.15 M/UL (ref 4–5.4)
SODIUM SERPL-SCNC: 138 MMOL/L (ref 136–145)
TRIGL SERPL-MCNC: 195 MG/DL (ref 30–150)
WBC # BLD AUTO: 11.09 K/UL (ref 3.9–12.7)

## 2022-02-15 PROCEDURE — 3074F PR MOST RECENT SYSTOLIC BLOOD PRESSURE < 130 MM HG: ICD-10-PCS | Mod: CPTII,S$GLB,, | Performed by: INTERNAL MEDICINE

## 2022-02-15 PROCEDURE — 99214 OFFICE O/P EST MOD 30 MIN: CPT | Mod: S$GLB,,, | Performed by: INTERNAL MEDICINE

## 2022-02-15 PROCEDURE — 3078F PR MOST RECENT DIASTOLIC BLOOD PRESSURE < 80 MM HG: ICD-10-PCS | Mod: CPTII,S$GLB,, | Performed by: INTERNAL MEDICINE

## 2022-02-15 PROCEDURE — 80061 LIPID PANEL: CPT | Performed by: INTERNAL MEDICINE

## 2022-02-15 PROCEDURE — 1159F MED LIST DOCD IN RCRD: CPT | Mod: CPTII,S$GLB,, | Performed by: INTERNAL MEDICINE

## 2022-02-15 PROCEDURE — 1159F PR MEDICATION LIST DOCUMENTED IN MEDICAL RECORD: ICD-10-PCS | Mod: CPTII,S$GLB,, | Performed by: INTERNAL MEDICINE

## 2022-02-15 PROCEDURE — 3078F DIAST BP <80 MM HG: CPT | Mod: CPTII,S$GLB,, | Performed by: INTERNAL MEDICINE

## 2022-02-15 PROCEDURE — 36415 COLL VENOUS BLD VENIPUNCTURE: CPT | Performed by: INTERNAL MEDICINE

## 2022-02-15 PROCEDURE — 1160F PR REVIEW ALL MEDS BY PRESCRIBER/CLIN PHARMACIST DOCUMENTED: ICD-10-PCS | Mod: CPTII,S$GLB,, | Performed by: INTERNAL MEDICINE

## 2022-02-15 PROCEDURE — 1160F RVW MEDS BY RX/DR IN RCRD: CPT | Mod: CPTII,S$GLB,, | Performed by: INTERNAL MEDICINE

## 2022-02-15 PROCEDURE — 99214 PR OFFICE/OUTPT VISIT, EST, LEVL IV, 30-39 MIN: ICD-10-PCS | Mod: S$GLB,,, | Performed by: INTERNAL MEDICINE

## 2022-02-15 PROCEDURE — 99999 PR PBB SHADOW E&M-EST. PATIENT-LVL IV: ICD-10-PCS | Mod: PBBFAC,,, | Performed by: INTERNAL MEDICINE

## 2022-02-15 PROCEDURE — 85027 COMPLETE CBC AUTOMATED: CPT | Performed by: INTERNAL MEDICINE

## 2022-02-15 PROCEDURE — 3008F BODY MASS INDEX DOCD: CPT | Mod: CPTII,S$GLB,, | Performed by: INTERNAL MEDICINE

## 2022-02-15 PROCEDURE — 99999 PR PBB SHADOW E&M-EST. PATIENT-LVL IV: CPT | Mod: PBBFAC,,, | Performed by: INTERNAL MEDICINE

## 2022-02-15 PROCEDURE — 3074F SYST BP LT 130 MM HG: CPT | Mod: CPTII,S$GLB,, | Performed by: INTERNAL MEDICINE

## 2022-02-15 PROCEDURE — 80053 COMPREHEN METABOLIC PANEL: CPT | Performed by: INTERNAL MEDICINE

## 2022-02-15 PROCEDURE — 3008F PR BODY MASS INDEX (BMI) DOCUMENTED: ICD-10-PCS | Mod: CPTII,S$GLB,, | Performed by: INTERNAL MEDICINE

## 2022-02-15 RX ORDER — FLUPHENAZINE HYDROCHLORIDE 5 MG/1
TABLET ORAL
Qty: 1 TABLET | Refills: 0
Start: 2022-02-15 | End: 2023-11-01 | Stop reason: SDUPTHER

## 2022-02-15 RX ORDER — SUMATRIPTAN SUCCINATE 100 MG/1
TABLET ORAL
Qty: 12 TABLET | Refills: 11 | Status: SHIPPED | OUTPATIENT
Start: 2022-02-15 | End: 2023-02-09 | Stop reason: SDUPTHER

## 2022-02-15 NOTE — PROGRESS NOTES
Subjective:       Patient ID: Vianney Callahan is a 36 y.o. female.    Chief Complaint: Annual Exam    HPI   JPHSA NP managing her schizoaffective DO, bipolar type and polydrug depend excluding opiods, in remission.  Last hospitalization over a year ago.  She is doing very well in that regard.    Disabled.   Doesn't handle stress well, as she believes it transfers into psychotic thoughts.  Thus, she doesn't think it is wise to get a job.    Back in gym.  Watching diet.  Trying to lose weight.  BMI 26.    She has chronic back pain, worse after helping sister move.  Ibuprofen helps back- 800 mg bid.  Lost tramadol bottle and requested refill early.   reviewed.  She has been out of tramadol for a few weeks.   Physical therapy not helpful.  Dull, throbbing pain in back.  No leg pain.  Last xray was 12/11:  mild lumbar dextroscoliosis    Migraine headaches.  Gets 12 a month.  Goes through about 5 a months.    Constipation tx with linzess.   Also takes a stool softener/laxative once daily.      Chronic complaint.    C/o nocturia - but manageable.    Vapes daily.  No longer smoking.  Review of Systems   Constitutional: Negative for fever and unexpected weight change.   HENT: Negative for nasal congestion and postnasal drip.    Respiratory: Negative for chest tightness, shortness of breath and wheezing.    Cardiovascular: Negative for chest pain and leg swelling.   Gastrointestinal: Negative for abdominal pain, anal bleeding, constipation, diarrhea, nausea and vomiting.   Genitourinary: Negative for dysuria and urgency.   Integumentary:  Negative for rash.   Neurological: Negative for headaches.   Psychiatric/Behavioral: Negative for dysphoric mood and sleep disturbance. The patient is not nervous/anxious.          Objective:      Physical Exam  Constitutional:       General: She is not in acute distress.     Appearance: She is well-developed and well-nourished.   HENT:      Head: Normocephalic and atraumatic.      Right  Ear: External ear normal.      Left Ear: External ear normal.      Nose: Nose normal.      Mouth/Throat:      Mouth: Oropharynx is clear and moist.   Eyes:      General: No scleral icterus.        Right eye: No discharge.         Left eye: No discharge.      Extraocular Movements: EOM normal.      Conjunctiva/sclera: Conjunctivae normal.      Pupils: Pupils are equal, round, and reactive to light.   Neck:      Thyroid: No thyromegaly.      Vascular: No JVD.   Cardiovascular:      Rate and Rhythm: Normal rate and regular rhythm.      Heart sounds: Normal heart sounds. No murmur heard.  No gallop.    Pulmonary:      Effort: Pulmonary effort is normal. No respiratory distress.      Breath sounds: Normal breath sounds. No wheezing or rales.   Abdominal:      General: Bowel sounds are normal. There is no distension.      Palpations: Abdomen is soft. There is no mass.      Tenderness: There is no abdominal tenderness. There is no guarding or rebound.   Musculoskeletal:         General: No tenderness or edema. Normal range of motion.      Cervical back: Normal range of motion and neck supple.   Lymphadenopathy:      Cervical: No cervical adenopathy.   Skin:     General: Skin is warm and dry.      Findings: No rash.   Neurological:      Mental Status: She is alert and oriented to person, place, and time.      Cranial Nerves: No cranial nerve deficit.      Coordination: Coordination normal.   Psychiatric:         Mood and Affect: Mood and affect normal.         Behavior: Behavior normal.         Thought Content: Thought content normal.         Judgment: Judgment normal.         Assessment:       Problem List Items Addressed This Visit     Schizoaffective disorder, bipolar type    Back pain    Relevant Orders    Ambulatory referral/consult to [facility]      Other Visit Diagnoses     Annual physical exam    -  Primary    Relevant Orders    Comprehensive Metabolic Panel    CBC Without Differential    Lipid Panel     BMI 28.0-28.9,adult        Screening for cardiovascular condition        Relevant Orders    Lipid Panel    NSAID long-term use        Relevant Orders    CBC Without Differential    Other migraine without status migrainosus, not intractable              Plan:       Vianney was seen today for annual exam.    Diagnoses and all orders for this visit:    Annual physical exam  -     Comprehensive Metabolic Panel; Future  -     CBC Without Differential; Future  -     Lipid Panel; Future    Schizoaffective disorder, bipolar type    Chronic low back pain without sciatica, unspecified back pain laterality  -     Ambulatory referral/consult to [facility]; Future    BMI 28.0-28.9,adult    Screening for cardiovascular condition  -     Lipid Panel; Future    NSAID long-term use  -     CBC Without Differential; Future    Other migraine without status migrainosus, not intractable    Other orders  -     fluphenazine (PROLIXIN) 5 MG tablet; 1 tab p qm, 2 tabs po q hs.  -     sumatriptan (IMITREX) 100 MG tablet; TAKE 1 TABLET BY MOUTH EVERY DAY AS NEEDED FOR MIGRAINE HEADACHE         Minimize tramadol use.  We discussed pain management referral in future, if necessary  Exercise regularly.  Weight loss diet reviewed.  Work on core strengthening

## 2022-02-28 ENCOUNTER — PATIENT OUTREACH (OUTPATIENT)
Dept: ADMINISTRATIVE | Facility: OTHER | Age: 37
End: 2022-02-28
Payer: MEDICARE

## 2022-02-28 NOTE — PROGRESS NOTES
Health Maintenance Due   Topic Date Due    Pneumococcal Vaccines (Age 0-64) (1 of 2 - PPSV23) Never done    Influenza Vaccine (1) 09/01/2021    COVID-19 Vaccine (3 - Booster for Pfizer series) 11/13/2021     Updates were requested from care everywhere.  Chart was reviewed for overdue [facility]active [facility] En[facility] ([facility]) topics (CRS, Breast Cancer Screening, Eye exam)  Health Maintenance has been updated.  LINKS immunization registry triggered.  Immunizations were reconciled.

## 2022-03-02 ENCOUNTER — TELEPHONE (OUTPATIENT)
Dept: GASTROENTEROLOGY | Facility: CLINIC | Age: 37
End: 2022-03-02
Payer: MEDICARE
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
## 2022-03-13 NOTE — TELEPHONE ENCOUNTER
No new care gaps identified.  Powered by [employer]. Reference number: [id number].   3/13/2022 7:32:28 AM CDT

## 2022-03-14 RX ORDER — OMEPRAZOLE 20 MG/1
CAPSULE, DELAYED RELEASE ORAL
Qty: 90 CAPSULE | Refills: 2 | Status: SHIPPED | OUTPATIENT
Start: 2022-03-14 | End: 2022-04-28

## 2022-03-14 NOTE — TELEPHONE ENCOUNTER
This Rx Request does not qualify for assessment with the OR   Please review protocol details and the Care Due Message for extra clinical information    Reasons Rx Request may be deferred:  Labs/Vitals overdue  Labs/Vitals abnormal  Patient has been seen in the ED/Hospital since the last PCP visit  Medication is non-delegated  Medication associated diagnosis code is outside of scope  Provider is a non-participating provider  Visit criteria not met (Patient needs to be seen at least every 15 months by PCP)  8.     An appropriate indication is not on the problem list  Note composed:11:37 AM 03/14/2022

## 2022-04-26 ENCOUNTER — PATIENT OUTREACH (OUTPATIENT)
Dept: ADMINISTRATIVE | Facility: OTHER | Age: 37
End: 2022-04-26
Payer: MEDICARE

## 2022-04-26 NOTE — PROGRESS NOTES
Health Maintenance Due   Topic Date Due    Pneumococcal Vaccines (Age 0-64) (1 - PCV) Never done    Influenza Vaccine (1) 09/01/2021    COVID-19 Vaccine (3 - Booster for Pfizer series) 11/13/2021     Updates were requested from care everywhere.  Chart was reviewed for overdue [facility]active [facility] Encounters ([facility]) topics (CRS, Breast Cancer Screening, Eye exam)  Health Maintenance has been updated.  LINKS immunization registry triggered.  Immunizations were reconciled.

## 2022-04-28 ENCOUNTER — OFFICE VISIT (OUTPATIENT)
Dept: GASTROENTEROLOGY | Facility: CLINIC | Age: 37
End: 2022-04-28
Payer: MEDICARE

## 2022-04-28 VITALS — WEIGHT: 169.06 LBS | BODY MASS INDEX: 25.62 KG/M2 | HEIGHT: 68 IN

## 2022-04-28 DIAGNOSIS — K21.9 GASTROESOPHAGEAL REFLUX DISEASE, UNSPECIFIED WHETHER ESOPHAGITIS PRESENT: ICD-10-CM

## 2022-04-28 DIAGNOSIS — K59.04 CHRONIC IDIOPATHIC CONSTIPATION: Primary | ICD-10-CM

## 2022-04-28 PROCEDURE — 99213 OFFICE O/P EST LOW 20 MIN: CPT | Mod: S$GLB,,, | Performed by: NURSE PRACTITIONER

## 2022-04-28 PROCEDURE — 99999 PR PBB SHADOW E&M-EST. PATIENT-LVL III: ICD-10-PCS | Mod: PBBFAC,,, | Performed by: NURSE PRACTITIONER

## 2022-04-28 PROCEDURE — 1159F MED LIST DOCD IN RCRD: CPT | Mod: CPTII,S$GLB,, | Performed by: NURSE PRACTITIONER

## 2022-04-28 PROCEDURE — 1159F PR MEDICATION LIST DOCUMENTED IN MEDICAL RECORD: ICD-10-PCS | Mod: CPTII,S$GLB,, | Performed by: NURSE PRACTITIONER

## 2022-04-28 PROCEDURE — 99999 PR PBB SHADOW E&M-EST. PATIENT-LVL III: CPT | Mod: PBBFAC,,, | Performed by: NURSE PRACTITIONER

## 2022-04-28 PROCEDURE — 3008F BODY MASS INDEX DOCD: CPT | Mod: CPTII,S$GLB,, | Performed by: NURSE PRACTITIONER

## 2022-04-28 PROCEDURE — 99213 PR OFFICE/OUTPT VISIT, EST, LEVL III, 20-29 MIN: ICD-10-PCS | Mod: S$GLB,,, | Performed by: NURSE PRACTITIONER

## 2022-04-28 PROCEDURE — 3008F PR BODY MASS INDEX (BMI) DOCUMENTED: ICD-10-PCS | Mod: CPTII,S$GLB,, | Performed by: NURSE PRACTITIONER

## 2022-04-28 RX ORDER — LAMOTRIGINE 150 MG/1
150 TABLET ORAL DAILY
COMMUNITY
End: 2023-11-03

## 2022-04-28 RX ORDER — SENNOSIDES 8.6 MG/1
1 TABLET ORAL DAILY
Qty: 90 TABLET | Refills: 3 | Status: SHIPPED | OUTPATIENT
Start: 2022-04-28 | End: 2022-07-27

## 2022-04-28 RX ORDER — OMEPRAZOLE 40 MG/1
40 CAPSULE, DELAYED RELEASE ORAL DAILY
Qty: 30 CAPSULE | Refills: 6 | Status: SHIPPED | OUTPATIENT
Start: 2022-04-28 | End: 2022-10-28

## 2022-04-28 NOTE — PROGRESS NOTES
GASTROENTEROLOGY CLINIC NOTE    Chief Complaint: There were no encounter diagnoses.  Referring provider/PCP: Nellie Coreas MD    HPI:  Vianney Callahan is a 36 y.o. female who is a new patient to me with a PMH that is significant for Abnormal cervical Papanicolaou smear, ADHD (attention deficit hyperactivity disorder), Allergy, Anxiety, Asthma, Back pain, Bipolar affective disorder, Cholecystitis, Chronic migraine without aura, with intractable migraine, so stated, without mention of status migrainosus, Depression, Fever blister, Gallstones, Headache(784.0), Hepatitis C antibody positive in blood, History of hepatitis C, History of psychiatric hospitalization, Personality disorder, Psychosis, Therapy, and Tobacco abuse.  She is here today to establish care for constipation.  This is a new problem that began about two to three weeks ago.  She reports she has one bowel movement every three days that is soft, thin with mucus.  In between bowel movements, she will feel urge to have a bowel movement but will pass either gas or yellow/orange mucus no stool.  She reports increased straining with bowel movements.  Denies diarrhea, nocturnal symptoms, abdominal pain, hematochezia, nausea, vomiting, or fever. She does report starting wellbutrin prior to start of constipation.      Of note, patient sought care at urgent care and at the ER for symptoms.  KUB was obtained at urgent care. Stool noted throughout colon.  She was also given prescription for Cipro which she has completed.  In the ER, MELODY performed and was without any abnormal findings. It was recommended she follow up with GI.      Interval Note 1/18/2021  Vianney Callahan who is known to me presents to clinic for follow up visit regarding constipation.  Following her last visit, Linzess 145mcg was initiated.  She reports that the Linzess is helping with bowel movements but it is causing loose, frequent bowel movements.  One week ago, she decreased Linzess to every  other day and reports it has helped with frequency and consistency of bowel movements but bloating has returned.  Denies straining, abdominal pain, melena, or hematochezia.  No mucus noted in stool.  When taking Linzess daily, bloating was improved.     Interval Note 4/28/2022  Vianney Callahan who is known to me present to clinic for follow up regarding constipation and reflux.  Following her last appointment Linzess was decreased to 72mcg.  Constipation returned with decreased dose and bowel movements were occurring every two days accompanied by straining. Consistency was hard.  It was recommended she start Senna with the Linzess.  She reports improvement in bowel movements.  They are not occurring daily and are soft in consistency. No straining, hematochezia, bloating, or abdominal pain.  She is having an increase in reflux and pyrosis.  Currently taking omeprazole 20mg daily.       Prior Upper Endoscopy: 9/2014  Findings: The examined esophagus was normal. The Z-line was regular and was found 40 cm from the incisors. The entire examined stomach was normal. Biopsies were taken with a cold forceps for Helicobacter pylori testing. Estimated blood loss   was minimal. The examined duodenum was normal. Biopsies were taken with a cold   forceps for evaluation of celiac disease. Estimated blood loss was minimal. .    Impression:           - Normal Study. Stomach and duodenum biopsied.     Recommendation: - Discharge patient to home.                         - Resume previous diet.                         - Await pathology results.                         - Continue present medications.   Pathology:  1. DUODENUM, BIOPSY:  Duodenal mucosa with no significant pathologic abnormality  Villous architecture is maintained  2. STOMACH, BIOPSY:  Gastric body with mild chronic inflammation  No evidence of intestinal metaplasia, dysplasia or malignancy  No evidence of Helicobacter pylori organisms on H&E stain    Prior Colonoscopy:  No  Family h/o Colon Cancer: No  Family h/o Crohn's Disease or Ulcerative Colitis: No  Abdominal Surgeries: 2014 Cholecystectomy    GI ROS:  Reflux: No  Dysphagia: No   Constipation: No  Diarrhea: No  Rectal bleeding/Melena/hematemesis: No  NSAIDs: No  Anticoagulation or Antiplatelet: No    Review of Systems   Constitutional: Negative for weight loss.   HENT: Negative for sore throat.    Eyes: Negative for blurred vision.   Respiratory: Negative for cough.    Cardiovascular: Negative for chest pain.   Gastrointestinal: Positive for heartburn. Negative for abdominal pain, blood in stool, constipation, diarrhea, melena, nausea and vomiting.   Genitourinary: Negative for dysuria.   Musculoskeletal: Negative for myalgias.   Skin: Negative for rash.   Neurological: Negative for headaches.   Endo/Heme/Allergies: Negative for environmental allergies.   Psychiatric/Behavioral: Negative for suicidal ideas. The patient is not nervous/anxious.        Past Medical History: has a past medical history of Abnormal cervical Papanicolaou smear, ADHD (attention deficit hyperactivity disorder), Allergy, Anxiety, Asthma, Back pain, Bipolar affective disorder, Cholecystitis, Chronic migraine without aura, with intractable migraine, so stated, without mention of status migrainosus, Depression, Fever blister, Gallstones, Headache(784.0), Hepatitis C antibody positive in blood, History of hepatitis C, History of psychiatric hospitalization, Personality disorder, Psychosis, Therapy, and Tobacco abuse.    Past Surgical History: has a past surgical history that includes Fracture surgery; Skin biopsy; Mole removal; and Esophagogastroduodenoscopy.    Family History:family history includes ADD / ADHD in her mother; Acne in her maternal aunt; Alcohol abuse in her father; Anxiety disorder in her paternal aunt and sister; Bipolar disorder in her mother; Depression in her father, maternal aunt, and paternal aunt; Drug abuse in her father; Melanoma  "in her mother; Migraines in her maternal aunt; Schizophrenia in her mother.    Allergies:   Review of patient's allergies indicates:   Allergen Reactions    Dilantin [phenytoin sodium extended] Rash    Saphris [asenapine]      Confusion and depressed mood.       Social History: reports that she has been smoking vaping with nicotine. She has been smoking about 0.50 packs per day. She has never used smokeless tobacco. She reports that she does not drink alcohol and does not use drugs.    Home medications:   Current Outpatient Medications on File Prior to Visit   Medication Sig Dispense Refill    omeprazole (PRILOSEC) 20 MG capsule TAKE 1 CAPSULE BY MOUTH EVERY DAY 90 capsule 2    benztropine (COGENTIN) 0.5 MG tablet 1 mg.      buPROPion (WELLBUTRIN XL) 300 MG 24 hr tablet Take 300 mg by mouth once daily.      busPIRone (BUSPAR) 15 MG tablet 30 mg 2 (two) times daily.      fluphenazine (PROLIXIN) 5 MG tablet 1 tab p qm, 2 tabs po q hs. 1 tablet 0    hydrocortisone 2.5 % cream Apply topically 2 (two) times daily. 20 g 0    mupirocin (BACTROBAN) 2 % ointment Apply to affected area 3 times daily (Patient not taking: Reported on 2/15/2022) 22 g 1    norethindrone-ethinyl estradiol (MICROGESTIN 1/20) 1-20 mg-mcg per tablet TAKE 1 TABLET BY MOUTH EVERY DAY 63 tablet 0    sertraline (ZOLOFT) 100 MG tablet Pt takes 150mg so one and a half a day      sumatriptan (IMITREX) 100 MG tablet TAKE 1 TABLET BY MOUTH EVERY DAY AS NEEDED FOR MIGRAINE HEADACHE 12 tablet 11    traMADoL (ULTRAM) 50 mg tablet TAKE 1 TABLET BY MOUTH EVERY 8 HOURS AS NEEDED FOR PAIN 60 tablet 0    tretinoin (RETIN-A) 0.1 % cream Apply small amount to entire face qhs. Wash off qam and apply sunscreen. 45 g 3    valACYclovir (VALTREX) 1000 MG tablet Take 1 tablet (1,000 mg total) by mouth once daily. 90 tablet 3     No current facility-administered medications on file prior to visit.       Vital signs:  Ht 5' 8" (1.727 m)   Wt 76.7 kg (169 lb 1.5 " oz)   BMI 25.71 kg/m²     Physical Exam  Vitals reviewed.   Constitutional:       General: She is not in acute distress.     Appearance: Normal appearance. She is not ill-appearing.   HENT:      Head: Normocephalic.   Cardiovascular:      Rate and Rhythm: Normal rate and regular rhythm.      Heart sounds: Normal heart sounds. No murmur heard.  Pulmonary:      Effort: Pulmonary effort is normal. No respiratory distress.      Breath sounds: Normal breath sounds.   Chest:      Chest wall: No tenderness.   Abdominal:      General: Bowel sounds are normal. There is no distension.      Palpations: Abdomen is soft.      Tenderness: There is no abdominal tenderness. Negative signs include Orellana's sign.      Hernia: No hernia is present.   Skin:     General: Skin is warm.   Neurological:      Mental Status: She is alert and oriented to person, place, and time.   Psychiatric:         Mood and Affect: Mood normal.         Behavior: Behavior normal.         Routine labs:  Lab Results   Component Value Date    WBC 11.09 02/15/2022    HGB 15.1 02/15/2022    HCT 47.2 02/15/2022    MCV 92 02/15/2022     (H) 02/15/2022     No results found for: INR  No results found for: IRON, FERRITIN, TIBC, FESATURATED  Lab Results   Component Value Date     02/15/2022    K 4.4 02/15/2022     02/15/2022    CO2 23 02/15/2022    BUN 12 02/15/2022    CREATININE 0.9 02/15/2022     Lab Results   Component Value Date    ALBUMIN 4.2 02/15/2022    ALT 15 02/15/2022    AST 14 02/15/2022    ALKPHOS 98 02/15/2022    BILITOT 0.4 02/15/2022     No results found for: GLUCOSE  Lab Results   Component Value Date    TSH 2.038 05/08/2018     Lab Results   Component Value Date    CALCIUM 9.8 02/15/2022    PHOS 2.8 11/05/2014       Imaging:  XR ABDOMEN FLAT AND ERECT  Narrative: EXAMINATION:  XR ABDOMEN FLAT AND ERECT    CLINICAL HISTORY:  Constipation, unspecified    TECHNIQUE:  Flat and erect AP views of the abdomen were  performed.    COMPARISON:  Abdominal radiograph performed 10/04/2019    FINDINGS:  No definite dilated loops of small large bowel appreciated.  No convincing fluid levels are seen on the upright view.  No free air.  Surgical clips in right upper quadrant of the abdomen may relate to prior cholecystectomy.  Postsurgical changes are noted involving the right proximal femur.  Osseous soft tissue structures overall appear without definite acute finding.  Phleboliths appear to project within the pelvis.  Impression: No definite acute findings identified.    Electronically signed by: Bladimir Kaur  Date:    11/22/2021  Time:    19:02      I have reviewed prior labs, imaging, and notes.      Assessment:  1. Chronic idiopathic constipation    2. Gastroesophageal reflux disease, unspecified whether esophagitis present        Plan:  Orders Placed This Encounter    senna (SENNA) 8.6 mg tablet    omeprazole (PRILOSEC) 40 MG capsule     Continue Linzess 72mcg daily.   Senna   Increase omeprazole to 40mg daily.  Take 30 minutes before first meal of the day.   Continue good water intake.    Suspect constipation r/t starting Wellbutrin. According to Up to Date, 8-26% of people experience constipation.  Will trial Linzess first. If no improvement in symptoms, consider colonoscopy.       Plan of care discussed with patient who is in agreement and verbalized understanding.       Follow Up: As Needed          PERRI Yap,FNP-BC  [facility] [place]

## 2022-06-21 ENCOUNTER — PATIENT MESSAGE (OUTPATIENT)
Dept: GASTROENTEROLOGY | Facility: CLINIC | Age: 37
End: 2022-06-21
Payer: MEDICARE

## 2022-06-21 DIAGNOSIS — K59.04 CHRONIC IDIOPATHIC CONSTIPATION: Primary | ICD-10-CM

## 2022-09-19 ENCOUNTER — PATIENT MESSAGE (OUTPATIENT)
Dept: GASTROENTEROLOGY | Facility: CLINIC | Age: 37
End: 2022-09-19
Payer: MEDICARE

## 2022-09-19 DIAGNOSIS — K58.1 IRRITABLE BOWEL SYNDROME WITH CONSTIPATION: Primary | ICD-10-CM

## 2022-09-20 RX ORDER — LUBIPROSTONE 24 UG/1
24 CAPSULE ORAL 2 TIMES DAILY
Qty: 30 CAPSULE | Refills: 3 | Status: SHIPPED | OUTPATIENT
Start: 2022-09-20 | End: 2022-11-21

## 2022-09-21 ENCOUNTER — NURSE TRIAGE (OUTPATIENT)
Dept: ADMINISTRATIVE | Facility: CLINIC | Age: 37
End: 2022-09-21
Payer: MEDICARE

## 2022-09-22 ENCOUNTER — HOSPITAL ENCOUNTER (EMERGENCY)
Facility: HOSPITAL | Age: 37
Discharge: HOME | End: 2022-09-22
Attending: EMERGENCY MEDICINE
Payer: MEDICARE

## 2022-09-22 VITALS
SYSTOLIC BLOOD PRESSURE: 116 MMHG | OXYGEN SATURATION: 97 % | TEMPERATURE: 98 F | RESPIRATION RATE: 18 BRPM | BODY MASS INDEX: 25.46 KG/M2 | HEIGHT: 68 IN | WEIGHT: 168 LBS | DIASTOLIC BLOOD PRESSURE: 70 MMHG | HEART RATE: 110 BPM

## 2022-09-22 DIAGNOSIS — S39.012A BACK STRAIN, INITIAL ENCOUNTER: Primary | ICD-10-CM

## 2022-09-22 PROCEDURE — 99284 EMERGENCY DEPT VISIT MOD MDM: CPT | Mod: ,,, | Performed by: EMERGENCY MEDICINE

## 2022-09-22 PROCEDURE — 99284 EMERGENCY DEPT VISIT MOD MDM: CPT

## 2022-09-22 PROCEDURE — 25000003 PHARM REV CODE 250: Performed by: PHYSICIAN ASSISTANT

## 2022-09-22 PROCEDURE — 99284 PR EMERGENCY DEPT VISIT,LEVEL IV: ICD-10-PCS | Mod: ,,, | Performed by: EMERGENCY MEDICINE

## 2022-09-22 RX ORDER — IBUPROFEN 600 MG/1
600 TABLET ORAL EVERY 6 HOURS PRN
Qty: 20 TABLET | Refills: 0 | Status: SHIPPED | OUTPATIENT
Start: 2022-09-22 | End: 2022-10-03 | Stop reason: SDUPTHER

## 2022-09-22 RX ORDER — METHOCARBAMOL 500 MG/1
500-1000 TABLET, FILM COATED ORAL 2 TIMES DAILY PRN
Qty: 20 TABLET | Refills: 0 | Status: SHIPPED | OUTPATIENT
Start: 2022-09-22 | End: 2022-09-27

## 2022-09-22 RX ORDER — LIDOCAINE 50 MG/G
1 PATCH TOPICAL DAILY
Qty: 30 PATCH | Refills: 0 | Status: SHIPPED | OUTPATIENT
Start: 2022-09-22 | End: 2023-03-28

## 2022-09-22 RX ORDER — METHOCARBAMOL 500 MG/1
1000 TABLET, FILM COATED ORAL
Status: COMPLETED | OUTPATIENT
Start: 2022-09-22 | End: 2022-09-22

## 2022-09-22 RX ORDER — LIDOCAINE 50 MG/G
1 PATCH TOPICAL
Status: DISCONTINUED | OUTPATIENT
Start: 2022-09-22 | End: 2022-09-22 | Stop reason: HOSPADM

## 2022-09-22 RX ADMIN — LIDOCAINE 1 PATCH: 50 PATCH CUTANEOUS at 10:09

## 2022-09-22 RX ADMIN — METHOCARBAMOL 1000 MG: 500 TABLET ORAL at 10:09

## 2022-09-22 NOTE — ED NOTES
LOC: The patient is awake, alert, and oriented to self, place, time, and situation. Pt is calm and cooperative. Affect is appropriate.  Speech is appropriate and clear.      APPEARANCE: Patient resting uncomfortably, left hip pain radiating to knee,  in no acute distress.  Patient is clean and well groomed.     SKIN: The skin is warm and dry; color consistent with ethnicity.  Patient has normal skin turgor and moist mucus membranes.  Skin intact; no breakdown or bruising noted.      MUSCULOSKELETAL: Patient moving upper and lower extremities without difficulty; reporting pain to lower back and I buttock area     RESPIRATORY: Airway is open and patent. Respirations spontaneous, even, easy, and non-labored.  Patient has a normal effort and rate.  No accessory muscle use noted. Denies cough.      CARDIAC:   No peripheral edema noted. No complaints of chest pain.       ABDOMEN: Soft and non tender to palpation.  No distention noted. Pt denies abdominal pain; denies nausea, vomiting, diarrhea, or constipation.     NEUROLOGIC: Eyes open spontaneously.  Behavior appropriate to situation.  Follows commands; facial expression symmetrical.  Purposeful motor response noted; normal sensation in all extremities. Pt denies headache; denies lightheadedness or dizziness; denies visual disturbances; denies loss of balance; denies unilateral weakness.

## 2022-09-22 NOTE — TELEPHONE ENCOUNTER
Pt c/o lower pain pain shooting to right buttock 9/10. Stated that she can barely walk. Pt also having trouble urinating. Stated that she has to push really hard to urinate when bladder feels full. Care advice to go to ED per protocol. Verbalized understanding. Encounter routed to provider.     Reason for Disposition   [1] Unable to urinate (or only a few drops) > 4 hours AND [2] bladder feels very full (e.g., palpable bladder or strong urge to urinate)    Additional Information   Negative: Passed out (i.e., lost consciousness, collapsed and was not responding)   Negative: Shock suspected (e.g., cold/pale/clammy skin, too weak to stand, low BP, rapid pulse)   Negative: Sounds like a life-threatening emergency to the triager   Negative: [1] SEVERE back pain (e.g., excruciating) AND [2] sudden onset AND [3] age > 60 years    Protocols used: Back Pain-A-AH

## 2022-09-22 NOTE — ED NOTES
Reports buttock pain/ right lower backpain. States she has been having to strain to get urine out but denies any dysuria or foul odor. Denies blood in urine.

## 2022-09-22 NOTE — ED PROVIDER NOTES
Encounter Date: 9/22/2022       History     Chief Complaint   Patient presents with    Back Pain     R lower back      This is a 37 y.o. year old female with a PMH of asthma, migraine headaches, psychiatric illness who presents to the ED with a chief complaint of back pain. Patient reports a 3 day history of right sided low back and buttock pain after starting a new workout regimen she found on [employer]. The pain is stabbing/shooting and radiates to the right buttock. Patient rates the pain 10/10. She denies trauma, fever, chills, chest pain, shortness of breath, nausea, abdominal pain, joint swelling, rashes, focal weakness or numbness. She tried Advil without improvement.        Review of patient's allergies indicates:   Allergen Reactions    Dilantin [phenytoin sodium extended] Rash    Saphris [asenapine]      Confusion and depressed mood.     Past Medical History:   Diagnosis Date    Abnormal cervical Papanicolaou smear 2012    Colposcopy    ADHD (attention deficit hyperactivity disorder) 8/16/2012    Allergy     Anxiety     Asthma     childhood    Back pain 5/19/2014    Bipolar affective disorder     Cholecystitis     Chronic migraine without aura, with intractable migraine, so stated, without mention of status migrainosus 9/24/2013    Depression     Fever blister     Gallstones 5/26/2014    Headache(784.0)     Hepatitis C antibody positive in blood     History of hepatitis C     History of psychiatric hospitalization     Suicide attempt    Personality disorder 4/11/2013    Psychosis 10/7/2013    Therapy     Tobacco abuse 9/24/2013     Past Surgical History:   Procedure Laterality Date    ESOPHAGOGASTRODUODENOSCOPY      FRACTURE SURGERY      MOLE REMOVAL      SKIN BIOPSY       Family History   Problem Relation Age of Onset    Melanoma Mother     ADD / ADHD Mother     Bipolar disorder Mother     Schizophrenia Mother     Alcohol abuse Father     Drug abuse Father     Depression Father     Anxiety disorder Sister      Acne Maternal Aunt     Depression Maternal Aunt     Migraines Maternal Aunt     Anxiety disorder Paternal Aunt     Depression Paternal Aunt     Breast cancer Neg Hx     Colon cancer Neg Hx     Ovarian cancer Neg Hx      Social History     Tobacco Use    Smoking status: Some Days     Packs/day: 0.50     Types: Vaping with nicotine, Cigarettes    Smokeless tobacco: Never   Substance Use Topics    Alcohol use: No     Alcohol/week: 0.0 standard drinks    Drug use: No     Review of Systems   Constitutional:  Negative for chills and fever.   Respiratory:  Negative for shortness of breath.    Cardiovascular:  Negative for chest pain.   Gastrointestinal:  Negative for nausea and vomiting.   Genitourinary:  Negative for dysuria.   Musculoskeletal:  Positive for back pain.   Skin:  Negative for rash.   Neurological:  Negative for weakness and numbness.   Hematological:  Does not bruise/bleed easily.     Physical Exam     Initial Vitals [09/22/22 0905]   BP Pulse Resp Temp SpO2   116/70 110 18 98.2 °F (36.8 °C) 97 %      MAP       --         Physical Exam    Constitutional: She appears well-developed and well-nourished.   HENT:   Head: Atraumatic.   Eyes: Conjunctivae and EOM are normal. Pupils are equal, round, and reactive to light.   Cardiovascular:  Normal rate, regular rhythm and normal heart sounds.           Pulmonary/Chest: Breath sounds normal. No respiratory distress. She has no wheezes. She has no rhonchi. She has no rales.   Abdominal: Abdomen is soft. Bowel sounds are normal. There is no abdominal tenderness.   Musculoskeletal:      Lumbar back: Tenderness present. No bony tenderness.        Back:      Neurological: She is alert and oriented to person, place, and time. She has normal strength and normal reflexes. No sensory deficit.   Skin: Skin is warm and dry. No rash noted.       ED Course   Procedures  Labs Reviewed - No data to display         Imaging Results    None          Medications   methocarbamoL  tablet 1,000 mg (1,000 mg Oral Given 9/22/22 1013)           APC / Resident Notes:   37 year old female presenting with back pain. History and exam are consistent with muscle strain/radicular pain. There is no bony tenderness or history of trauma to indicated fracture. No additional red flags in history to necessitate imaging. Discussed self limited nature of back pain and recommended supportive care measures, trial of NSAIDs. Activity modification, she would likely benefit from PT. She is stable for discharge.                   Clinical Impression:   Final diagnoses:  [S39.012A] Back strain, initial encounter (Primary)      ED Disposition Condition    Discharge Stable          ED Prescriptions       Medication Sig Dispense Start Date End Date Auth. Provider    methocarbamoL (ROBAXIN) 500 MG Tab Take 1-2 tablets (500-1,000 mg total) by mouth 2 (two) times daily as needed (pain/muscle spasm). 20 tablet 9/22/2022 9/27/2022 Angelica Rivera PA-C    ibuprofen (ADVIL,MOTRIN) 600 MG tablet Take 1 tablet (600 mg total) by mouth every 6 (six) hours as needed for Pain. 20 tablet 9/22/2022 -- Angelica Rivera PA-C    LIDOcaine (LIDODERM) 5 % Place 1 patch onto the skin once daily. Remove & Discard patch within 12 hours or as directed by MD 30 patch 9/22/2022 -- Angelica Rivera PA-C          Follow-up Information       Follow up With Specialties Details Why Contact Info    Nellie Coreas MD Internal Medicine Schedule an appointment as soon as possible for a visit   [id number]  [place] [address]  [phone]               Angelica Rivera PA-C  09/22/22 9328

## 2022-09-23 ENCOUNTER — PATIENT MESSAGE (OUTPATIENT)
Dept: GASTROENTEROLOGY | Facility: CLINIC | Age: 37
End: 2022-09-23
Payer: MEDICARE

## 2022-10-01 ENCOUNTER — PATIENT MESSAGE (OUTPATIENT)
Dept: INTERNAL MEDICINE | Facility: CLINIC | Age: 37
End: 2022-10-01
Payer: MEDICARE

## 2022-10-01 DIAGNOSIS — G89.29 CHRONIC LOW BACK PAIN WITH SCIATICA, SCIATICA LATERALITY UNSPECIFIED, UNSPECIFIED BACK PAIN LATERALITY: Primary | ICD-10-CM

## 2022-10-01 DIAGNOSIS — M54.40 CHRONIC LOW BACK PAIN WITH SCIATICA, SCIATICA LATERALITY UNSPECIFIED, UNSPECIFIED BACK PAIN LATERALITY: Primary | ICD-10-CM

## 2022-10-03 ENCOUNTER — PATIENT MESSAGE (OUTPATIENT)
Dept: INTERNAL MEDICINE | Facility: CLINIC | Age: 37
End: 2022-10-03
Payer: MEDICARE

## 2022-10-03 RX ORDER — IBUPROFEN 600 MG/1
600 TABLET ORAL EVERY 8 HOURS PRN
Qty: 90 TABLET | Refills: 1 | Status: SHIPPED | OUTPATIENT
Start: 2022-10-03 | End: 2022-11-30

## 2022-10-03 RX ORDER — METHOCARBAMOL 500 MG/1
TABLET, FILM COATED ORAL
Qty: 60 TABLET | Refills: 1 | Status: ON HOLD | OUTPATIENT
Start: 2022-10-03 | End: 2023-10-04 | Stop reason: SDUPTHER

## 2022-10-05 ENCOUNTER — PATIENT MESSAGE (OUTPATIENT)
Dept: GASTROENTEROLOGY | Facility: CLINIC | Age: 37
End: 2022-10-05
Payer: MEDICARE

## 2022-11-09 ENCOUNTER — CLINICAL SUPPORT (OUTPATIENT)
Dept: REHABILITATION | Facility: OTHER | Age: 37
End: 2022-11-09
Attending: INTERNAL MEDICINE
Payer: MEDICARE

## 2022-11-09 DIAGNOSIS — G89.29 CHRONIC LOW BACK PAIN WITH SCIATICA, SCIATICA LATERALITY UNSPECIFIED, UNSPECIFIED BACK PAIN LATERALITY: ICD-10-CM

## 2022-11-09 DIAGNOSIS — M54.40 CHRONIC LOW BACK PAIN WITH SCIATICA, SCIATICA LATERALITY UNSPECIFIED, UNSPECIFIED BACK PAIN LATERALITY: ICD-10-CM

## 2022-11-09 DIAGNOSIS — G89.29 CHRONIC BILATERAL LOW BACK PAIN WITHOUT SCIATICA: ICD-10-CM

## 2022-11-09 DIAGNOSIS — M54.50 CHRONIC BILATERAL LOW BACK PAIN WITHOUT SCIATICA: ICD-10-CM

## 2022-11-09 DIAGNOSIS — R29.898 DECREASED STRENGTH OF TRUNK AND BACK: ICD-10-CM

## 2022-11-09 PROCEDURE — 97110 THERAPEUTIC EXERCISES: CPT

## 2022-11-09 PROCEDURE — 97161 PT EVAL LOW COMPLEX 20 MIN: CPT

## 2022-11-09 NOTE — PROGRESS NOTES
See plan of care for evaluation and treatment.     Thank you for the referral of this patient.
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
## 2022-11-13 ENCOUNTER — PATIENT MESSAGE (OUTPATIENT)
Dept: GASTROENTEROLOGY | Facility: CLINIC | Age: 37
End: 2022-11-13
Payer: MEDICARE

## 2022-11-28 NOTE — PROGRESS NOTES
[facility] Physical Therapy Treatment      Name: Vianney Callahan  Clinic Number: [id number]    Therapy Diagnosis:   Encounter Diagnoses   Name Primary?    Decreased strength of trunk and back Yes    Chronic bilateral low back pain without sciatica      Physician: Nellie Coreas MD    Visit Date: 2022    Physician Orders: PT Eval and Treat   Medical Diagnosis from Referral: M54.40,G89.29 (ICD-10-CM) - Chronic low back pain with sciatica, sciatica laterality unspecified, unspecified back pain laterality   Evaluation Date: 2022  Authorization Period Expiration: 2022   Plan of Care Expiration: 2023  Reassessment Due: 2022  Visit # / Visits authorized: 2/     Time In: 10:30 am  Time Out: 11:25 am  Total Billable Time: 55 minutes  Insurance:Fee for service Insurance Patient     Precautions: Standard     Pattern of pain determined: 1 no movement preference    Subjective   Vianney reports chronic lower back discomfort/stiffness with occasional shooting pain to her buttock area R>L.  She reports minimal residual soreness after the eval. Reports compliance with HEP without c/o. States that she did taking pain meds yesterday.    Patient reports tolerating previous visit Min soreness from eval.  Patient reports their pain to be 3-4/10 on a 0-10 scale with 0 being no pain and 10 being the worst pain imaginable.  Pain Location: Low back bilaterally, but mainly in the R side     Work and leisure: Not working, plans on working at [employer] at the beginning of the year/Watching [employer], read, short walks around the [place]. (Tries to get out 7 days a week about 10)   Pt goals: To strengthen the back     Objective     Baseline Isometric Testing on Med X equipment: Testing administered by PT  Date of testin2022  ROM 0-54 deg   Max Peak Torque 133    Min Peak Torque 57    Flex/Ext Ratio 2.33:1   % below normative data 35%     Outcomes:  Initial score: 10%  Visit 5  "score:  Goal:      Treatment    Pt was instructed in and performed the following:     Vianney received therapeutic exercises to develop/improved posture, cardiovascular endurance, muscular endurance, lumbar/cervical ROM, strength and muscular endurance for 55 minutes including the following exercises:     LTR x 10  +Piriformis stretch 3 x 20"  QL stretch 5 x 10sec  Posterior pelvic tilt x 10   Ex's added this visit per Eval recs/HEP  + Pelvic tilt with march x 10  + Bridges x 10  +Quadruped hip ext x 5 each (cues for level pelvis)    [facility] - Short 11/30/2022   Visit Number 2   VAS Pain Rating 3   Treadmill Time (in min.) 5   Lumbar Stretches - Slouch -   Extension in Lying -   Extension in Standing -   Flexion in Lying 3   Lumbar Extension - Seat Pad -   Femur Restraint -   Top Dead Center -   Counterweight -   Lumbar Flexion -   Lumbar Extension -   Lumbar Peak Torque -   Lumbar Weight 60   Repetitions 16   Rating of Perceived Exertion 4        Peripheral muscle strengthening which included 1 set of 15-20 repetitions at a slow, controlled 10-13 second per rep pace focused on strengthening supporting musculature for improved body mechanics and functional mobility.  Pt and therapist focused on proper form during treatment to ensure optimal strengthening of each targeted muscle group.  Machines were utilized including torso rotation, leg extension, leg curl, chest press, upright row. Tricep extension, bicep curl, leg press, and hip abduction added visit 3    Vianney received the following manual therapy techniques: NP      Home Exercises Provided and Patient Education Provided   Home exercises include:LTR, QL stretch, PPT, PPT w/marching, Quadruped Hip ext  Cardio program:visit 5  Lifting education date:visit 11  Posture/Lumbar roll: not received yet    Education provided:   - Cues w/ex's  - Pacing w/MedX    Written Home Exercises Provided: Patient instructed to cont prior HEP.  Exercises were reviewed and Vianney " was able to demonstrate them prior to the end of the session.  Vianney demonstrated good  understanding of the education provided.     See EMR under Patient Instructions for exercises provided prior visit.      Assessment   Vianney returns for her [facility]nd [facility] visit with chronic report of lower back discomfort/stiffness. Treatment consisted of a review and performance of HEP provided on the eval along with additional ex's to include: piriformis stretch, bridging and PPT w/ marching, Quadruped Hip ext (cues for level pelvis). She was able all without increased pain but minimal cues required for correct performance. Lumbar resistance was initiated at 60 ft/lbs (< 50% Max Peak Torque due to increased flex/ext ratio during IM testing at eval) and she completed 16 reps with a RPE = 4/10. She did require moderate cues initially for pacing and extension hold on the MedX. Pt was also able to complete half of the peripheral strengthening exercises without increased discomfort and will plan to complete the full circuit next visit as tolerated.    Patient is making progress towards established goals.  Pt will continue to benefit from skilled outpatient physical therapy to address the deficits stated in the impairment chart, provide pt/family education and to maximize pt's level of independence in the home and community environment.     Anticipated Barriers for therapy: anxiety  Pt's spiritual, cultural and educational needs considered and pt agreeable to plan of care and goals as stated below:     GOALS: Pt is in agreement with the following goals.     Short term goals:  6 weeks or 10 visits   1.  Pt will demonstrate increased lumbar ROM by at least 3 degrees from the initial ROM value with improvements noted in functional ROM and ability to perform ADLs.  (approp and ongoing)  2.  Pt will demonstrate increased MedX average isometric strength value  by 15% from initial test resulting in improved ability to perform bending,  lifting, and carrying activities safely, confidently.  (approp and ongoing)  3.  Patient report a reduction in worst pain score by 1-2 points for improved tolerance for static standing.  (approp and ongoing)  4.  Pt able to perform HEP correctly with minimal cueing or supervision from therapist to encourage independent management of symptoms. (approp and ongoing)      Long term goals: 10 weeks or 20 visits   1. Pt will demonstrate increased lumbar ROM by at least 6 degrees from initial ROM value, resulting in improved ability to perform functional fwd bending while standing and sitting. (approp and ongoing)  2. Pt will demonstrate increased MedX average isometric strength value  by 30% from initial test resulting in improved ability to perform bending, lifting, and carrying activities safely, confidently.  (approp and ongoing)  3. Pt to demonstrate ability to independently control and reduce their pain through posture positioning and mechanical movements throughout a typical day.  (approp and ongoing)  4.  Pt will demonstrate reduced pain and improved functional outcomes as reported on the Oswestry Disability Index by reaching a score of 2-4 or less in order to demonstrate subjective improvement in pt's condition.    (approp and ongoing)  5. Pt will demonstrate independence with the HEP at discharge  (approp and ongoing)  6.  Pt will be able to clean her home without increase in low back pain(patient goal)  (approp and ongoing)    Plan   Continue with established Plan of Care towards established PT goals.     Therapist: Kishore Aguilar, PTA  11/28/2022

## 2022-11-30 ENCOUNTER — CLINICAL SUPPORT (OUTPATIENT)
Dept: REHABILITATION | Facility: OTHER | Age: 37
End: 2022-11-30
Attending: INTERNAL MEDICINE
Payer: MEDICARE

## 2022-11-30 DIAGNOSIS — R29.898 DECREASED STRENGTH OF TRUNK AND BACK: Primary | ICD-10-CM

## 2022-11-30 DIAGNOSIS — M54.50 CHRONIC BILATERAL LOW BACK PAIN WITHOUT SCIATICA: ICD-10-CM

## 2022-11-30 DIAGNOSIS — G89.29 CHRONIC BILATERAL LOW BACK PAIN WITHOUT SCIATICA: ICD-10-CM

## 2022-11-30 PROCEDURE — 97110 THERAPEUTIC EXERCISES: CPT | Mod: CQ

## 2022-12-02 ENCOUNTER — CLINICAL SUPPORT (OUTPATIENT)
Dept: REHABILITATION | Facility: OTHER | Age: 37
End: 2022-12-02
Attending: INTERNAL MEDICINE
Payer: MEDICARE

## 2022-12-02 DIAGNOSIS — R29.898 DECREASED STRENGTH OF TRUNK AND BACK: Primary | ICD-10-CM

## 2022-12-02 DIAGNOSIS — G89.29 CHRONIC BILATERAL LOW BACK PAIN WITHOUT SCIATICA: ICD-10-CM

## 2022-12-02 DIAGNOSIS — M54.50 CHRONIC BILATERAL LOW BACK PAIN WITHOUT SCIATICA: ICD-10-CM

## 2022-12-02 PROCEDURE — 97110 THERAPEUTIC EXERCISES: CPT

## 2022-12-02 NOTE — PROGRESS NOTES
[facility]      Name: Vianney Callahan  Clinic Number: [id number]    Therapy Diagnosis:   Encounter Diagnoses   Name Primary?    Decreased strength of trunk and back Yes    Chronic bilateral low back pain without sciatica        Physician: Nellie Coreas MD    Visit Date: 2022    Physician Orders: PT Eval and Treat   Medical Diagnosis from Referral: M54.40,G89.29 (ICD-10-CM) - Chronic low back pain with sciatica, sciatica laterality unspecified, unspecified back pain laterality   Evaluation Date: 2022  Authorization Period Expiration: 2022   Plan of Care Expiration: 2023  Reassessment Due: 2022  Visit # / Visits authorized: 3 /20     Time In: 1:00 pm  Time Out: 2:00 pm   Total Billable Time: 60 minutes  Insurance:Fee for service Insurance Patient     Precautions: Standard     Pattern of pain determined: 1 no movement preference    Subjective   Vianney reports no sig changes in sx presentation since last visit.  Patient report HEP compliance but does not do them on days attending the gym.  Patient report 2 x wk gym visits that include group exercises like Chayo or Body Pump.  Patient report occasionally discontinuing a class due to LBP but intends to keep attending during HB.         Patient reports tolerating previous visit well /c no c/o of excess discomfort   Patient reports their pain to be 3/10 on a 0-10 scale with 0 being no pain and 10 being the worst pain imaginable.  Pain Location: Low back bilaterally, but mainly in the R side     Work and leisure: Not working, plans on working at [employer] at the beginning of the year/Watching [employer], read, short walks around the [place]. (Tries to get out 7 days a week about 10)   Pt goals: To strengthen the back     Objective     Baseline Isometric Testing on Med X equipment: Testing administered by PT  Date of testin2022  ROM 0-54 deg   Max Peak Torque 133    Min Peak Torque 57    Flex/Ext Ratio  "2.33:1   % below normative data 35%     Outcomes:  Initial score: 10%  Visit 6 score:  Goal:      Treatment    Pt was instructed in and performed the following:     Vianney received therapeutic exercises to develop/improved posture, cardiovascular endurance, muscular endurance, lumbar/cervical ROM, strength and muscular endurance for 60 minutes including the following exercises:     LTR x 10  Piriformis stretch 3 x 20"  QL stretch 3 x 10 sec cue for starting position   Prone press ups x 10     Posterior pelvic tilt x 10 cue for dec valsalva  PPT /c Bridges x 10  PPT /c march x 10 cue for valsalva    NP:   Quadruped   Hip ext x 5 each (cues for level pelvis)      [facility] 12/2/2022   Visit Number 3   VAS Pain Rating 3   Treadmill Time (in min.) 5   Lumbar Stretches - Slouch Overcorrection -   Extension in Lying 10   Extension in Standing -   Flexion in Lying -   Lumbar Extension Seat Pad -   Femur Restraint -   Top Dead Center -   Counterweight -   Lumbar Flexion -   Lumbar Extension -   Lumbar Peak Torque -   Min Torque -   Test Percent Below Normative Data -   Lumbar Weight 60   Repetitions 18   Rating of Perceived Exertion 5   Ice - Z Lie (in min.) 5          Peripheral muscle strengthening which included 1 set of 15-20 repetitions at a slow, controlled 10-13 second per rep pace focused on strengthening supporting musculature for improved body mechanics and functional mobility.  Pt and therapist focused on proper form during treatment to ensure optimal strengthening of each targeted muscle group.  Machines were utilized including torso rotation, leg extension, leg curl, chest press, upright row. Tricep extension, bicep curl, leg press, and hip abduction added visit 3    Vianney received the following manual therapy techniques: NP      Home Exercises Provided and Patient Education Provided   Home exercises include:  LTR,   QL stretch,   PPT,   PPT w/marching,   Quadruped Hip ext    Cardio program:visit " 5  Lifting education date:visit 11  Posture/Lumbar roll: not obtained as of 12/02/22     Education provided:       Written Home Exercises Provided: Patient instructed to cont prior HEP.  Exercises were reviewed and Vianney was able to demonstrate them prior to the end of the session.  Vianney demonstrated good  understanding of the education provided.     See EMR under Patient Instructions for exercises provided prior visit.      Assessment     Patient subjective report indicate good motivation /c consistent gym visits.  Considering patient note some LBP /c classes, patient advised to alert PT of inc sx /c specific exercises or notes of improved exercise tolerance.  Patient need cue /c PPT indicating core motor control deficit.  Patient advised to focus HEP on core engagement.  Lumbar MedX weight maintained per pt tolerance last visit.   Today pt perform 18 reps at 5 RPE indicating appropriateness of present resistance for strength challenge.  Plan to progress reps next visit.    Patient complete full complement of MedX peripheral exercises /s issue.  Progress per HB protocol.     Patient is making progress towards established goals.  Pt will continue to benefit from skilled outpatient physical therapy to address the deficits stated in the impairment chart, provide pt/family education and to maximize pt's level of independence in the home and community environment.     Anticipated Barriers for therapy: anxiety  Pt's spiritual, cultural and educational needs considered and pt agreeable to plan of care and goals as stated below:     GOALS: Pt is in agreement with the following goals.     Short term goals:  6 weeks or 10 visits   1.  Pt will demonstrate increased lumbar ROM by at least 3 degrees from the initial ROM value with improvements noted in functional ROM and ability to perform ADLs.  (approp and ongoing)  2.  Pt will demonstrate increased MedX average isometric strength value  by 15% from initial test resulting in  improved ability to perform bending, lifting, and carrying activities safely, confidently.  (approp and ongoing)  3.  Patient report a reduction in worst pain score by 1-2 points for improved tolerance for static standing.  (approp and ongoing)  4.  Pt able to perform HEP correctly with minimal cueing or supervision from therapist to encourage independent management of symptoms. (approp and ongoing)      Long term goals: 10 weeks or 20 visits   1. Pt will demonstrate increased lumbar ROM by at least 6 degrees from initial ROM value, resulting in improved ability to perform functional fwd bending while standing and sitting. (approp and ongoing)  2. Pt will demonstrate increased MedX average isometric strength value  by 30% from initial test resulting in improved ability to perform bending, lifting, and carrying activities safely, confidently.  (approp and ongoing)  3. Pt to demonstrate ability to independently control and reduce their pain through posture positioning and mechanical movements throughout a typical day.  (approp and ongoing)  4.  Pt will demonstrate reduced pain and improved functional outcomes as reported on the Oswestry Disability Index by reaching a score of 2-4 or less in order to demonstrate subjective improvement in pt's condition.    (approp and ongoing)  5. Pt will demonstrate independence with the HEP at discharge  (approp and ongoing)  6.  Pt will be able to clean her home without increase in low back pain(patient goal)  (approp and ongoing)    Plan   Continue with established Plan of Care towards established PT goals.     Therapist: David Schaffer, PT  12/2/2022

## 2022-12-07 ENCOUNTER — CLINICAL SUPPORT (OUTPATIENT)
Dept: REHABILITATION | Facility: OTHER | Age: 37
End: 2022-12-07
Attending: INTERNAL MEDICINE
Payer: MEDICARE

## 2022-12-07 DIAGNOSIS — R29.898 DECREASED STRENGTH OF TRUNK AND BACK: Primary | ICD-10-CM

## 2022-12-07 DIAGNOSIS — G89.29 CHRONIC BILATERAL LOW BACK PAIN WITHOUT SCIATICA: ICD-10-CM

## 2022-12-07 DIAGNOSIS — M54.50 CHRONIC BILATERAL LOW BACK PAIN WITHOUT SCIATICA: ICD-10-CM

## 2022-12-07 PROCEDURE — 97530 THERAPEUTIC ACTIVITIES: CPT | Mod: CQ

## 2022-12-07 PROCEDURE — 97110 THERAPEUTIC EXERCISES: CPT | Mod: CQ

## 2022-12-07 NOTE — PROGRESS NOTES
[facility]      Name: Vianney Callahan  Clinic Number: [id number]    Therapy Diagnosis:   No diagnosis found.      Physician: Nellie Coreas MD    Visit Date: 2022    Physician Orders: PT Eval and Treat   Medical Diagnosis from Referral: M54.40,G89.29 (ICD-10-CM) - Chronic low back pain with sciatica, sciatica laterality unspecified, unspecified back pain laterality   Evaluation Date: 2022  Authorization Period Expiration: 2022   Plan of Care Expiration: 2023  Reassessment Due: 2022  Visit # / Visits authorized:      Time In: *** pm  Time Out: *** pm   Total Billable Time: 60 minutes  Insurance:Fee for service Insurance Patient     Precautions: Standard     Pattern of pain determined: 1 no movement preference    Subjective   Vianney reports *** no sig changes in sx presentation since last visit.  Patient report HEP compliance but does not do them on days attending the gym.  Patient report 2 x wk gym visits that include group exercises like Chayo or Body Pump.  Patient report occasionally discontinuing a class due to LBP but intends to keep attending during HB.         Patient reports tolerating previous visit well /c no c/o of excess discomfort   Patient reports their pain to be 3/10 on a 0-10 scale with 0 being no pain and 10 being the worst pain imaginable.  Pain Location: Low back bilaterally, but mainly in the R side     Work and leisure: Not working, plans on working at [employer] at the beginning of the year/Watching [email or web address], read, short walks around the [place]. (Tries to get out 7 days a week about 10)   Pt goals: To strengthen the back     Objective     Baseline Isometric Testing on Med X equipment: Testing administered by PT  Date of testin2022  ROM 0-54 deg   Max Peak Torque 133    Min Peak Torque 57    Flex/Ext Ratio 2.33:1   % below normative data 35%     Outcomes:  Initial score: 10%  Visit 6 score:  Goal:      Treatment    Pt was  "instructed in and performed the following:     Vianney received therapeutic exercises to develop/improved posture, cardiovascular endurance, muscular endurance, lumbar/cervical ROM, strength and muscular endurance for 60 minutes including the following exercises:     LTR x 10  Piriformis stretch 3 x 20"  QL stretch 3 x 10 sec cue for starting position   Prone press ups x 10     Posterior pelvic tilt x 10 cue for dec valsalva  PPT /c Bridges x 10  PPT /c march x 10 cue for valsalva    NP:   Quadruped   Hip ext x 5 each (cues for level pelvis)    ***flowsheets  [facility] 12/2/2022   Visit Number 3   VAS Pain Rating 3   Treadmill Time (in min.) 5   Lumbar Stretches - Slouch Overcorrection -   Extension in Lying 10   Extension in Standing -   Flexion in Lying -   Lumbar Extension Seat Pad -   Femur Restraint -   Top Dead Center -   Counterweight -   Lumbar Flexion -   Lumbar Extension -   Lumbar Peak Torque -   Min Torque -   Test Percent Below Normative Data -   Lumbar Weight 60   Repetitions 18   Rating of Perceived Exertion 5   Ice - Z Lie (in min.) 5          Peripheral muscle strengthening which included 1 set of 15-20 repetitions at a slow, controlled 10-13 second per rep pace focused on strengthening supporting musculature for improved body mechanics and functional mobility.  Pt and therapist focused on proper form during treatment to ensure optimal strengthening of each targeted muscle group.  Machines were utilized including torso rotation, leg extension, leg curl, chest press, upright row. Tricep extension, bicep curl, leg press, and hip abduction added visit 3    Vianney received the following manual therapy techniques: NP      Home Exercises Provided and Patient Education Provided   Home exercises include:  LTR,   QL stretch,   PPT,   PPT w/marching,   Quadruped Hip ext    Cardio program:visit 5  Lifting education date:visit 11  Posture/Lumbar roll: not obtained as of 12/02/22     Education provided: "       Written Home Exercises Provided: Patient instructed to cont prior HEP.  Exercises were reviewed and Vianney was able to demonstrate them prior to the end of the session.  Vianney demonstrated good  understanding of the education provided.     See EMR under Patient Instructions for exercises provided prior visit.      Assessment     Patient ***   subjective report indicate good motivation /c consistent gym visits.  Considering patient note some LBP /c classes, patient advised to alert PT of inc sx /c specific exercises or notes of improved exercise tolerance.  Patient need cue /c PPT indicating core motor control deficit.  Patient advised to focus HEP on core engagement.  Lumbar MedX weight maintained per pt tolerance last visit.   Today pt perform 18 reps at 5 RPE indicating appropriateness of present resistance for strength challenge.  Plan to progress reps next visit.    Patient complete full complement of MedX peripheral exercises /s issue.  Progress per HB protocol.     Patient is making progress towards established goals.  Pt will continue to benefit from skilled outpatient physical therapy to address the deficits stated in the impairment chart, provide pt/family education and to maximize pt's level of independence in the home and community environment.     Anticipated Barriers for therapy: anxiety  Pt's spiritual, cultural and educational needs considered and pt agreeable to plan of care and goals as stated below:     GOALS: Pt is in agreement with the following goals.     Short term goals:  6 weeks or 10 visits   1.  Pt will demonstrate increased lumbar ROM by at least 3 degrees from the initial ROM value with improvements noted in functional ROM and ability to perform ADLs.  (approp and ongoing)  2.  Pt will demonstrate increased MedX average isometric strength value  by 15% from initial test resulting in improved ability to perform bending, lifting, and carrying activities safely, confidently.  (approp and  ongoing)  3.  Patient report a reduction in worst pain score by 1-2 points for improved tolerance for static standing.  (approp and ongoing)  4.  Pt able to perform HEP correctly with minimal cueing or supervision from therapist to encourage independent management of symptoms. (approp and ongoing)      Long term goals: 10 weeks or 20 visits   1. Pt will demonstrate increased lumbar ROM by at least 6 degrees from initial ROM value, resulting in improved ability to perform functional fwd bending while standing and sitting. (approp and ongoing)  2. Pt will demonstrate increased MedX average isometric strength value  by 30% from initial test resulting in improved ability to perform bending, lifting, and carrying activities safely, confidently.  (approp and ongoing)  3. Pt to demonstrate ability to independently control and reduce their pain through posture positioning and mechanical movements throughout a typical day.  (approp and ongoing)  4.  Pt will demonstrate reduced pain and improved functional outcomes as reported on the Oswestry Disability Index by reaching a score of 2-4 or less in order to demonstrate subjective improvement in pt's condition.    (approp and ongoing)  5. Pt will demonstrate independence with the HEP at discharge  (approp and ongoing)  6.  Pt will be able to clean her home without increase in low back pain(patient goal)  (approp and ongoing)    Plan   Continue with established Plan of Care towards established PT goals.     Therapist: Ruben Wang, PT  12/7/2022

## 2022-12-07 NOTE — PROGRESS NOTES
[facility]      Name: Vianney Callahan  Clinic Number: [id number]    Therapy Diagnosis:   Encounter Diagnoses   Name Primary?    Decreased strength of trunk and back Yes    Chronic bilateral low back pain without sciatica        Physician: Nellie Coreas MD    Visit Date: 2022    Physician Orders: PT Eval and Treat   Medical Diagnosis from Referral: M54.40,G89.29 (ICD-10-CM) - Chronic low back pain with sciatica, sciatica laterality unspecified, unspecified back pain laterality   Evaluation Date: 2022  Authorization Period Expiration: 2022   Plan of Care Expiration: 2023  Reassessment Due: 2022  Visit # / Visits authorized: 3 /20     Time In: 10:25 am  Time Out: 11:25 am   Total Billable Time: 60 minutes  Insurance:Fee for service Insurance Patient     Precautions: Standard     Pattern of pain determined: 1 no movement preference    Subjective   Vianney reports soreness from previous session.     Patient reports their pain to be 3/10 on a 0-10 scale with 0 being no pain and 10 being the worst pain imaginable.  Pain Location: Low back bilaterally, but mainly in the R side     Work and leisure: Not working, plans on working at [employer] at the beginning of the year/Watching [employer], read, short walks around the [place]. (Tries to get out 7 days a week about 10)   Pt goals: To strengthen the back     Objective     Baseline Isometric Testing on Med X equipment: Testing administered by PT  Date of testin2022  ROM 0-54 deg   Max Peak Torque 133    Min Peak Torque 57    Flex/Ext Ratio 2.33:1   % below normative data 35%     Outcomes:  Initial score: 10%  Visit 6 score:  Goal:      Treatment    Pt was instructed in and performed the following:     Vianney received therapeutic exercises to develop/improved posture, cardiovascular endurance, muscular endurance, lumbar/cervical ROM, strength and muscular endurance for 60 minutes including the following exercises:  "    LTR x 10 NP  Piriformis stretch 3 x 20"  QL stretch 3 x 10 sec cue for starting position NP  Prone press ups x 10   HSS 2 x 30''  Posterior pelvic tilt x 10 NP  PPT /c Bridges GTB 2 x 10  PPT /c march 2 x 10    NP:   Quadruped   Hip ext x 5 each (cues for level pelvis)    [facility] 12/7/2022   Visit Number 4   VAS Pain Rating 2   Treadmill Time (in min.) 5   Lumbar Stretches - Slouch -   Extension in Lying -   Extension in Standing -   Flexion in Lying -   Lumbar Extension - Seat Pad -   Femur Restraint -   Top Dead Center -   Counterweight -   Lumbar Flexion -   Lumbar Extension -   Lumbar Peak Torque -   Lumbar Weight 60   Repetitions 20   Rating of Perceived Exertion 4             Peripheral muscle strengthening which included 1 set of 15-20 repetitions at a slow, controlled 10-13 second per rep pace focused on strengthening supporting musculature for improved body mechanics and functional mobility.  Pt and therapist focused on proper form during treatment to ensure optimal strengthening of each targeted muscle group.  Machines were utilized including torso rotation, leg extension, leg curl, chest press, upright row. Tricep extension, bicep curl, leg press, and hip abduction added visit 3    Vianney received the following manual therapy techniques: NP      Home Exercises Provided and Patient Education Provided   Home exercises include:  LTR,   QL stretch,   PPT,   PPT w/marching,   Quadruped Hip ext    Cardio program:visit 5  Lifting education date:visit 11  Posture/Lumbar roll: not obtained as of 12/02/22     Education provided:       Written Home Exercises Provided: Patient instructed to cont prior HEP.  Exercises were reviewed and Vianney was able to demonstrate them prior to the end of the session.  Vianney demonstrated good  understanding of the education provided.     See EMR under Patient Instructions for exercises provided prior visit.      Assessment   Patient tolerates table stretches and " exercises with no c/o pain or symptoms. Progressions are made to exercises with Vianney performing an increase in reps and added GTB to bridges to increase glute activation. Patient is able to complete 20 reps on the lumbar medX. Progressions will be made per HB protocol.     Patient is making progress towards established goals.  Pt will continue to benefit from skilled outpatient physical therapy to address the deficits stated in the impairment chart, provide pt/family education and to maximize pt's level of independence in the home and community environment.     Anticipated Barriers for therapy: anxiety  Pt's spiritual, cultural and educational needs considered and pt agreeable to plan of care and goals as stated below:     GOALS: Pt is in agreement with the following goals.     Short term goals:  6 weeks or 10 visits   1.  Pt will demonstrate increased lumbar ROM by at least 3 degrees from the initial ROM value with improvements noted in functional ROM and ability to perform ADLs.  (approp and ongoing)  2.  Pt will demonstrate increased MedX average isometric strength value  by 15% from initial test resulting in improved ability to perform bending, lifting, and carrying activities safely, confidently.  (approp and ongoing)  3.  Patient report a reduction in worst pain score by 1-2 points for improved tolerance for static standing.  (approp and ongoing)  4.  Pt able to perform HEP correctly with minimal cueing or supervision from therapist to encourage independent management of symptoms. (approp and ongoing)      Long term goals: 10 weeks or 20 visits   1. Pt will demonstrate increased lumbar ROM by at least 6 degrees from initial ROM value, resulting in improved ability to perform functional fwd bending while standing and sitting. (approp and ongoing)  2. Pt will demonstrate increased MedX average isometric strength value  by 30% from initial test resulting in improved ability to perform bending, lifting, and  carrying activities safely, confidently.  (approp and ongoing)  3. Pt to demonstrate ability to independently control and reduce their pain through posture positioning and mechanical movements throughout a typical day.  (approp and ongoing)  4.  Pt will demonstrate reduced pain and improved functional outcomes as reported on the Oswestry Disability Index by reaching a score of 2-4 or less in order to demonstrate subjective improvement in pt's condition.    (approp and ongoing)  5. Pt will demonstrate independence with the HEP at discharge  (approp and ongoing)  6.  Pt will be able to clean her home without increase in low back pain(patient goal)  (approp and ongoing)    Plan   Continue with established Plan of Care towards established PT goals.     Therapist: Venkat Emanuel, PTA  12/7/2022

## 2022-12-16 ENCOUNTER — CLINICAL SUPPORT (OUTPATIENT)
Dept: REHABILITATION | Facility: OTHER | Age: 37
End: 2022-12-16
Attending: INTERNAL MEDICINE
Payer: MEDICARE

## 2022-12-16 DIAGNOSIS — M54.50 CHRONIC BILATERAL LOW BACK PAIN WITHOUT SCIATICA: ICD-10-CM

## 2022-12-16 DIAGNOSIS — G89.29 CHRONIC BILATERAL LOW BACK PAIN WITHOUT SCIATICA: ICD-10-CM

## 2022-12-16 DIAGNOSIS — R29.898 DECREASED STRENGTH OF TRUNK AND BACK: Primary | ICD-10-CM

## 2022-12-16 PROCEDURE — 97110 THERAPEUTIC EXERCISES: CPT | Mod: CQ

## 2022-12-16 NOTE — PROGRESS NOTES
[facility]      Name: Vianney Callahan  Clinic Number: [id number]    Therapy Diagnosis:   Encounter Diagnoses   Name Primary?    Decreased strength of trunk and back Yes    Chronic bilateral low back pain without sciatica          Physician: Nellie Coreas MD    Visit Date: 2022    Physician Orders: PT Eval and Treat   Medical Diagnosis from Referral: M54.40,G89.29 (ICD-10-CM) - Chronic low back pain with sciatica, sciatica laterality unspecified, unspecified back pain laterality   Evaluation Date: 2022  Authorization Period Expiration: 2022   Plan of Care Expiration: 2023  Reassessment Due: 2022  Visit # / Visits authorized:      Time In: 1:25 pm  Time Out: 2:25 pm   Total Billable Time: 60 minutes  Insurance:Fee for service Insurance Patient     Precautions: Standard     Pattern of pain determined: 1 no movement preference    Subjective   Vianney reports no discomfort this afternoon and tolerated last session well without soreness. In discussions about regular cardio exercise, pt stated she performs Chayo 2x/wk and walks.    Patient reports their pain to be 0/10 on a 0-10 scale with 0 being no pain and 10 being the worst pain imaginable.  Pain Location: Low back bilaterally, but mainly in the R side     Work and leisure: Not working, plans on working at [employer] at the beginning of the year/Watching [employer], read, [facility] (Tries to get out 7 days a week about 10)   Pt goals: To strengthen the back     Objective     Baseline Isometric Testing on Med X equipment: Testing administered by PT  Date of testin2022  ROM 0-54 deg   Max Peak Torque 133    Min Peak Torque 57    Flex/Ext Ratio 2.33:1   % below normative data 35%     Outcomes:  Initial score: 10%  Visit 6 score:  Goal:      Treatment    Pt was instructed in and performed the following:     Vianney received therapeutic exercises to develop/improved posture,  "cardiovascular endurance, muscular endurance, lumbar/cervical ROM, strength and muscular endurance for 60 minutes including the following exercises:     LTR x 10   Piriformis stretch 3 x 20"  Prone press ups x 10   +Prone hip ext 2 x10  HSS 2 x 30''  Posterior pelvic tilt x 10   PPT /c Bridges GTB 2 x 10  PPT /c march 2 x 10    NP:   Quadruped Hip ext x 5 each (cues for level pelvis)  QL stretch 3 x 10 sec cue for starting position NP    [facility] 12/16/2022   Visit Number 5   VAS Pain Rating 0   Treadmill Time (in min.) 5   Lumbar Stretches - Slouch Overcorrection -   Extension in Lying 10   Extension in Standing -   Flexion in Lying 1   Lumbar Extension Seat Pad -   Femur Restraint -   Top Dead Center -   Counterweight -   Lumbar Flexion -   Lumbar Extension -   Lumbar Peak Torque -   Min Torque -   Test Percent Below Normative Data -   Lumbar Weight 63   Repetitions 15   Rating of Perceived Exertion 4   Ice - Z Lie (in min.) 5              Peripheral muscle strengthening which included 1 set of 15-20 repetitions at a slow, controlled 10-13 second per rep pace focused on strengthening supporting musculature for improved body mechanics and functional mobility.  Pt and therapist focused on proper form during treatment to ensure optimal strengthening of each targeted muscle group.  Machines were utilized including torso rotation, leg extension, leg curl, chest press, upright row. Tricep extension, bicep curl, leg press, and hip abduction added visit 3    Vianney received the following manual therapy techniques: NP      Home Exercises Provided and Patient Education Provided   Home exercises include:  LTR,   QL stretch,   PPT,   PPT w/marching,   Quadruped Hip ext    Cardio program: 12/16/22 Review benefits of regular cardio exercises  Lifting education date:visit 11  Posture/Lumbar roll: not obtained as of 12/02/22     Education provided:       Written Home Exercises Provided: Patient instructed to cont prior " HEP.  Exercises were reviewed and Vianney was able to demonstrate them prior to the end of the session.  Vianney demonstrated good  understanding of the education provided.     See EMR under Patient Instructions for exercises provided prior visit.      Assessment   Patient tolerates table stretches and exercises with no c/o pain or symptoms. Progressions are made to exercises with Vianney performing clams and prone hip ext for glute strengthening. Lumbar MedX resistance increased to 63ft/lbs. Patient is able to complete 15 reps with PRE 3/10. Progressions will be made per HB protocol. Encouraged pt to cont cardio exercise.    Patient is making progress towards established goals.  Pt will continue to benefit from skilled outpatient physical therapy to address the deficits stated in the impairment chart, provide pt/family education and to maximize pt's level of independence in the home and community environment.     Anticipated Barriers for therapy: anxiety  Pt's spiritual, cultural and educational needs considered and pt agreeable to plan of care and goals as stated below:     GOALS: Pt is in agreement with the following goals.     Short term goals:  6 weeks or 10 visits   1.  Pt will demonstrate increased lumbar ROM by at least 3 degrees from the initial ROM value with improvements noted in functional ROM and ability to perform ADLs.  (approp and ongoing)  2.  Pt will demonstrate increased MedX average isometric strength value  by 15% from initial test resulting in improved ability to perform bending, lifting, and carrying activities safely, confidently.  (approp and ongoing)  3.  Patient report a reduction in worst pain score by 1-2 points for improved tolerance for static standing.  (approp and ongoing)  4.  Pt able to perform HEP correctly with minimal cueing or supervision from therapist to encourage independent management of symptoms. (approp and ongoing)      Long term goals: 10 weeks or 20 visits   1. Pt will demonstrate  increased lumbar ROM by at least 6 degrees from initial ROM value, resulting in improved ability to perform functional fwd bending while standing and sitting. (approp and ongoing)  2. Pt will demonstrate increased MedX average isometric strength value  by 30% from initial test resulting in improved ability to perform bending, lifting, and carrying activities safely, confidently.  (approp and ongoing)  3. Pt to demonstrate ability to independently control and reduce their pain through posture positioning and mechanical movements throughout a typical day.  (approp and ongoing)  4.  Pt will demonstrate reduced pain and improved functional outcomes as reported on the Oswestry Disability Index by reaching a score of 2-4 or less in order to demonstrate subjective improvement in pt's condition.    (approp and ongoing)  5. Pt will demonstrate independence with the HEP at discharge  (approp and ongoing)  6.  Pt will be able to clean her home without increase in low back pain(patient goal)  (approp and ongoing)    Plan   Continue with established Plan of Care towards established PT goals.     Therapist: Chanel Martins, PTA  12/16/2022

## 2022-12-21 ENCOUNTER — CLINICAL SUPPORT (OUTPATIENT)
Dept: REHABILITATION | Facility: OTHER | Age: 37
End: 2022-12-21
Attending: INTERNAL MEDICINE
Payer: MEDICARE

## 2022-12-21 DIAGNOSIS — M54.50 CHRONIC BILATERAL LOW BACK PAIN WITHOUT SCIATICA: ICD-10-CM

## 2022-12-21 DIAGNOSIS — G89.29 CHRONIC BILATERAL LOW BACK PAIN WITHOUT SCIATICA: ICD-10-CM

## 2022-12-21 DIAGNOSIS — R29.898 DECREASED STRENGTH OF TRUNK AND BACK: Primary | ICD-10-CM

## 2022-12-21 PROCEDURE — 97110 THERAPEUTIC EXERCISES: CPT

## 2022-12-21 NOTE — PROGRESS NOTES
[facility]      Name: Vianney Callahan  Clinic Number: [id number]    Therapy Diagnosis:   Encounter Diagnoses   Name Primary?    Decreased strength of trunk and back Yes    Chronic bilateral low back pain without sciatica          Physician: Nellie Coreas MD    Visit Date: 2022    Physician Orders: PT Eval and Treat   Medical Diagnosis from Referral: M54.40,G89.29 (ICD-10-CM) - Chronic low back pain with sciatica, sciatica laterality unspecified, unspecified back pain laterality   Evaluation Date: 2022  Authorization Period Expiration: 2022   Plan of Care Expiration: 2023  Reassessment Due: 2022  Visit # / Visits authorized:      Time In: 10:55 am  Time Out: 11:55 am   Total Billable Time: 60 minutes  Insurance:Fee for service Insurance Patient     Precautions: Standard     Pattern of pain determined: 1 no movement preference    Subjective   Vianney reports no discomfort this afternoon and tolerated last session well without soreness. In discussions about regular cardio exercise, pt stated she performs Chayo 2x/wk and walks.    Patient reports their pain to be 0/10 on a 0-10 scale with 0 being no pain and 10 being the worst pain imaginable.  Pain Location: Low back bilaterally, but mainly in the R side     Work and leisure: Not working, plans on working at [employer] at the beginning of the year/Watching [employer], read, [facility] [place]. (Tries to get out 7 days a week about 10)   Pt goals: To strengthen the back     Objective     Baseline Isometric Testing on Med X equipment: Testing administered by PT  Date of testin2022  ROM 0-54 deg   Max Peak Torque 133    Min Peak Torque 57    Flex/Ext Ratio 2.33:1   % below normative data 35%     Outcomes: FOTO  Initial score: 33%  Visit 6 score: 24%  Goal: <20%      Treatment    Pt was instructed in and performed the following:     Vianney received therapeutic exercises to develop/improved  "posture, cardiovascular endurance, muscular endurance, lumbar/cervical ROM, strength and muscular endurance for 60 minutes including the following exercises:     LTR x 10   Open book 5x5" ea  Sidelying QL/lat stretch c/OP 2x10" ea  Piriformis stretch 3 x 20"  Prone press ups x 10   Posterior pelvic tilt x 10   PPT /c Bridges and ISO bolster hold x 20   Single leg bridge 2x5 ea    NP:   Quadruped Hip ext x 5 each (cues for level pelvis)  QL stretch 3 x 10 sec cue for starting position  +Prone hip ext 2 x10  HSS 2 x 30''    [facility] - [facility] 12/21/2022   Visit Number 6   VAS Pain Rating 0   Treadmill Time (in min.) 5   Lumbar Stretches - Slouch -   Extension in Lying 10   Extension in Standing -   Flexion in Lying 10   Lumbar Extension - Seat Pad -   Femur Restraint -   Top Dead Center -   Counterweight -   Lumbar Flexion -   Lumbar Extension -   Lumbar Peak Torque -   Lumbar Weight 63   Repetitions 19   Rating of Perceived Exertion 5                 Peripheral muscle strengthening which included 1 set of 15-20 repetitions at a slow, controlled 10-13 second per rep pace focused on strengthening supporting musculature for improved body mechanics and functional mobility.  Pt and therapist focused on proper form during treatment to ensure optimal strengthening of each targeted muscle group.  Machines were utilized including torso rotation, leg extension, leg curl, chest press, upright row. Tricep extension, bicep curl, leg press, and hip abduction added visit 3    Vianney received the following manual therapy techniques: NP      Home Exercises Provided and Patient Education Provided   Home exercises include:  LTR,   QL stretch,   PPT,   PPT w/marching,   Quadruped Hip ext    Cardio program: 12/16/22 Review benefits of regular cardio exercises  Lifting education date:visit 11  Posture/Lumbar roll: not obtained as of 12/02/22     Education provided:       Written Home Exercises Provided: Patient instructed to cont " prior HEP.  Exercises were reviewed and Vianney was able to demonstrate them prior to the end of the session.  Vianney demonstrated good  understanding of the education provided.     See EMR under Patient Instructions for exercises provided prior visit.      Assessment   Patient tolerates table stretches and exercises with no c/o pain or symptoms. Trialed bridge with ISO ABD using bolster as well as single leg bridge. Both performed well but with very limited tolerance, especially single-leg bridge, indicating significant glute weakness to continue focusing on in future visits. Lumbar MedX resistance continued at 63ft/lbs and Vianney was able to complete 19 reps with RPE of 5/10. Progressions will be made per HB protocol.    Patient is making progress towards established goals.  Pt will continue to benefit from skilled outpatient physical therapy to address the deficits stated in the impairment chart, provide pt/family education and to maximize pt's level of independence in the home and community environment.     Anticipated Barriers for therapy: anxiety  Pt's spiritual, cultural and educational needs considered and pt agreeable to plan of care and goals as stated below:     GOALS: Pt is in agreement with the following goals.     Short term goals:  6 weeks or 10 visits   1.  Pt will demonstrate increased lumbar ROM by at least 3 degrees from the initial ROM value with improvements noted in functional ROM and ability to perform ADLs.  (approp and ongoing)  2.  Pt will demonstrate increased MedX average isometric strength value  by 15% from initial test resulting in improved ability to perform bending, lifting, and carrying activities safely, confidently.  (approp and ongoing)  3.  Patient report a reduction in worst pain score by 1-2 points for improved tolerance for static standing.  (approp and ongoing)  4.  Pt able to perform HEP correctly with minimal cueing or supervision from therapist to encourage independent management  of symptoms. (approp and ongoing)      Long term goals: 10 weeks or 20 visits   1. Pt will demonstrate increased lumbar ROM by at least 6 degrees from initial ROM value, resulting in improved ability to perform functional fwd bending while standing and sitting. (approp and ongoing)  2. Pt will demonstrate increased MedX average isometric strength value  by 30% from initial test resulting in improved ability to perform bending, lifting, and carrying activities safely, confidently.  (approp and ongoing)  3. Pt to demonstrate ability to independently control and reduce their pain through posture positioning and mechanical movements throughout a typical day.  (approp and ongoing)  4.  Pt will demonstrate reduced pain and improved functional outcomes as reported on FOTO by reaching a score of 20% or less in order to demonstrate subjective improvement in pt's condition.    (approp and ongoing)  5. Pt will demonstrate independence with the HEP at discharge  (approp and ongoing)  6.  Pt will be able to clean her home without increase in low back pain(patient goal)  (approp and ongoing)    Plan   Continue with established Plan of Care towards established PT goals.     Therapist: Rinku Aranda, PT, DPT  12/21/2022

## 2022-12-26 ENCOUNTER — PATIENT MESSAGE (OUTPATIENT)
Dept: GASTROENTEROLOGY | Facility: CLINIC | Age: 37
End: 2022-12-26
Payer: MEDICARE

## 2022-12-26 DIAGNOSIS — K59.04 CHRONIC IDIOPATHIC CONSTIPATION: Primary | ICD-10-CM

## 2022-12-27 ENCOUNTER — TELEPHONE (OUTPATIENT)
Dept: OBSTETRICS AND GYNECOLOGY | Facility: CLINIC | Age: 37
End: 2022-12-27
Payer: MEDICARE

## 2022-12-27 RX ORDER — LUBIPROSTONE 24 UG/1
24 CAPSULE ORAL 2 TIMES DAILY
Qty: 60 CAPSULE | Refills: 11 | Status: SHIPPED | OUTPATIENT
Start: 2022-12-27 | End: 2023-03-28

## 2022-12-27 RX ORDER — NORETHINDRONE ACETATE AND ETHINYL ESTRADIOL .02; 1 MG/1; MG/1
1 TABLET ORAL DAILY
Qty: 63 TABLET | Refills: 0 | Status: SHIPPED | OUTPATIENT
Start: 2022-12-27 | End: 2023-03-06 | Stop reason: SDUPTHER

## 2022-12-27 NOTE — TELEPHONE ENCOUNTER
Vianney Callahan Staff  Phone Number: [phone]     Refills have been requested for the following medications:         norethindrone-ethinyl estradiol (MICROGESTIN 1/20) 1-20 mg-mcg per tablet [Morro Abbott MD]     Preferred pharmacy: [facility] [id number] [place] [address]   Delivery method: Pickup     very important  Medications from outside sources need reconciliation.             Requested Prescriptions     norethindrone-ethinyl estradiol (MICROGESTIN 1/20) 1-20 mg-mcg per tablet         Sig: Take 1 tablet by mouth once daily.    Disp:  63 tablet    Refills:  0    Start: 12/25/2022    Class: Normal    Last ordered: 3 months ago by Morro Abbott MD     Oral Contraceptives Protocol Failed 12/25/2022 11:05 PM   Protocol Details  Not a current smoker    Patient is not currently pregnant    No positive pregnancy test in the past 12 months    Up to date with pap smear Health Maintenance    Visit with authorizing provider in past 12 months or upcoming 90 days     Patient does not have contraindications diagnosis    Blood Pressure below 139/89 on file in past 12 months     No history of Carcinoma of Breast      To be filled at: [facility] [id number] [place] [address]

## 2023-01-06 ENCOUNTER — CLINICAL SUPPORT (OUTPATIENT)
Dept: REHABILITATION | Facility: OTHER | Age: 38
End: 2023-01-06
Attending: INTERNAL MEDICINE
Payer: MEDICARE

## 2023-01-06 DIAGNOSIS — R29.898 DECREASED STRENGTH OF TRUNK AND BACK: Primary | ICD-10-CM

## 2023-01-06 DIAGNOSIS — G89.29 CHRONIC BILATERAL LOW BACK PAIN WITHOUT SCIATICA: ICD-10-CM

## 2023-01-06 DIAGNOSIS — M54.50 CHRONIC BILATERAL LOW BACK PAIN WITHOUT SCIATICA: ICD-10-CM

## 2023-01-06 PROCEDURE — 97110 THERAPEUTIC EXERCISES: CPT

## 2023-01-06 NOTE — PROGRESS NOTES
[facility]      Name: Vianney Callahan  Clinic Number: [id number]    Therapy Diagnosis:   Encounter Diagnoses   Name Primary?    Decreased strength of trunk and back Yes    Chronic bilateral low back pain without sciatica        Physician: Nellie Croeas MD    Visit Date: 2023    Physician Orders: PT Eval and Treat   Medical Diagnosis from Referral: M54.40,G89.29 (ICD-10-CM) - Chronic low back pain with sciatica, sciatica laterality unspecified, unspecified back pain laterality   Evaluation Date: 2022  Authorization Period Expiration: 2022   Plan of Care Expiration: 2023  Reassessment Due: 2022  Visit # / Visits authorized:      Time In: 11:50 am  Time Out: 1230  Total Billable Time: 40 minutes  Insurance:Fee for service Insurance Patient     Precautions: Standard     Pattern of pain determined: 1 no movement preference    Subjective   Vianney reports she has rec'd good benefit from program so far  No c/o soreness today  Patient reports their pain to be 0/10 on a 0-10 scale with 0 being no pain and 10 being the worst pain imaginable.  Pain Location: Low back bilaterally, but mainly in the R side     Work and leisure: Not working, plans on working at [employer] at the beginning of the year/Watching [employer], read, short walks around the [place]. (Tries to get out 7 days a week about 10)   Pt goals: To strengthen the back     Objective     Baseline Isometric Testing on Med X equipment: Testing administered by PT  Date of testin2022  ROM 0-54 deg   Max Peak Torque 133    Min Peak Torque 57    Flex/Ext Ratio 2.33:1   % below normative data 35%     Outcomes: FOTO  Initial score: 33%  Visit 6 score: 24%  Goal: <20%      Treatment    Pt was instructed in and performed the following:     Vianney received therapeutic exercises to develop/improved posture, cardiovascular endurance, muscular endurance, lumbar/cervical ROM, strength and muscular endurance for 40  "minutes including the following exercises:   [facility] 1/6/2023   Visit Number 7   VAS Pain Rating 0   Treadmill Time (in min.) 5   Lumbar Stretches - Slouch Overcorrection -   Extension in Lying 10   Extension in Standing -   Flexion in Lying 10   Lumbar Extension Seat Pad -   Femur Restraint -   Top Dead Center -   Counterweight -   Lumbar Flexion -   Lumbar Extension -   Lumbar Peak Torque -   Min Torque -   Test Percent Below Normative Data -   Lumbar Weight 63   Repetitions 20   Rating of Perceived Exertion 5   Ice - Z Lie (in min.) 5       LTR x 10   Open book 5x5" ea  Sidelying QL/lat stretch c/OP 2x10" ea  Piriformis stretch 3 x 20"  Prone press ups x 10   Posterior pelvic tilt x 10   PPT /c Bridges and ISO bolster hold x 20   Single leg bridge 2x5 ea    NP:   Quadruped Hip ext x 5 each (cues for level pelvis)  QL stretch 3 x 10 sec cue for starting position  +Prone hip ext 2 x10  HSS 2 x 30''             Peripheral muscle strengthening which included 1 set of 15-20 repetitions at a slow, controlled 10-13 second per rep pace focused on strengthening supporting musculature for improved body mechanics and functional mobility.  Pt and therapist focused on proper form during treatment to ensure optimal strengthening of each targeted muscle group.  Machines were utilized including torso rotation, leg extension, leg curl, chest press, upright row. Tricep extension, bicep curl, leg press, and hip abduction added visit 3    Vianney received the following manual therapy techniques: NP      Home Exercises Provided and Patient Education Provided   Home exercises include:  LTR,   QL stretch,   PPT,   PPT w/marching,   Quadruped Hip ext    Cardio program: 12/16/22 Review benefits of regular cardio exercises  Lifting education date:visit 11  Posture/Lumbar roll: not obtained as of 12/02/22     Education provided:       Written Home Exercises Provided: Patient instructed to cont prior HEP.  Exercises were reviewed " and Vianney was able to demonstrate them prior to the end of the session.  Vianney demonstrated good  understanding of the education provided.     See EMR under Patient Instructions for exercises provided prior visit.      Assessment   Patient arrives today without pain.  Some discomfort noted with iso bridge today, modified range.  Pt able to complete all other lumbo pelvic stability exercises without c/o pain.  Pt completed 20 reps at 63ft/lbs with 5/10 exertion level.  Increase 5% next visit.  Patient is making progress towards established goals.  Pt will continue to benefit from skilled outpatient physical therapy to address the deficits stated in the impairment chart, provide pt/family education and to maximize pt's level of independence in the home and community environment.     Anticipated Barriers for therapy: anxiety  Pt's spiritual, cultural and educational needs considered and pt agreeable to plan of care and goals as stated below:     GOALS: Pt is in agreement with the following goals.     Short term goals:  6 weeks or 10 visits   1.  Pt will demonstrate increased lumbar ROM by at least 3 degrees from the initial ROM value with improvements noted in functional ROM and ability to perform ADLs.  (approp and ongoing)  2.  Pt will demonstrate increased MedX average isometric strength value  by 15% from initial test resulting in improved ability to perform bending, lifting, and carrying activities safely, confidently.  (approp and ongoing)  3.  Patient report a reduction in worst pain score by 1-2 points for improved tolerance for static standing.  (approp and ongoing)  4.  Pt able to perform HEP correctly with minimal cueing or supervision from therapist to encourage independent management of symptoms. (approp and ongoing)      Long term goals: 10 weeks or 20 visits   1. Pt will demonstrate increased lumbar ROM by at least 6 degrees from initial ROM value, resulting in improved ability to perform functional fwd  bending while standing and sitting. (approp and ongoing)  2. Pt will demonstrate increased MedX average isometric strength value  by 30% from initial test resulting in improved ability to perform bending, lifting, and carrying activities safely, confidently.  (approp and ongoing)  3. Pt to demonstrate ability to independently control and reduce their pain through posture positioning and mechanical movements throughout a typical day.  (approp and ongoing)  4.  Pt will demonstrate reduced pain and improved functional outcomes as reported on FOTO by reaching a score of 20% or less in order to demonstrate subjective improvement in pt's condition.    (approp and ongoing)  5. Pt will demonstrate independence with the HEP at discharge  (approp and ongoing)  6.  Pt will be able to clean her home without increase in low back pain(patient goal)  (approp and ongoing)    Plan   Continue with established Plan of Care towards established PT goals.     Therapist: Smiley Guerra, PT,   1/6/2023

## 2023-01-11 ENCOUNTER — CLINICAL SUPPORT (OUTPATIENT)
Dept: REHABILITATION | Facility: OTHER | Age: 38
End: 2023-01-11
Attending: INTERNAL MEDICINE
Payer: MEDICARE

## 2023-01-11 DIAGNOSIS — M54.50 CHRONIC BILATERAL LOW BACK PAIN WITHOUT SCIATICA: ICD-10-CM

## 2023-01-11 DIAGNOSIS — G89.29 CHRONIC BILATERAL LOW BACK PAIN WITHOUT SCIATICA: ICD-10-CM

## 2023-01-11 DIAGNOSIS — R29.898 DECREASED STRENGTH OF TRUNK AND BACK: Primary | ICD-10-CM

## 2023-01-11 PROCEDURE — 97110 THERAPEUTIC EXERCISES: CPT | Mod: CQ

## 2023-01-11 NOTE — PROGRESS NOTES
[facility]      Name: Vianney Callahan  Clinic Number: [id number]    Therapy Diagnosis:   Encounter Diagnoses   Name Primary?    Decreased strength of trunk and back Yes    Chronic bilateral low back pain without sciatica        Physician: Nellie Coreas MD    Visit Date: 2023    Physician Orders: PT Eval and Treat   Medical Diagnosis from Referral: M54.40,G89.29 (ICD-10-CM) - Chronic low back pain with sciatica, sciatica laterality unspecified, unspecified back pain laterality   Evaluation Date: 2022  Authorization Period Expiration: 2022   Plan of Care Expiration: 2023  Reassessment Due: 2022  Visit # / Visits authorized:     Time In: 2:00 pm  Time Out: 2:55pm  Total Billable Time: 55 minutes  Insurance:Fee for service Insurance Patient     Precautions: Standard     Pattern of pain determined: 1 no movement preference    Subjective   Vianney reports she rarely has pain and feels stronger.   Patient reports their pain to be 0/10 on a 0-10 scale with 0 being no pain and 10 being the worst pain imaginable.  Pain Location: Low back bilaterally, but mainly in the R side     Work and leisure: Not working, plans on working at [employer] at the beginning of the year/Watching [employer], read, [place] (Tries to get out 7 days a week about 10)   Pt goals: To strengthen the back     Objective     Baseline Isometric Testing on Med X equipment: Testing administered by PT  Date of testin2022  ROM 0-54 deg   Max Peak Torque 133    Min Peak Torque 57    Flex/Ext Ratio 2.33:1   % below normative data 35%     Outcomes: FOTO  Initial score: 33%  Visit 6 score: 24%  Goal: <20%      Treatment    Pt was instructed in and performed the following:     Vianney received therapeutic exercises to develop/improved posture, cardiovascular endurance, muscular endurance, lumbar/cervical ROM, strength and muscular endurance for 55 minutes including the  "following exercises:       LTR x5,5"  Open book 5x5" ea  Posterior pelvic tilt x 10   PPT /c Bridges and ISO bolster hold x 20   Single leg bridge 2x5 ea  +Bridge w/GTB (added to HEP) x15,3"  +Lifting training:Floor to table transfer, STS to practice hip hinge x10, golfers lift x10    [facility] - Short 1/11/2023   Visit Number 8   VAS Pain Rating 0   Treadmill Time (in min.) 5   Lumbar Stretches - Slouch -   Extension in Lying -   Extension in Standing -   Flexion in Lying -   Lumbar Extension - Seat Pad -   Femur Restraint -   Top Dead Center -   Counterweight -   Lumbar Flexion -   Lumbar Extension -   Lumbar Peak Torque -   Lumbar Weight 66   Repetitions 15   Rating of Perceived Exertion 4        NP:   Quadruped Hip ext x 5 each (cues for level pelvis)  QL stretch 3 x 10 sec cue for starting position  +Prone hip ext 2 x10  HSS 2 x 30''             Peripheral muscle strengthening which included 1 set of 15-20 repetitions at a slow, controlled 10-13 second per rep pace focused on strengthening supporting musculature for improved body mechanics and functional mobility.  Pt and therapist focused on proper form during treatment to ensure optimal strengthening of each targeted muscle group.  Machines were utilized including torso rotation, leg extension, leg curl, chest press, upright row. Tricep extension, bicep curl, leg press, and hip abduction added visit 3    Vianney received the following manual therapy techniques: NP      Home Exercises Provided and Patient Education Provided   Home exercises include:  LTR,   QL stretch,   PPT,   PPT w/marching,   Quadruped Hip ext    Cardio program: 12/16/22 Review benefits of regular cardio exercises  Lifting education date:1/11/23  Posture/Lumbar roll: not obtained as of 12/02/22     Education provided:       Written Home Exercises Provided: Patient instructed to cont prior HEP.  Exercises were reviewed and Vianney was able to demonstrate them prior to the end of the " session.  Vianney demonstrated good  understanding of the education provided.     See EMR under Patient Instructions for exercises provided prior visit.      Assessment   Patient arrives today without pain. Initiated lifting training with max cues required for hip hinge. Added bridge with band to HEP to progress strengthening and stabilization. Consider progressing core and lumbopelvic stabilization ex NV. Patient able to perform 15 reps on l/s medx machine with an RPE of 4/10, but required mod cueing to decrease LE compensation and keep UT quiet.     Patient is making progress towards established goals.  Pt will continue to benefit from skilled outpatient physical therapy to address the deficits stated in the impairment chart, provide pt/family education and to maximize pt's level of independence in the home and community environment.     Anticipated Barriers for therapy: anxiety  Pt's spiritual, cultural and educational needs considered and pt agreeable to plan of care and goals as stated below:     GOALS: Pt is in agreement with the following goals.     Short term goals:  6 weeks or 10 visits   1.  Pt will demonstrate increased lumbar ROM by at least 3 degrees from the initial ROM value with improvements noted in functional ROM and ability to perform ADLs.  (approp and ongoing)  2.  Pt will demonstrate increased MedX average isometric strength value  by 15% from initial test resulting in improved ability to perform bending, lifting, and carrying activities safely, confidently.  (approp and ongoing)  3.  Patient report a reduction in worst pain score by 1-2 points for improved tolerance for static standing.  (approp and ongoing)  4.  Pt able to perform HEP correctly with minimal cueing or supervision from therapist to encourage independent management of symptoms. (approp and ongoing)      Long term goals: 10 weeks or 20 visits   1. Pt will demonstrate increased lumbar ROM by at least 6 degrees from initial ROM value,  resulting in improved ability to perform functional fwd bending while standing and sitting. (approp and ongoing)  2. Pt will demonstrate increased MedX average isometric strength value  by 30% from initial test resulting in improved ability to perform bending, lifting, and carrying activities safely, confidently.  (approp and ongoing)  3. Pt to demonstrate ability to independently control and reduce their pain through posture positioning and mechanical movements throughout a typical day.  (approp and ongoing)  4.  Pt will demonstrate reduced pain and improved functional outcomes as reported on FOTO by reaching a score of 20% or less in order to demonstrate subjective improvement in pt's condition.    (approp and ongoing)  5. Pt will demonstrate independence with the HEP at discharge  (approp and ongoing)  6.  Pt will be able to clean her home without increase in low back pain(patient goal)  (approp and ongoing)    Plan   Continue with established Plan of Care towards established PT goals.     Therapist: Ella Orona PTA,   1/11/2023

## 2023-01-22 NOTE — PROGRESS NOTES
Group Psychotherapy (PhD/LCSW)    Site: [facility]    Clinical status of patient: Intensive Outpatient Program (IOP)    Date: 1/24/2017    Group Focus: Acceptance & Commitment Therapy (ACT) Group Psychotherapy    Length of service: 92933 - 45-50 minutes    Number of patients in attendance: 4    Referred by: Behavioral Medicine Unit Treatment Team    Target symptoms: Mood Disorder    Patient's response to treatment: Active Listening and Self-disclosure    Progress toward goals: Progressing slowly    Interval History: Session focus was Fusion and Defusion:  alf Conversations.  Patient felt anxious due to a new group member, but was willing to remain in group and defuse thoughts, feelings, and sensations.  She has been demonstrating appropriate use of defusion techniques like labeling thoughts and feelings.    Diagnosis: Bipolar Disorder, Borderline Personality Disorder    Plan: Continue treatment on [facility]           present

## 2023-02-09 RX ORDER — SUMATRIPTAN SUCCINATE 100 MG/1
TABLET ORAL
Qty: 12 TABLET | Refills: 11 | Status: SHIPPED | OUTPATIENT
Start: 2023-02-09

## 2023-03-03 ENCOUNTER — PATIENT MESSAGE (OUTPATIENT)
Dept: OBSTETRICS AND GYNECOLOGY | Facility: CLINIC | Age: 38
End: 2023-03-03
Payer: MEDICARE

## 2023-03-06 ENCOUNTER — TELEPHONE (OUTPATIENT)
Dept: OBSTETRICS AND GYNECOLOGY | Facility: CLINIC | Age: 38
End: 2023-03-06
Payer: MEDICARE

## 2023-03-06 RX ORDER — NORETHINDRONE ACETATE AND ETHINYL ESTRADIOL .02; 1 MG/1; MG/1
1 TABLET ORAL DAILY
Qty: 63 TABLET | Refills: 0 | Status: SHIPPED | OUTPATIENT
Start: 2023-03-06 | End: 2023-04-12 | Stop reason: SDUPTHER

## 2023-03-06 NOTE — TELEPHONE ENCOUNTER
It was documented that Dr Abbott sent the medication. I will be happy to request it be sent again. Claudia  ===View-only below this line===      ----- Message -----       From:Vianney Callahan       Sent:3/3/2023  5:22 PM CST         To:Patient Medical Advice Request Message List    Subject:Birth control     The pharmacy said that my request has been denied.      ----- Message -----       From:Med Assistant Claudia       Sent:3/3/2023  4:06 PM CST         To:Vianney Callahan    Subject:Birth control     Please check with the pharmacy. Claudia      ----- Message -----       From:Vianney Callahan       Sent:3/3/2023 12:24 PM CST         To:Morro Abbott MD    Subject:Birth control     Hello! I need a refill of my birth control. I have an appointment in April.  Please send in a refill for me. I am completely out.   Thank You!   Vianney

## 2023-03-25 ENCOUNTER — PATIENT MESSAGE (OUTPATIENT)
Dept: GASTROENTEROLOGY | Facility: CLINIC | Age: 38
End: 2023-03-25
Payer: MEDICARE

## 2023-03-28 ENCOUNTER — LAB VISIT (OUTPATIENT)
Dept: LAB | Facility: HOSPITAL | Age: 38
End: 2023-03-28
Attending: INTERNAL MEDICINE
Payer: MEDICARE

## 2023-03-28 ENCOUNTER — OFFICE VISIT (OUTPATIENT)
Dept: INTERNAL MEDICINE | Facility: CLINIC | Age: 38
End: 2023-03-28
Payer: MEDICARE

## 2023-03-28 VITALS
WEIGHT: 164.44 LBS | DIASTOLIC BLOOD PRESSURE: 72 MMHG | BODY MASS INDEX: 24.92 KG/M2 | OXYGEN SATURATION: 98 % | SYSTOLIC BLOOD PRESSURE: 120 MMHG | HEIGHT: 68 IN | HEART RATE: 88 BPM

## 2023-03-28 DIAGNOSIS — Z00.00 ANNUAL PHYSICAL EXAM: Primary | ICD-10-CM

## 2023-03-28 DIAGNOSIS — F19.21: ICD-10-CM

## 2023-03-28 DIAGNOSIS — Z00.00 ANNUAL PHYSICAL EXAM: ICD-10-CM

## 2023-03-28 DIAGNOSIS — M54.50 CHRONIC LOW BACK PAIN WITHOUT SCIATICA, UNSPECIFIED BACK PAIN LATERALITY: ICD-10-CM

## 2023-03-28 DIAGNOSIS — Z13.1 SCREENING FOR DIABETES MELLITUS: ICD-10-CM

## 2023-03-28 DIAGNOSIS — Z87.891 FORMER SMOKER: ICD-10-CM

## 2023-03-28 DIAGNOSIS — E78.1 HYPERTRIGLYCERIDEMIA: ICD-10-CM

## 2023-03-28 DIAGNOSIS — F25.0 SCHIZOAFFECTIVE DISORDER, BIPOLAR TYPE: ICD-10-CM

## 2023-03-28 DIAGNOSIS — G89.29 CHRONIC LOW BACK PAIN WITHOUT SCIATICA, UNSPECIFIED BACK PAIN LATERALITY: ICD-10-CM

## 2023-03-28 DIAGNOSIS — G43.809 OTHER MIGRAINE WITHOUT STATUS MIGRAINOSUS, NOT INTRACTABLE: ICD-10-CM

## 2023-03-28 LAB
ALBUMIN SERPL BCP-MCNC: 4.2 G/DL (ref 3.5–5.2)
ALP SERPL-CCNC: 117 U/L (ref 55–135)
ALT SERPL W/O P-5'-P-CCNC: 17 U/L (ref 10–44)
ANION GAP SERPL CALC-SCNC: 11 MMOL/L (ref 8–16)
AST SERPL-CCNC: 17 U/L (ref 10–40)
BILIRUB SERPL-MCNC: 0.3 MG/DL (ref 0.1–1)
BUN SERPL-MCNC: 6 MG/DL (ref 6–20)
CALCIUM SERPL-MCNC: 9.8 MG/DL (ref 8.7–10.5)
CHLORIDE SERPL-SCNC: 102 MMOL/L (ref 95–110)
CHOLEST SERPL-MCNC: 229 MG/DL (ref 120–199)
CHOLEST/HDLC SERPL: 4.6 {RATIO} (ref 2–5)
CO2 SERPL-SCNC: 24 MMOL/L (ref 23–29)
CREAT SERPL-MCNC: 0.9 MG/DL (ref 0.5–1.4)
ERYTHROCYTE [DISTWIDTH] IN BLOOD BY AUTOMATED COUNT: 13.2 % (ref 11.5–14.5)
EST. GFR  (NO RACE VARIABLE): >60 ML/MIN/1.73 M^2
GLUCOSE SERPL-MCNC: 77 MG/DL (ref 70–110)
HCT VFR BLD AUTO: 45.6 % (ref 37–48.5)
HDLC SERPL-MCNC: 50 MG/DL (ref 40–75)
HDLC SERPL: 21.8 % (ref 20–50)
HGB BLD-MCNC: 14.5 G/DL (ref 12–16)
LDLC SERPL CALC-MCNC: 132 MG/DL (ref 63–159)
MCH RBC QN AUTO: 28 PG (ref 27–31)
MCHC RBC AUTO-ENTMCNC: 31.8 G/DL (ref 32–36)
MCV RBC AUTO: 88 FL (ref 82–98)
NONHDLC SERPL-MCNC: 179 MG/DL
PLATELET # BLD AUTO: 460 K/UL (ref 150–450)
PMV BLD AUTO: 10.6 FL (ref 9.2–12.9)
POTASSIUM SERPL-SCNC: 3.8 MMOL/L (ref 3.5–5.1)
PROT SERPL-MCNC: 7.1 G/DL (ref 6–8.4)
RBC # BLD AUTO: 5.17 M/UL (ref 4–5.4)
SODIUM SERPL-SCNC: 137 MMOL/L (ref 136–145)
TRIGL SERPL-MCNC: 235 MG/DL (ref 30–150)
WBC # BLD AUTO: 11.56 K/UL (ref 3.9–12.7)

## 2023-03-28 PROCEDURE — 1159F PR MEDICATION LIST DOCUMENTED IN MEDICAL RECORD: ICD-10-PCS | Mod: CPTII,S$GLB,, | Performed by: INTERNAL MEDICINE

## 2023-03-28 PROCEDURE — 36415 COLL VENOUS BLD VENIPUNCTURE: CPT | Performed by: INTERNAL MEDICINE

## 2023-03-28 PROCEDURE — 99499 UNLISTED E&M SERVICE: CPT | Mod: S$GLB,,, | Performed by: INTERNAL MEDICINE

## 2023-03-28 PROCEDURE — 80061 LIPID PANEL: CPT | Performed by: INTERNAL MEDICINE

## 2023-03-28 PROCEDURE — 3074F SYST BP LT 130 MM HG: CPT | Mod: CPTII,S$GLB,, | Performed by: INTERNAL MEDICINE

## 2023-03-28 PROCEDURE — 3008F BODY MASS INDEX DOCD: CPT | Mod: CPTII,S$GLB,, | Performed by: INTERNAL MEDICINE

## 2023-03-28 PROCEDURE — 3008F PR BODY MASS INDEX (BMI) DOCUMENTED: ICD-10-PCS | Mod: CPTII,S$GLB,, | Performed by: INTERNAL MEDICINE

## 2023-03-28 PROCEDURE — 99395 PR PREVENTIVE VISIT,EST,18-39: ICD-10-PCS | Mod: GZ,S$GLB,, | Performed by: INTERNAL MEDICINE

## 2023-03-28 PROCEDURE — 80053 COMPREHEN METABOLIC PANEL: CPT | Performed by: INTERNAL MEDICINE

## 2023-03-28 PROCEDURE — 99999 PR PBB SHADOW E&M-EST. PATIENT-LVL III: ICD-10-PCS | Mod: PBBFAC,,, | Performed by: INTERNAL MEDICINE

## 2023-03-28 PROCEDURE — 3074F PR MOST RECENT SYSTOLIC BLOOD PRESSURE < 130 MM HG: ICD-10-PCS | Mod: CPTII,S$GLB,, | Performed by: INTERNAL MEDICINE

## 2023-03-28 PROCEDURE — 85027 COMPLETE CBC AUTOMATED: CPT | Performed by: INTERNAL MEDICINE

## 2023-03-28 PROCEDURE — 3078F DIAST BP <80 MM HG: CPT | Mod: CPTII,S$GLB,, | Performed by: INTERNAL MEDICINE

## 2023-03-28 PROCEDURE — 3078F PR MOST RECENT DIASTOLIC BLOOD PRESSURE < 80 MM HG: ICD-10-PCS | Mod: CPTII,S$GLB,, | Performed by: INTERNAL MEDICINE

## 2023-03-28 PROCEDURE — 99395 PREV VISIT EST AGE 18-39: CPT | Mod: GZ,S$GLB,, | Performed by: INTERNAL MEDICINE

## 2023-03-28 PROCEDURE — 99999 PR PBB SHADOW E&M-EST. PATIENT-LVL III: CPT | Mod: PBBFAC,,, | Performed by: INTERNAL MEDICINE

## 2023-03-28 PROCEDURE — 99499 RISK ADDL DX/OHS AUDIT: ICD-10-PCS | Mod: S$GLB,,, | Performed by: INTERNAL MEDICINE

## 2023-03-28 PROCEDURE — 1160F RVW MEDS BY RX/DR IN RCRD: CPT | Mod: CPTII,S$GLB,, | Performed by: INTERNAL MEDICINE

## 2023-03-28 PROCEDURE — 1159F MED LIST DOCD IN RCRD: CPT | Mod: CPTII,S$GLB,, | Performed by: INTERNAL MEDICINE

## 2023-03-28 PROCEDURE — 1160F PR REVIEW ALL MEDS BY PRESCRIBER/CLIN PHARMACIST DOCUMENTED: ICD-10-PCS | Mod: CPTII,S$GLB,, | Performed by: INTERNAL MEDICINE

## 2023-03-28 NOTE — PROGRESS NOTES
Subjective     Patient ID: Vianney Callahan is a 37 y.o. female.    Chief Complaint: Annual Exam    HPI  Vegan plus shrimp diet    Works out frequently; lifts weights.     Hopes to work at [employer].     Her mental health has improved.  Taking Zoloft, lamictal, prolixin, wellbutrin and cogentin.  Last psych hospitalization was years ago.    Migraines are less severe, but frequent.  More severe migraines twice a  month.  Imitrex helpful.    .Chronic constipation.  Amitiza no longer effective.  She has incr fiber.    Gerd controlled with prilosec.    OCPs control dysmenorrhea.    Back better with healthy back.    Last filled tramadol 12/21.    She has stopped smoking.      Review of Systems   Constitutional:  Negative for fever and unexpected weight change.   HENT:  Negative for nasal congestion and postnasal drip.    Respiratory:  Negative for chest tightness, shortness of breath and wheezing.    Cardiovascular:  Negative for chest pain and leg swelling.   Gastrointestinal:  Negative for abdominal pain, anal bleeding, constipation, diarrhea, nausea and vomiting.   Genitourinary:  Negative for dysuria and urgency.   Integumentary:  Negative for rash.   Neurological:  Negative for headaches.   Psychiatric/Behavioral:  Negative for dysphoric mood and sleep disturbance. The patient is not nervous/anxious.         Objective     Physical Exam  Constitutional:       General: She is not in acute distress.     Appearance: She is well-developed.   HENT:      Head: Normocephalic and atraumatic.      Right Ear: External ear normal.      Left Ear: External ear normal.      Nose: Nose normal.   Eyes:      General: No scleral icterus.        Right eye: No discharge.         Left eye: No discharge.      Conjunctiva/sclera: Conjunctivae normal.      Pupils: Pupils are equal, round, and reactive to light.   Neck:      Thyroid: No thyromegaly.      Vascular: No JVD.   Cardiovascular:      Rate and Rhythm: Normal rate and regular rhythm.       Heart sounds: Normal heart sounds. No murmur heard.    No gallop.   Pulmonary:      Effort: Pulmonary effort is normal. No respiratory distress.      Breath sounds: Normal breath sounds. No wheezing or rales.   Abdominal:      General: Bowel sounds are normal. There is no distension.      Palpations: Abdomen is soft. There is no mass.      Tenderness: There is no abdominal tenderness. There is no guarding or rebound.   Musculoskeletal:         General: No tenderness. Normal range of motion.      Cervical back: Normal range of motion and neck supple.   Lymphadenopathy:      Cervical: No cervical adenopathy.   Skin:     General: Skin is warm and dry.      Findings: No rash.   Neurological:      Mental Status: She is alert and oriented to person, place, and time.      Cranial Nerves: No cranial nerve deficit.      Coordination: Coordination normal.   Psychiatric:         Behavior: Behavior normal.         Thought Content: Thought content normal.         Judgment: Judgment normal.          Assessment and Plan     Problem List Items Addressed This Visit          BMU THERAPY PLAN    Former smoker       Other    Schizoaffective disorder, bipolar type    Polydrug dependence excluding opioid type drug, in remission    Back pain     Other Visit Diagnoses       Annual physical exam    -  Primary    Relevant Orders    Comprehensive Metabolic Panel    Other migraine without status migrainosus, not intractable        BMI 25.0-25.9,adult        Relevant Orders    CBC Without Differential    Hypertriglyceridemia        Relevant Orders    Lipid Panel    Screening for diabetes mellitus               She is doing quite well.    Vianney was seen today for annual exam.    Diagnoses and all orders for this visit:    Annual physical exam  -     Comprehensive Metabolic Panel; Future    Schizoaffective disorder, bipolar type    Other migraine without status migrainosus, not intractable    Former smoker    Chronic low back pain without  sciatica, unspecified back pain laterality    BMI 25.0-25.9,adult  -     CBC Without Differential; Future    Hypertriglyceridemia  -     Lipid Panel; Future    Screening for diabetes mellitus    Polydrug dependence excluding opioid type drug, in remission

## 2023-04-06 ENCOUNTER — OFFICE VISIT (OUTPATIENT)
Dept: GASTROENTEROLOGY | Facility: CLINIC | Age: 38
End: 2023-04-06
Payer: MEDICARE

## 2023-04-06 VITALS — BODY MASS INDEX: 24.96 KG/M2 | WEIGHT: 164.69 LBS | HEIGHT: 68 IN

## 2023-04-06 DIAGNOSIS — K21.9 GASTROESOPHAGEAL REFLUX DISEASE, UNSPECIFIED WHETHER ESOPHAGITIS PRESENT: ICD-10-CM

## 2023-04-06 DIAGNOSIS — K59.04 CHRONIC IDIOPATHIC CONSTIPATION: Primary | ICD-10-CM

## 2023-04-06 PROCEDURE — 3008F BODY MASS INDEX DOCD: CPT | Mod: CPTII,S$GLB,, | Performed by: NURSE PRACTITIONER

## 2023-04-06 PROCEDURE — 99999 PR PBB SHADOW E&M-EST. PATIENT-LVL III: CPT | Mod: PBBFAC,,, | Performed by: NURSE PRACTITIONER

## 2023-04-06 PROCEDURE — 99999 PR PBB SHADOW E&M-EST. PATIENT-LVL III: ICD-10-PCS | Mod: PBBFAC,,, | Performed by: NURSE PRACTITIONER

## 2023-04-06 PROCEDURE — 99214 OFFICE O/P EST MOD 30 MIN: CPT | Mod: S$GLB,,, | Performed by: NURSE PRACTITIONER

## 2023-04-06 PROCEDURE — 3008F PR BODY MASS INDEX (BMI) DOCUMENTED: ICD-10-PCS | Mod: CPTII,S$GLB,, | Performed by: NURSE PRACTITIONER

## 2023-04-06 PROCEDURE — 99214 PR OFFICE/OUTPT VISIT, EST, LEVL IV, 30-39 MIN: ICD-10-PCS | Mod: S$GLB,,, | Performed by: NURSE PRACTITIONER

## 2023-04-06 PROCEDURE — 1159F MED LIST DOCD IN RCRD: CPT | Mod: CPTII,S$GLB,, | Performed by: NURSE PRACTITIONER

## 2023-04-06 PROCEDURE — 1159F PR MEDICATION LIST DOCUMENTED IN MEDICAL RECORD: ICD-10-PCS | Mod: CPTII,S$GLB,, | Performed by: NURSE PRACTITIONER

## 2023-04-06 NOTE — PATIENT INSTRUCTIONS
Continue Senna in the evening.     Start Metamucil daily in the morning. Start with 2-3 capsules in the morning and you can increase up to 5 capsules in the morning if needed.

## 2023-04-06 NOTE — PROGRESS NOTES
GASTROENTEROLOGY CLINIC NOTE    Chief Complaint: The primary encounter diagnosis was Chronic idiopathic constipation. A diagnosis of Gastroesophageal reflux disease, unspecified whether esophagitis present was also pertinent to this visit.  Referring provider/PCP: Nellie Coreas MD    HPI:  Vianney Callahan is a 37 y.o. female who is a new patient to me with a PMH that is significant for Abnormal cervical Papanicolaou smear, ADHD (attention deficit hyperactivity disorder), Allergy, Anxiety, Asthma, Back pain, Bipolar affective disorder, Cholecystitis, Chronic migraine without aura, with intractable migraine, so stated, without mention of status migrainosus, Depression, Fever blister, Gallstones, Headache(784.0), Hepatitis C antibody positive in blood, History of hepatitis C, History of psychiatric hospitalization, Personality disorder, Psychosis, Therapy, and Tobacco abuse.  She is here today to establish care for constipation.  This is a new problem that began about two to three weeks ago.  She reports she has one bowel movement every three days that is soft, thin with mucus.  In between bowel movements, she will feel urge to have a bowel movement but will pass either gas or yellow/orange mucus no stool.  She reports increased straining with bowel movements.  Denies diarrhea, nocturnal symptoms, abdominal pain, hematochezia, nausea, vomiting, or fever. She does report starting wellbutrin prior to start of constipation.      Of note, patient sought care at urgent care and at the ER for symptoms.  KUB was obtained at urgent care. Stool noted throughout colon.  She was also given prescription for Cipro which she has completed.  In the ER, MELODY performed and was without any abnormal findings. It was recommended she follow up with GI.      Interval Note 1/18/2021  Vianney Callahan who is known to me presents to clinic for follow up visit regarding constipation.  Following her last visit, Linzess 145mcg was initiated.  She  reports that the Linzess is helping with bowel movements but it is causing loose, frequent bowel movements.  One week ago, she decreased Linzess to every other day and reports it has helped with frequency and consistency of bowel movements but bloating has returned.  Denies straining, abdominal pain, melena, or hematochezia.  No mucus noted in stool.  When taking Linzess daily, bloating was improved.     Interval Note 4/28/2022  Vianney Callahan who is known to me present to clinic for follow up regarding constipation and reflux.  Following her last appointment Linzess was decreased to 72mcg.  Constipation returned with decreased dose and bowel movements were occurring every two days accompanied by straining. Consistency was hard.  It was recommended she start Senna with the Linzess.  She reports improvement in bowel movements.  They are not occurring daily and are soft in consistency. No straining, hematochezia, bloating, or abdominal pain.  She is having an increase in reflux and pyrosis.  Currently taking omeprazole 20mg daily.     Interval Note 4/6/2023  Vianney Callahan who is known to me presents to clinic for follow up regarding constipation.  Following her last appointment Senna was added to Linzess and Linzess was increased to 145mcg. Amitiza was then started as Linzess was no longer helping. She has since stopped Amitiza and is taking three Senna at night and is having some improvement. Having daily bowel movements in morning but experiencing rectal pain when initiating bowel movement due to large, hard stool. Loose stool will follow.   Straining: Some with beginning of bowel movement  Blood: No  Abdominal Pain: (Lwr quad) No  Unexpected weight loss: No    Omeprazole continues to control reflux. Tolerating well without any side effects.     Medications: Wellbutrin, Cogentin, Prolixin, Lamictal   Treatments: Linzess, Amitiza, Senna    Prior Upper Endoscopy: 9/2014  Findings: The examined esophagus was normal. The  Z-line was regular and was found 40 cm from the incisors. The entire examined stomach was normal. Biopsies were taken with a cold forceps for Helicobacter pylori testing. Estimated blood loss   was minimal. The examined duodenum was normal. Biopsies were taken with a cold   forceps for evaluation of celiac disease. Estimated blood loss was minimal. .    Impression:           - Normal Study. Stomach and duodenum biopsied.     Recommendation: - Discharge patient to home.                         - Resume previous diet.                         - Await pathology results.                         - Continue present medications.   Pathology:  1. DUODENUM, BIOPSY:  Duodenal mucosa with no significant pathologic abnormality  Villous architecture is maintained  2. STOMACH, BIOPSY:  Gastric body with mild chronic inflammation  No evidence of intestinal metaplasia, dysplasia or malignancy  No evidence of Helicobacter pylori organisms on H&E stain    Prior Colonoscopy: No  Family h/o Colon Cancer: No  Family h/o Crohn's Disease or Ulcerative Colitis: No  Abdominal Surgeries: 2014 Cholecystectomy    NSAIDs: No  Anticoagulation or Antiplatelet: No    Review of Systems   Constitutional:  Negative for weight loss.   HENT:  Negative for sore throat.    Eyes:  Negative for blurred vision.   Respiratory:  Negative for cough.    Cardiovascular:  Negative for chest pain.   Gastrointestinal:  Positive for constipation. Negative for abdominal pain, blood in stool, diarrhea, heartburn, melena, nausea and vomiting.   Genitourinary:  Negative for dysuria.   Musculoskeletal:  Negative for myalgias.   Skin:  Negative for rash.   Neurological:  Negative for headaches.   Endo/Heme/Allergies:  Negative for environmental allergies.   Psychiatric/Behavioral:  Negative for suicidal ideas. The patient is not nervous/anxious.      Past Medical History: has a past medical history of Abnormal cervical Papanicolaou smear, ADHD (attention deficit  hyperactivity disorder), Allergy, Anxiety, Asthma, Back pain, Bipolar affective disorder, Cholecystitis, Chronic migraine without aura, with intractable migraine, so stated, without mention of status migrainosus, Depression, Fever blister, Gallstones, Headache(784.0), Hepatitis C antibody positive in blood, History of hepatitis C, History of psychiatric hospitalization, Personality disorder, Psychosis, Therapy, and Tobacco abuse.    Past Surgical History: has a past surgical history that includes Fracture surgery; Skin biopsy; Mole removal; and Esophagogastroduodenoscopy.    Family History:family history includes ADD / ADHD in her mother; Acne in her maternal aunt; Alcohol abuse in her father; Anxiety disorder in her paternal aunt and sister; Bipolar disorder in her mother; Depression in her father, maternal aunt, and paternal aunt; Drug abuse in her father; Melanoma in her mother; Migraines in her maternal aunt; Schizophrenia in her mother.    Allergies:   Review of patient's allergies indicates:   Allergen Reactions    Dilantin [phenytoin sodium extended] Rash    Saphris [asenapine]      Confusion and depressed mood.       Social History: reports that she has quit smoking. Her smoking use included vaping with nicotine and cigarettes. She smoked an average of .5 packs per day. She has never used smokeless tobacco. She reports that she does not drink alcohol and does not use drugs.    Home medications:   Current Outpatient Medications on File Prior to Visit   Medication Sig Dispense Refill    benztropine (COGENTIN) 0.5 MG tablet 1 mg.      buPROPion (WELLBUTRIN XL) 300 MG 24 hr tablet Take 300 mg by mouth once daily.      fluphenazine (PROLIXIN) 5 MG tablet 1 tab p qm, 2 tabs po q hs. 1 tablet 0    ibuprofen (ADVIL,MOTRIN) 600 MG tablet TAKE 1 TABLET BY MOUTH EVERY 8 HOURS AS NEEDED FOR PAIN 90 tablet 1    lamoTRIgine (LAMICTAL) 150 MG Tab Take 150 mg by mouth once daily.      methocarbamoL (ROBAXIN) 500 MG Tab 1-2  "tabs po bid prn back pain 60 tablet 1    norethindrone-ethinyl estradiol (MICROGESTIN 1/20) 1-20 mg-mcg per tablet Take 1 tablet by mouth once daily. NO ADDITIONAL REFILLS APPOINTMENT NEEDED 63 tablet 0    omeprazole (PRILOSEC) 40 MG capsule TAKE 1 CAPSULE BY MOUTH EVERY DAY 90 capsule 2    sertraline (ZOLOFT) 100 MG tablet Pt takes 150mg so one and a half a day      sumatriptan (IMITREX) 100 MG tablet TAKE 1 TABLET BY MOUTH EVERY DAY AS NEEDED FOR MIGRAINE HEADACHE 12 tablet 11    tretinoin (RETIN-A) 0.1 % cream APPLY SMALL AMOUNT TO ENTIRE FACE EVERY NIGHT AT BEDTIME. WASH OFF EVERY MORNING AND APPLY SUNSCREEN 45 g 3    [DISCONTINUED] valACYclovir (VALTREX) 1000 MG tablet Take 1 tablet (1,000 mg total) by mouth once daily. 90 tablet 3     No current facility-administered medications on file prior to visit.       Vital signs:  Ht 5' 8" (1.727 m)   Wt 74.7 kg (164 lb 10.9 oz)   BMI 25.04 kg/m²     Physical Exam  Vitals reviewed.   Constitutional:       General: She is not in acute distress.     Appearance: Normal appearance. She is not ill-appearing.   HENT:      Head: Normocephalic.   Cardiovascular:      Rate and Rhythm: Normal rate and regular rhythm.      Heart sounds: Normal heart sounds. No murmur heard.  Pulmonary:      Effort: Pulmonary effort is normal. No respiratory distress.      Breath sounds: Normal breath sounds.   Chest:      Chest wall: No tenderness.   Abdominal:      General: Bowel sounds are normal. There is no distension.      Palpations: Abdomen is soft.      Tenderness: There is no abdominal tenderness. Negative signs include Orellana's sign.      Hernia: No hernia is present.   Skin:     General: Skin is warm.   Neurological:      Mental Status: She is alert and oriented to person, place, and time.   Psychiatric:         Mood and Affect: Mood normal.         Behavior: Behavior normal.       Routine labs:  Lab Results   Component Value Date    WBC 11.56 03/28/2023    HGB 14.5 03/28/2023    HCT " 45.6 03/28/2023    MCV 88 03/28/2023     (H) 03/28/2023     No results found for: INR  No results found for: IRON, FERRITIN, TIBC, FESATURATED  Lab Results   Component Value Date     03/28/2023    K 3.8 03/28/2023     03/28/2023    CO2 24 03/28/2023    BUN 6 03/28/2023    CREATININE 0.9 03/28/2023     Lab Results   Component Value Date    ALBUMIN 4.2 03/28/2023    ALT 17 03/28/2023    AST 17 03/28/2023    ALKPHOS 117 03/28/2023    BILITOT 0.3 03/28/2023     No results found for: GLUCOSE  Lab Results   Component Value Date    TSH 2.038 05/08/2018     Lab Results   Component Value Date    CALCIUM 9.8 03/28/2023    PHOS 2.8 11/05/2014       Imaging:  XR ABDOMEN FLAT AND ERECT  Narrative: EXAMINATION:  XR ABDOMEN FLAT AND ERECT    CLINICAL HISTORY:  Constipation, unspecified    TECHNIQUE:  Flat and erect AP views of the abdomen were performed.    COMPARISON:  Abdominal radiograph performed 10/04/2019    FINDINGS:  No definite dilated loops of small large bowel appreciated.  No convincing fluid levels are seen on the upright view.  No free air.  Surgical clips in right upper quadrant of the abdomen may relate to prior cholecystectomy.  Postsurgical changes are noted involving the right proximal femur.  Osseous soft tissue structures overall appear without definite acute finding.  Phleboliths appear to project within the pelvis.  Impression: No definite acute findings identified.    Electronically signed by: Bladimir Kaur  Date:    11/22/2021  Time:    19:02      I have reviewed prior labs, imaging, and notes.      Assessment:  1. Chronic idiopathic constipation    2. Gastroesophageal reflux disease, unspecified whether esophagitis present      Constipation. Medications and stress of caring for her mother may be contributing to constipation. No longer taking Amitiza.   Having more relief with Senna daily     Plan:     Continue Senna in the evening.   Start Metamucil daily in the morning. Can increase  to twice a day if needed.   Continue omeprazole as previously prescribed.   Continue good water intake.    Consider colonoscopy if constipation continues.      Plan of care discussed with patient who is in agreement and verbalized understanding.       Follow Up: 6 weeks          PERRI Yap,LILIYAP-BC [facility]/[place]

## 2023-04-12 ENCOUNTER — OFFICE VISIT (OUTPATIENT)
Dept: OBSTETRICS AND GYNECOLOGY | Facility: CLINIC | Age: 38
End: 2023-04-12
Payer: MEDICARE

## 2023-04-12 VITALS
BODY MASS INDEX: 25.23 KG/M2 | WEIGHT: 166.44 LBS | DIASTOLIC BLOOD PRESSURE: 68 MMHG | HEIGHT: 68 IN | SYSTOLIC BLOOD PRESSURE: 98 MMHG

## 2023-04-12 DIAGNOSIS — Z30.41 ENCOUNTER FOR SURVEILLANCE OF CONTRACEPTIVE PILLS: ICD-10-CM

## 2023-04-12 DIAGNOSIS — Z01.419 WOMEN'S ANNUAL ROUTINE GYNECOLOGICAL EXAMINATION: Primary | ICD-10-CM

## 2023-04-12 DIAGNOSIS — Z12.4 PAP SMEAR FOR CERVICAL CANCER SCREENING: ICD-10-CM

## 2023-04-12 PROCEDURE — 87624 HPV HI-RISK TYP POOLED RSLT: CPT | Performed by: OBSTETRICS & GYNECOLOGY

## 2023-04-12 PROCEDURE — 99999 PR PBB SHADOW E&M-EST. PATIENT-LVL III: CPT | Mod: PBBFAC,,, | Performed by: OBSTETRICS & GYNECOLOGY

## 2023-04-12 PROCEDURE — 3078F PR MOST RECENT DIASTOLIC BLOOD PRESSURE < 80 MM HG: ICD-10-PCS | Mod: CPTII,S$GLB,, | Performed by: OBSTETRICS & GYNECOLOGY

## 2023-04-12 PROCEDURE — G0101 CA SCREEN;PELVIC/BREAST EXAM: HCPCS | Mod: GZ,S$GLB,, | Performed by: OBSTETRICS & GYNECOLOGY

## 2023-04-12 PROCEDURE — 1160F PR REVIEW ALL MEDS BY PRESCRIBER/CLIN PHARMACIST DOCUMENTED: ICD-10-PCS | Mod: CPTII,S$GLB,, | Performed by: OBSTETRICS & GYNECOLOGY

## 2023-04-12 PROCEDURE — 3008F PR BODY MASS INDEX (BMI) DOCUMENTED: ICD-10-PCS | Mod: CPTII,S$GLB,, | Performed by: OBSTETRICS & GYNECOLOGY

## 2023-04-12 PROCEDURE — 3078F DIAST BP <80 MM HG: CPT | Mod: CPTII,S$GLB,, | Performed by: OBSTETRICS & GYNECOLOGY

## 2023-04-12 PROCEDURE — 1160F RVW MEDS BY RX/DR IN RCRD: CPT | Mod: CPTII,S$GLB,, | Performed by: OBSTETRICS & GYNECOLOGY

## 2023-04-12 PROCEDURE — 88142 CYTOPATH C/V THIN LAYER: CPT | Performed by: OBSTETRICS & GYNECOLOGY

## 2023-04-12 PROCEDURE — 1159F MED LIST DOCD IN RCRD: CPT | Mod: CPTII,S$GLB,, | Performed by: OBSTETRICS & GYNECOLOGY

## 2023-04-12 PROCEDURE — 3008F BODY MASS INDEX DOCD: CPT | Mod: CPTII,S$GLB,, | Performed by: OBSTETRICS & GYNECOLOGY

## 2023-04-12 PROCEDURE — 3074F SYST BP LT 130 MM HG: CPT | Mod: CPTII,S$GLB,, | Performed by: OBSTETRICS & GYNECOLOGY

## 2023-04-12 PROCEDURE — G0101 PR CA SCREEN;PELVIC/BREAST EXAM: ICD-10-PCS | Mod: GZ,S$GLB,, | Performed by: OBSTETRICS & GYNECOLOGY

## 2023-04-12 PROCEDURE — 99999 PR PBB SHADOW E&M-EST. PATIENT-LVL III: ICD-10-PCS | Mod: PBBFAC,,, | Performed by: OBSTETRICS & GYNECOLOGY

## 2023-04-12 PROCEDURE — 3074F PR MOST RECENT SYSTOLIC BLOOD PRESSURE < 130 MM HG: ICD-10-PCS | Mod: CPTII,S$GLB,, | Performed by: OBSTETRICS & GYNECOLOGY

## 2023-04-12 PROCEDURE — 1159F PR MEDICATION LIST DOCUMENTED IN MEDICAL RECORD: ICD-10-PCS | Mod: CPTII,S$GLB,, | Performed by: OBSTETRICS & GYNECOLOGY

## 2023-04-12 RX ORDER — BENZTROPINE MESYLATE 1 MG/1
1 TABLET ORAL 2 TIMES DAILY
COMMUNITY
Start: 2023-03-23 | End: 2023-11-01 | Stop reason: SDUPTHER

## 2023-04-12 RX ORDER — SENNOSIDES 8.6 MG
1 TABLET ORAL
COMMUNITY
Start: 2023-03-23 | End: 2023-05-29

## 2023-04-12 RX ORDER — NORETHINDRONE ACETATE AND ETHINYL ESTRADIOL .02; 1 MG/1; MG/1
1 TABLET ORAL DAILY
Qty: 63 TABLET | Refills: 4 | Status: SHIPPED | OUTPATIENT
Start: 2023-04-12 | End: 2023-12-01

## 2023-04-12 RX ORDER — FLUPHENAZINE HYDROCHLORIDE 10 MG/1
10 TABLET ORAL 2 TIMES DAILY
COMMUNITY
Start: 2023-04-02 | End: 2023-09-01

## 2023-04-12 NOTE — PROGRESS NOTES
"Vianney Callahan is a 37 y.o. female  who presents for annual exam.  She continues to take Microgestin  on a continual basis, not experiencing any bleeding.  No GYN complaints.  No LMP recorded. (Menstrual status: Birth Control).    Blood pressure 98/68, height 5' 8" (1.727 m), weight 75.5 kg (166 lb 7.2 oz).      Past Medical History:   Diagnosis Date    Abnormal cervical Papanicolaou smear 2012    Colposcopy    ADHD (attention deficit hyperactivity disorder) 2012    Allergy     Anxiety     Asthma     childhood    Back pain 2014    Bipolar affective disorder     Cholecystitis     Chronic migraine without aura, with intractable migraine, so stated, without mention of status migrainosus 2013    Depression     Fever blister     Gallstones 2014    Headache(784.0)     Hepatitis C antibody positive in blood     History of hepatitis C     History of psychiatric hospitalization     Suicide attempt    Personality disorder 2013    Psychosis 10/7/2013    Therapy     Tobacco abuse 2013       Past Surgical History:   Procedure Laterality Date    ESOPHAGOGASTRODUODENOSCOPY      FRACTURE SURGERY      MOLE REMOVAL      SKIN BIOPSY         OB History          0    Para   0    Term                AB        Living             SAB        IAB        Ectopic        Multiple        Live Births                     ROS:  GENERAL: Feeling well overall.   SKIN: Denies rash or lesions.   HEAD: Denies head injury or headache.   NODES: Denies enlarged lymph nodes.   CHEST: Denies chest pain or shortness of breath.   CARDIOVASCULAR: Denies palpitations or left sided chest pain.   ABDOMEN: No abdominal pain, nausea, vomiting or rectal bleeding.   URINARY: No dysuria or hematuria.  REPRODUCTIVE: See HPI.   BREASTS: Denies pain, lumps, or nipple discharge.   HEMATOLOGIC: No easy bruisability or excessive bleeding.   MUSCULOSKELETAL: Reports some low back discomfort.   NEUROLOGIC: Denies syncope or " weakness.   PSYCHIATRIC: History of bipolar.    PE:   (chaperone present during entire exam)  APPEARANCE: Well nourished, well developed, in no acute distress.  BREASTS: Symmetrical, no skin changes or visible lesions. No palpable masses, nipple discharge or adenopathy bilaterally.  ABDOMEN: Soft. No tenderness or masses. No CVA tenderness.  VULVA: No lesions. Normal female genitalia.  URETHRAL MEATUS: Normal size and location, no lesions, no prolapse.  URETHRA: No masses, tenderness, prolapse or scarring.  VAGINA: Moist and well rugated, no abnormal discharge, no significant cystocele or rectocele.  CERVIX: No lesions and discharge. PAP done.  UTERUS: Normal size, regular shape, mobile, non-tender, bladder base nontender.  ADNEXA: No masses, tenderness or CDS nodularity.  ANUS PERINEUM: Normal.      Diagnosis:  1. Women's annual routine gynecological examination    2. Encounter for surveillance of contraceptive pills    3. Pap smear for cervical cancer screening          PLAN:    Orders Placed This Encounter    HPV High Risk Genotypes, PCR    Liquid-Based Pap Smear, Screening    norethindrone-ethinyl estradiol (MICROGESTIN 1/20) 1-20 mg-mcg per tablet       Patient was counseled today on the need for annual gyn exams.  We reviewed her continuous usage of OCPs with which she is doing well and desires to continue.    Follow-up in 1 year.    This note was created with voice recognition software.  Grammatical, syntax and spelling errors may be inevitable.

## 2023-04-19 LAB
HPV HR 12 DNA SPEC QL NAA+PROBE: POSITIVE
HPV16 AG SPEC QL: NEGATIVE
HPV18 DNA SPEC QL NAA+PROBE: NEGATIVE

## 2023-04-21 ENCOUNTER — TELEPHONE (OUTPATIENT)
Dept: OBSTETRICS AND GYNECOLOGY | Facility: CLINIC | Age: 38
End: 2023-04-21
Payer: MEDICARE

## 2023-04-21 LAB
FINAL PATHOLOGIC DIAGNOSIS: NORMAL
Lab: NORMAL

## 2023-04-21 NOTE — TELEPHONE ENCOUNTER
Called patient:    Discussed results of pap: negative cytology, other HR HPV present for 2 consecutive paps.    REC:  Colpo

## 2023-04-24 ENCOUNTER — PATIENT MESSAGE (OUTPATIENT)
Dept: OBSTETRICS AND GYNECOLOGY | Facility: CLINIC | Age: 38
End: 2023-04-24
Payer: MEDICARE

## 2023-04-24 ENCOUNTER — PATIENT MESSAGE (OUTPATIENT)
Dept: GASTROENTEROLOGY | Facility: CLINIC | Age: 38
End: 2023-04-24
Payer: MEDICARE

## 2023-04-24 ENCOUNTER — TELEPHONE (OUTPATIENT)
Dept: OBSTETRICS AND GYNECOLOGY | Facility: CLINIC | Age: 38
End: 2023-04-24
Payer: MEDICARE

## 2023-04-24 NOTE — TELEPHONE ENCOUNTER
----- Message from Kelly Suh MA sent at 4/24/2023  2:49 PM CDT -----  Name of Who is Calling:  NYASIA CHAIDEZ [[id number]]              What is the request in detail: Pt needs to reschedule her procedure appt. Please assist.                Can the clinic reply by [facility]: No                What [facility]: [phone]

## 2023-05-24 ENCOUNTER — PROCEDURE VISIT (OUTPATIENT)
Dept: OBSTETRICS AND GYNECOLOGY | Facility: CLINIC | Age: 38
End: 2023-05-24
Payer: MEDICARE

## 2023-05-24 VITALS
SYSTOLIC BLOOD PRESSURE: 132 MMHG | DIASTOLIC BLOOD PRESSURE: 62 MMHG | HEIGHT: 68 IN | BODY MASS INDEX: 24.06 KG/M2 | WEIGHT: 158.75 LBS

## 2023-05-24 DIAGNOSIS — R87.810 PAP SMEAR OF CERVIX SHOWS HIGH RISK HPV PRESENT: Primary | ICD-10-CM

## 2023-05-24 LAB
B-HCG UR QL: NEGATIVE
CTP QC/QA: YES

## 2023-05-24 PROCEDURE — 88305 TISSUE EXAM BY PATHOLOGIST: CPT | Mod: 59 | Performed by: PATHOLOGY

## 2023-05-24 PROCEDURE — 88305 TISSUE EXAM BY PATHOLOGIST: ICD-10-PCS | Mod: 26,,, | Performed by: PATHOLOGY

## 2023-05-24 PROCEDURE — 88305 TISSUE EXAM BY PATHOLOGIST: CPT | Mod: 26,,, | Performed by: PATHOLOGY

## 2023-05-24 PROCEDURE — 81025 POCT URINE PREGNANCY: ICD-10-PCS | Mod: S$GLB,,, | Performed by: OBSTETRICS & GYNECOLOGY

## 2023-05-24 PROCEDURE — 81025 URINE PREGNANCY TEST: CPT | Mod: S$GLB,,, | Performed by: OBSTETRICS & GYNECOLOGY

## 2023-05-24 RX ORDER — BUSPIRONE HYDROCHLORIDE 30 MG/1
30 TABLET ORAL 2 TIMES DAILY
COMMUNITY
Start: 2023-05-22 | End: 2023-11-01 | Stop reason: SDUPTHER

## 2023-05-24 NOTE — PROCEDURES
COLPOSCOPY:    Vianney Callahan is a 37 y.o. female who presents for a colposcopy secondary sequential paps with normal cytology but persistent other high risk HPV.     Summary:  6/17/23 Pap: Negative, HPV: other HR HPV positive  4/12/23 Pap: Negative, HPV: other HR HPV positive     UPT today is negative.        PRE-COLPOSCOPY PROCEDURE COUNSELING:  Discussed the abnormal pap test findings, HPV, need for colposcopy and possible biopsies to determine a diagnosis and plan of care, treatments available, the minimal risks of bleeding and infection with a colposcopy, alternatives to colposcopy and she agrees to proceed.  Patient was given the opportunity to ask questions and written consent was obtained.        COLPOSCOPY EXAM:   TIME OUT PERFORMED.   Cervix visualized with speculum  Acetic acid applied to cervix  Entire transformation zone seen  Minimal faint white epithelium noted at 11:00 to 12:00  Biopsy was taken at 11:00  ECC performed.    Hemostatic with silver nitrate.  Minimal blood loss.      The speculum was removed.   The patient tolerated the procedure well.    There were no complications.      IMPRESSION:   Pap with normal cytology, persistent other HR HPV    POST COLPOSCOPY COUNSELING:   Manage post colposcopy cramping with NSAIDs or Tylenol.  Avoid anything in vagina (intercourse, douching, tampons) one week after the procedure.  Report bleeding heavier than a period, worsening pain, fever > 101.0 F, or foul-smelling vaginal discharge.  Importance of follow-up stressed.      FOLLOW-UP:   We will contact her biopsy results and to discuss managment plan.

## 2023-05-27 DIAGNOSIS — K59.04 CHRONIC IDIOPATHIC CONSTIPATION: ICD-10-CM

## 2023-05-29 RX ORDER — SENNOSIDES 8.6 MG
TABLET ORAL
Qty: 90 TABLET | Refills: 3 | Status: SHIPPED | OUTPATIENT
Start: 2023-05-29 | End: 2023-05-31 | Stop reason: SDUPTHER

## 2023-05-30 ENCOUNTER — PATIENT MESSAGE (OUTPATIENT)
Dept: GASTROENTEROLOGY | Facility: CLINIC | Age: 38
End: 2023-05-30
Payer: MEDICARE

## 2023-05-30 DIAGNOSIS — K59.04 CHRONIC IDIOPATHIC CONSTIPATION: ICD-10-CM

## 2023-05-30 LAB
FINAL PATHOLOGIC DIAGNOSIS: NORMAL
GROSS: NORMAL
Lab: NORMAL

## 2023-05-31 ENCOUNTER — TELEPHONE (OUTPATIENT)
Dept: OBSTETRICS AND GYNECOLOGY | Facility: CLINIC | Age: 38
End: 2023-05-31
Payer: MEDICARE

## 2023-05-31 RX ORDER — SENNOSIDES 8.6 MG/1
3 TABLET ORAL DAILY
Qty: 90 TABLET | Refills: 11 | Status: SHIPPED | OUTPATIENT
Start: 2023-05-31 | End: 2024-05-30

## 2023-05-31 NOTE — TELEPHONE ENCOUNTER
Called patient:    Discussed results of colpo:    1. ENDOCERVICAL CURETTAGE SHOWS SCANT FRAGMENTS OF BENIGN ENDOCERVICAL GLANDULAR EPITHELIUM ADMIXED WITH ABUNDANT MUCUS, NO DYSPLASIA IDENTIFIED.     2. CERVICAL BIOPSY AT 11 O'CLOCK SHOWS MILD SQUAMOUS DYSPLASIA (CIN1).     REC:  Repeat pap in one year - I emphasized the importance of follow-up.   Multilayer Compression Wrap   (Not Unna) Below the Knee    NAME:  Reji Farah OF BIRTH:  11/26/1929  MEDICAL RECORD NUMBER:  [id number]  DATE:  3/14/Adelaida Mann LPN on 4/90/0096 at 7:25 PM

## 2023-06-15 ENCOUNTER — PATIENT MESSAGE (OUTPATIENT)
Dept: INTERNAL MEDICINE | Facility: CLINIC | Age: 38
End: 2023-06-15
Payer: MEDICARE

## 2023-06-20 ENCOUNTER — PATIENT MESSAGE (OUTPATIENT)
Dept: INTERNAL MEDICINE | Facility: CLINIC | Age: 38
End: 2023-06-20
Payer: MEDICARE

## 2023-06-28 ENCOUNTER — OFFICE VISIT (OUTPATIENT)
Dept: INTERNAL MEDICINE | Facility: CLINIC | Age: 38
End: 2023-06-28
Payer: MEDICARE

## 2023-06-28 VITALS
HEIGHT: 67 IN | HEART RATE: 106 BPM | DIASTOLIC BLOOD PRESSURE: 62 MMHG | BODY MASS INDEX: 26.66 KG/M2 | SYSTOLIC BLOOD PRESSURE: 130 MMHG | OXYGEN SATURATION: 97 % | WEIGHT: 169.88 LBS

## 2023-06-28 DIAGNOSIS — R22.0 SUBCUTANEOUS MASS OF HEAD: ICD-10-CM

## 2023-06-28 DIAGNOSIS — A60.00 GENITAL HERPES SIMPLEX, UNSPECIFIED SITE: Primary | ICD-10-CM

## 2023-06-28 PROCEDURE — 3075F SYST BP GE 130 - 139MM HG: CPT | Mod: CPTII,S$GLB,, | Performed by: PHYSICIAN ASSISTANT

## 2023-06-28 PROCEDURE — 3078F PR MOST RECENT DIASTOLIC BLOOD PRESSURE < 80 MM HG: ICD-10-PCS | Mod: CPTII,S$GLB,, | Performed by: PHYSICIAN ASSISTANT

## 2023-06-28 PROCEDURE — 1159F PR MEDICATION LIST DOCUMENTED IN MEDICAL RECORD: ICD-10-PCS | Mod: CPTII,S$GLB,, | Performed by: PHYSICIAN ASSISTANT

## 2023-06-28 PROCEDURE — 99213 OFFICE O/P EST LOW 20 MIN: CPT | Mod: S$GLB,,, | Performed by: PHYSICIAN ASSISTANT

## 2023-06-28 PROCEDURE — 99999 PR PBB SHADOW E&M-EST. PATIENT-LVL V: CPT | Mod: PBBFAC,,, | Performed by: PHYSICIAN ASSISTANT

## 2023-06-28 PROCEDURE — 3008F PR BODY MASS INDEX (BMI) DOCUMENTED: ICD-10-PCS | Mod: CPTII,S$GLB,, | Performed by: PHYSICIAN ASSISTANT

## 2023-06-28 PROCEDURE — 3075F PR MOST RECENT SYSTOLIC BLOOD PRESS GE 130-139MM HG: ICD-10-PCS | Mod: CPTII,S$GLB,, | Performed by: PHYSICIAN ASSISTANT

## 2023-06-28 PROCEDURE — 1160F PR REVIEW ALL MEDS BY PRESCRIBER/CLIN PHARMACIST DOCUMENTED: ICD-10-PCS | Mod: CPTII,S$GLB,, | Performed by: PHYSICIAN ASSISTANT

## 2023-06-28 PROCEDURE — 3078F DIAST BP <80 MM HG: CPT | Mod: CPTII,S$GLB,, | Performed by: PHYSICIAN ASSISTANT

## 2023-06-28 PROCEDURE — 99213 PR OFFICE/OUTPT VISIT, EST, LEVL III, 20-29 MIN: ICD-10-PCS | Mod: S$GLB,,, | Performed by: PHYSICIAN ASSISTANT

## 2023-06-28 PROCEDURE — 3008F BODY MASS INDEX DOCD: CPT | Mod: CPTII,S$GLB,, | Performed by: PHYSICIAN ASSISTANT

## 2023-06-28 PROCEDURE — 99999 PR PBB SHADOW E&M-EST. PATIENT-LVL V: ICD-10-PCS | Mod: PBBFAC,,, | Performed by: PHYSICIAN ASSISTANT

## 2023-06-28 PROCEDURE — 1159F MED LIST DOCD IN RCRD: CPT | Mod: CPTII,S$GLB,, | Performed by: PHYSICIAN ASSISTANT

## 2023-06-28 PROCEDURE — 1160F RVW MEDS BY RX/DR IN RCRD: CPT | Mod: CPTII,S$GLB,, | Performed by: PHYSICIAN ASSISTANT

## 2023-06-28 RX ORDER — VALACYCLOVIR HYDROCHLORIDE 1 G/1
1000 TABLET, FILM COATED ORAL DAILY
Qty: 90 TABLET | Refills: 1 | Status: SHIPPED | OUTPATIENT
Start: 2023-06-28 | End: 2023-12-26

## 2023-06-28 NOTE — PROGRESS NOTES
Subjective:       Patient ID: Vianney Callahan is a 37 y.o. female.        Chief Complaint: knot on forehead    Vianney Callahan is an established patient of Nellie Coreas MD here today for urgent care visit.    Genital herpes -   Previously on valtrex 1000 mg daily for suppression but d/c due to control  Now with flares again, 2-3 times this year already  Would like to resume suppressive therapy    Lump on left side of forehead x 3-4 years  Enlarging recently  Bothersome but not painful         Review of Systems   Constitutional:  Negative for chills, diaphoresis, fatigue and fever.   HENT:  Negative for congestion and sore throat.    Eyes:  Negative for visual disturbance.   Respiratory:  Negative for cough, chest tightness and shortness of breath.    Cardiovascular:  Negative for chest pain, palpitations and leg swelling.   Gastrointestinal:  Negative for abdominal pain, blood in stool, constipation, diarrhea, nausea and vomiting.   Genitourinary:  Negative for dysuria, frequency, hematuria and urgency.   Musculoskeletal:  Negative for arthralgias and back pain.   Skin:  Negative for rash.   Neurological:  Negative for dizziness, syncope, weakness and headaches.   Psychiatric/Behavioral:  Negative for dysphoric mood and sleep disturbance. The patient is not nervous/anxious.      Objective:      Physical Exam  Constitutional:       General: She is not in acute distress.     Appearance: She is well-developed. She is not diaphoretic.   HENT:      Head: Normocephalic and atraumatic.      Left Ear: External ear normal.   Pulmonary:      Effort: Pulmonary effort is normal.   Abdominal:      Palpations: Abdomen is soft.   Musculoskeletal:      Cervical back: Neck supple.   Skin:     General: Skin is warm and dry.   Neurological:      Mental Status: She is alert.         4 x 4 cm subcutaneous mass left side of forehead  Assessment:       1. Genital herpes simplex, unspecified site    2. Subcutaneous mass of head        Plan:  "      Vianney was seen today for knot on forehead.    Diagnoses and all orders for this visit:    Genital herpes simplex, unspecified site  -     valACYclovir (VALTREX) 1000 MG tablet; Take 1 tablet (1,000 mg total) by mouth once daily.    Subcutaneous mass of head  -     US Soft Tissue Head Neck Thyroid; Future  -     Ambulatory referral/consult to Plastic Surgery; Future    Check US, refer to plastic surgery to eval for removal    Pt has been given instructions populated from patient instructions database and has verbalized understanding of the after visit summary and information contained wherein.    Follow up with a primary care provider. May go to ER for acute shortness of breath, lightheadedness, fever, or any other emergent complaints or changes in condition.    "This note will be shared with the patient"    Future Appointments   Date Time Provider Department Center   7/6/2023  3:15 PM [facility] [place]                 "

## 2023-07-07 ENCOUNTER — HOSPITAL ENCOUNTER (OUTPATIENT)
Dept: RADIOLOGY | Facility: HOSPITAL | Age: 38
Discharge: HOME | End: 2023-07-07
Attending: PHYSICIAN ASSISTANT
Payer: MEDICARE

## 2023-07-07 DIAGNOSIS — R22.0 SUBCUTANEOUS MASS OF HEAD: ICD-10-CM

## 2023-07-07 PROCEDURE — 76536 US EXAM OF HEAD AND NECK: CPT | Mod: TC

## 2023-07-07 PROCEDURE — 76536 US SOFT TISSUE HEAD NECK THYROID: ICD-10-PCS | Mod: 26,,, | Performed by: INTERNAL MEDICINE

## 2023-07-07 PROCEDURE — 76536 US EXAM OF HEAD AND NECK: CPT | Mod: 26,,, | Performed by: INTERNAL MEDICINE

## 2023-07-18 DIAGNOSIS — K21.9 GASTROESOPHAGEAL REFLUX DISEASE, UNSPECIFIED WHETHER ESOPHAGITIS PRESENT: ICD-10-CM

## 2023-07-19 RX ORDER — OMEPRAZOLE 40 MG/1
CAPSULE, DELAYED RELEASE ORAL
Qty: 90 CAPSULE | Refills: 2 | Status: SHIPPED | OUTPATIENT
Start: 2023-07-19 | End: 2024-03-25

## 2023-08-18 NOTE — TELEPHONE ENCOUNTER
Thank you for visiting [address] Emergency Department. You need to call NASREEN Abraham NP to make an appointment as directed for follow up. Should you have any questions regarding your care or further treatment, please call [facility] at [phone]. Please return to department for any new or worrisome symptoms including any vomiting, fever, difficulty opening mouth. The pharmacy is requesting a new rx for the pt tramadol please include the diagnostic  code, the intake reason and that you are aware she is also taking Clorazepate.      Please advise,

## 2023-08-29 ENCOUNTER — OFFICE VISIT (OUTPATIENT)
Dept: PLASTIC SURGERY | Facility: CLINIC | Age: 38
End: 2023-08-29
Payer: MEDICARE

## 2023-08-29 VITALS — DIASTOLIC BLOOD PRESSURE: 62 MMHG | SYSTOLIC BLOOD PRESSURE: 130 MMHG | HEART RATE: 106 BPM

## 2023-08-29 DIAGNOSIS — D17.0 LIPOMA OF FOREHEAD: ICD-10-CM

## 2023-08-29 DIAGNOSIS — R22.0 SUBCUTANEOUS MASS OF HEAD: ICD-10-CM

## 2023-08-29 PROCEDURE — 99202 PR OFFICE/OUTPT VISIT, NEW, LEVL II, 15-29 MIN: ICD-10-PCS | Mod: S$PBB,,, | Performed by: SURGERY

## 2023-08-29 PROCEDURE — 99999 PR PBB SHADOW E&M-EST. PATIENT-LVL II: ICD-10-PCS | Mod: PBBFAC,,, | Performed by: SURGERY

## 2023-08-29 PROCEDURE — 3078F PR MOST RECENT DIASTOLIC BLOOD PRESSURE < 80 MM HG: ICD-10-PCS | Mod: CPTII,,, | Performed by: SURGERY

## 2023-08-29 PROCEDURE — 99202 OFFICE O/P NEW SF 15 MIN: CPT | Mod: S$PBB,,, | Performed by: SURGERY

## 2023-08-29 PROCEDURE — 99999 PR PBB SHADOW E&M-EST. PATIENT-LVL II: CPT | Mod: PBBFAC,,, | Performed by: SURGERY

## 2023-08-29 PROCEDURE — 3075F PR MOST RECENT SYSTOLIC BLOOD PRESS GE 130-139MM HG: ICD-10-PCS | Mod: CPTII,,, | Performed by: SURGERY

## 2023-08-29 PROCEDURE — 3078F DIAST BP <80 MM HG: CPT | Mod: CPTII,,, | Performed by: SURGERY

## 2023-08-29 PROCEDURE — 3075F SYST BP GE 130 - 139MM HG: CPT | Mod: CPTII,,, | Performed by: SURGERY

## 2023-08-29 NOTE — ASSESSMENT & PLAN NOTE
Discussed excision of lesion, which can be done in the OR under [facility]. Risks including but not limited to hematoma, seroma, infection, scarring, need for reoperation, disruption to hairline pattern.     Patient elects to undergo surgery for lipoma excision.

## 2023-08-29 NOTE — PROGRESS NOTES
Plastic Surgery History & Physical    SUBJECTIVE:   Chief complaint: Forehead cyst    History of Present Illness:  38 y.o. female presenting to discuss forehead cyst. She reports the cyst has been present for several years and has grown in size over the last few months. She denies pain, overlying skin changes, history of infection or trauma to the area. She had a soft tissue US consistent with lipoma. She desires excision of the lesion. She reports psychiatric history but is otherwise healthy.    She denies nicotine use.  Works at [employer] in [place].    Past Medical History:   Diagnosis Date    Abnormal cervical Papanicolaou smear 2012    Colposcopy    ADHD (attention deficit hyperactivity disorder) 8/16/2012    Allergy     Anxiety     Asthma     childhood    Back pain 5/19/2014    Bipolar affective disorder     Cholecystitis     Chronic migraine without aura, with intractable migraine, so stated, without mention of status migrainosus 9/24/2013    Depression     Fever blister     Gallstones 5/26/2014    Headache(784.0)     Hepatitis C antibody positive in blood     History of hepatitis C     History of psychiatric hospitalization     Suicide attempt    Personality disorder 4/11/2013    Psychosis 10/7/2013    Therapy     Tobacco abuse 9/24/2013       Past Surgical History:   Procedure Laterality Date    ESOPHAGOGASTRODUODENOSCOPY      FRACTURE SURGERY      MOLE REMOVAL      SKIN BIOPSY         Family History   Problem Relation Age of Onset    Melanoma Mother     ADD / ADHD Mother     Bipolar disorder Mother     Schizophrenia Mother     Alcohol abuse Father     Drug abuse Father     Depression Father     Anxiety disorder Sister     Acne Maternal Aunt     Depression Maternal Aunt     Migraines Maternal Aunt     Anxiety disorder Paternal Aunt     Depression Paternal Aunt     Breast cancer Neg Hx     Colon cancer Neg Hx     Ovarian cancer Neg Hx        Social History     Socioeconomic History    Marital status:  Single   Occupational History    Occupation: disabled     Employer: disabled    Tobacco Use    Smoking status: Former     Current packs/day: 0.50     Types: Vaping with nicotine, Cigarettes    Smokeless tobacco: Never   Substance and Sexual Activity    Alcohol use: No     Alcohol/week: 0.0 standard drinks of alcohol    Drug use: No    Sexual activity: Not Currently     Partners: Male     Birth control/protection: OCP   Other Topics Concern    Are you pregnant or think you may be? No    Breast-feeding No    Caffeine Use: Excessive No    Financial Status: Unemployed No    Legal: Other No    Childhood History: Adopted No    Financial Status: Other No    Leisure: Exercise No    Childhood History: Early trauma No    Firearms: Does patient have access to a firearm? No    Leisure: Fishing No    Childhood History: Raised by parents Yes    Home situation: homeless No    Leisure: Hunting No    Childhood History: Uneventful No    Home situation: lives alone No    Leisure: Movie Watching Yes    Childhood History: Other No    Home situation: lives in group home No    Leisure: Shopping Yes    Education: Unfinished High School No    Home situation: lives in nursing home No    Leisure: Sports No    Education: High School Graduate Yes    Home situation: lives in shelter No    Leisure: Time with family No    Education: Unfinished college Yes    Home situation: lives with family No     Service No    Education: Trade School No    Home situation: lives with friends No    Spirituality: Active Participation No    Education: Associate's Degree No    Home situation: lives with significant other Yes    Spirituality: Organized Spiritism No    Education: Bachelor's Degree No    Home situation: lives with spouse No    Spirituality: Private Participation No    Education: More than one Bachelor's or Professional No    Legal consequences of chemical use No    Patient feels they ought to cut down on drinking/drug use No    Education:  Master's, PhD No    Legal: Arrest history No    Patient annoyed by others criticizing their drinking/drug use No    Financial Status: Disabled Yes    Legal: Involved in civil litigation No    Patient has felt bad or guilty about drinking/drug use No    Financial Status: Employed No    Legal: Involved in criminal litigation No    Patient has had a drink/used drugs as an eye opener in the AM No   Social History Narrative        Exercise:  Walking, classes at various gyms and [employer]        Lives in apartment with 2 other women with mental illness.       Current Outpatient Medications   Medication Sig Dispense Refill    benztropine (COGENTIN) 1 MG tablet Take 1 mg by mouth 2 (two) times daily.      buPROPion (WELLBUTRIN XL) 300 MG 24 hr tablet Take 300 mg by mouth once daily.      busPIRone (BUSPAR) 30 MG Tab Take 30 mg by mouth 2 (two) times daily.      fluphenazine (PROLIXIN) 10 MG tablet Take 10 mg by mouth 2 (two) times daily.      fluphenazine (PROLIXIN) 5 MG tablet 1 tab p qm, 2 tabs po q hs. 1 tablet 0    ibuprofen (ADVIL,MOTRIN) 600 MG tablet TAKE 1 TABLET BY MOUTH EVERY 8 HOURS AS NEEDED FOR PAIN 90 tablet 1    lamoTRIgine (LAMICTAL) 150 MG Tab Take 150 mg by mouth once daily.      methocarbamoL (ROBAXIN) 500 MG Tab 1-2 tabs po bid prn back pain 60 tablet 1    norethindrone-ethinyl estradiol (MICROGESTIN 1/20) 1-20 mg-mcg per tablet Take 1 tablet by mouth once daily. 63 tablet 4    omeprazole (PRILOSEC) 40 MG capsule TAKE 1 CAPSULE BY MOUTH EVERY DAY 90 capsule 2    SENNA 8.6 mg tablet Take 3 tablets by mouth once daily. 90 tablet 11    sertraline (ZOLOFT) 100 MG tablet Pt takes 150mg so one and a half a day      sumatriptan (IMITREX) 100 MG tablet TAKE 1 TABLET BY MOUTH EVERY DAY AS NEEDED FOR MIGRAINE HEADACHE 12 tablet 11    tretinoin (RETIN-A) 0.1 % cream APPLY SMALL AMOUNT TO ENTIRE FACE EVERY NIGHT AT BEDTIME. WASH OFF EVERY MORNING AND APPLY SUNSCREEN 45 g 3    valACYclovir (VALTREX) 1000 MG tablet Take  1 tablet (1,000 mg total) by mouth once daily. 90 tablet 1     No current facility-administered medications for this visit.       Review of patient's allergies indicates:   Allergen Reactions    Dilantin [phenytoin sodium extended] Rash    Saphris [asenapine]      Confusion and depressed mood.           OBJECTIVE:     /62   Pulse 106       Physical Exam:  Gen: NAD, appears stated age  Neuro: normal without focal findings, mental status and speech normal  HEENT: NCAT, neck supple, PEERL  CV: RRR  Pulm: Breathing non-labored, chest wall movement equal bilaterally   Breast: not examined  Abdomen: soft, nontender, no guarding  Gu: genitalia not examined  Extremity:normal strength, no cyanosis or edema  Skin: 3cm soft mobile cystic lesion of left forehead in hairline  Psych: flat          ASSESSMENT/PLAN:     Lipoma of forehead  Discussed excision of lesion, which can be done in the OR under [facility]. Risks including but not limited to hematoma, seroma, infection, scarring, need for reoperation, disruption to hairline pattern.   Would be able to incision in the hairline.  Discussed with the wound to be closed with staples.    Patient elects to undergo surgery for lipoma excision.  We will submit to her insurance and  look to schedule afterwards.      Chun Dupree, DO  Plastic and Reconstructive Surgery    I spent a total of 20 minutes on the day of the visit.  This includes face to face time and non-face to face time preparing to see the patient (eg, review of tests), obtaining and/or reviewing separately obtained history, documenting clinical information in the electronic or other health record, independently interpreting results and communicating results to the patient/family/caregiver, or care coordinator.

## 2023-08-30 ENCOUNTER — HOSPITAL ENCOUNTER (EMERGENCY)
Facility: HOSPITAL | Age: 38
Discharge: HOME | End: 2023-08-30
Attending: STUDENT IN AN ORGANIZED HEALTH CARE EDUCATION/TRAINING PROGRAM
Payer: OTHER MISCELLANEOUS

## 2023-08-30 VITALS
HEIGHT: 67 IN | HEART RATE: 71 BPM | OXYGEN SATURATION: 97 % | BODY MASS INDEX: 26.6 KG/M2 | SYSTOLIC BLOOD PRESSURE: 121 MMHG | RESPIRATION RATE: 14 BRPM | DIASTOLIC BLOOD PRESSURE: 81 MMHG | TEMPERATURE: 98 F

## 2023-08-30 DIAGNOSIS — S92.352A CLOSED DISPLACED FRACTURE OF FIFTH METATARSAL BONE OF LEFT FOOT, INITIAL ENCOUNTER: Primary | ICD-10-CM

## 2023-08-30 DIAGNOSIS — S99.922A FOOT INJURY, LEFT, INITIAL ENCOUNTER: ICD-10-CM
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
## 2023-08-30 DIAGNOSIS — S49.90XA SHOULDER INJURY: ICD-10-CM

## 2023-08-30 DIAGNOSIS — S59.909A ELBOW INJURY: ICD-10-CM

## 2023-08-30 LAB
B-HCG UR QL: NEGATIVE
CTP QC/QA: YES

## 2023-08-30 PROCEDURE — 99284 EMERGENCY DEPT VISIT MOD MDM: CPT

## 2023-08-30 PROCEDURE — 25000003 PHARM REV CODE 250: Performed by: PHYSICIAN ASSISTANT

## 2023-08-30 PROCEDURE — 81025 URINE PREGNANCY TEST: CPT | Performed by: PHYSICIAN ASSISTANT

## 2023-08-30 RX ORDER — ACETAMINOPHEN 500 MG
1000 TABLET ORAL
Status: COMPLETED | OUTPATIENT
Start: 2023-08-30 | End: 2023-08-30

## 2023-08-30 RX ORDER — OXYCODONE HYDROCHLORIDE 5 MG/1
5 TABLET ORAL
Status: COMPLETED | OUTPATIENT
Start: 2023-08-30 | End: 2023-08-30

## 2023-08-30 RX ORDER — IBUPROFEN 800 MG/1
800 TABLET ORAL EVERY 6 HOURS PRN
Qty: 20 TABLET | Refills: 0 | Status: SHIPPED | OUTPATIENT
Start: 2023-08-30 | End: 2023-09-05

## 2023-08-30 RX ORDER — HYDROCODONE BITARTRATE AND ACETAMINOPHEN 5; 325 MG/1; MG/1
1 TABLET ORAL EVERY 6 HOURS PRN
Qty: 8 TABLET | Refills: 0 | Status: SHIPPED | OUTPATIENT
Start: 2023-08-30 | End: 2023-09-01

## 2023-08-30 RX ADMIN — OXYCODONE HYDROCHLORIDE 5 MG: 5 TABLET ORAL at 07:08

## 2023-08-30 RX ADMIN — ACETAMINOPHEN 1000 MG: 500 TABLET ORAL at 07:08

## 2023-08-31 NOTE — ED NOTES
Bed: [facility]  Expected date: 8/30/23  Expected time: 7:14 PM  Means of arrival:   Comments:  [place]

## 2023-08-31 NOTE — ED NOTES
Walking boot applied to pt's LLE. Pt instructed on how to don and doff walking boot. Pt's crutches adjusted to appropriate height. Pt states she has prior experience walking with crutches due to a previous fracture in the past. Pt denies any further questions regarding orthotic devices

## 2023-08-31 NOTE — DISCHARGE INSTRUCTIONS
You have an acute fracture to your 5th metatarsal.  Please follow up with Podiatry.  A referral was placed today  I recommend that you remain nonweightbearing until evaluated by Podiatry.  Please use crutches to ambulate  Please wear boot on foot until you are evaluated by Podiatry. You may take it off to shower.  I recommended that you take ibuprofen as directed for pain. May sure to take with meals.

## 2023-08-31 NOTE — ED NOTES
Patient identifiers for Vianney Callahan 38 y.o. female checked and correct.  Chief Complaint   Patient presents with    Fall     Pt fell off 4ft ladder at work. Hit a few boxes before landing on floor. -head truam, -LOC. Pt c/o left arm and left foot pain. No obvious deformity.      Pt arrives to ED via EMS after sustaining a fall off of a ladder at work around 5:30PM.Pt states she was attempting to get a box of hangers from a shelf when the box went sideways and she fell off the ladder on to her left side. Pt cannot remember if she hit her head during the fall. Pt denies HA, visual changes, NVD, SOB, chest pain. Pt endorses left shoulder, elbow, and left foot/ankle pain exacerbated with movement. Pt is not on blood thinners.    LOC: Patient is awake, alert, and aware of environment with an appropriate affect. Patient is oriented x 4 and speaking appropriately.  APPEARANCE: Patient resting comfortably and in no acute distress. Patient is clean and well groomed, patient's clothing is properly fastened.  HEENT: WDL  SKIN: The skin is warm and dry. Patient has normal skin turgor and moist mucus membranes. Pt has skin abrasions to left elbow and left ankle   MUSKULOSKELETAL: No obvious deformities noted. Pulses intact. Decreased movement to LUE and LLE due to increased pain  RESPIRATORY: Airway is open and patent. Respirations are spontaneous and non-labored with normal effort and rate.  CARDIAC: Patient has a normal rate and rhythm 88.No peripheral edema noted.   ABDOMEN: No distention noted. Soft and non-tender upon palpation.  NEUROLOGICAL: pupils 3 mm, PERRL. Facial expression is symmetrical. Hand grasps are equal bilaterally. Normal sensation in all extremities when touched with finger.

## 2023-08-31 NOTE — ED PROVIDER NOTES
Encounter Date: 8/30/2023       History     Chief Complaint   Patient presents with    Fall     Pt fell off 4ft ladder at work. Hit a few boxes before landing on floor. -head truam, -LOC. Pt c/o left arm and left foot pain. No obvious deformity.      38 y.o. female with a PMHx of asthma, bipolar disorder, schizoaffective disorder, history of hepatitis-C, migraine, presents to ED s/p fall. She fell off of a 4 ft ladder at work today. She did not hit her head or lose consciousness. She c/o left foot, elbow and shoulder pain. Her current PL= 8/10. She denies headache, neck pain, back pain, weakness, paresthesias.     The history is provided by the patient.     Review of patient's allergies indicates:   Allergen Reactions    Dilantin [phenytoin sodium extended] Rash    Saphris [asenapine]      Confusion and depressed mood.     Past Medical History:   Diagnosis Date    Abnormal cervical Papanicolaou smear 2012    Colposcopy    ADHD (attention deficit hyperactivity disorder) 8/16/2012    Allergy     Anxiety     Asthma     childhood    Back pain 5/19/2014    Bipolar affective disorder     Cholecystitis     Chronic migraine without aura, with intractable migraine, so stated, without mention of status migrainosus 9/24/2013    Depression     Fever blister     Gallstones 5/26/2014    Headache(784.0)     Hepatitis C antibody positive in blood     History of hepatitis C     History of psychiatric hospitalization     Suicide attempt    Personality disorder 4/11/2013    Psychosis 10/7/2013    Therapy     Tobacco abuse 9/24/2013     Past Surgical History:   Procedure Laterality Date    ESOPHAGOGASTRODUODENOSCOPY      FRACTURE SURGERY      MOLE REMOVAL      SKIN BIOPSY       Family History   Problem Relation Age of Onset    Melanoma Mother     ADD / ADHD Mother     Bipolar disorder Mother     Schizophrenia Mother     Alcohol abuse Father     Drug abuse Father     Depression Father     Anxiety  disorder Sister     Acne Maternal Aunt     Depression Maternal Aunt     Migraines Maternal Aunt     Anxiety disorder Paternal Aunt     Depression Paternal Aunt     Breast cancer Neg Hx     Colon cancer Neg Hx     Ovarian cancer Neg Hx      Social History     Tobacco Use    Smoking status: Former     Current packs/day: 0.50     Types: Vaping with nicotine, Cigarettes    Smokeless tobacco: Never   Substance Use Topics    Alcohol use: No     Alcohol/week: 0.0 standard drinks of alcohol    Drug use: No     Review of Systems   Constitutional:  Negative for fever.   HENT:  Negative for sore throat.    Respiratory:  Negative for shortness of breath.    Cardiovascular:  Negative for chest pain.   Gastrointestinal:  Negative for nausea.   Genitourinary:  Negative for dysuria.   Musculoskeletal:  Negative for back pain.   Skin:  Negative for rash.   Neurological:  Negative for weakness.   Hematological:  Does not bruise/bleed easily.       Physical Exam     Initial Vitals [08/30/23 1819]   BP Pulse Resp Temp SpO2   (!) 144/77 98 16 97.6 °F (36.4 °C) 99 %      MAP       --         Physical Exam    Nursing note and vitals reviewed.  Constitutional: She appears well-developed and well-nourished. She is not diaphoretic. No distress.   HENT:   Head: Normocephalic and atraumatic.   Nose: Nose normal.   Eyes: Conjunctivae and EOM are normal.   Neck: Neck supple.   Cardiovascular:  Normal rate.           Pulmonary/Chest: No respiratory distress.   Musculoskeletal:      Left shoulder: Tenderness present. No swelling or deformity. Normal range of motion.      Left upper arm: No tenderness.      Left elbow: Swelling present. No deformity. Normal range of motion. Tenderness present.      Cervical back: Neck supple.      Left ankle: No swelling. No tenderness. Normal range of motion.      Left foot: Swelling and bony tenderness (lateral aspect overlying distal 4/5th metatarsals) present. Normal pulse.      Comments:   No  C-spine, T-spine or L-spine TTP  L radial pulse intact  L DP pulse intact      Neurological: She is alert and oriented to person, place, and time. Gait normal.   Skin: No rash noted.   Psychiatric: She has a normal mood and affect. Her behavior is normal. Judgment and thought content normal.       ED Course   Procedures  Labs Reviewed   POCT URINE PREGNANCY          Imaging Results              X-Ray Elbow Complete Left (Final result)  Result time 08/30/23 22:40:31      Final result by Surinder Vasquez MD (08/30/23 22:40:31)                   Impression:      LEFT SHOULDER: Negative.    LEFT ELBOW: Minimal dorsal ulnar subcutaneous stranding, possibly representing a soft tissue contusion.  Correlate clinically.      Electronically signed by: Surinder Vasquez  Date:    08/30/2023  Time:    22:40               Narrative:    EXAMINATION:  XR SHOULDER TRAUMA 3 VIEW LEFT; XR ELBOW COMPLETE 3 VIEW LEFT    CLINICAL HISTORY:  Unspecified fall, initial encounter    TECHNIQUE:  Three views of the left shoulder were performed.    COMPARISON  None    FINDINGS:  LEFT SHOULDER:    No acute osseous, articular, or soft tissue abnormality is apparent radiographically.    LEFT ELBOW:    Minimal subcutaneous stranding along the dorsal ulnar aspect of the distal left humerus.    Otherwise, no acute osseous, articular, or soft tissue abnormality is apparent radiographically.                                       X-Ray Shoulder Trauma Left (Final result)  Result time 08/30/23 22:40:31      Final result by Surinder Vasquez MD (08/30/23 22:40:31)                   Impression:      LEFT SHOULDER: Negative.    LEFT ELBOW: Minimal dorsal ulnar subcutaneous stranding, possibly representing a soft tissue contusion.  Correlate clinically.      Electronically signed by: Surinder Vasquez  Date:    08/30/2023  Time:    22:40               Narrative:    EXAMINATION:  XR SHOULDER TRAUMA 3 VIEW LEFT; XR ELBOW COMPLETE 3 VIEW LEFT    CLINICAL  HISTORY:  Unspecified fall, initial encounter    TECHNIQUE:  Three views of the left shoulder were performed.    COMPARISON  None    FINDINGS:  LEFT SHOULDER:    No acute osseous, articular, or soft tissue abnormality is apparent radiographically.    LEFT ELBOW:    Minimal subcutaneous stranding along the dorsal ulnar aspect of the distal left humerus.    Otherwise, no acute osseous, articular, or soft tissue abnormality is apparent radiographically.                                       X-Ray Foot Complete Left (Final result)  Result time 08/30/23 22:29:18      Final result by Surinder Vasquez MD (08/30/23 22:29:18)                   Impression:      Left 5th metatarsal shaft fracture, with surrounding soft tissue swelling.      Electronically signed by: Surinder Vasquez  Date:    08/30/2023  Time:    22:29               Narrative:    EXAMINATION:  XR FOOT COMPLETE 3 VIEW LEFT    CLINICAL HISTORY:  .  Unspecified fall, initial encounter    TECHNIQUE:  AP, lateral and oblique views of the left foot were performed.    COMPARISON:  None    FINDINGS:  Minimally comminuted, mildly foreshortened and mildly displaced fracture of the distal shaft of the left 5th metatarsal, with surrounding soft tissue swelling.  Superomedial displacement of the main distal fragment.  Medial apex angulation.    The oblique view (image 3/3) suggests focal lucency the projecting over the distal portion of the 2nd toe proximal phalanx.  This lucency is not confirmed on the AP view and is thought to represent artifact.                                       Medications   oxyCODONE immediate release tablet 5 mg (5 mg Oral Given 8/30/23 1953)   acetaminophen tablet 1,000 mg (1,000 mg Oral Given 8/30/23 1953)     Medical Decision Making  38 y.o. female with a PMHx of asthma, bipolar disorder, schizoaffective disorder, history of hepatitis-C, migraine, presents to ED s/p fall. She fell off of a 4 ft ladder at work today.  Hemodynamically stable.   Oriented x3.  No midline back or spine tenderness.  Denies headache or head trauma.  Exam as above.  I will initiate workup    Foot fracture, elbow fracture, shoulder fracture, dislocation, contusion    Mildly displaced Left 5th metatarsal shaft fracture. Placed in boot and non weight bearing. Crutches given. Referral placed with podiatry.   Pending imaging of elbow and shoulder. Will sign out to  due to shift change.        Amount and/or Complexity of Data Reviewed  Labs: ordered. Decision-making details documented in ED Course.  Radiology: ordered and independent interpretation performed. Decision-making details documented in ED Course.    Risk  OTC drugs.  Prescription drug management.              Attending Attestation:     Physician Attestation Statement for NP/PA:   I have directed and reviewed the workup performed by the PA/NP.  I performed the substantive portion of the medical decision making.     Other NP/PA Attestation Additions:      Medical Decision Making: Patient's foot placed in a boot by the RN and was subsequently neurovascularly intact. She was given crutches and instructions to be non-weight bearing until cleared by ortho. Have instructed her on home tylenol/ibuprofen use and rx'd a short course of oxycodone for additional pain control. She was referred to Ortho for definitive management and outpatient follow-up.           ED Course as of 08/31/23 1806   Wed Aug 30, 2023   2237 X-Ray Foot Complete Left  5th metatarsal fracture.  Patient will be placed in a boot and given crutches with Podiatry follow-up [BD]   2237 Preg Test, Ur: Negative [BD]      ED Course User Index  [BD] Bhavik Coles MD                    Clinical Impression:   Final diagnoses:  [S49.90XA] Shoulder injury  [W19.XXXA] Fall  [S59.909A] Elbow injury  [S99.922A] Foot injury, left, initial encounter  [S92.352A] Closed displaced fracture of fifth metatarsal bone of left foot, initial encounter (Primary)        ED  Disposition Condition    Discharge Stable          ED Prescriptions       Medication Sig Dispense Start Date End Date Auth. Provider    HYDROcodone-acetaminophen (NORCO) 5-325 mg per tablet Take 1 tablet by mouth every 6 (six) hours as needed for Pain. 8 tablet 8/30/2023 -- Shivani Owens PA-C    ibuprofen (ADVIL,MOTRIN) 800 MG tablet Take 1 tablet (800 mg total) by mouth every 6 (six) hours as needed for Pain. 20 tablet 8/30/2023 -- Shivani Owens PA-C          Follow-up Information       Follow up With Specialties Details Why Contact Info Additional Information    [facility] [address] Podiatry Schedule an appointment as soon as possible for a visit in 1 week  [address] [facility] [address] [phone] [facility] - Main Building, 5th Floor Please park in [facility] and use Atrium elevator    [facility] - Emergency Dept Emergency Medicine  If symptoms worsen [address] [facility] [address] [phone]              Shivani Owens PA-C  08/31/23 162       Bhavik Coles MD  08/31/23 1806

## 2023-09-01 ENCOUNTER — TELEPHONE (OUTPATIENT)
Dept: PODIATRY | Facility: CLINIC | Age: 38
End: 2023-09-01
Payer: MEDICARE

## 2023-09-01 ENCOUNTER — OFFICE VISIT (OUTPATIENT)
Dept: PSYCHIATRY | Facility: CLINIC | Age: 38
End: 2023-09-01
Payer: COMMERCIAL

## 2023-09-01 ENCOUNTER — OFFICE VISIT (OUTPATIENT)
Dept: PODIATRY | Facility: CLINIC | Age: 38
End: 2023-09-01
Payer: OTHER MISCELLANEOUS

## 2023-09-01 VITALS
DIASTOLIC BLOOD PRESSURE: 82 MMHG | TEMPERATURE: 98 F | BODY MASS INDEX: 26.53 KG/M2 | OXYGEN SATURATION: 97 % | WEIGHT: 169 LBS | RESPIRATION RATE: 18 BRPM | HEIGHT: 67 IN | HEART RATE: 100 BPM | SYSTOLIC BLOOD PRESSURE: 129 MMHG

## 2023-09-01 VITALS
HEART RATE: 97 BPM | SYSTOLIC BLOOD PRESSURE: 118 MMHG | DIASTOLIC BLOOD PRESSURE: 64 MMHG | WEIGHT: 170.06 LBS | BODY MASS INDEX: 26.64 KG/M2

## 2023-09-01 DIAGNOSIS — F41.9 ANXIETY: ICD-10-CM

## 2023-09-01 DIAGNOSIS — S92.352A CLOSED DISPLACED FRACTURE OF FIFTH METATARSAL BONE OF LEFT FOOT, INITIAL ENCOUNTER: ICD-10-CM

## 2023-09-01 DIAGNOSIS — M79.672 PAIN OF LEFT FOOT: ICD-10-CM

## 2023-09-01 DIAGNOSIS — F60.9 PERSONALITY DISORDER: ICD-10-CM

## 2023-09-01 DIAGNOSIS — F25.0 SCHIZOAFFECTIVE DISORDER, BIPOLAR TYPE: Primary | ICD-10-CM

## 2023-09-01 DIAGNOSIS — S92.352D CLOSED DISPLACED FRACTURE OF FIFTH METATARSAL BONE OF LEFT FOOT WITH ROUTINE HEALING, SUBSEQUENT ENCOUNTER: Primary | ICD-10-CM

## 2023-09-01 DIAGNOSIS — Z86.59 HISTORY OF ADHD: ICD-10-CM

## 2023-09-01 PROCEDURE — 3074F SYST BP LT 130 MM HG: CPT | Mod: CPTII,S$GLB,, | Performed by: NURSE PRACTITIONER

## 2023-09-01 PROCEDURE — 90792 PSYCH DIAG EVAL W/MED SRVCS: CPT | Mod: S$GLB,,, | Performed by: NURSE PRACTITIONER

## 2023-09-01 PROCEDURE — 99999 PR PBB SHADOW E&M-EST. PATIENT-LVL IV: CPT | Mod: PBBFAC,,, | Performed by: STUDENT IN AN ORGANIZED HEALTH CARE EDUCATION/TRAINING PROGRAM

## 2023-09-01 PROCEDURE — 99212 OFFICE O/P EST SF 10 MIN: CPT | Mod: PBBFAC | Performed by: NURSE PRACTITIONER

## 2023-09-01 PROCEDURE — 3008F BODY MASS INDEX DOCD: CPT | Mod: CPTII,S$GLB,, | Performed by: NURSE PRACTITIONER

## 2023-09-01 PROCEDURE — 99999 PR PBB SHADOW E&M-EST. PATIENT-LVL II: CPT | Mod: PBBFAC,,, | Performed by: NURSE PRACTITIONER

## 2023-09-01 PROCEDURE — 99999 PR PBB SHADOW E&M-EST. PATIENT-LVL IV: ICD-10-PCS | Mod: PBBFAC,,, | Performed by: STUDENT IN AN ORGANIZED HEALTH CARE EDUCATION/TRAINING PROGRAM

## 2023-09-01 PROCEDURE — 99204 PR OFFICE/OUTPT VISIT, NEW, LEVL IV, 45-59 MIN: ICD-10-PCS | Mod: S$GLB,,, | Performed by: STUDENT IN AN ORGANIZED HEALTH CARE EDUCATION/TRAINING PROGRAM

## 2023-09-01 PROCEDURE — 3074F PR MOST RECENT SYSTOLIC BLOOD PRESSURE < 130 MM HG: ICD-10-PCS | Mod: CPTII,S$GLB,, | Performed by: NURSE PRACTITIONER

## 2023-09-01 PROCEDURE — 99999 PR PBB SHADOW E&M-EST. PATIENT-LVL II: ICD-10-PCS | Mod: PBBFAC,,, | Performed by: NURSE PRACTITIONER

## 2023-09-01 PROCEDURE — 90792 PR PSYCHIATRIC DIAGNOSTIC EVALUATION W/MEDICAL SERVICES: ICD-10-PCS | Mod: S$GLB,,, | Performed by: NURSE PRACTITIONER

## 2023-09-01 PROCEDURE — 99204 OFFICE O/P NEW MOD 45 MIN: CPT | Mod: S$GLB,,, | Performed by: STUDENT IN AN ORGANIZED HEALTH CARE EDUCATION/TRAINING PROGRAM

## 2023-09-01 PROCEDURE — 3008F PR BODY MASS INDEX (BMI) DOCUMENTED: ICD-10-PCS | Mod: CPTII,S$GLB,, | Performed by: NURSE PRACTITIONER

## 2023-09-01 PROCEDURE — 3078F PR MOST RECENT DIASTOLIC BLOOD PRESSURE < 80 MM HG: ICD-10-PCS | Mod: CPTII,S$GLB,, | Performed by: NURSE PRACTITIONER

## 2023-09-01 PROCEDURE — 3078F DIAST BP <80 MM HG: CPT | Mod: CPTII,S$GLB,, | Performed by: NURSE PRACTITIONER

## 2023-09-01 RX ORDER — DEXAMETHASONE 4 MG/1
4 TABLET ORAL DAILY
Qty: 7 TABLET | Refills: 0 | Status: SHIPPED | OUTPATIENT
Start: 2023-09-01 | End: 2023-10-17

## 2023-09-01 RX ORDER — TRAMADOL HYDROCHLORIDE 50 MG/1
50 TABLET ORAL EVERY 4 HOURS PRN
Qty: 30 TABLET | Refills: 0 | Status: SHIPPED | OUTPATIENT
Start: 2023-09-01 | End: 2023-09-05

## 2023-09-01 RX ORDER — MELOXICAM 15 MG/1
15 TABLET ORAL DAILY
Qty: 30 TABLET | Refills: 0 | Status: SHIPPED | OUTPATIENT
Start: 2023-09-01 | End: 2023-10-17
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
## 2023-09-01 NOTE — H&P (VIEW-ONLY)
Subjective:     Patient    Vianney Callahan is a 38 y.o. female.    Problem    Had fall from 4 foot ladder at work 2 days ago. Seen in the ED same day and diagnosed with displaced left 5th metatarsal fracture; discharged with oxycodone, acetaminophen, tall surgical boot, crutches. Reports ongoing swelling, pain, discoloration to the area.     History    History obtained from patient and review of medical records.     Past Medical History:   Diagnosis Date    Abnormal cervical Papanicolaou smear     Colposcopy    ADHD (attention deficit hyperactivity disorder) 2012    Allergy     Anxiety     Asthma     childhood    Back pain 2014    Bipolar affective disorder     Cholecystitis     Chronic migraine without aura, with intractable migraine, so stated, without mention of status migrainosus 2013    Depression     Fever blister     Gallstones 2014    Headache(784.0)     Hepatitis C antibody positive in blood     History of hepatitis C     History of psychiatric hospitalization     Suicide attempt    Personality disorder 2013    Psychosis 10/7/2013    Therapy     Tobacco abuse 2013       Past Surgical History:   Procedure Laterality Date    ESOPHAGOGASTRODUODENOSCOPY      FRACTURE SURGERY      MOLE REMOVAL      SKIN BIOPSY          Objective:     Vitals  Wt Readings from Last 1 Encounters:   23 76.7 kg (169 lb)     Temp Readings from Last 1 Encounters:   23 98.4 °F (36.9 °C)     BP Readings from Last 1 Encounters:   23 129/82     Pulse Readings from Last 1 Encounters:   23 100       Dermatological Exam    Skin:   Pedal hair growth, skin color, and skin texture normal on left; swelling and bruising at lateral forefoot    Nails:  All nails normal in length, thickness, color    Vascular Exam    Arteries:   Posterior tibial artery palpable on left  Dorsalis pedis artery palpable on left    Veins:   Superficial veins unremarkable on left    Swellin+ nonpitting on  left at lateral forefoot    Neurological Exam    [place] touch test:  Light touch sensation intact to left foot     Musculoskeletal Exam    Footwear:  Casual on right  Surgical boot on left    Gait Exam:   Ambulatory Status: Ambulatory  Gait:  NWB LLE  Assistive Devices: Crutches    Foot Progression Angle:  Normal on right  Normal on left     Left Lower Extremity Additional Findings:  Pain on palpation at distal 5th metatarsal; gross function intact to 5th toe  Left foot and ankle function, strength, and range of motion unremarkable except as noted above.    Imaging and Other Tests    Imaging:  Independently reviewed and interpreted imaging, findings are as follows:   08/30/23 Left foot X ray: displaced 5th metatarsal fracture     Assessment:     Encounter Diagnoses   Name Primary?    Closed displaced fracture of fifth metatarsal bone of left foot with routine healing, subsequent encounter Yes    Pain of left foot         Plan:     I counseled the patient on her conditions, their implications and medical management.    Closed displaced fracture of left 5th metatarsal, pain: acute complicated  -Ordered dexamethasone, meloxicam, and tramadol.   Surgical Decision Making  -Discussed further attempts at conservative care with NWB in surgical boot and high risk of nonunion versus surgical intervention consisting of ORIF left 5th metatarsal with more reliable healing and lower risk of sequelae. Patient declined further conservative care and would like to proceed with surgical intervention.   -Reviewed potential risks, benefits, alternatives. Answered all questions. Written consent obtained.   -Discussed anticipated postop course including immediate postop weightbearing status which is WBAT in surgical boot. Not driving foot, no driving restrictions.  Rolling knee scooter ordered, instructed to obtain prior to day of surgery.   -Will refer to PCP for surgical clearance.   -Initially discussed arranging case 09/18 but no PRN  time available, case request submitted for Thursday 09/14.       Return to clinic 1 week after surgery, call sooner PRN.

## 2023-09-01 NOTE — PROGRESS NOTES
Subjective:     Patient    Vianney Callahan is a 38 y.o. female.    Problem    Had fall from 4 foot ladder at work 2 days ago. Seen in the ED same day and diagnosed with displaced left 5th metatarsal fracture; discharged with oxycodone, acetaminophen, tall surgical boot, crutches. Reports ongoing swelling, pain, discoloration to the area.     History    History obtained from patient and review of medical records.     Past Medical History:   Diagnosis Date    Abnormal cervical Papanicolaou smear     Colposcopy    ADHD (attention deficit hyperactivity disorder) 2012    Allergy     Anxiety     Asthma     childhood    Back pain 2014    Bipolar affective disorder     Cholecystitis     Chronic migraine without aura, with intractable migraine, so stated, without mention of status migrainosus 2013    Depression     Fever blister     Gallstones 2014    Headache(784.0)     Hepatitis C antibody positive in blood     History of hepatitis C     History of psychiatric hospitalization     Suicide attempt    Personality disorder 2013    Psychosis 10/7/2013    Therapy     Tobacco abuse 2013       Past Surgical History:   Procedure Laterality Date    ESOPHAGOGASTRODUODENOSCOPY      FRACTURE SURGERY      MOLE REMOVAL      SKIN BIOPSY          Objective:     Vitals  Wt Readings from Last 1 Encounters:   23 76.7 kg (169 lb)     Temp Readings from Last 1 Encounters:   23 98.4 °F (36.9 °C)     BP Readings from Last 1 Encounters:   23 129/82     Pulse Readings from Last 1 Encounters:   23 100       Dermatological Exam    Skin:   Pedal hair growth, skin color, and skin texture normal on left; swelling and bruising at lateral forefoot    Nails:  All nails normal in length, thickness, color    Vascular Exam    Arteries:   Posterior tibial artery palpable on left  Dorsalis pedis artery palpable on left    Veins:   Superficial veins unremarkable on left    Swellin+ nonpitting on  left at lateral forefoot    Neurological Exam    [place] touch test:  Light touch sensation intact to left foot     Musculoskeletal Exam    Footwear:  Casual on right  Surgical boot on left    Gait Exam:   Ambulatory Status: Ambulatory  Gait:  NWB LLE  Assistive Devices: Crutches    Foot Progression Angle:  Normal on right  Normal on left     Left Lower Extremity Additional Findings:  Pain on palpation at distal 5th metatarsal; gross function intact to 5th toe  Left foot and ankle function, strength, and range of motion unremarkable except as noted above.    Imaging and Other Tests    Imaging:  Independently reviewed and interpreted imaging, findings are as follows:   08/30/23 Left foot X ray: displaced 5th metatarsal fracture     Assessment:     Encounter Diagnoses   Name Primary?    Closed displaced fracture of fifth metatarsal bone of left foot with routine healing, subsequent encounter Yes    Pain of left foot         Plan:     I counseled the patient on her conditions, their implications and medical management.    Closed displaced fracture of left 5th metatarsal, pain: acute complicated  -Ordered dexamethasone, meloxicam, and tramadol.   Surgical Decision Making  -Discussed further attempts at conservative care with NWB in surgical boot and high risk of nonunion versus surgical intervention consisting of ORIF left 5th metatarsal with more reliable healing and lower risk of sequelae. Patient declined further conservative care and would like to proceed with surgical intervention.   -Reviewed potential risks, benefits, alternatives. Answered all questions. Written consent obtained.   -Discussed anticipated postop course including immediate postop weightbearing status which is WBAT in surgical boot. Not driving foot, no driving restrictions.  Rolling knee scooter ordered, instructed to obtain prior to day of surgery.   -Will refer to PCP for surgical clearance.   -Initially discussed arranging case 09/18 but no PRN  time available, case request submitted for Thursday 09/14.       Return to clinic 1 week after surgery, call sooner PRN.

## 2023-09-01 NOTE — TELEPHONE ENCOUNTER
Left message for pt to return call at [phone] in regards to scheduling for surgical consult with another provider

## 2023-09-01 NOTE — TELEPHONE ENCOUNTER
Spoke with pt mother and pt. Pt can be seen by Dr. Wylie at 11:30 today per Nancy. Pt verbalized understanding.

## 2023-09-01 NOTE — TELEPHONE ENCOUNTER
Staff informed patient a message was sent to CANDY Mejia at the podiatry [facility] about appointment that suppose to be scheduled for 11:30 with Dr. Wylie.    Patient verbalized understanding.    ----- Message from Shannen Gomez sent at 9/1/2023 10:10 AM CDT -----  Regarding: appt; returning missed call  Contact: PT @ [phone]  Pt is returning a missed call from someone in the office and is asking for a return call back soon. Thanks.    Reason for call: returning missed call from Jackie in the office.     Patient's DX: S92.352A (ICD-10-CM) - Closed displaced fracture of fifth metatarsal bone of left foot, initial encounter    Patient requesting call back or [facility] msg: call back. Pt is fine with switching her appt to Dr. Wylie today at 11:30am at the [place] location. I did try to r/s in [facility] and was unable to. Pt is asking for a return call back to confirm. Thanks.

## 2023-09-02 ENCOUNTER — PATIENT MESSAGE (OUTPATIENT)
Dept: PODIATRY | Facility: CLINIC | Age: 38
End: 2023-09-02
Payer: MEDICARE

## 2023-09-02 DIAGNOSIS — M79.672 PAIN OF LEFT FOOT: Primary | ICD-10-CM

## 2023-09-05 RX ORDER — IBUPROFEN 800 MG/1
800 TABLET ORAL EVERY 6 HOURS PRN
Qty: 30 TABLET | Refills: 2 | Status: ON HOLD | OUTPATIENT
Start: 2023-09-05 | End: 2023-10-04 | Stop reason: HOSPADM

## 2023-09-05 RX ORDER — HYDROCODONE BITARTRATE AND ACETAMINOPHEN 5; 325 MG/1; MG/1
1 TABLET ORAL EVERY 4 HOURS PRN
Qty: 30 TABLET | Refills: 0 | Status: SHIPPED | OUTPATIENT
Start: 2023-09-05 | End: 2023-09-15 | Stop reason: SDUPTHER

## 2023-09-07 DIAGNOSIS — M54.16 LUMBAR BACK PAIN WITH RADICULOPATHY AFFECTING RIGHT LOWER EXTREMITY: Primary | ICD-10-CM

## 2023-09-11 ENCOUNTER — TELEPHONE (OUTPATIENT)
Dept: PODIATRY | Facility: CLINIC | Age: 38
End: 2023-09-11
Payer: MEDICARE

## 2023-09-11 NOTE — TELEPHONE ENCOUNTER
Contacted pt. On 09/08/2023.  Pt. Has been advised that someone from the OR will be contacting her prior to her procedure date to advise her of time and to give her instructions.  Pt. Verbalized comprehension.

## 2023-09-11 NOTE — TELEPHONE ENCOUNTER
----- Message from Jone Carias sent at 9/8/2023  2:04 PM CDT -----  Regarding: Procedure arrival time 9/14/2023  Contact: @ [phone]  Pt is calling to speak to someone in the office to, discuss procedure arrival time. Pt states that they have not heard from anyone in the office. Please call to advise. Thanks.         Call Back or Sent message thru the [facility]? Call back

## 2023-09-13 ENCOUNTER — TELEPHONE (OUTPATIENT)
Dept: PLASTIC SURGERY | Facility: CLINIC | Age: 38
End: 2023-09-13
Payer: MEDICARE

## 2023-09-13 DIAGNOSIS — D17.0 LIPOMA OF FOREHEAD: Primary | ICD-10-CM

## 2023-09-13 NOTE — TELEPHONE ENCOUNTER
Spoke to pt and offered a surgery date 10/4/230at the [facility], 2nd floor surgery center- Pt agreeable. Provided patient with details of pre /post op appts, basic prep for sx, arrival time the day before, triage call from anesthesia dept, and address of the location.  call back # to RN navigator given should any questions or concerns arise  All questions and concerns addressed. Pt voiced understanding.

## 2023-09-15 RX ORDER — HYDROCODONE BITARTRATE AND ACETAMINOPHEN 5; 325 MG/1; MG/1
1 TABLET ORAL EVERY 4 HOURS PRN
Qty: 30 TABLET | Refills: 0 | Status: ON HOLD | OUTPATIENT
Start: 2023-09-15 | End: 2023-10-04 | Stop reason: HOSPADM

## 2023-09-18 ENCOUNTER — ANESTHESIA EVENT (OUTPATIENT)
Dept: SURGERY | Facility: HOSPITAL | Age: 38
End: 2023-09-18
Payer: OTHER MISCELLANEOUS

## 2023-09-18 NOTE — ANESTHESIA PREPROCEDURE EVALUATION
09/18/2023  Vianney Callahan is a 38 y.o., female.      Pre-op Assessment    I have reviewed the Patient Summary Reports.    I have reviewed the NPO Status.   I have reviewed the Medications.     Review of Systems  Anesthesia Hx:  No problems with previous Anesthesia  Denies Family Hx of Anesthesia complications.   Denies Personal Hx of Anesthesia complications.   Social:  Recreational Drugs Heroine in the past off for few years   Hematology/Oncology:  Hematology Normal        EENT/Dental:   Sinus allergy   Cardiovascular:   Exercise tolerance: good NYHA Classification II    Pulmonary:   Asthma asymptomatic Denies Sleep Apnea. snoring   Renal/:  Renal/ Normal     Hepatic/GI:   Liver Disease, Hepatitis (false positive), C    Musculoskeletal:   Foot fracture  Chronic back pain Spine Disorders: lumbar Chronic Pain    Neurological:   Neuromuscular Disease, Headaches    Endocrine:  Endocrine Normal  Denies Diabetes    Psych:   Psychiatric History anxiety depression Schizoaffective disorder  ADHD  Psychiatric History Pt has extensive psych hx, hx bipolar, ADHD, suicide attempt in past, hx of drug abuse, heroin use in past       Back pain    Other Medical History   Fever blister Cholecystitis   Anxiety Headache(784.0)   History of psychiatric hospitalization Therapy   Bipolar affective disorder Depression   ADHD (attention deficit hyperactivity disorder) Allergy   History of hepatitis C Gallstones   Chronic migraine without aura, with intractable migraine, so stated, without mention of status migrainosus Personality disorder   Psychosis Tobacco abuse   Abnormal cervical Papanicolaou smear Hepatitis C antibody positive in blood   Asthma      Surgical History    FRACTURE SURGERY SKIN BIOPSY   MOLE REMOVAL ESOPHAGOGASTRODUODENOSCOPY         Physical Exam  General: Well nourished, Cooperative, Alert and  Oriented    Airway:  Mallampati: III   Mouth Opening: Small, but > 3cm  TM Distance: 4 - 6 cm  Tongue: Normal  Neck ROM: Normal ROM    Dental:  Intact, Caps / Implants  Dental filling  Receding chin  Upper lip bite test ok      Anesthesia Plan  Type of Anesthesia, risks & benefits discussed:    Anesthesia Type: Gen ETT  Intra-op Monitoring Plan: Standard ASA Monitors  Post Op Pain Control Plan: multimodal analgesia and IV/PO Opioids PRN  Induction:  IV  Airway Plan: Video  Informed Consent: Informed consent signed with the Patient and all parties understand the risks and agree with anesthesia plan.  All questions answered.   ASA Score: 2  Day of Surgery Review of History & Physical: H&P Update referred to the surgeon/provider.I have interviewed and examined the patient. I have reviewed the patient's H&P dated: There are no significant changes. H&P completed by Anesthesiologist.  Anesthesia Plan Notes: Pt desires general    Ready For Surgery From Anesthesia Perspective.     .

## 2023-09-18 NOTE — PRE-PROCEDURE INSTRUCTIONS
The following was discussed with pt via phone and sent to pt portal. Pt verbalized understanding.    Dear Vianney ,    You are scheduled for a procedure with Dr. Wylie on 9/19/2023. Your scheduled arrival time is 5:00am.  This arrival time is roughly 2 hours before your anticipated procedure time to allow sufficient time for pre-op.  Please wear comfortable clothes.  This procedure will take place at the [facility] at the corner of [facility].  [facility] to [facility].  The address is:    [address] [place] [address].  [place] [id number]    After entering the building, you will proceed to the second floor where you can check in with registration. You should take any medications that you routinely take for blood pressure (other than those listed below), heart medications, thyroid, cholesterol, etc.     If you wear contact lenses, please wear glasses to your procedure.    Your fasting instructions are as follow:  Nothing to eat or drink after midnight tonight. You may have small amounts of water to take medications in the morning. You MUST have a responsible adult to bring you home.      This evening (9/18/2023) and tomorrow morning, please hold the following medications:  -Aspirin and Aspirin-containing products (Goody's powder, Excedrin)  -NSAIDs (Advil, Ibuprofen, Aleve, Diclofenac)  -Vitamins/Supplements  -Herbal remedies/Teas  -Stimulants (Adderall, Vyvanse, Adipex)  -Diabetic medication (Please bring with you day of procedure)  -IBUPROFEN  -MOBIC/MELOXICAN  -SENNA  -RETIN-A    -May take Tylenol      This evening (9/18/2023) and tomorrow morning,  take a shower using antibacterial soap (ex: Hibiclens or Dial antibacterial soap). DO NOT apply deodorant, lotion, cologne, or anything else to the skin. Wear loose, comfortable fitting clothing. Do not wear jewelry or bring any valuables with you. If you wear dentures or contacts, please bring your case  with you or leave them at home.    If you have any procedure-specific questions, please call your surgeon's office. Any other questions, don't hesitate to call at [phone].    Thanks,  TAMMI Mcelroy  Pre-Admit Dept [facility]

## 2023-09-19 ENCOUNTER — ANESTHESIA (OUTPATIENT)
Dept: SURGERY | Facility: HOSPITAL | Age: 38
End: 2023-09-19
Payer: OTHER MISCELLANEOUS

## 2023-09-19 ENCOUNTER — HOSPITAL ENCOUNTER (OUTPATIENT)
Dept: RADIOLOGY | Facility: HOSPITAL | Age: 38
Discharge: HOME | End: 2023-09-19
Attending: STUDENT IN AN ORGANIZED HEALTH CARE EDUCATION/TRAINING PROGRAM | Admitting: STUDENT IN AN ORGANIZED HEALTH CARE EDUCATION/TRAINING PROGRAM
Payer: OTHER MISCELLANEOUS

## 2023-09-19 ENCOUNTER — HOSPITAL ENCOUNTER (OUTPATIENT)
Facility: HOSPITAL | Age: 38
Discharge: HOME | End: 2023-09-19
Attending: STUDENT IN AN ORGANIZED HEALTH CARE EDUCATION/TRAINING PROGRAM | Admitting: STUDENT IN AN ORGANIZED HEALTH CARE EDUCATION/TRAINING PROGRAM
Payer: OTHER MISCELLANEOUS

## 2023-09-19 VITALS
HEART RATE: 81 BPM | SYSTOLIC BLOOD PRESSURE: 97 MMHG | OXYGEN SATURATION: 99 % | TEMPERATURE: 98 F | RESPIRATION RATE: 20 BRPM | DIASTOLIC BLOOD PRESSURE: 59 MMHG

## 2023-09-19 DIAGNOSIS — S92.352A CLOSED DISPLACED FRACTURE OF FIFTH METATARSAL BONE OF LEFT FOOT: ICD-10-CM

## 2023-09-19 DIAGNOSIS — R52 PAIN: ICD-10-CM

## 2023-09-19 DIAGNOSIS — S92.352A CLOSED DISPLACED FRACTURE OF FIFTH METATARSAL BONE OF LEFT FOOT, INITIAL ENCOUNTER: Primary | ICD-10-CM

## 2023-09-19 PROCEDURE — 25000003 PHARM REV CODE 250: Performed by: STUDENT IN AN ORGANIZED HEALTH CARE EDUCATION/TRAINING PROGRAM

## 2023-09-19 PROCEDURE — 94761 N-INVAS EAR/PLS OXIMETRY MLT: CPT

## 2023-09-19 PROCEDURE — 27201423 OPTIME MED/SURG SUP & DEVICES STERILE SUPPLY: Performed by: STUDENT IN AN ORGANIZED HEALTH CARE EDUCATION/TRAINING PROGRAM

## 2023-09-19 PROCEDURE — 28485 OPTX METATARSAL FX EACH: CPT | Mod: T4,,, | Performed by: STUDENT IN AN ORGANIZED HEALTH CARE EDUCATION/TRAINING PROGRAM

## 2023-09-19 PROCEDURE — D9220A PRA ANESTHESIA: ICD-10-PCS | Mod: ,,, | Performed by: NURSE ANESTHETIST, CERTIFIED REGISTERED

## 2023-09-19 PROCEDURE — 25000003 PHARM REV CODE 250: Performed by: ANESTHESIOLOGY

## 2023-09-19 PROCEDURE — 37000009 HC ANESTHESIA EA ADD 15 MINS: Performed by: STUDENT IN AN ORGANIZED HEALTH CARE EDUCATION/TRAINING PROGRAM

## 2023-09-19 PROCEDURE — 28485 PR OPEN TREATMENT METATARSAL FRACTURE EACH: ICD-10-PCS | Mod: T4,,, | Performed by: STUDENT IN AN ORGANIZED HEALTH CARE EDUCATION/TRAINING PROGRAM

## 2023-09-19 PROCEDURE — D9220A PRA ANESTHESIA: Mod: ,,, | Performed by: NURSE ANESTHETIST, CERTIFIED REGISTERED

## 2023-09-19 PROCEDURE — 36000708 HC OR TIME LEV III 1ST 15 MIN: Performed by: STUDENT IN AN ORGANIZED HEALTH CARE EDUCATION/TRAINING PROGRAM

## 2023-09-19 PROCEDURE — 36000709 HC OR TIME LEV III EA ADD 15 MIN: Performed by: STUDENT IN AN ORGANIZED HEALTH CARE EDUCATION/TRAINING PROGRAM

## 2023-09-19 PROCEDURE — C1713 ANCHOR/SCREW BN/BN,TIS/BN: HCPCS | Performed by: STUDENT IN AN ORGANIZED HEALTH CARE EDUCATION/TRAINING PROGRAM

## 2023-09-19 PROCEDURE — 99900035 HC TECH TIME PER 15 MIN (STAT)

## 2023-09-19 PROCEDURE — 63600175 PHARM REV CODE 636 W HCPCS: Performed by: NURSE ANESTHETIST, CERTIFIED REGISTERED

## 2023-09-19 PROCEDURE — 37000008 HC ANESTHESIA 1ST 15 MINUTES: Performed by: STUDENT IN AN ORGANIZED HEALTH CARE EDUCATION/TRAINING PROGRAM

## 2023-09-19 PROCEDURE — 25000003 PHARM REV CODE 250: Performed by: NURSE ANESTHETIST, CERTIFIED REGISTERED

## 2023-09-19 PROCEDURE — 76000 FLUOROSCOPY <1 HR PHYS/QHP: CPT | Mod: TC

## 2023-09-19 PROCEDURE — 71000015 HC POSTOP RECOV 1ST HR: Performed by: STUDENT IN AN ORGANIZED HEALTH CARE EDUCATION/TRAINING PROGRAM

## 2023-09-19 PROCEDURE — 71000033 HC RECOVERY, INTIAL HOUR: Performed by: STUDENT IN AN ORGANIZED HEALTH CARE EDUCATION/TRAINING PROGRAM

## 2023-09-19 PROCEDURE — 63600175 PHARM REV CODE 636 W HCPCS: Performed by: STUDENT IN AN ORGANIZED HEALTH CARE EDUCATION/TRAINING PROGRAM

## 2023-09-19 DEVICE — IMPLANTABLE DEVICE: Type: IMPLANTABLE DEVICE | Site: FOOT | Status: FUNCTIONAL

## 2023-09-19 RX ORDER — FENTANYL CITRATE 50 UG/ML
INJECTION, SOLUTION INTRAMUSCULAR; INTRAVENOUS
Status: DISCONTINUED | OUTPATIENT
Start: 2023-09-19 | End: 2023-09-19

## 2023-09-19 RX ORDER — PROPOFOL 10 MG/ML
VIAL (ML) INTRAVENOUS
Status: DISCONTINUED | OUTPATIENT
Start: 2023-09-19 | End: 2023-09-19

## 2023-09-19 RX ORDER — LIDOCAINE HYDROCHLORIDE AND EPINEPHRINE 10; 10 MG/ML; UG/ML
INJECTION, SOLUTION INFILTRATION; PERINEURAL
Status: DISCONTINUED | OUTPATIENT
Start: 2023-09-19 | End: 2023-09-19 | Stop reason: HOSPADM

## 2023-09-19 RX ORDER — MORPHINE SULFATE 2 MG/ML
1 INJECTION, SOLUTION INTRAMUSCULAR; INTRAVENOUS EVERY 10 MIN PRN
Status: DISCONTINUED | OUTPATIENT
Start: 2023-09-19 | End: 2023-09-19 | Stop reason: HOSPADM

## 2023-09-19 RX ORDER — HYDROCODONE BITARTRATE AND ACETAMINOPHEN 5; 325 MG/1; MG/1
1 TABLET ORAL EVERY 4 HOURS PRN
Status: DISCONTINUED | OUTPATIENT
Start: 2023-09-19 | End: 2023-09-19

## 2023-09-19 RX ORDER — ACETAMINOPHEN 500 MG
1000 TABLET ORAL
Status: DISCONTINUED | OUTPATIENT
Start: 2023-09-19 | End: 2023-09-19 | Stop reason: HOSPADM

## 2023-09-19 RX ORDER — SODIUM CHLORIDE 0.9 % (FLUSH) 0.9 %
10 SYRINGE (ML) INJECTION
Status: DISCONTINUED | OUTPATIENT
Start: 2023-09-19 | End: 2023-09-19 | Stop reason: HOSPADM

## 2023-09-19 RX ORDER — ONDANSETRON 2 MG/ML
INJECTION INTRAMUSCULAR; INTRAVENOUS
Status: DISCONTINUED | OUTPATIENT
Start: 2023-09-19 | End: 2023-09-19

## 2023-09-19 RX ORDER — KETOROLAC TROMETHAMINE 30 MG/ML
30 INJECTION, SOLUTION INTRAMUSCULAR; INTRAVENOUS ONCE AS NEEDED
Status: DISCONTINUED | OUTPATIENT
Start: 2023-09-19 | End: 2023-09-19 | Stop reason: HOSPADM

## 2023-09-19 RX ORDER — HYDROCODONE BITARTRATE AND ACETAMINOPHEN 5; 325 MG/1; MG/1
1 TABLET ORAL ONCE AS NEEDED
Status: DISCONTINUED | OUTPATIENT
Start: 2023-09-19 | End: 2023-09-19 | Stop reason: HOSPADM

## 2023-09-19 RX ORDER — PROPOFOL 10 MG/ML
VIAL (ML) INTRAVENOUS CONTINUOUS PRN
Status: DISCONTINUED | OUTPATIENT
Start: 2023-09-19 | End: 2023-09-19

## 2023-09-19 RX ORDER — TERBINAFINE HYDROCHLORIDE 250 MG/1
250 TABLET ORAL DAILY
Qty: 7 TABLET | Refills: 0 | Status: SHIPPED | OUTPATIENT
Start: 2023-09-19 | End: 2023-09-26

## 2023-09-19 RX ORDER — SODIUM CHLORIDE 9 MG/ML
INJECTION, SOLUTION INTRAVENOUS CONTINUOUS
Status: DISCONTINUED | OUTPATIENT
Start: 2023-09-19 | End: 2023-09-19 | Stop reason: HOSPADM

## 2023-09-19 RX ORDER — DIAZEPAM 5 MG/1
5 TABLET ORAL ONCE AS NEEDED
Status: DISCONTINUED | OUTPATIENT
Start: 2023-09-19 | End: 2023-09-19 | Stop reason: HOSPADM

## 2023-09-19 RX ORDER — ONDANSETRON 2 MG/ML
4 INJECTION INTRAMUSCULAR; INTRAVENOUS ONCE AS NEEDED
Status: DISCONTINUED | OUTPATIENT
Start: 2023-09-19 | End: 2023-09-19 | Stop reason: HOSPADM

## 2023-09-19 RX ORDER — MIDAZOLAM HYDROCHLORIDE 1 MG/ML
INJECTION INTRAMUSCULAR; INTRAVENOUS
Status: DISCONTINUED | OUTPATIENT
Start: 2023-09-19 | End: 2023-09-19

## 2023-09-19 RX ORDER — BUPIVACAINE HYDROCHLORIDE 5 MG/ML
INJECTION, SOLUTION PERINEURAL
Status: DISCONTINUED | OUTPATIENT
Start: 2023-09-19 | End: 2023-09-19 | Stop reason: HOSPADM

## 2023-09-19 RX ORDER — GABAPENTIN 300 MG/1
600 CAPSULE ORAL
Status: COMPLETED | OUTPATIENT
Start: 2023-09-19 | End: 2023-09-19

## 2023-09-19 RX ORDER — LIDOCAINE HYDROCHLORIDE 20 MG/ML
INJECTION INTRAVENOUS
Status: DISCONTINUED | OUTPATIENT
Start: 2023-09-19 | End: 2023-09-19

## 2023-09-19 RX ORDER — DEXAMETHASONE SODIUM PHOSPHATE 4 MG/ML
INJECTION, SOLUTION INTRA-ARTICULAR; INTRALESIONAL; INTRAMUSCULAR; INTRAVENOUS; SOFT TISSUE
Status: DISCONTINUED | OUTPATIENT
Start: 2023-09-19 | End: 2023-09-19 | Stop reason: HOSPADM

## 2023-09-19 RX ORDER — CEFAZOLIN SODIUM 1 G/3ML
INJECTION, POWDER, FOR SOLUTION INTRAMUSCULAR; INTRAVENOUS
Status: DISCONTINUED | OUTPATIENT
Start: 2023-09-19 | End: 2023-09-19

## 2023-09-19 RX ORDER — KETAMINE HCL IN 0.9 % NACL 50 MG/5 ML
SYRINGE (ML) INTRAVENOUS
Status: DISCONTINUED | OUTPATIENT
Start: 2023-09-19 | End: 2023-09-19

## 2023-09-19 RX ADMIN — FENTANYL CITRATE 25 MCG: 50 INJECTION, SOLUTION INTRAMUSCULAR; INTRAVENOUS at 07:09

## 2023-09-19 RX ADMIN — Medication 20 MG: at 07:09

## 2023-09-19 RX ADMIN — SODIUM CHLORIDE: 0.9 INJECTION, SOLUTION INTRAVENOUS at 07:09

## 2023-09-19 RX ADMIN — FENTANYL CITRATE 25 MCG: 50 INJECTION, SOLUTION INTRAMUSCULAR; INTRAVENOUS at 08:09

## 2023-09-19 RX ADMIN — PROPOFOL 125 MCG/KG/MIN: 10 INJECTION, EMULSION INTRAVENOUS at 07:09

## 2023-09-19 RX ADMIN — Medication 50 MG: at 07:09

## 2023-09-19 RX ADMIN — ONDANSETRON 4 MG: 2 INJECTION INTRAMUSCULAR; INTRAVENOUS at 07:09

## 2023-09-19 RX ADMIN — CEFAZOLIN 2 G: 330 INJECTION, POWDER, FOR SOLUTION INTRAMUSCULAR; INTRAVENOUS at 07:09

## 2023-09-19 RX ADMIN — MIDAZOLAM HYDROCHLORIDE 2 MG: 1 INJECTION INTRAMUSCULAR; INTRAVENOUS at 07:09

## 2023-09-19 RX ADMIN — LIDOCAINE HYDROCHLORIDE 80 MG: 20 INJECTION INTRAVENOUS at 07:09

## 2023-09-19 RX ADMIN — GABAPENTIN 600 MG: 300 CAPSULE ORAL at 06:09

## 2023-09-19 RX ADMIN — Medication 10 MG: at 08:09

## 2023-09-19 NOTE — PLAN OF CARE
Pt in preop bay 41, VSS, meds given and IV inserted. Pt denies any open wounds on body or the use of any weight loss injections. Pt needs an admit order, an H&P, procedural and anesthesia consents, otherwise ready to roll.

## 2023-09-19 NOTE — DISCHARGE INSTRUCTIONS
-Weighbearing as tolerated in surgical boot for short distances. Nonweightbearing with crutches or knee scooter for longer distances.  -Pain medication as prescribed, wean off as tolerated.   -In case of pain: elevate lower limb, loosen outer layers of dressing/bandage.  -In case of bleeding: elevate lower limb, tighten outer layers of dressing or reinforce with another elastic bandage.    -Followup in clinic next week, clinic will reach out to set up appointment.

## 2023-09-19 NOTE — OP NOTE
[facility] ([facility])  Operative Note      Procedure Date:   9/19/2023     Surgeons:   Surgeon(s) and Role:     * Sameer Wylie DPM - Primary    Pre-Operative Diagnosis:   Closed displaced fracture of fifth metatarsal bone of left foot with routine healing, subsequent encounter [Z51.676P]    Indications:  Left 5th metatarsal displaced fracture, unstable; in need of open reduction with internal fixation to increase stability and healing potential    Consent:  Indications, potential risks, potential benefits, available alternatives reviewed. Patient was given the opportunity to ask questions, all questions answered. Written consent obtained.      Procedure(s) (LRB):  ORIF, FRACTURE, METATARSAL BONE (Left)     Post-Operative Diagnosis:   Same as preoperative diagnosis    Surgery:     Anesthesia:   Local MAC    Procedure in Detail:   The patient was transported to the operating room on a stretcher and was transferred to the OR table in supine position. Anesthesia was induced.      Of note, there appeared to be a fungal infection throughout the plantar left foot.     A tourniquet was applied to the left calf. 20 mL of a 1:1 mixture of 0.50% bupivacaine plain and 1% lidocaine with epinephrine was locally infiltrated. The left lower limb was prepped and draped in a sterile manner. Tourniquet(s) inflated to 250 mmHg.     Dorsolateral incision made. Dissection carried down to bone, bleeding controlled with electrocautery. Periosteum elevated off bone to reveal underlying fracture. Fracture fragments were mobilized with a freer elevator and scar tissue was cleared out using a rongeur. Traction was applied to the 5th toe to reduce the fracture, temporarily fixated with a clamp. Reduction acceptable visually and under fluoroscopy. A 2.0 lag screw was driven across the fracture line. Then an L plate was placed dorsally over the fracture and fixated with 1 lag screw and 3 locking screws. Stress testing  performed, stable. Reduction acceptable on final fluoroscopy shots.     Site irrigated with saline. Layered primary closure performed using 4-0 monocryl and 4-0 nylon suture. 8 mL 0.5% bupivacaine and 4 mg dexamethasone were locally infiltrated. Dressed with xeroform, 4x4 gauze, cast padding, ACE wraps. Tourniquet(s) deflated.     Complications:  None    Estimated Blood Loss (EBL):   5 mL           Vendors:  [employer]    Implants:   Implant Name Type Inv. Item Serial No.  Lot No. LRB No. Used Action   [employer] [place] Baby Gorilla L plate, 6 hole,left   [id number] [employer]  Left 1 Implanted   [place] BG locking screw 2.0x10mm   -[id number] [employer]  Left 1 Implanted   [employer] [place] BG locking screrw 2.0x 08mm   -[id number] [employer]  Left 2 Implanted   [employer] [place] BG Non-locking 2.0x11mm   -[id number] [employer]  Left 1 Implanted   [place] BG Non-locking 2.0x13mm   -[id number] [employer]  Left 1 Implanted              Condition:   Good    Disposition:   PACU - hemodynamically stable.    Attestation:   I was present and scrubbed for the entire procedure.    Discharge:     OUTCOME:   Patient tolerated treatment/procedure well without complication and is now ready for discharge.    DISPOSITION:   Patient tolerated the procedure well.     DISCHARGE INSTRUCTIONS:       Clinical Reference Documents Added to Patient Instructions         Document    OPEN REDUCTION AND INTERNAL FIXATION SURGERY (ENGLISH)

## 2023-09-19 NOTE — ANESTHESIA POSTPROCEDURE EVALUATION
Anesthesia Post Evaluation    Patient: Vianney Callahan    Procedure(s) Performed: Procedure(s) (LRB):  ORIF, FRACTURE, METATARSAL BONE (Left)    Final Anesthesia Type: general      Patient location during evaluation: PACU  Patient participation: Yes- Able to Participate  Level of consciousness: awake and alert  Post-procedure vital signs: reviewed and stable  Pain management: adequate  Airway patency: patent    PONV status at discharge: No PONV  Anesthetic complications: no      Cardiovascular status: blood pressure returned to baseline  Respiratory status: unassisted, spontaneous ventilation and room air  Hydration status: euvolemic  Follow-up not needed.          Vitals Value Taken Time   /69 09/19/23 0843     09/19/23 0845   Pulse 96 09/19/23 0844   Resp 13 09/19/23 0844   SpO2 99 % 09/19/23 0844   Vitals shown include unvalidated device data.      No case tracking events are documented in the log.      Pain/Shefali Score: No data recorded

## 2023-09-19 NOTE — PLAN OF CARE
Pt arrived to recovery bay 18. VSS, airway open and stable, O2 99 on room air, and responding to gentle stimuli. RN received report on pt from OR nurse and anesthesia.

## 2023-09-19 NOTE — DISCHARGE SUMMARY
[facility] ([facility])  Podiatry  Discharge Summary      Patient Name: Vianney Callahan  MRN: [id number]  Admission Date: 9/19/2023  Hospital Length of Stay: 0 days  Discharge Date and Time: 09/19/23  Attending Physician: Sameer Wylie DPM   Discharging Provider: Sameer Wylie DPM  Primary Care Provider: Nellie Coreas MD    HPI: Had fall from 4 foot ladder at work. Seen in the ED same day and diagnosed with displaced left 5th metatarsal fracture; discharged with oxycodone, acetaminophen, tall surgical boot, crutches. Reports ongoing swelling, pain, discoloration to the area.    Procedure(s) (LRB):  ORIF, FRACTURE, METATARSAL BONE (Left)      Hospital Course: Admitted for ORIF left foot fracture. Surgery unremarkable. Post-procedure unremarkable.     Prescribed terbinafine for fungal infection of foot, possible contamination of surgical site.     Discharged.         Significant Diagnostic Studies: N/A    Pending Diagnostic Studies:       None          There are no hospital problems to display for this patient.     Discharged Condition: good    Disposition: Home or Self Care    Follow Up:    Patient Instructions:   No discharge procedures on file.  Medications:  Reconciled Home Medications:      Medication List        START taking these medications      terbinafine  mg tablet  Commonly known as: LAMISIL  Take 1 tablet (250 mg total) by mouth once daily. for 7 days            CONTINUE taking these medications      benztropine 1 MG tablet  Commonly known as: COGENTIN  Take 1 mg by mouth 2 (two) times daily.     buPROPion 300 MG 24 hr tablet  Commonly known as: WELLBUTRIN XL  Take 300 mg by mouth once daily.     busPIRone 30 MG Tab  Commonly known as: BUSPAR  Take 30 mg by mouth 2 (two) times daily.     dexAMETHasone 4 MG Tab  Commonly known as: DECADRON  Take 1 tablet (4 mg total) by mouth once daily.     fluphenazine 5 MG tablet  Commonly known as: PROLIXIN  1 tab p qm, 2 tabs po q  hs.     HYDROcodone-acetaminophen 5-325 mg per tablet  Commonly known as: NORCO  Take 1 tablet by mouth every 4 (four) hours as needed for Pain.     ibuprofen 800 MG tablet  Commonly known as: ADVIL,MOTRIN  Take 1 tablet (800 mg total) by mouth every 6 (six) hours as needed for Pain.     lamoTRIgine 150 MG Tab  Commonly known as: LAMICTAL  Take 150 mg by mouth once daily.     meloxicam 15 MG tablet  Commonly known as: MOBIC  Take 1 tablet (15 mg total) by mouth once daily.     methocarbamoL 500 MG Tab  Commonly known as: ROBAXIN  1-2 tabs po bid prn back pain     norethindrone-ethinyl estradiol 1-20 mg-mcg per tablet  Commonly known as: MICROGESTIN 1/20  Take 1 tablet by mouth once daily.     omeprazole 40 MG capsule  Commonly known as: PRILOSEC  TAKE 1 CAPSULE BY MOUTH EVERY DAY     senna 8.6 mg tablet  Commonly known as: SENNA  Take 3 tablets by mouth once daily.     sertraline 100 MG tablet  Commonly known as: ZOLOFT  Pt takes 150mg so one and a half a day     sumatriptan 100 MG tablet  Commonly known as: IMITREX  TAKE 1 TABLET BY MOUTH EVERY DAY AS NEEDED FOR MIGRAINE HEADACHE     tretinoin 0.1 % cream  Commonly known as: RETIN-A  APPLY SMALL AMOUNT TO ENTIRE FACE EVERY NIGHT AT BEDTIME. WASH OFF EVERY MORNING AND APPLY SUNSCREEN     valACYclovir 1000 MG tablet  Commonly known as: VALTREX  Take 1 tablet (1,000 mg total) by mouth once daily.            Time spent on the discharge of patient: 15 minutes    Sameer Wylie DPM  Podiatry  [facility]

## 2023-09-19 NOTE — PLAN OF CARE
Discharge instructions given and explained to patient with verbalization of understanding all instructions. Patients v/s stable, denies n/v and tolerating po fluids, rates pain level 0/10, IV removed, and family (mom Kate) reached and available for patient discharge home.

## 2023-09-19 NOTE — TRANSFER OF CARE
Anesthesia Transfer of Care Note    Patient: Vianney Callahan    Procedure(s) Performed: Procedure(s) (LRB):  ORIF, FRACTURE, METATARSAL BONE (Left)    Patient location: PACU    Anesthesia Type: general    Transport from OR: Transported from OR on room air with adequate spontaneous ventilation    Post pain: adequate analgesia    Post assessment: no apparent anesthetic complications and tolerated procedure well    Post vital signs: stable    Level of consciousness: awake and sedated    Nausea/Vomiting: no nausea/vomiting    Complications: none    Transfer of care protocol was followed      Last vitals:   Visit Vitals  BP (!) 97/59   Pulse 81   Temp 36.7 °C (98 °F) (Temporal)   Resp 20   SpO2 99%   Breastfeeding No

## 2023-09-21 ENCOUNTER — PATIENT MESSAGE (OUTPATIENT)
Dept: PODIATRY | Facility: CLINIC | Age: 38
End: 2023-09-21
Payer: MEDICARE

## 2023-09-27 ENCOUNTER — OFFICE VISIT (OUTPATIENT)
Dept: PODIATRY | Facility: CLINIC | Age: 38
End: 2023-09-27
Payer: OTHER MISCELLANEOUS

## 2023-09-27 VITALS
HEIGHT: 67 IN | SYSTOLIC BLOOD PRESSURE: 121 MMHG | OXYGEN SATURATION: 97 % | BODY MASS INDEX: 26.68 KG/M2 | DIASTOLIC BLOOD PRESSURE: 80 MMHG | WEIGHT: 170 LBS | TEMPERATURE: 99 F | HEART RATE: 90 BPM | RESPIRATION RATE: 18 BRPM

## 2023-09-27 DIAGNOSIS — B35.3 TINEA PEDIS OF LEFT FOOT: ICD-10-CM

## 2023-09-27 DIAGNOSIS — Z98.890 POST-OPERATIVE STATE: Primary | ICD-10-CM

## 2023-09-27 PROCEDURE — 99024 POSTOP FOLLOW-UP VISIT: CPT | Mod: S$GLB,,, | Performed by: STUDENT IN AN ORGANIZED HEALTH CARE EDUCATION/TRAINING PROGRAM

## 2023-09-27 PROCEDURE — 99999 PR PBB SHADOW E&M-EST. PATIENT-LVL IV: ICD-10-PCS | Mod: PBBFAC,,, | Performed by: STUDENT IN AN ORGANIZED HEALTH CARE EDUCATION/TRAINING PROGRAM

## 2023-09-27 PROCEDURE — 99999 PR PBB SHADOW E&M-EST. PATIENT-LVL IV: CPT | Mod: PBBFAC,,, | Performed by: STUDENT IN AN ORGANIZED HEALTH CARE EDUCATION/TRAINING PROGRAM

## 2023-09-27 PROCEDURE — 99024 PR POST-OP FOLLOW-UP VISIT: ICD-10-PCS | Mod: S$GLB,,, | Performed by: STUDENT IN AN ORGANIZED HEALTH CARE EDUCATION/TRAINING PROGRAM

## 2023-09-27 RX ORDER — CICLOPIROX OLAMINE 7.7 MG/G
CREAM TOPICAL 2 TIMES DAILY
Qty: 30 G | Refills: 3 | Status: SHIPPED | OUTPATIENT
Start: 2023-09-27 | End: 2023-10-25

## 2023-09-27 NOTE — PROGRESS NOTES
Subjective:     Patient    Vianney Callahan is a 38 y.o. female.    Problem    09/01/23: Had fall from 4 foot ladder at work 2 days ago. Seen in the ED same day and diagnosed with displaced left 5th metatarsal fracture; discharged with oxycodone, acetaminophen, tall surgical boot, crutches. Reports ongoing swelling, pain, discoloration to the area.     09/19/23: Left 5th metatarsal fracture ORIF.     09/27/23: Returns 1 week s/p left 5th metatarsal ORIF. Pain is 6/10. Completed terbinafine without issue, requesting topical antifungals for left foot. Using crutches with partial weightbearing to left heel.     History    History obtained from patient and review of medical records.     Past Medical History:   Diagnosis Date    Abnormal cervical Papanicolaou smear 2012    Colposcopy    ADHD (attention deficit hyperactivity disorder) 8/16/2012    Allergy     Anxiety     Asthma     childhood    Back pain 5/19/2014    Bipolar affective disorder     Cholecystitis     Chronic migraine without aura, with intractable migraine, so stated, without mention of status migrainosus 9/24/2013    Depression     Fever blister     Gallstones 5/26/2014    Headache(784.0)     Hepatitis C antibody positive in blood     History of hepatitis C     History of psychiatric hospitalization     Suicide attempt    Personality disorder 4/11/2013    Psychosis 10/7/2013    Therapy     Tobacco abuse 9/24/2013       Past Surgical History:   Procedure Laterality Date    ESOPHAGOGASTRODUODENOSCOPY      FRACTURE SURGERY      MOLE REMOVAL      OPEN REDUCTION AND INTERNAL FIXATION (ORIF) OF FRACTURE OF METATARSAL BONE Left 9/19/2023    Procedure: ORIF, FRACTURE, METATARSAL BONE;  Surgeon: Sameer Wylie DPM;  Location: [facility];  Service: Podiatry;  Laterality: Left;  1.5 hours; foot tray; mini c arm    SKIN BIOPSY          Objective:     Vitals  Wt Readings from Last 1 Encounters:   09/27/23 77.1 kg (170 lb)     Temp Readings from Last 1 Encounters:    23 98.6 °F (37 °C)     BP Readings from Last 1 Encounters:   23 121/80     Pulse Readings from Last 1 Encounters:   23 90       Dermatological Exam    Skin:   Pedal hair growth, skin color, and skin texture normal on left; incision healing well; diffuse scaling and peeling from fungal infection    Nails:  All nails normal in length, thickness, color    Vascular Exam    Arteries:   Posterior tibial artery palpable on left  Dorsalis pedis artery palpable on left    Veins:   Superficial veins unremarkable on left    Swellin+ nonpitting on left at lateral forefoot    Neurological Exam    [place] touch test:  Light touch sensation intact to left foot     Musculoskeletal Exam    Footwear:  Casual on right  Surgical boot on left    Gait Exam:   Ambulatory Status: Ambulatory  Gait:  NWB LLE  Assistive Devices: Crutches    Foot Progression Angle:  Normal on right  Normal on left     Left Lower Extremity Additional Findings:  Pain on palpation at distal 5th metatarsal; gross function intact to 5th toe  Left foot and ankle function, strength, and range of motion unremarkable except as noted above.    Imaging and Other Tests    Imagin23 Left foot X ray: displaced 5th metatarsal fracture    Independently reviewed and interpreted imaging, findings are as follows: N/A     Assessment:     Encounter Diagnoses   Name Primary?    Post-operative state Yes    Tinea pedis of left foot         Plan:     I counseled the patient on her conditions, their implications and medical management.    S/p left 5th metatarsal ORIF  -Continue pain medications as prescribed, wean off as tolerated.  -LLE WBAT in surgical boot for short distances. Knee scooter or crutches for PWB or NWB for longer distances.   -Can cleanse incision daily with soap and water, apply adhesive bandage.     Tinea pedis left foot: acute improving  -Ordered topical ciclopirox to be applied to left foot 1-2 times daily for 1-2  months.      Return to clinic in 1 week for suture removal, call sooner PRN.

## 2023-10-02 ENCOUNTER — OFFICE VISIT (OUTPATIENT)
Dept: PODIATRY | Facility: CLINIC | Age: 38
End: 2023-10-02
Payer: OTHER MISCELLANEOUS

## 2023-10-02 VITALS
DIASTOLIC BLOOD PRESSURE: 79 MMHG | HEIGHT: 65 IN | HEART RATE: 100 BPM | RESPIRATION RATE: 18 BRPM | OXYGEN SATURATION: 97 % | BODY MASS INDEX: 28.32 KG/M2 | WEIGHT: 170 LBS | TEMPERATURE: 99 F | SYSTOLIC BLOOD PRESSURE: 118 MMHG

## 2023-10-02 DIAGNOSIS — Z98.890 POST-OPERATIVE STATE: Primary | ICD-10-CM

## 2023-10-02 PROCEDURE — 99024 PR POST-OP FOLLOW-UP VISIT: ICD-10-PCS | Mod: S$GLB,,, | Performed by: STUDENT IN AN ORGANIZED HEALTH CARE EDUCATION/TRAINING PROGRAM

## 2023-10-02 PROCEDURE — 99024 POSTOP FOLLOW-UP VISIT: CPT | Mod: S$GLB,,, | Performed by: STUDENT IN AN ORGANIZED HEALTH CARE EDUCATION/TRAINING PROGRAM

## 2023-10-02 PROCEDURE — 99999 PR PBB SHADOW E&M-EST. PATIENT-LVL V: ICD-10-PCS | Mod: PBBFAC,,, | Performed by: STUDENT IN AN ORGANIZED HEALTH CARE EDUCATION/TRAINING PROGRAM

## 2023-10-02 PROCEDURE — 99999 PR PBB SHADOW E&M-EST. PATIENT-LVL V: CPT | Mod: PBBFAC,,, | Performed by: STUDENT IN AN ORGANIZED HEALTH CARE EDUCATION/TRAINING PROGRAM

## 2023-10-02 RX ORDER — DOXYCYCLINE 100 MG/1
100 CAPSULE ORAL 2 TIMES DAILY
Qty: 20 CAPSULE | Refills: 0 | Status: SHIPPED | OUTPATIENT
Start: 2023-10-02 | End: 2023-10-17

## 2023-10-02 NOTE — LETTER
October 2, 2023      [facility]  [address] [address]  Phone: [phone]       Patient: Vianney Callahan   YOB: 1985  Date of Visit: 10/02/2023    To Whom It May Concern:    Jeremy Callahan  was at [facility] on 10/02/2023. She is not yet medically cleared to return to work due to recent fracture repair surgery and current postoperative course. Next appointment in 2 weeks, will reevaluate at that time for possible return to work afterwards. If you have any questions or concerns, or if I can be of further assistance, please do not hesitate to contact me.    Sincerely,        Sameer Wylie DPM

## 2023-10-02 NOTE — PROGRESS NOTES
Subjective:     Patient    Vianney Callahan is a 38 y.o. female.    Problem    09/01/23: Had fall from 4 foot ladder at work 2 days ago. Seen in the ED same day and diagnosed with displaced left 5th metatarsal fracture; discharged with oxycodone, acetaminophen, tall surgical boot, crutches. Reports ongoing swelling, pain, discoloration to the area.     09/19/23: Left 5th metatarsal fracture ORIF.     09/27/23: Returns 1 week s/p left 5th metatarsal ORIF. Pain is 6/10. Completed terbinafine without issue, requesting topical antifungals for left foot. Using crutches with partial weightbearing to left heel.     10/02/23: Returns 2 weeks s/p left 5th metatarsal ORIF. New redness and swelling to left foot, is using the topical antifungals. WB in surgical boot.     History    History obtained from patient and review of medical records.     Past Medical History:   Diagnosis Date    Abnormal cervical Papanicolaou smear 2012    Colposcopy    ADHD (attention deficit hyperactivity disorder) 8/16/2012    Allergy     Anxiety     Asthma     childhood    Back pain 5/19/2014    Bipolar affective disorder     Cholecystitis     Chronic migraine without aura, with intractable migraine, so stated, without mention of status migrainosus 9/24/2013    Depression     Fever blister     Gallstones 5/26/2014    Headache(784.0)     Hepatitis C antibody positive in blood     History of hepatitis C     History of psychiatric hospitalization     Suicide attempt    Personality disorder 4/11/2013    Psychosis 10/7/2013    Therapy     Tobacco abuse 9/24/2013       Past Surgical History:   Procedure Laterality Date    ESOPHAGOGASTRODUODENOSCOPY      FRACTURE SURGERY      MOLE REMOVAL      OPEN REDUCTION AND INTERNAL FIXATION (ORIF) OF FRACTURE OF METATARSAL BONE Left 9/19/2023    Procedure: ORIF, FRACTURE, METATARSAL BONE;  Surgeon: Sameer Wylie DPM;  Location: [facility];  Service: Podiatry;  Laterality: Left;  1.5 hours; foot tray; mini c arm     SKIN BIOPSY          Objective:     Vitals  Wt Readings from Last 1 Encounters:   10/02/23 77.1 kg (170 lb)     Temp Readings from Last 1 Encounters:   10/02/23 98.9 °F (37.2 °C)     BP Readings from Last 1 Encounters:   10/02/23 118/79     Pulse Readings from Last 1 Encounters:   10/02/23 100       Dermatological Exam    Skin:   Pedal hair growth, skin color, and skin texture normal on left; incision healed; redness and swelling dorsolateral forefoot; diffuse scaling and peeling from fungal infection    Nails:  All nails normal in length, thickness, color    Vascular Exam    Arteries:   Posterior tibial artery palpable on left  Dorsalis pedis artery palpable on left    Veins:   Superficial veins unremarkable on left    Swellin+ nonpitting on left at lateral forefoot    Neurological Exam    Telephone touch test:  Light touch sensation intact to left foot     Musculoskeletal Exam    Footwear:  Casual on right  Surgical boot on left    Gait Exam:   Ambulatory Status: Ambulatory  Gait:  NWB LLE  Assistive Devices: Crutches    Foot Progression Angle:  Normal on right  Normal on left     Left Lower Extremity Additional Findings:  Pain on palpation at distal 5th metatarsal; gross function intact to 5th toe  Left foot and ankle function, strength, and range of motion unremarkable except as noted above.    Imaging and Other Tests    Imagin23 Left foot X ray: displaced 5th metatarsal fracture    Independently reviewed and interpreted imaging, findings are as follows: N/A     Assessment:     Encounter Diagnosis   Name Primary?    Post-operative state Yes        Plan:     I counseled the patient on her conditions, their implications and medical management.    S/p left 5th metatarsal ORIF  -Continue pain medications as prescribed, wean off as tolerated.  -LLE WBAT in surgical boot for short distances. Knee scooter or crutches for PWB or NWB for longer distances.   -Sutures removed. Treat as normal skin.    -Ordered doxycycline.     Tinea pedis left foot: acute improving  -Ordered topical ciclopirox to be applied to left foot 1-2 times daily for 1-2 months.      Return to clinic in 2 weeks with X rays prior, call sooner PRN.

## 2023-10-03 NOTE — PRE-PROCEDURE INSTRUCTIONS
10/3/23 @ 1048: Pt returned RN's call. Pre-op interview conducted. Reviewed following info via phone. Pt verbalized understanding. When asked about the doxycycline on her list, pt states that it was written by Dr. Wylie for prophylaxis. Pt states that the dr did not think her surgical scar was infected. Pt has not yet started taking doxycycline.      Unable to reach pt via phone. Left message with instructions along with a request for a call back. Doxycycline noted on med list. RN emphasized to need for pt to call back regarding this. The following was sent to pt portal.    Dear Vianney ,    You are scheduled for a procedure with Dr. Dupree on 10/4/2023. Your scheduled arrival time is 11:10am.  This arrival time is roughly 2 hours before your anticipated procedure time to allow sufficient time for pre-op.  Please wear comfortable clothes.  This procedure will take place at the [facility] at the corner of [facility] and [facility].  It is in the [facility] next to [facility].  The address is:    [address] [place] [address].  [place] [id number]    After entering the building, you will proceed to the second floor where you can check in with registration. You should take any medications that you routinely take for blood pressure (other than those listed below), heart medications, thyroid, cholesterol, etc.     If you wear contact lenses, please wear glasses to your procedure.    Your fasting instructions are as follow:  Nothing to eat or drink after midnight tonight. You may have small amounts of water to take medications in the morning. You MUST have a responsible adult to bring you home.      This evening (10/3/2023) and tomorrow morning, please hold the following medications:  -Aspirin and Aspirin-containing products (Goody's powder, Excedrin)  -NSAIDs (Advil, Ibuprofen, Aleve, Diclofenac)  -Vitamins/Supplements  -Herbal remedies/Teas  -Stimulants (Adderall, Vyvanse, Adipex)  -Diabetic  medication (Please bring with you day of procedure)  -IBUPROFEN  -SENNA  -PRESCRIPTIONS CREAMS/LOTIONS    -May take Tylenol      This evening (10/3/2023) and tomorrow morning, take a shower using antibacterial soap (ex: Hibiclens or Dial antibacterial soap). DO NOT apply deodorant, lotion, cologne, or anything else to the skin. Wear loose, comfortable fitting clothing. Do not wear jewelry or bring any valuables with you. If you wear dentures or contacts, please bring your case with you or leave them at home.    If you have any procedure-specific questions, please call your surgeon's office. Any other questions, don't hesitate to call at [phone].    Thanks,  TAMMI Mcelroy  Pre-Admit Dept [facility]

## 2023-10-04 ENCOUNTER — ANESTHESIA EVENT (OUTPATIENT)
Dept: SURGERY | Facility: HOSPITAL | Age: 38
End: 2023-10-04
Payer: MEDICARE

## 2023-10-04 ENCOUNTER — HOSPITAL ENCOUNTER (OUTPATIENT)
Facility: HOSPITAL | Age: 38
Discharge: HOME | End: 2023-10-04
Attending: SURGERY | Admitting: SURGERY
Payer: MEDICARE

## 2023-10-04 ENCOUNTER — ANESTHESIA (OUTPATIENT)
Dept: SURGERY | Facility: HOSPITAL | Age: 38
End: 2023-10-04
Payer: MEDICARE

## 2023-10-04 VITALS
SYSTOLIC BLOOD PRESSURE: 106 MMHG | OXYGEN SATURATION: 99 % | RESPIRATION RATE: 20 BRPM | DIASTOLIC BLOOD PRESSURE: 55 MMHG | TEMPERATURE: 98 F | HEART RATE: 94 BPM

## 2023-10-04 DIAGNOSIS — D17.0 LIPOMA, FACE: ICD-10-CM

## 2023-10-04 DIAGNOSIS — D17.0 LIPOMA OF FOREHEAD: Primary | ICD-10-CM

## 2023-10-04 LAB
B-HCG UR QL: NEGATIVE
CTP QC/QA: YES

## 2023-10-04 PROCEDURE — D9220A PRA ANESTHESIA: ICD-10-PCS | Mod: CRNA,,, | Performed by: NURSE ANESTHETIST, CERTIFIED REGISTERED

## 2023-10-04 PROCEDURE — 81025 URINE PREGNANCY TEST: CPT | Performed by: PHYSICIAN ASSISTANT

## 2023-10-04 PROCEDURE — 25000003 PHARM REV CODE 250: Performed by: SURGERY

## 2023-10-04 PROCEDURE — D9220A PRA ANESTHESIA: Mod: CRNA,,, | Performed by: NURSE ANESTHETIST, CERTIFIED REGISTERED

## 2023-10-04 PROCEDURE — 21014 EXC FACE TUM DEEP 2 CM/>: CPT | Mod: ,,, | Performed by: SURGERY

## 2023-10-04 PROCEDURE — 37000009 HC ANESTHESIA EA ADD 15 MINS: Performed by: SURGERY

## 2023-10-04 PROCEDURE — 71000015 HC POSTOP RECOV 1ST HR: Performed by: SURGERY

## 2023-10-04 PROCEDURE — 25000003 PHARM REV CODE 250: Performed by: PHYSICIAN ASSISTANT

## 2023-10-04 PROCEDURE — 63600175 PHARM REV CODE 636 W HCPCS: Performed by: PHYSICIAN ASSISTANT

## 2023-10-04 PROCEDURE — D9220A PRA ANESTHESIA: ICD-10-PCS | Mod: ANES,,, | Performed by: STUDENT IN AN ORGANIZED HEALTH CARE EDUCATION/TRAINING PROGRAM

## 2023-10-04 PROCEDURE — 25000003 PHARM REV CODE 250: Performed by: NURSE ANESTHETIST, CERTIFIED REGISTERED

## 2023-10-04 PROCEDURE — 36000706: Performed by: SURGERY

## 2023-10-04 PROCEDURE — 99900035 HC TECH TIME PER 15 MIN (STAT)

## 2023-10-04 PROCEDURE — 21014 PR EXC TUMOR SOFT TISS FACE&SCALP SUBFASCIAL 2+CM: ICD-10-PCS | Mod: ,,, | Performed by: SURGERY

## 2023-10-04 PROCEDURE — 63600175 PHARM REV CODE 636 W HCPCS: Performed by: NURSE ANESTHETIST, CERTIFIED REGISTERED

## 2023-10-04 PROCEDURE — 88304 TISSUE EXAM BY PATHOLOGIST: CPT | Mod: 26,,, | Performed by: PATHOLOGY

## 2023-10-04 PROCEDURE — 88304 PR  SURG PATH,LEVEL III: ICD-10-PCS | Mod: 26,,, | Performed by: PATHOLOGY

## 2023-10-04 PROCEDURE — 71000033 HC RECOVERY, INTIAL HOUR: Performed by: SURGERY

## 2023-10-04 PROCEDURE — 36000707: Performed by: SURGERY

## 2023-10-04 PROCEDURE — 37000008 HC ANESTHESIA 1ST 15 MINUTES: Performed by: SURGERY

## 2023-10-04 PROCEDURE — 88304 TISSUE EXAM BY PATHOLOGIST: CPT | Performed by: PATHOLOGY

## 2023-10-04 PROCEDURE — D9220A PRA ANESTHESIA: Mod: ANES,,, | Performed by: STUDENT IN AN ORGANIZED HEALTH CARE EDUCATION/TRAINING PROGRAM

## 2023-10-04 PROCEDURE — 94761 N-INVAS EAR/PLS OXIMETRY MLT: CPT

## 2023-10-04 RX ORDER — FENTANYL CITRATE 50 UG/ML
INJECTION, SOLUTION INTRAMUSCULAR; INTRAVENOUS
Status: DISCONTINUED | OUTPATIENT
Start: 2023-10-04 | End: 2023-10-04

## 2023-10-04 RX ORDER — DEXAMETHASONE SODIUM PHOSPHATE 4 MG/ML
INJECTION, SOLUTION INTRA-ARTICULAR; INTRALESIONAL; INTRAMUSCULAR; INTRAVENOUS; SOFT TISSUE
Status: DISCONTINUED | OUTPATIENT
Start: 2023-10-04 | End: 2023-10-04

## 2023-10-04 RX ORDER — MUPIROCIN 20 MG/G
OINTMENT TOPICAL
Status: DISCONTINUED | OUTPATIENT
Start: 2023-10-04 | End: 2023-10-04 | Stop reason: HOSPADM

## 2023-10-04 RX ORDER — ACETAMINOPHEN 500 MG
1000 TABLET ORAL EVERY 8 HOURS
Qty: 42 TABLET | Refills: 0 | Status: SHIPPED | OUTPATIENT
Start: 2023-10-04 | End: 2023-11-22

## 2023-10-04 RX ORDER — PROPOFOL 10 MG/ML
INJECTION, EMULSION INTRAVENOUS
Status: DISCONTINUED | OUTPATIENT
Start: 2023-10-04 | End: 2023-10-04

## 2023-10-04 RX ORDER — CEFAZOLIN SODIUM 1 G/3ML
2 INJECTION, POWDER, FOR SOLUTION INTRAMUSCULAR; INTRAVENOUS
Status: COMPLETED | OUTPATIENT
Start: 2023-10-04 | End: 2023-10-04

## 2023-10-04 RX ORDER — METHOCARBAMOL 500 MG/1
500 TABLET, FILM COATED ORAL 4 TIMES DAILY
Qty: 16 TABLET | Refills: 1 | Status: SHIPPED | OUTPATIENT
Start: 2023-10-04 | End: 2024-02-26

## 2023-10-04 RX ORDER — LIDOCAINE HYDROCHLORIDE 20 MG/ML
INJECTION INTRAVENOUS
Status: DISCONTINUED | OUTPATIENT
Start: 2023-10-04 | End: 2023-10-04

## 2023-10-04 RX ORDER — SODIUM CHLORIDE 0.9 % (FLUSH) 0.9 %
10 SYRINGE (ML) INJECTION
Status: DISCONTINUED | OUTPATIENT
Start: 2023-10-04 | End: 2023-10-04 | Stop reason: HOSPADM

## 2023-10-04 RX ORDER — LIDOCAINE HYDROCHLORIDE AND EPINEPHRINE 5; 5 MG/ML; UG/ML
INJECTION, SOLUTION INFILTRATION; PERINEURAL
Status: DISCONTINUED | OUTPATIENT
Start: 2023-10-04 | End: 2023-10-04 | Stop reason: HOSPADM

## 2023-10-04 RX ORDER — GABAPENTIN 300 MG/1
300 CAPSULE ORAL 3 TIMES DAILY
Qty: 21 CAPSULE | Refills: 0 | Status: SHIPPED | OUTPATIENT
Start: 2023-10-04 | End: 2023-11-10

## 2023-10-04 RX ORDER — LIDOCAINE HYDROCHLORIDE 10 MG/ML
1 INJECTION, SOLUTION EPIDURAL; INFILTRATION; INTRACAUDAL; PERINEURAL ONCE
Status: DISCONTINUED | OUTPATIENT
Start: 2023-10-04 | End: 2023-10-04 | Stop reason: HOSPADM

## 2023-10-04 RX ORDER — OXYCODONE HYDROCHLORIDE 5 MG/1
5 TABLET ORAL EVERY 4 HOURS PRN
Qty: 10 TABLET | Refills: 0 | Status: SHIPPED | OUTPATIENT
Start: 2023-10-04 | End: 2023-10-09 | Stop reason: SDUPTHER

## 2023-10-04 RX ORDER — ONDANSETRON 2 MG/ML
INJECTION INTRAMUSCULAR; INTRAVENOUS
Status: DISCONTINUED | OUTPATIENT
Start: 2023-10-04 | End: 2023-10-04

## 2023-10-04 RX ORDER — SODIUM CHLORIDE 9 MG/ML
INJECTION, SOLUTION INTRAVENOUS CONTINUOUS PRN
Status: DISCONTINUED | OUTPATIENT
Start: 2023-10-04 | End: 2023-10-04

## 2023-10-04 RX ORDER — MIDAZOLAM HYDROCHLORIDE 1 MG/ML
INJECTION INTRAMUSCULAR; INTRAVENOUS
Status: DISCONTINUED | OUTPATIENT
Start: 2023-10-04 | End: 2023-10-04

## 2023-10-04 RX ADMIN — MIDAZOLAM HYDROCHLORIDE 2 MG: 1 INJECTION INTRAMUSCULAR; INTRAVENOUS at 12:10

## 2023-10-04 RX ADMIN — LIDOCAINE HYDROCHLORIDE 100 MG: 20 INJECTION INTRAVENOUS at 12:10

## 2023-10-04 RX ADMIN — SODIUM CHLORIDE: 0.9 INJECTION, SOLUTION INTRAVENOUS at 11:10

## 2023-10-04 RX ADMIN — CEFAZOLIN 2 G: 330 INJECTION, POWDER, FOR SOLUTION INTRAMUSCULAR; INTRAVENOUS at 12:10

## 2023-10-04 RX ADMIN — DEXAMETHASONE SODIUM PHOSPHATE 8 MG: 4 INJECTION INTRA-ARTICULAR; INTRALESIONAL; INTRAMUSCULAR; INTRAVENOUS; SOFT TISSUE at 12:10

## 2023-10-04 RX ADMIN — MUPIROCIN: 20 OINTMENT TOPICAL at 11:10

## 2023-10-04 RX ADMIN — FENTANYL CITRATE 50 MCG: 50 INJECTION, SOLUTION INTRAMUSCULAR; INTRAVENOUS at 12:10

## 2023-10-04 RX ADMIN — PROPOFOL 200 MG: 10 INJECTION, EMULSION INTRAVENOUS at 12:10

## 2023-10-04 RX ADMIN — ONDANSETRON 4 MG: 2 INJECTION INTRAMUSCULAR; INTRAVENOUS at 01:10

## 2023-10-04 NOTE — OP NOTE
[facility]  Plastic Surgery  Operative Note    SUMMARY     Date of Procedure: 10/4/2023     Location:  [facility]    Procedure(s):   Excision of benign subfascial tumor 3 x 2 cm     Surgeon(s) and Role:     * Esvin Dupree DO - Primary    Assistant: Jm Cadlwell DO, Fellow    Pre-Operative Diagnosis: Lipoma of forehead [D17.0]    Post-Operative Diagnosis: same    Anesthesia: General    Indications for Procedure:  30-year-old woman with a slowly growing mass of the left side of the forehead.  Was bothersome to her.  She wished to have it excised.  Exam was consistent with a lipoma.    Operative Findings (including complications, if any):  Excision of subgaleal 2 x 3 cm lipoma    Description of Procedures:  Patient was seen in the preoperative holding area.  All questions were answered.  She was Will consent was signed.  The site was marked.  Patient was taken the operating room general anesthesia was induced.    We discussed an incision in her part line to best hide this in her hair.  Her hair was parted using truly invasive jelly.  Next the head was prepped with dilute Betadine.  Draped in a sterile fashion.  8 cc of 0.5% lidocaine with epinephrine was injected for a field block.  A 1 in incision was made extending posteriorly.  The started about 1 cm behind the hairline.  The superior edge of the mass was about the top of her hairline.  Using Bovie hemostasis the subcutaneous tissue.  Next a small tenotomy scissor was used to circumferentially dissect around this mass.  Once all of the capsular attachments remove the mass was able to be removed from the incision.  It measured 2 x 3 cm.  It was photographed.  The wound was irrigated and checked for hemostasis.  We are in the subgaleal plane.  The galea was closed with the interrupted 3-0 Vicryl suture.  The skin was closed with staples.      The patient tolerated rated the procedure well was taken to recovery in  stable condition.    Significant Surgical Tasks Conducted by the Assistant(s), if Applicable: The indicated resident served as first assistant for this procedure.    Estimated Blood Loss (EBL): 2mL           Implants: * No implants in log *    Specimens:   Specimen (24h ago, onward)      None                    Condition: Good    Disposition: PACU - hemodynamically stable., discharged home with one-week follow up    Attestation: I was present and scrubbed for the entire procedure.

## 2023-10-04 NOTE — PLAN OF CARE
Pt in preop bay 41, VSS, meds given and IV inserted. Pt denies any open wounds on body or the use of any weight loss injections. Pt needs an updated H&P, anesthesia and procedural consents, and site marking, otherwise ready to roll.

## 2023-10-04 NOTE — ANESTHESIA PROCEDURE NOTES
Intubation    Date/Time: 10/4/2023 12:35 PM    Performed by: Sobeida Franco CRNA  Authorized by: Montana Mc MD    Intubation:     Induction:  Intravenous    Intubated:  Postinduction    Mask Ventilation:  Easy mask    Attempts:  1    Attempted By:  CRNA    Difficult Airway Encountered?: No      Complications:  None    Airway Device:  Supraglottic airway/LMA    Airway Device Size:  4.0    Style/Cuff Inflation:  Cuffed    Secured at:  The lips    Placement Verified By:  Capnometry    Complicating Factors:  None    Findings Post-Intubation:  BS equal bilateral and atraumatic/condition of teeth unchanged

## 2023-10-04 NOTE — ANESTHESIA POSTPROCEDURE EVALUATION
Anesthesia Post Evaluation    Patient: Vianney Callahan    Procedure(s) Performed: Procedure(s) (LRB):  EXCISION, LIPOMA (N/A)    Final Anesthesia Type: general      Patient location during evaluation: PACU  Patient participation: Yes- Able to Participate  Level of consciousness: awake and alert  Post-procedure vital signs: reviewed and stable  Pain management: adequate  Airway patency: patent    PONV status at discharge: No PONV  Anesthetic complications: no      Cardiovascular status: stable  Respiratory status: room air  Hydration status: euvolemic  Follow-up not needed.          Vitals Value Taken Time   /72 10/04/23 1346   Temp 36.6 °C (97.9 °F) 10/04/23 1325   Pulse 99 10/04/23 1359   Resp 32 10/04/23 1359   SpO2 98 % 10/04/23 1359   Vitals shown include unvalidated device data.      Event Time   Out of Recovery 13:50:00         Pain/Shefali Score: Shefali Score: 10 (10/4/2023  1:55 PM)

## 2023-10-04 NOTE — BRIEF OP NOTE
[facility] ([facility])  Brief Operative Note    Surgery Date: 10/4/2023     Surgeon(s) and Role:     * Jassi Dupree, DO - Primary    Assisting Surgeon: None    Pre-op Diagnosis:  Lipoma of forehead [D17.0]    Post-op Diagnosis:  Post-Op Diagnosis Codes:     * Lipoma of forehead [D17.0]    Procedure(s) (LRB):  EXCISION, LIPOMA (N/A)    Anesthesia: General    Operative Findings: lipoma forehead    Estimated Blood Loss: * No values recorded between 10/4/2023 12:00 AM and 10/4/2023 12:35 PM *         Specimens:   Specimen (24h ago, onward)       Start     Ordered    10/04/23 1259  Specimen to Pathology, Surgery Other  Once        Comments: Pre-op Diagnosis: Lipoma of forehead [D17.0]Procedure(s):EXCISION, LIPOMA Number of specimens: 1Name of specimens: 1. Lipoma     References:    Click here for ordering Quick Tip   Question Answer Comment   Procedure Type: Other    Specimen Class: Complex case/Special    Which provider would you like to cc? JASSI DUPREE    Release to patient Immediate        10/04/23 1259                      Discharge Note    OUTCOME: Patient tolerated treatment/procedure well without complication and is now ready for discharge.    DISPOSITION: Home or Self Care    FINAL DIAGNOSIS:  <principal problem not specified>    FOLLOWUP: In clinic    DISCHARGE INSTRUCTIONS:    Discharge Procedure Orders   Notify your health care provider if you experience any of the following:  temperature >100.4     Notify your health care provider if you experience any of the following:  persistent nausea and vomiting or diarrhea     Notify your health care provider if you experience any of the following:  severe uncontrolled pain     Notify your health care provider if you experience any of the following:  redness, tenderness, or signs of infection (pain, swelling, redness, odor or green/yellow discharge around incision site)     Notify your health care provider if you experience any  of the following:  difficulty breathing or increased cough     Notify your health care provider if you experience any of the following:  severe persistent headache     Notify your health care provider if you experience any of the following:  worsening rash     Notify your health care provider if you experience any of the following:  persistent dizziness, light-headedness, or visual disturbances     Notify your health care provider if you experience any of the following:  increased confusion or weakness     Remove dressing in 24 hours

## 2023-10-04 NOTE — TRANSFER OF CARE
Anesthesia Transfer of Care Note    Patient: Vianney Callahan    Procedure(s) Performed: Procedure(s) (LRB):  EXCISION, LIPOMA (N/A)    Patient location: PACU    Anesthesia Type: general    Transport from OR: Transported from OR on 6-10 L/min O2 by face mask with adequate spontaneous ventilation    Post pain: adequate analgesia    Post assessment: tolerated procedure well and no apparent anesthetic complications    Post vital signs: stable    Level of consciousness: lethargic    Nausea/Vomiting: no nausea/vomiting    Complications: none    Transfer of care protocol was followed      Last vitals:   Visit Vitals  BP (!) 110/56   Pulse 85   Temp 36.5 °C (97.7 °F) (Temporal)   Resp 16   SpO2 97%   Breastfeeding No

## 2023-10-04 NOTE — DISCHARGE INSTRUCTIONS
Plastic Surgery Discharge Instructions  Chun Dupree DO    Wound Care  1. Shower daily beginning the day after surgery  2. Okay to let warm soapy water run over the wound at that time  3. Do not submerge wounds in the bath  4. Leave any skin glue or mesh tape in place  5. May removed dressing in 24 hours    Diet  Regular Diet    Activity  Light activity only - no lifting greater than 10 lbs  Avoid strenuous activity that would cause you to get hot or sweaty    Medications  You have been given a prescription for antibiotics, narcotic pain medication, anti-inflammatory pain, muscle relaxer, and nerve pain  Unless otherwise contraindicated by your doctor, take 2 extra strength Tylenol and 600mg of ibuprofen every 8 hours  Please take medications as prescribed     What to call for:  1. Sustained fever > 101.0  2. Changes in color, sensation, temperature or pain at surgical site  3. Redness and/or drainage from the surgical site  4. Any reaction to prescribed medications  5. Continuous bloody drainage from surgical drains  6. Wound vac malfunction  7. Questions related to the procedure    Follow-up  1. Please call [phone] to schedule or confirm your follow up visit at the [facility], [address] on 10/10/2023  2. Call with any questions or concerns.  3. After hours call [phone] - ask to speak with the Plastic Surgery fellow on call

## 2023-10-04 NOTE — H&P
Plastic Surgery History & Physical     SUBJECTIVE:   Chief complaint: Forehead cyst     History of Present Illness:  38 y.o. female presenting to discuss forehead cyst. She reports the cyst has been present for several years and has grown in size over the last few months. She denies pain, overlying skin changes, history of infection or trauma to the area. She had a soft tissue US consistent with lipoma. She desires excision of the lesion. She reports psychiatric history but is otherwise healthy.     She denies nicotine use.  Works at [employer] in [place].          Past Medical History:   Diagnosis Date    Abnormal cervical Papanicolaou smear 2012     Colposcopy    ADHD (attention deficit hyperactivity disorder) 8/16/2012    Allergy      Anxiety      Asthma       childhood    Back pain 5/19/2014    Bipolar affective disorder      Cholecystitis      Chronic migraine without aura, with intractable migraine, so stated, without mention of status migrainosus 9/24/2013    Depression      Fever blister      Gallstones 5/26/2014    Headache(784.0)      Hepatitis C antibody positive in blood      History of hepatitis C      History of psychiatric hospitalization       Suicide attempt    Personality disorder 4/11/2013    Psychosis 10/7/2013    Therapy      Tobacco abuse 9/24/2013               Past Surgical History:   Procedure Laterality Date    ESOPHAGOGASTRODUODENOSCOPY        FRACTURE SURGERY        MOLE REMOVAL        SKIN BIOPSY                   Family History   Problem Relation Age of Onset    Melanoma Mother      ADD / ADHD Mother      Bipolar disorder Mother      Schizophrenia Mother      Alcohol abuse Father      Drug abuse Father      Depression Father      Anxiety disorder Sister      Acne Maternal Aunt      Depression Maternal Aunt      Migraines Maternal Aunt      Anxiety disorder Paternal Aunt      Depression Paternal Aunt      Breast cancer Neg Hx      Colon cancer Neg Hx      Ovarian cancer Neg Hx            Social History            Socioeconomic History    Marital status: Single   Occupational History    Occupation: disabled       Employer: disabled    Tobacco Use    Smoking status: Former       Current packs/day: 0.50       Types: Vaping with nicotine, Cigarettes    Smokeless tobacco: Never   Substance and Sexual Activity    Alcohol use: No       Alcohol/week: 0.0 standard drinks of alcohol    Drug use: No    Sexual activity: Not Currently       Partners: Male       Birth control/protection: OCP   Other Topics Concern    Are you pregnant or think you may be? No    Breast-feeding No    Caffeine Use: Excessive No    Financial Status: Unemployed No    Legal: Other No    Childhood History: Adopted No    Financial Status: Other No    Leisure: Exercise No    Childhood History: Early trauma No    Firearms: Does patient have access to a firearm? No    Leisure: Fishing No    Childhood History: Raised by parents Yes    Home situation: homeless No    Leisure: Hunting No    Childhood History: Uneventful No    Home situation: lives alone No    Leisure: Movie Watching Yes    Childhood History: Other No    Home situation: lives in group home No    Leisure: Shopping Yes    Education: Unfinished High School No    Home situation: lives in nursing home No    Leisure: Sports No    Education: High School Graduate Yes    Home situation: lives in shelter No    Leisure: Time with family No    Education: Unfinished college Yes    Home situation: lives with family No     Service No    Education: Trade School No    Home situation: lives with friends No    Spirituality: Active Participation No    Education: Associate's Degree No    Home situation: lives with significant other Yes    Spirituality: Organized Yarsani No    Education: Bachelor's Degree No    Home situation: lives with spouse No    Spirituality: Private Participation No    Education: More than one Bachelor's or Professional No    Legal consequences of chemical use No     Patient feels they ought to cut down on drinking/drug use No    Education: Master's, PhD No    Legal: Arrest history No    Patient annoyed by others criticizing their drinking/drug use No    Financial Status: Disabled Yes    Legal: Involved in civil litigation No    Patient has felt bad or guilty about drinking/drug use No    Financial Status: Employed No    Legal: Involved in criminal litigation No    Patient has had a drink/used drugs as an eye opener in the AM No   Social History Narrative           Exercise:  Walking, classes at various gyms and [employer]           Lives in apartment with 2 other women with mental illness.         Current Medications          Current Outpatient Medications   Medication Sig Dispense Refill    benztropine (COGENTIN) 1 MG tablet Take 1 mg by mouth 2 (two) times daily.        buPROPion (WELLBUTRIN XL) 300 MG 24 hr tablet Take 300 mg by mouth once daily.        busPIRone (BUSPAR) 30 MG Tab Take 30 mg by mouth 2 (two) times daily.        fluphenazine (PROLIXIN) 10 MG tablet Take 10 mg by mouth 2 (two) times daily.        fluphenazine (PROLIXIN) 5 MG tablet 1 tab p qm, 2 tabs po q hs. 1 tablet 0    ibuprofen (ADVIL,MOTRIN) 600 MG tablet TAKE 1 TABLET BY MOUTH EVERY 8 HOURS AS NEEDED FOR PAIN 90 tablet 1    lamoTRIgine (LAMICTAL) 150 MG Tab Take 150 mg by mouth once daily.        methocarbamoL (ROBAXIN) 500 MG Tab 1-2 tabs po bid prn back pain 60 tablet 1    norethindrone-ethinyl estradiol (MICROGESTIN 1/20) 1-20 mg-mcg per tablet Take 1 tablet by mouth once daily. 63 tablet 4    omeprazole (PRILOSEC) 40 MG capsule TAKE 1 CAPSULE BY MOUTH EVERY DAY 90 capsule 2    SENNA 8.6 mg tablet Take 3 tablets by mouth once daily. 90 tablet 11    sertraline (ZOLOFT) 100 MG tablet Pt takes 150mg so one and a half a day        sumatriptan (IMITREX) 100 MG tablet TAKE 1 TABLET BY MOUTH EVERY DAY AS NEEDED FOR MIGRAINE HEADACHE 12 tablet 11    tretinoin (RETIN-A) 0.1 % cream APPLY SMALL AMOUNT TO ENTIRE  FACE EVERY NIGHT AT BEDTIME. WASH OFF EVERY MORNING AND APPLY SUNSCREEN 45 g 3    valACYclovir (VALTREX) 1000 MG tablet Take 1 tablet (1,000 mg total) by mouth once daily. 90 tablet 1      No current facility-administered medications for this visit.                  Review of patient's allergies indicates:   Allergen Reactions    Dilantin [phenytoin sodium extended] Rash    Saphris [asenapine]         Confusion and depressed mood.               OBJECTIVE:      /62   Pulse 106         Physical Exam:  Gen: NAD, appears stated age  Neuro: normal without focal findings, mental status and speech normal  HEENT: NCAT, neck supple, PEERL  CV: RRR  Pulm: Breathing non-labored, chest wall movement equal bilaterally   Breast: not examined  Abdomen: soft, nontender, no guarding  Gu: genitalia not examined  Extremity:normal strength, no cyanosis or edema  Skin: 3cm soft mobile cystic lesion of left forehead in hairline  Psych: flat              ASSESSMENT/PLAN:      Lipoma of forehead  Discussed details of excision of lesion. Risks including but not limited to hematoma, seroma, infection, scarring, need for reoperation, disruption to hairline pattern.   Plan is for incision in the hairline closed with quiana.    Angelica Rivera PA-C

## 2023-10-04 NOTE — PLAN OF CARE
Pt AAOx3, VSS on room air.Pt tolerated procedure well. Pt denies any pain, Dizziness or N/V. IV removed with catheter tip intact. Assisted up for the first time, steady on feet. Pt Discharge instructions given and explained to patient and family (mother Kate) with verbalization of understanding all instructions. Pt denies any further questions at this time. Pt DC'd home VIA wheelchair with family.

## 2023-10-04 NOTE — ANESTHESIA PREPROCEDURE EVALUATION
10/04/2023  Vianney Callahan is a 38 y.o., female.      Pre-op Assessment    I have reviewed the Patient Summary Reports.    I have reviewed the NPO Status.   I have reviewed the Medications.     Review of Systems  Anesthesia Hx:  No problems with previous Anesthesia   Denies Personal Hx of Anesthesia complications.   Social:  Non-Smoker    Cardiovascular:  Cardiovascular Normal     Pulmonary:  Pulmonary Normal    Hepatic/GI:  Hepatic/GI Normal    Neurological:  Neurology Normal    Endocrine:  Endocrine Normal    Psych:   Psychiatric History          Physical Exam  General: Well nourished and Cooperative    Airway:  Mallampati: I   Mouth Opening: Normal  TM Distance: Normal  Tongue: Normal  Neck ROM: Normal ROM    Dental:  Intact        Anesthesia Plan  Type of Anesthesia, risks & benefits discussed:    Anesthesia Type: Gen ETT  Intra-op Monitoring Plan: Standard ASA Monitors  Post Op Pain Control Plan: multimodal analgesia and IV/PO Opioids PRN  Induction:  IV  Informed Consent: Informed consent signed with the Patient and all parties understand the risks and agree with anesthesia plan.  All questions answered.   ASA Score: 2  Day of Surgery Review of History & Physical: H&P Update referred to the surgeon/provider.    Ready For Surgery From Anesthesia Perspective.     .

## 2023-10-04 NOTE — H&P
Plastic Surgery History & Physical     SUBJECTIVE:   Chief complaint: Forehead mass     History of Present Illness:  38 y.o. female presenting for excision of forehead mass. She reports the mass has been present for several years and has grown in size over the last few months. She denies pain, overlying skin changes, history of infection or trauma to the area. She had a soft tissue US consistent with lipoma. She desires excision of the lesion. She reports psychiatric history but is otherwise healthy.     She denies nicotine use.  Works at [employer] in [employer].          Past Medical History:   Diagnosis Date    Abnormal cervical Papanicolaou smear 2012     Colposcopy    ADHD (attention deficit hyperactivity disorder) 8/16/2012    Allergy      Anxiety      Asthma       childhood    Back pain 5/19/2014    Bipolar affective disorder      Cholecystitis      Chronic migraine without aura, with intractable migraine, so stated, without mention of status migrainosus 9/24/2013    Depression      Fever blister      Gallstones 5/26/2014    Headache(784.0)      Hepatitis C antibody positive in blood      History of hepatitis C      History of psychiatric hospitalization       Suicide attempt    Personality disorder 4/11/2013    Psychosis 10/7/2013    Therapy      Tobacco abuse 9/24/2013               Past Surgical History:   Procedure Laterality Date    ESOPHAGOGASTRODUODENOSCOPY        FRACTURE SURGERY        MOLE REMOVAL        SKIN BIOPSY                   Family History   Problem Relation Age of Onset    Melanoma Mother      ADD / ADHD Mother      Bipolar disorder Mother      Schizophrenia Mother      Alcohol abuse Father      Drug abuse Father      Depression Father      Anxiety disorder Sister      Acne Maternal Aunt      Depression Maternal Aunt      Migraines Maternal Aunt      Anxiety disorder Paternal Aunt      Depression Paternal Aunt      Breast cancer Neg Hx      Colon cancer Neg Hx      Ovarian cancer Neg Hx            Social History            Socioeconomic History    Marital status: Single   Occupational History    Occupation: disabled       Employer: disabled    Tobacco Use    Smoking status: Former       Current packs/day: 0.50       Types: Vaping with nicotine, Cigarettes    Smokeless tobacco: Never   Substance and Sexual Activity    Alcohol use: No       Alcohol/week: 0.0 standard drinks of alcohol    Drug use: No    Sexual activity: Not Currently       Partners: Male       Birth control/protection: OCP   Other Topics Concern    Are you pregnant or think you may be? No    Breast-feeding No    Caffeine Use: Excessive No    Financial Status: Unemployed No    Legal: Other No    Childhood History: Adopted No    Financial Status: Other No    Leisure: Exercise No    Childhood History: Early trauma No    Firearms: Does patient have access to a firearm? No    Leisure: Fishing No    Childhood History: Raised by parents Yes    Home situation: homeless No    Leisure: Hunting No    Childhood History: Uneventful No    Home situation: lives alone No    Leisure: Movie Watching Yes    Childhood History: Other No    Home situation: lives in group home No    Leisure: Shopping Yes    Education: Unfinished High School No    Home situation: lives in nursing home No    Leisure: Sports No    Education: High School Graduate Yes    Home situation: lives in shelter No    Leisure: Time with family No    Education: Unfinished college Yes    Home situation: lives with family No     Service No    Education: Trade School No    Home situation: lives with friends No    Spirituality: Active Participation No    Education: Associate's Degree No    Home situation: lives with significant other Yes    Spirituality: Organized Druze No    Education: Bachelor's Degree No    Home situation: lives with spouse No    Spirituality: Private Participation No    Education: More than one Bachelor's or Professional No    Legal consequences of chemical use No     Patient feels they ought to cut down on drinking/drug use No    Education: Master's, PhD No    Legal: Arrest history No    Patient annoyed by others criticizing their drinking/drug use No    Financial Status: Disabled Yes    Legal: Involved in civil litigation No    Patient has felt bad or guilty about drinking/drug use No    Financial Status: Employed No    Legal: Involved in criminal litigation No    Patient has had a drink/used drugs as an eye opener in the AM No   Social History Narrative           Exercise:  Walking, classes at various gyms and [employer]           Lives in apartment with 2 other women with mental illness.                Current Outpatient Medications   Medication Sig Dispense Refill    benztropine (COGENTIN) 1 MG tablet Take 1 mg by mouth 2 (two) times daily.        buPROPion (WELLBUTRIN XL) 300 MG 24 hr tablet Take 300 mg by mouth once daily.        busPIRone (BUSPAR) 30 MG Tab Take 30 mg by mouth 2 (two) times daily.        fluphenazine (PROLIXIN) 10 MG tablet Take 10 mg by mouth 2 (two) times daily.        fluphenazine (PROLIXIN) 5 MG tablet 1 tab p qm, 2 tabs po q hs. 1 tablet 0    ibuprofen (ADVIL,MOTRIN) 600 MG tablet TAKE 1 TABLET BY MOUTH EVERY 8 HOURS AS NEEDED FOR PAIN 90 tablet 1    lamoTRIgine (LAMICTAL) 150 MG Tab Take 150 mg by mouth once daily.        methocarbamoL (ROBAXIN) 500 MG Tab 1-2 tabs po bid prn back pain 60 tablet 1    norethindrone-ethinyl estradiol (MICROGESTIN 1/20) 1-20 mg-mcg per tablet Take 1 tablet by mouth once daily. 63 tablet 4    omeprazole (PRILOSEC) 40 MG capsule TAKE 1 CAPSULE BY MOUTH EVERY DAY 90 capsule 2    SENNA 8.6 mg tablet Take 3 tablets by mouth once daily. 90 tablet 11    sertraline (ZOLOFT) 100 MG tablet Pt takes 150mg so one and a half a day        sumatriptan (IMITREX) 100 MG tablet TAKE 1 TABLET BY MOUTH EVERY DAY AS NEEDED FOR MIGRAINE HEADACHE 12 tablet 11    tretinoin (RETIN-A) 0.1 % cream APPLY SMALL AMOUNT TO ENTIRE FACE EVERY NIGHT AT  BEDTIME. WASH OFF EVERY MORNING AND APPLY SUNSCREEN 45 g 3    valACYclovir (VALTREX) 1000 MG tablet Take 1 tablet (1,000 mg total) by mouth once daily. 90 tablet 1      No current facility-administered medications for this visit.               Review of patient's allergies indicates:   Allergen Reactions    Dilantin [phenytoin sodium extended] Rash    Saphris [asenapine]         Confusion and depressed mood.               OBJECTIVE:           Physical Exam:  Gen: NAD, appears stated age  Neuro: normal without focal findings, mental status and speech normal  HEENT: NCAT, neck supple, PEERL  CV: RRR  Pulm: Breathing non-labored, chest wall movement equal bilaterally   Breast: not examined  Abdomen: soft, nontender, no guarding  Gu: genitalia not examined  Extremity:normal strength, no cyanosis or edema  Skin: 3cm soft mobile lesion of left forehead in hairline  Psych: flat              ASSESSMENT/PLAN:      Lipoma of forehead  -Plan for excision of lesion  -Risks including but not limited to hematoma, seroma, infection, scarring, need for reoperation, disruption to hairline pattern.   -Would be able to incision in the hairline.    -Discussed with the wound to be closed with staples.   -Patient elects to undergo surgery for lipoma excision.       Jm Caldwell DO, DO  Plastic and Reconstructive Surgery

## 2023-10-06 ENCOUNTER — PATIENT MESSAGE (OUTPATIENT)
Dept: PODIATRY | Facility: CLINIC | Age: 38
End: 2023-10-06
Payer: MEDICARE

## 2023-10-06 DIAGNOSIS — M79.672 PAIN OF LEFT FOOT: Primary | ICD-10-CM

## 2023-10-06 DIAGNOSIS — Z98.890 POST-OPERATIVE STATE: ICD-10-CM

## 2023-10-09 LAB
FINAL PATHOLOGIC DIAGNOSIS: NORMAL
GROSS: NORMAL
Lab: NORMAL

## 2023-10-09 RX ORDER — OXYCODONE HYDROCHLORIDE 5 MG/1
5 TABLET ORAL EVERY 4 HOURS PRN
Qty: 30 TABLET | Refills: 0 | Status: SHIPPED | OUTPATIENT
Start: 2023-10-09 | End: 2023-10-18 | Stop reason: SDUPTHER

## 2023-10-10 ENCOUNTER — OFFICE VISIT (OUTPATIENT)
Dept: PLASTIC SURGERY | Facility: CLINIC | Age: 38
End: 2023-10-10
Payer: MEDICARE

## 2023-10-10 VITALS — DIASTOLIC BLOOD PRESSURE: 76 MMHG | HEART RATE: 96 BPM | SYSTOLIC BLOOD PRESSURE: 115 MMHG

## 2023-10-10 DIAGNOSIS — D17.0 LIPOMA OF FOREHEAD: Primary | ICD-10-CM

## 2023-10-10 PROCEDURE — 3074F PR MOST RECENT SYSTOLIC BLOOD PRESSURE < 130 MM HG: ICD-10-PCS | Mod: CPTII,S$GLB,, | Performed by: SURGERY

## 2023-10-10 PROCEDURE — 99999 PR PBB SHADOW E&M-EST. PATIENT-LVL I: ICD-10-PCS | Mod: PBBFAC,,, | Performed by: SURGERY

## 2023-10-10 PROCEDURE — 3074F SYST BP LT 130 MM HG: CPT | Mod: CPTII,S$GLB,, | Performed by: SURGERY

## 2023-10-10 PROCEDURE — 3078F PR MOST RECENT DIASTOLIC BLOOD PRESSURE < 80 MM HG: ICD-10-PCS | Mod: CPTII,S$GLB,, | Performed by: SURGERY

## 2023-10-10 PROCEDURE — 99024 POSTOP FOLLOW-UP VISIT: CPT | Mod: S$GLB,,, | Performed by: SURGERY

## 2023-10-10 PROCEDURE — 99999 PR PBB SHADOW E&M-EST. PATIENT-LVL I: CPT | Mod: PBBFAC,,, | Performed by: SURGERY

## 2023-10-10 PROCEDURE — 3078F DIAST BP <80 MM HG: CPT | Mod: CPTII,S$GLB,, | Performed by: SURGERY

## 2023-10-10 PROCEDURE — 99024 PR POST-OP FOLLOW-UP VISIT: ICD-10-PCS | Mod: S$GLB,,, | Performed by: SURGERY

## 2023-10-12 NOTE — PROGRESS NOTES
Ms Callahan is 1 week status post forehead lipoma removal.    We removed it through a small incision in her hairline.    She has just a tiny bit of skin laxity still.  No fluid.  Good forehead contour.    No visible scar.    Staples were removed     Her pathology is back.  I reviewed with her.  It was a lipoma.    She can continue to wash her hair normally and follow up as needed    Chun Dupree, DO  Plastic and Reconstructive Surgery

## 2023-10-17 ENCOUNTER — OFFICE VISIT (OUTPATIENT)
Dept: INTERNAL MEDICINE | Facility: CLINIC | Age: 38
End: 2023-10-17
Payer: MEDICARE

## 2023-10-17 ENCOUNTER — IMMUNIZATION (OUTPATIENT)
Dept: INTERNAL MEDICINE | Facility: CLINIC | Age: 38
End: 2023-10-17
Payer: MEDICARE

## 2023-10-17 VITALS
OXYGEN SATURATION: 97 % | HEIGHT: 65 IN | TEMPERATURE: 98 F | HEART RATE: 87 BPM | WEIGHT: 171.19 LBS | BODY MASS INDEX: 28.52 KG/M2 | DIASTOLIC BLOOD PRESSURE: 60 MMHG | SYSTOLIC BLOOD PRESSURE: 118 MMHG

## 2023-10-17 DIAGNOSIS — N39.9 URINARY PROBLEM IN FEMALE: Primary | ICD-10-CM

## 2023-10-17 LAB
BACTERIA #/AREA URNS AUTO: NORMAL /HPF
BILIRUB UR QL STRIP: NEGATIVE
CLARITY UR REFRACT.AUTO: CLEAR
COLOR UR AUTO: YELLOW
GLUCOSE UR QL STRIP: NEGATIVE
HGB UR QL STRIP: NEGATIVE
KETONES UR QL STRIP: NEGATIVE
LEUKOCYTE ESTERASE UR QL STRIP: ABNORMAL
MICROSCOPIC COMMENT: NORMAL
NITRITE UR QL STRIP: NEGATIVE
PH UR STRIP: 6 [PH] (ref 5–8)
PROT UR QL STRIP: NEGATIVE
RBC #/AREA URNS AUTO: 2 /HPF (ref 0–4)
SP GR UR STRIP: 1.01 (ref 1–1.03)
SQUAMOUS #/AREA URNS AUTO: 2 /HPF
URN SPEC COLLECT METH UR: ABNORMAL
WBC #/AREA URNS AUTO: 0 /HPF (ref 0–5)

## 2023-10-17 PROCEDURE — 3078F PR MOST RECENT DIASTOLIC BLOOD PRESSURE < 80 MM HG: ICD-10-PCS | Mod: CPTII,S$GLB,, | Performed by: PHYSICIAN ASSISTANT

## 2023-10-17 PROCEDURE — 90686 FLU VACCINE (QUAD) GREATER THAN OR EQUAL TO 3YO PRESERVATIVE FREE IM: ICD-10-PCS | Mod: S$GLB,,, | Performed by: INTERNAL MEDICINE

## 2023-10-17 PROCEDURE — 90686 IIV4 VACC NO PRSV 0.5 ML IM: CPT | Mod: S$GLB,,, | Performed by: INTERNAL MEDICINE

## 2023-10-17 PROCEDURE — 3078F DIAST BP <80 MM HG: CPT | Mod: CPTII,S$GLB,, | Performed by: PHYSICIAN ASSISTANT

## 2023-10-17 PROCEDURE — 99999 PR PBB SHADOW E&M-EST. PATIENT-LVL V: ICD-10-PCS | Mod: PBBFAC,,, | Performed by: PHYSICIAN ASSISTANT

## 2023-10-17 PROCEDURE — 99213 OFFICE O/P EST LOW 20 MIN: CPT | Mod: S$GLB,,, | Performed by: PHYSICIAN ASSISTANT

## 2023-10-17 PROCEDURE — 99999 PR PBB SHADOW E&M-EST. PATIENT-LVL V: CPT | Mod: PBBFAC,,, | Performed by: PHYSICIAN ASSISTANT

## 2023-10-17 PROCEDURE — 3074F PR MOST RECENT SYSTOLIC BLOOD PRESSURE < 130 MM HG: ICD-10-PCS | Mod: CPTII,S$GLB,, | Performed by: PHYSICIAN ASSISTANT

## 2023-10-17 PROCEDURE — 1159F PR MEDICATION LIST DOCUMENTED IN MEDICAL RECORD: ICD-10-PCS | Mod: CPTII,S$GLB,, | Performed by: PHYSICIAN ASSISTANT

## 2023-10-17 PROCEDURE — 1160F RVW MEDS BY RX/DR IN RCRD: CPT | Mod: CPTII,S$GLB,, | Performed by: PHYSICIAN ASSISTANT

## 2023-10-17 PROCEDURE — 99213 PR OFFICE/OUTPT VISIT, EST, LEVL III, 20-29 MIN: ICD-10-PCS | Mod: S$GLB,,, | Performed by: PHYSICIAN ASSISTANT

## 2023-10-17 PROCEDURE — 1160F PR REVIEW ALL MEDS BY PRESCRIBER/CLIN PHARMACIST DOCUMENTED: ICD-10-PCS | Mod: CPTII,S$GLB,, | Performed by: PHYSICIAN ASSISTANT

## 2023-10-17 PROCEDURE — 3074F SYST BP LT 130 MM HG: CPT | Mod: CPTII,S$GLB,, | Performed by: PHYSICIAN ASSISTANT

## 2023-10-17 PROCEDURE — 1159F MED LIST DOCD IN RCRD: CPT | Mod: CPTII,S$GLB,, | Performed by: PHYSICIAN ASSISTANT

## 2023-10-17 PROCEDURE — G0008 ADMIN INFLUENZA VIRUS VAC: HCPCS | Mod: S$GLB,,, | Performed by: INTERNAL MEDICINE

## 2023-10-17 PROCEDURE — G0008 FLU VACCINE (QUAD) GREATER THAN OR EQUAL TO 3YO PRESERVATIVE FREE IM: ICD-10-PCS | Mod: S$GLB,,, | Performed by: INTERNAL MEDICINE

## 2023-10-17 PROCEDURE — 81001 URINALYSIS AUTO W/SCOPE: CPT | Performed by: PHYSICIAN ASSISTANT

## 2023-10-17 PROCEDURE — 3008F PR BODY MASS INDEX (BMI) DOCUMENTED: ICD-10-PCS | Mod: CPTII,S$GLB,, | Performed by: PHYSICIAN ASSISTANT

## 2023-10-17 PROCEDURE — 3008F BODY MASS INDEX DOCD: CPT | Mod: CPTII,S$GLB,, | Performed by: PHYSICIAN ASSISTANT

## 2023-10-17 NOTE — PROGRESS NOTES
Subjective:       Patient ID: Vianney Callahan is a 38 y.o. female.        Chief Complaint: urinary problem    Vianney Callahan is an established patient of Nellie Coreas MD here today for urgent care visit.    She has to push to empty bladder.  Often has to lean forward to evacuate bladder.  Started a year ago.  She saw pink in her urine twice within the past month, which she presumes was blood.  She does not have a menstrual cycle.  No burning.  No frequency.  No urgency.  She has chronic constipation and uses senna prn, sees GI, on oxycodone for pain due to broken foot which has worsened constipation.           Review of Systems   Constitutional:  Positive for activity change. Negative for chills, diaphoresis, fatigue, fever and unexpected weight change.   HENT:  Negative for congestion, hearing loss, rhinorrhea, sore throat and trouble swallowing.    Eyes:  Negative for discharge and visual disturbance.   Respiratory:  Negative for cough, chest tightness, shortness of breath and wheezing.    Cardiovascular:  Negative for chest pain, palpitations and leg swelling.   Gastrointestinal:  Negative for abdominal pain, blood in stool, constipation, diarrhea, nausea and vomiting.   Endocrine: Positive for polyuria. Negative for polydipsia.   Genitourinary:  Positive for difficulty urinating and hematuria. Negative for dysuria, frequency, menstrual problem and urgency.   Musculoskeletal:  Negative for arthralgias, back pain, joint swelling and neck pain.   Skin:  Negative for rash.   Neurological:  Positive for headaches. Negative for dizziness, syncope and weakness.   Psychiatric/Behavioral:  Negative for confusion, dysphoric mood and sleep disturbance. The patient is not nervous/anxious.        Objective:      Physical Exam  Vitals and nursing note reviewed.   Constitutional:       Appearance: Normal appearance. She is well-developed.   HENT:      Head: Normocephalic.      Right Ear: External ear normal.      Left Ear:  "External ear normal.   Eyes:      Pupils: Pupils are equal, round, and reactive to light.   Cardiovascular:      Rate and Rhythm: Normal rate and regular rhythm.      Heart sounds: Normal heart sounds. No murmur heard.     No friction rub. No gallop.   Pulmonary:      Effort: Pulmonary effort is normal. No respiratory distress.      Breath sounds: Normal breath sounds.   Abdominal:      Palpations: Abdomen is soft.      Tenderness: There is no abdominal tenderness. There is no right CVA tenderness or left CVA tenderness.      Comments: Left foot in boot   Skin:     General: Skin is warm and dry.   Neurological:      Mental Status: She is alert.         Assessment:       1. Urinary problem in female        Plan:       Vianney was seen today for urinary problem.    Diagnoses and all orders for this visit:    Urinary problem in female  -     Urinalysis, Reflex to Urine Culture Urine, Clean Catch  -     Ambulatory referral/consult to Urogynecology; Future    Check urinalysis  Refer to urogyn    Flu shot today    Pt has been given instructions populated from patient instructions database and has verbalized understanding of the after visit summary and information contained wherein.    Follow up with a primary care provider. May go to ER for acute shortness of breath, lightheadedness, fever, or any other emergent complaints or changes in condition.    "This note will be shared with the patient"    Future Appointments   Date Time Provider Department Center   10/18/2023  2:15 PM [facility]  XR1 700LB LIMIT [facility] XRAY [place]   10/18/2023  2:30 PM Sameer Wylie DPM [facility] PODIA [place]   11/1/2023  2:00 PM Philippe Luz NP [facility] PSYCH [facility]                 "

## 2023-10-18 ENCOUNTER — HOSPITAL ENCOUNTER (OUTPATIENT)
Dept: RADIOLOGY | Facility: HOSPITAL | Age: 38
Discharge: HOME | End: 2023-10-18
Attending: STUDENT IN AN ORGANIZED HEALTH CARE EDUCATION/TRAINING PROGRAM
Payer: OTHER MISCELLANEOUS

## 2023-10-18 ENCOUNTER — OFFICE VISIT (OUTPATIENT)
Dept: PODIATRY | Facility: CLINIC | Age: 38
End: 2023-10-18
Payer: OTHER MISCELLANEOUS

## 2023-10-18 VITALS
BODY MASS INDEX: 26.84 KG/M2 | WEIGHT: 171 LBS | SYSTOLIC BLOOD PRESSURE: 106 MMHG | RESPIRATION RATE: 18 BRPM | TEMPERATURE: 99 F | HEIGHT: 67 IN | DIASTOLIC BLOOD PRESSURE: 70 MMHG | HEART RATE: 82 BPM | OXYGEN SATURATION: 97 %

## 2023-10-18 DIAGNOSIS — Z98.890 POST-OPERATIVE STATE: ICD-10-CM

## 2023-10-18 DIAGNOSIS — M79.672 PAIN OF LEFT FOOT: ICD-10-CM

## 2023-10-18 PROCEDURE — 99024 PR POST-OP FOLLOW-UP VISIT: ICD-10-PCS | Mod: S$GLB,,, | Performed by: STUDENT IN AN ORGANIZED HEALTH CARE EDUCATION/TRAINING PROGRAM

## 2023-10-18 PROCEDURE — 73630 XR WEIGHT BEARING FOOT COMPLETE 3+ VIEW LEFT: ICD-10-PCS | Mod: 26,LT,, | Performed by: RADIOLOGY

## 2023-10-18 PROCEDURE — 73630 X-RAY EXAM OF FOOT: CPT | Mod: TC,LT

## 2023-10-18 PROCEDURE — 73630 X-RAY EXAM OF FOOT: CPT | Mod: 26,LT,, | Performed by: RADIOLOGY

## 2023-10-18 PROCEDURE — 99999 PR PBB SHADOW E&M-EST. PATIENT-LVL IV: ICD-10-PCS | Mod: PBBFAC,,, | Performed by: STUDENT IN AN ORGANIZED HEALTH CARE EDUCATION/TRAINING PROGRAM

## 2023-10-18 PROCEDURE — 99024 POSTOP FOLLOW-UP VISIT: CPT | Mod: S$GLB,,, | Performed by: STUDENT IN AN ORGANIZED HEALTH CARE EDUCATION/TRAINING PROGRAM

## 2023-10-18 PROCEDURE — 99999 PR PBB SHADOW E&M-EST. PATIENT-LVL IV: CPT | Mod: PBBFAC,,, | Performed by: STUDENT IN AN ORGANIZED HEALTH CARE EDUCATION/TRAINING PROGRAM

## 2023-10-18 RX ORDER — OXYCODONE HYDROCHLORIDE 5 MG/1
5 TABLET ORAL EVERY 4 HOURS PRN
Qty: 30 TABLET | Refills: 0 | Status: SHIPPED | OUTPATIENT
Start: 2023-10-18 | End: 2023-10-31 | Stop reason: SDUPTHER

## 2023-10-18 NOTE — LETTER
October 18, 2023      [facility]  [address] [address]  Phone: [phone]       Patient: Vianney Callahan   YOB: 1985  Date of Visit: 10/18/2023    To Whom It May Concern:    Jeremy Callahan  was at [facility] on 10/18/2023. She is not yet cleared for return to work due to healing and rehabilitation from fracture surgery. Will reevaluate in 3 weeks on 11/08/23 for possible return to work afterwards. If you have any questions or concerns, or if I can be of further assistance, please do not hesitate to contact me.    Sincerely,        Sameer Wylie DPM

## 2023-10-18 NOTE — PROGRESS NOTES
Subjective:     Patient    Vianney Callahan is a 38 y.o. female.    Problem    09/01/23: Had fall from 4 foot ladder at work 2 days ago. Seen in the ED same day and diagnosed with displaced left 5th metatarsal fracture; discharged with oxycodone, acetaminophen, tall surgical boot, crutches. Reports ongoing swelling, pain, discoloration to the area.     09/19/23: Left 5th metatarsal fracture ORIF.     09/27/23: Returns 1 week s/p left 5th metatarsal ORIF. Pain is 6/10. Completed terbinafine without issue, requesting topical antifungals for left foot. Using crutches with partial weightbearing to left heel.     10/02/23: Returns 2 weeks s/p left 5th metatarsal ORIF. New redness and swelling to left foot, is using the topical antifungals. WB in surgical boot.     10/18/23: Returns 1 month s/p left 5th metatarsal ORIF. Skin of left foot slowly improving with topical antifungals. No pain in left foot when in surgical boot.     History    History obtained from patient and review of medical records.     Past Medical History:   Diagnosis Date    Abnormal cervical Papanicolaou smear 2012    Colposcopy    ADHD (attention deficit hyperactivity disorder) 8/16/2012    Allergy     Anxiety     Asthma     childhood    Back pain 5/19/2014    Bipolar affective disorder     Cholecystitis     Chronic migraine without aura, with intractable migraine, so stated, without mention of status migrainosus 9/24/2013    Depression     Fever blister     Gallstones 5/26/2014    Headache(784.0)     Hepatitis C antibody positive in blood     History of hepatitis C     History of psychiatric hospitalization     Suicide attempt    Personality disorder 4/11/2013    Psychosis 10/7/2013    Therapy     Tobacco abuse 9/24/2013       Past Surgical History:   Procedure Laterality Date    ESOPHAGOGASTRODUODENOSCOPY      FRACTURE SURGERY      LIPOMA RESECTION N/A 10/4/2023    Procedure: EXCISION, LIPOMA;  Surgeon: Esvin Dupree DO;  Location: [facility]  OR;  Service: Plastics;  Laterality: N/A;  excision of forehead lipoma - 1 hour    MOLE REMOVAL      OPEN REDUCTION AND INTERNAL FIXATION (ORIF) OF FRACTURE OF METATARSAL BONE Left 2023    Procedure: ORIF, FRACTURE, METATARSAL BONE;  Surgeon: Sameer Wylie DPM;  [facility];  Service: Podiatry;  Laterality: Left;  1.5 hours; foot tray; mini c arm    SKIN BIOPSY          Objective:     Vitals  Wt Readings from Last 1 Encounters:   10/18/23 77.6 kg (171 lb)     Temp Readings from Last 1 Encounters:   10/18/23 98.7 °F (37.1 °C)     BP Readings from Last 1 Encounters:   10/18/23 106/70     Pulse Readings from Last 1 Encounters:   10/18/23 82       Dermatological Exam    Skin:   Pedal hair growth, skin color, and skin texture normal on left; incision healed; redness and swelling dorsolateral forefoot improved; diffuse scaling and peeling from fungal infection slightly improved    Nails:  All nails normal in length, thickness, color    Vascular Exam    Arteries:   Posterior tibial artery palpable on left  Dorsalis pedis artery palpable on left    Veins:   Superficial veins unremarkable on left    Swellin+ nonpitting on left at lateral forefoot    Neurological Exam    Vincent touch test:  Light touch sensation intact to left foot     Musculoskeletal Exam    Footwear:  Casual on right  Surgical boot on left    Gait Exam:   Ambulatory Status: Ambulatory  Gait:  NWB LLE  Assistive Devices: Crutches    Foot Progression Angle:  Normal on right  Normal on left     Left Lower Extremity Additional Findings:  Mild pain on palpation at distal 5th metatarsal; gross function intact to 5th toe  Left foot and ankle function, strength, and range of motion unremarkable except as noted above.    Imaging and Other Tests    Imagin23 Left foot X ray: displaced 5th metatarsal fracture    Independently reviewed and interpreted imaging, findings are as follows:     10/18/23 left foot X ray: 5th metatarsal fracture  s/p ORIF, alignment of joint unremarkable, hardware intact, no concerns     Assessment:     Encounter Diagnoses   Name Primary?    Pain of left foot     Post-operative state         Plan:     I counseled the patient on her conditions, their implications and medical management.    S/p left 5th metatarsal ORIF  -Continue pain medications as prescribed, wean off as tolerated.  -LLE WBAT in surgical boot for short distances. Knee scooter or crutches for PWB or NWB for longer distances.    -Not yet cleared for return to work, need more evidence of healing on X ray. Letter provided.     Tinea pedis left foot: acute improving  -Continue topical ciclopirox to be applied to left foot 1-2 times daily for 1-2 months.      Return to clinic in 3 weeks with X rays prior, call sooner PRN.

## 2023-10-18 NOTE — LETTER
October 18, 2023      [facility]  [address] [address]  Phone: [phone]       Patient: Vianney Callahan   YOB: 1985  Date of Visit: 10/18/2023    To Whom It May Concern:    Jeremy Callahan  was at [facility] on 10/18/2023. She is not yet cleared for return to work due to healing and rehabilitation from fracture surgery. Will reevaluate in 3 weeks        She may return to work on 10/23 {With/no:84351} restrictions. If you have any questions or concerns, or if I can be of further assistance, please do not hesitate to contact me.    Sincerely,    Sameer Wylie DPM

## 2023-10-20 ENCOUNTER — OFFICE VISIT (OUTPATIENT)
Dept: UROLOGY | Facility: CLINIC | Age: 38
End: 2023-10-20
Payer: MEDICARE

## 2023-10-20 VITALS
BODY MASS INDEX: 26.3 KG/M2 | HEIGHT: 67 IN | DIASTOLIC BLOOD PRESSURE: 73 MMHG | SYSTOLIC BLOOD PRESSURE: 108 MMHG | WEIGHT: 167.56 LBS | HEART RATE: 91 BPM

## 2023-10-20 DIAGNOSIS — G12.20 MOTOR NEURON DISEASE, UNSPECIFIED: Primary | ICD-10-CM

## 2023-10-20 DIAGNOSIS — N39.9 URINARY PROBLEM IN FEMALE: ICD-10-CM

## 2023-10-20 DIAGNOSIS — N39.8 DYSFUNCTIONAL VOIDING OF URINE: ICD-10-CM

## 2023-10-20 PROCEDURE — 3078F PR MOST RECENT DIASTOLIC BLOOD PRESSURE < 80 MM HG: ICD-10-PCS | Mod: CPTII,S$GLB,, | Performed by: UROLOGY

## 2023-10-20 PROCEDURE — 99204 OFFICE O/P NEW MOD 45 MIN: CPT | Mod: S$GLB,,, | Performed by: UROLOGY

## 2023-10-20 PROCEDURE — 1159F MED LIST DOCD IN RCRD: CPT | Mod: CPTII,S$GLB,, | Performed by: UROLOGY

## 2023-10-20 PROCEDURE — 99999 PR PBB SHADOW E&M-EST. PATIENT-LVL IV: ICD-10-PCS | Mod: PBBFAC,,, | Performed by: UROLOGY

## 2023-10-20 PROCEDURE — 3008F PR BODY MASS INDEX (BMI) DOCUMENTED: ICD-10-PCS | Mod: CPTII,S$GLB,, | Performed by: UROLOGY

## 2023-10-20 PROCEDURE — 3078F DIAST BP <80 MM HG: CPT | Mod: CPTII,S$GLB,, | Performed by: UROLOGY

## 2023-10-20 PROCEDURE — 3008F BODY MASS INDEX DOCD: CPT | Mod: CPTII,S$GLB,, | Performed by: UROLOGY

## 2023-10-20 PROCEDURE — 1159F PR MEDICATION LIST DOCUMENTED IN MEDICAL RECORD: ICD-10-PCS | Mod: CPTII,S$GLB,, | Performed by: UROLOGY

## 2023-10-20 PROCEDURE — 3074F PR MOST RECENT SYSTOLIC BLOOD PRESSURE < 130 MM HG: ICD-10-PCS | Mod: CPTII,S$GLB,, | Performed by: UROLOGY

## 2023-10-20 PROCEDURE — 99204 PR OFFICE/OUTPT VISIT, NEW, LEVL IV, 45-59 MIN: ICD-10-PCS | Mod: S$GLB,,, | Performed by: UROLOGY

## 2023-10-20 PROCEDURE — 99999 PR PBB SHADOW E&M-EST. PATIENT-LVL IV: CPT | Mod: PBBFAC,,, | Performed by: UROLOGY

## 2023-10-20 PROCEDURE — 3074F SYST BP LT 130 MM HG: CPT | Mod: CPTII,S$GLB,, | Performed by: UROLOGY

## 2023-10-20 NOTE — PROGRESS NOTES
[facility]      New Urinary Retention Note    10/20/2023    Referred by:  Chantale Nichols PA-C    HPI: Vianney Callahan is a very pleasant 38 y.o. female referred for evaluation of voiding difficulty of 1 years duration. She currently does not perform intermittent catheterization and voids on own.  She reports extreme voiding difficulty with straining to empty. She reports no concomitant neurologic symptoms or diagnoses. This has gradually progressed at least over the last year. She experiences hesitancy, straining and sensation of incomplete emptying. PVR today is 7 mL. She notes this with almost every void.     She reports no 24hr frequency (6x), nocturia (0x). She has occasional urgency without incontinence. She no longer takes any OAB medications. She has no history of UTI.     We taught CISC today and she is able to do this without difficulty (though she really does not want to).     Relevant Medications:  No current medications that would be anticipated to impair voiding    Prior Therapies:  None    She does not report prior urodynamic evaluation. She does not reports prior cystoscopic evaluation.     A review of 10+ systems was conducted with pertinent positive and negative findings documented in HPI with all other systems reviewed and negative.    Past medical, family, surgical and social history reviewed as documented in chart with pertinent positive medical, family, surgical and social history detailed in HPI.    Exam Findings:    Const: no acute distress, conversant and alert  Eyes: anicteric, extraocular muscles intact  ENMT: normocephalic, Nl oral membranes  Cardio: no cyanosis, nl cap refill  Pulm: no tachypnea; no resp distress  Abd: soft, no tenderness  Musc: no laceration, no tenderness  Neuro: alert; oriented x 3  Skin: warm, dry; no petichiae  Psych: no anxiety; normal speech  : No urethral or vaginal abnormality noted to contribute to her symptoms.            Assessment/Plan:    Chronic Urinary Retention (new, addt'l workup): The patient has evidence of chronic urinary retention requiring catheterization.  This is most likely associated at least partially with unknown. Given the possibility of anatomic obstruction versus hypocontractility as etiology, I have recommended urodynamic evaluation as well as possible cystourethroscopy. The patient does not have recent upper urinary tract imaging. We will plan to image the upper urinary tract with renal U/S.  The need for catheterization was assessed and the patient is likely to benefit from a regimen of clean intermittent catheterization. The patient was instructed in clean intermittent catheterization today and appears comfortable with performing this alone. The etiology for urinary retention in this patient is thought to be uncertain etiology. The anticipated duration of need is estimated to be indefinite. The patient will not require insertion supplies. The patient will not require a coude-tip catheter. The patient will need to catheterize 4x daily and will require a total of 120 catheters per month. .    Patient is scheduled for video urodynamic evaluation  Patient is scheduled for cystourethroscopy in the office  Patient has been prescribed a regimen of clean intermittent catheterization as above  Upper urinary tract imaging is scheduled with renal U/S  MRI of Lumbar spine

## 2023-10-30 ENCOUNTER — HOSPITAL ENCOUNTER (OUTPATIENT)
Dept: RADIOLOGY | Facility: HOSPITAL | Age: 38
Discharge: HOME | End: 2023-10-30
Attending: UROLOGY
Payer: MEDICARE

## 2023-10-30 DIAGNOSIS — N39.8 DYSFUNCTIONAL VOIDING OF URINE: ICD-10-CM

## 2023-10-30 PROCEDURE — 76770 US RETROPERITONEAL COMPLETE: ICD-10-PCS | Mod: 26,,, | Performed by: RADIOLOGY

## 2023-10-30 PROCEDURE — 76770 US EXAM ABDO BACK WALL COMP: CPT | Mod: TC

## 2023-10-30 PROCEDURE — 76770 US EXAM ABDO BACK WALL COMP: CPT | Mod: 26,,, | Performed by: RADIOLOGY

## 2023-10-31 DIAGNOSIS — Z98.890 POST-OPERATIVE STATE: ICD-10-CM

## 2023-10-31 DIAGNOSIS — M79.672 PAIN OF LEFT FOOT: ICD-10-CM

## 2023-10-31 NOTE — PROGRESS NOTES
[facility]  Progress Note  Behavioral Medicine Unit    Date: 1/10/2017  Time: 1:55 PM    Name: Vianney Callahan    Age: 31 y.o.       : 1985            Admit Date: 17    SUBJECTIVE:  Patient is a 31 y.o. old female with a history of unspecified mood and anxiety disorder and possible borderline personality disorder admitted to the BMU yesterday for acute stabilization after recent hospitalization in December.  Patient reports continued anxiety, especially when she leaves the house and is around people.  She describes feeling awkward and unsure how to act around people.  She reports a history of anxiety so severe that she became paranoid, but denies those symptoms currently.  She reports an episode of nausea and headache this morning, however that has resolved and she was able to eat lunch today.  She likes Zoloft and feels it has been helpful - recently increased from 150mg daily to 200mg daily on 17.  Lithium and Abilify were started in December during hospitalization and she also feels that these are helpful.  Abilify was decreased from 15mg to 7.5mg on 17 due to concerns for akathisia - patient denies these symptoms now on the lower dose.  Neurontin was recently increased from 600mg TID to 1200mg qAM, 600mg qPM, and 600mg qHS, which she believes may be helpful as well.  She denies SI, HI, or AVH at this time.  She was supposed to have labs drawn this morning but did not go due to her nausea.     Current Medications:   Current Outpatient Prescriptions on File Prior to Encounter   Medication Sig Dispense Refill    ACZONE 5 % topical gel APPLY TO AFFECTED AREA TWICE DAILY 60 g 0    aripiprazole (ABILIFY) 15 MG Tab Take half tab by mouth each morning 15 tablet 5    cyclobenzaprine (FLEXERIL) 10 MG tablet TAKE 0.5 TO 1 TABLET BY MOUTH AT BEDTIME AS NEEDED FOR BACKPAIN 30 tablet 0    fluconazole (DIFLUCAN) 150 MG Tab Take one tablet by mouth once and may retreat in 3 days if  Per Dr. Shoshana Jasmine- echo, CT [facility], and follow up. still symptomatic. 2 tablet 4    gabapentin (NEURONTIN) 600 MG tablet Take 2 caps by mouth every morning, 1 cap by mouth at 4pm, and 1 cap by mouth at bedtime 120 tablet 5    lithium (ESKALITH) 300 MG capsule Take 1 capsule (300 mg total) by mouth 3 (three) times daily with meals. 90 capsule 5    metronidazole (FLAGYL) 500 MG tablet take 1 tablet by mouth twice a day for 7 days 14 tablet 0    norethindrone-ethinyl estradiol (MICROGESTIN 1/20) 1-20 mg-mcg per tablet Take 1 tablet by mouth every evening. 84 tablet 4    ondansetron (ZOFRAN-ODT) 4 MG TbDL Take 1 tablet (4 mg total) by mouth every 8 (eight) hours as needed. 30 tablet 0    promethazine (PHENERGAN) 12.5 MG Tab 1 tab po q 8 h prn nausea 30 tablet 0    sertraline (ZOLOFT) 100 MG tablet Take 2 tabs by mouth each morning 60 tablet 5    TAZORAC 0.05 % gel APPLY TOPICALLY EVERY EVENING. WASH OFF IN THE MORNING, AND APPLY SUNSCREEN 100 g 0    tinidazole (TINDAMAX) 500 MG tablet Take 4 tablets by mouth at once daily for two days 8 tablet 1    tramadol (ULTRAM) 50 mg tablet take 1 tablet by mouth every 8 hours if needed 60 tablet 0    valacyclovir (VALTREX) 1000 MG tablet Take 1 tablet (1,000 mg total) by mouth once daily. 90 tablet 3     No current facility-administered medications on file prior to encounter.      Psychiatric ROS:  See Dr. Chou's Admission Note of date 1/9/17 for ROS.  Otherwise, addressed in HPI above.    OBJECTIVE:  Labs/Imaging/Studies:   Recent Results (from the past 48 hour(s))   Toxicology screen, urine    Collection Time: 01/09/17  9:08 AM   Result Value Ref Range    Alcohol, Urine <10 <10 mg/dL    Benzodiazepines Negative     Methadone metabolites Negative     Cocaine (Metab.) Negative     Opiate Scrn, Ur Negative     Barbiturate Screen, Ur Negative     Amphetamine Screen, Ur Negative     THC Negative     Phencyclidine Negative     Creatinine, Random Ur 62.0 15.0 - 325.0 mg/dL    Toxicology Information SEE COMMENT    Pregnancy,  "urine rapid    Collection Time: 01/09/17  9:08 AM   Result Value Ref Range    Preg Test, Ur Negative       Lab Results   Component Value Date    CBMZ 5.3 (L) 01/29/2008     Mental Status Exam:  Appearance:  Well groomed, thin, appearing healthy and of stated age  Behavior:  Cooperative, pleasant, +psychomotor agitation  Speech:  Normal rate, loud volume  Mood:  "Anxious"  Affect:  Congruent with stated mood  Thought Process:  Linear, logical, goal directed  Thought Content:  Negative for suicidal ideation, homicidal ideation, delusions or hallucinations.  Associations:  Intact  Memory:  Grossly Intact  Level of Consciousness/Orientation:  Grossly intact  Fund of Knowledge:  Good  Attention:  Good  Language:  Fluent  Insight:  Fair  Judgment:  Fair  Psychomotor signs:  No involuntary movements or tremor  Gait:  Normal    ASSESSMENT/PLAN:  General Assessment:  Patient is a 31 y.o., female admitted to the [facility] partial hospitalization program for stabilization of mood.    Diagnoses:  Unspecified Mood Disorder  Unspecified Anxiety Disorder  Cluster B Personality traits, rule-out Borderline Personality Disorder    Plan:  · Continue U treatment - group and individual therapies  · Continue current medication regimen - recently changed by Roman Renae on 1/6/17:   1) Abilify 7.5mg daily (Started during December hospitalization, Decreased on 1/6/17 from  15mg daily due to concern for akathisia)   2) Lithium 300mg TID (Started during December hospitalization)   3) Zoloft 200mg daily (Increased from 150mg daily on 1/6/17)   4) Neurontin 1200mg qAM, 600mg qPM, and 600mg qHS (Increased on 1/6/17 from Neurontin  600mg TID)  · Labs in the AM - patient aware to hold morning Lithium dose in order to get trough level.  Educated her on the importance of staying hydrated while on Lithium in order to avoid toxicity.      Sol Rincon MD     "

## 2023-11-01 ENCOUNTER — OFFICE VISIT (OUTPATIENT)
Dept: PSYCHIATRY | Facility: CLINIC | Age: 38
End: 2023-11-01
Payer: COMMERCIAL

## 2023-11-01 VITALS
BODY MASS INDEX: 26.97 KG/M2 | HEART RATE: 102 BPM | WEIGHT: 172.19 LBS | SYSTOLIC BLOOD PRESSURE: 118 MMHG | DIASTOLIC BLOOD PRESSURE: 67 MMHG

## 2023-11-01 DIAGNOSIS — F25.0 SCHIZOAFFECTIVE DISORDER, BIPOLAR TYPE: Primary | ICD-10-CM

## 2023-11-01 DIAGNOSIS — F41.9 ANXIETY: ICD-10-CM

## 2023-11-01 DIAGNOSIS — Z86.59 HISTORY OF ADHD: ICD-10-CM

## 2023-11-01 DIAGNOSIS — F60.9 PERSONALITY DISORDER: ICD-10-CM

## 2023-11-01 PROCEDURE — 99214 OFFICE O/P EST MOD 30 MIN: CPT | Mod: S$GLB,,, | Performed by: NURSE PRACTITIONER

## 2023-11-01 PROCEDURE — 3008F BODY MASS INDEX DOCD: CPT | Mod: CPTII,S$GLB,, | Performed by: NURSE PRACTITIONER

## 2023-11-01 PROCEDURE — 3074F PR MOST RECENT SYSTOLIC BLOOD PRESSURE < 130 MM HG: ICD-10-PCS | Mod: CPTII,S$GLB,, | Performed by: NURSE PRACTITIONER

## 2023-11-01 PROCEDURE — 3078F DIAST BP <80 MM HG: CPT | Mod: CPTII,S$GLB,, | Performed by: NURSE PRACTITIONER

## 2023-11-01 PROCEDURE — 99999 PR PBB SHADOW E&M-EST. PATIENT-LVL II: ICD-10-PCS | Mod: PBBFAC,,, | Performed by: NURSE PRACTITIONER

## 2023-11-01 PROCEDURE — 99999 PR PBB SHADOW E&M-EST. PATIENT-LVL II: CPT | Mod: PBBFAC,,, | Performed by: NURSE PRACTITIONER

## 2023-11-01 PROCEDURE — 3078F PR MOST RECENT DIASTOLIC BLOOD PRESSURE < 80 MM HG: ICD-10-PCS | Mod: CPTII,S$GLB,, | Performed by: NURSE PRACTITIONER

## 2023-11-01 PROCEDURE — 3074F SYST BP LT 130 MM HG: CPT | Mod: CPTII,S$GLB,, | Performed by: NURSE PRACTITIONER

## 2023-11-01 PROCEDURE — 99214 PR OFFICE/OUTPT VISIT, EST, LEVL IV, 30-39 MIN: ICD-10-PCS | Mod: S$GLB,,, | Performed by: NURSE PRACTITIONER

## 2023-11-01 PROCEDURE — 3008F PR BODY MASS INDEX (BMI) DOCUMENTED: ICD-10-PCS | Mod: CPTII,S$GLB,, | Performed by: NURSE PRACTITIONER

## 2023-11-01 RX ORDER — BENZTROPINE MESYLATE 1 MG/1
1 TABLET ORAL 2 TIMES DAILY
Qty: 60 TABLET | Refills: 5 | Status: SHIPPED | OUTPATIENT
Start: 2023-11-01

## 2023-11-01 RX ORDER — SERTRALINE HYDROCHLORIDE 100 MG/1
200 TABLET, FILM COATED ORAL DAILY
Qty: 60 TABLET | Refills: 5 | Status: SHIPPED | OUTPATIENT
Start: 2023-11-01 | End: 2024-02-05 | Stop reason: SDUPTHER

## 2023-11-01 RX ORDER — FLUPHENAZINE HYDROCHLORIDE 10 MG/1
20 TABLET ORAL NIGHTLY
Qty: 60 TABLET | Refills: 5 | Status: SHIPPED | OUTPATIENT
Start: 2023-11-01 | End: 2023-12-14 | Stop reason: SDUPTHER

## 2023-11-01 RX ORDER — BUPROPION HYDROCHLORIDE 300 MG/1
300 TABLET ORAL DAILY
Qty: 30 TABLET | Refills: 5 | Status: SHIPPED | OUTPATIENT
Start: 2023-11-01 | End: 2024-02-05 | Stop reason: SDUPTHER

## 2023-11-01 RX ORDER — BUSPIRONE HYDROCHLORIDE 30 MG/1
30 TABLET ORAL 2 TIMES DAILY
Qty: 50 TABLET | Refills: 5 | Status: SHIPPED | OUTPATIENT
Start: 2023-11-01 | End: 2024-02-05 | Stop reason: SDUPTHER

## 2023-11-01 RX ORDER — OXYCODONE HYDROCHLORIDE 5 MG/1
5 TABLET ORAL EVERY 4 HOURS PRN
Qty: 30 TABLET | Refills: 0 | Status: SHIPPED | OUTPATIENT
Start: 2023-11-01 | End: 2024-01-04 | Stop reason: SDUPTHER

## 2023-11-01 RX ORDER — FLUPHENAZINE HYDROCHLORIDE 5 MG/1
TABLET ORAL
Qty: 30 TABLET | Refills: 5 | Status: SHIPPED | OUTPATIENT
Start: 2023-11-01 | End: 2023-12-14

## 2023-11-01 NOTE — PROGRESS NOTES
"Outpatient Psychiatry Follow-Up Visit (MD/NP)    11/1/2023    Clinical Status of Patient:  Outpatient (Ambulatory)    Chief Complaint:  Vianney Callahan is a 38 y.o. female who presents today for follow-up of depression, anxiety, and psychosis.  Met with patient.      Interval History and Content of Current Session:    Social/medical updates -- no major social changes. Resides in independent living apartment with 2 roommates. Has 1 cat. Remains out of work due to injury, boot on L foot    "Things have been really good."    Mood -- euthymic mood and affect. Denies depressed mood, denies anhedonia. No thoughts of death or SI. No law. Notes that with recently filling med-set she realized she had run out of lamotrigine for at least 2-4 weeks. States that she has not noticed any significant change in her mood.  Anxiety -- no change from baseline. No persistent unregulated worry or panic.  Attention -- no concerns expressed  Psychosis -- denies, no AVH, no paranoia, no delusions  Sleep -- mild early insomnia, sleeping in and napping during the day  Energy -- adequate  Appetite -- adequate, weight relatively stable    Substance use -- denies all    Medications:    Sertraline 200 mg daily -- compliant, denies SE  Bupropion  mg daily -- compliant, denies SE  Lamotrigine 150 mg daily -- not taking, hold  Fluphenazine 5 mg qAM and 20 mg qHS -- compliant, no SE, no evidence of TD or dystonia  Benztropine 1 mg BID -- compliant, denies SE  Buspirone 30 mg BID -- compliant, denies SE    Previous medication trials -- (pulled from chart review) -- vibryd (horrible reaction, shocks in head), trazodone (helpful for insomnia), lithium (rash), zyprexa (weight gain), adderall (palpitations), strattera (didn't feel it worked), neurontin (on a lot of other meds with it), ?buspar (didn't work), effexor (ok), celexa (ok), saphris, xanax (initially helpful but lost effect), ativan (didn't work), klonopin (depression), depakote (upset " stomach), tegretol (blurry vision), topamax (anaphylaxis), abilify (felt depressed and suicidal), seroquel (frightened at night), lexapro (suicidal), zoloft (sedation), paxil (akathesia), prozac (didn't work), symbyax (didn't like it), bupropion, risperidone, amitriptyline, gabapentin, lithium       Brief synopsis:  Sx stable, denies SI/HI/AVH      Review of Systems   PSYCHIATRIC: Pertinant items are noted in the narrative.  CONSTITUTIONAL: No weight gain or loss.   MUSCULOSKELETAL: No pain or stiffness of the joints.  NEUROLOGIC: No weakness, sensory changes, seizures, confusion, memory loss, tremor or other abnormal movements.  GASTROINTESTINAL: No nausea, vomiting, pain, constipation or diarrhea.    Past Medical, Family and Social History: The patient's past medical, family and social history have been reviewed and updated as appropriate within the electronic medical record - see encounter notes.    Compliance: see above    Side effects: see above    Risk Parameters:  Patient reports no suicidal ideation  Patient reports no homicidal ideation  Patient reports no self-injurious behavior  Patient reports no violent behavior    Exam (detailed: at least 9 elements; comprehensive: all 15 elements)   Constitutional  Vitals:  Most recent vital signs, dated less than 90 days prior to this appointment, were reviewed.   Vitals:    11/01/23 1407   BP: 118/67   Pulse: 102   Weight: 78.1 kg (172 lb 2.9 oz)        General:  unremarkable, age appropriate     Musculoskeletal  Muscle Strength/Tone:  no spasicity, no rigidity, no cogwheeling   Gait & Station:  non-ataxic     Psychiatric  Speech:  no latency; no press   Mood & Affect:  euthymic  congruent and appropriate, full   Thought Process:  normal and logical   Associations:  intact   Thought Content:  normal, no suicidality, no homicidality, delusions, or paranoia   Insight:  intact   Judgement: behavior is adequate to circumstances   Orientation:  grossly intact   Memory:  intact for content of interview   Language: grossly intact   Attention Span & Concentration:  able to focus   Fund of Knowledge:  intact and appropriate to age and level of education     Assessment and Diagnosis   Status/Progress: Based on the examination today, the patient's problem(s) is/are well controlled.  New problems have not been presented today.   Co-morbidities, Diagnostic uncertainty, and Lack of compliance are not complicating management of the primary condition.  There are no active rule-out diagnoses for this patient at this time.     General Impression: Vianney Callahan is a 38 y.o. female with a psychiatric hx of RADHA, mood disorder, schizoaffective disorder, bipolar disorder, cluster B personality disorder, ADHD. Medical hx significant for GERD. Family MH hx is significant for bipolar disorder in 1st degree relative. Past psychiatric treatment includes multitude of psychotropic trials since 12 years old. Patient reports best relative stability with current regimen of sertraline, bupropion, buspirone, fluphenazine, lamotrigine, benztropine. Multitude of psychiatric hospitalizations across the lifespan, most recently in 2022. Hx of para-suicidal gestures. No hx of defined suicide attempts. No hx of violence towards others. Significant hx of substance abuse in adolescence and early adulthood including ETOH, cannabis, opiates, heroin, cocaine, crystal meth. Lives in supportive independent living through [employer]. Employed at [employer] part-time. On SSDI.     11/01/23 -- sx stable. Ran out of lamotrigine, no appreciable change in mood, will hold at this time. Continue remainder of regimen as prescribed         ICD-10-CM ICD-9-CM   1. Schizoaffective disorder, bipolar type  F25.0 295.70   2. Anxiety  F41.9 300.00   3. Personality disorder  F60.9 301.9   4. History of ADHD  Z86.59 V11.8       Intervention/Counseling/Treatment Plan   Reviewed patient's current sx and medication regimen  We agreed to hold  lamotrigine at this time  Directed patient to contact writer with noticeable change in mood sx  Patient aware we would have to restart at lowest dose and titrate  Continue remainder of regimen as prescribed  Annual labs due in March  Reviewed sleep hygiene     Medication Management  Prescription drug management was employed during the encounter, as medications were prescribed, or considered but not prescribed.   [facility] reviewed  The risks and benefits of medication were discussed with the patient.  Possible expectable adverse effects of any current or proposed individual psychotropic agents were discussed with this patient.  Counseling was provided on the importance of full compliance with any prescribed medication.  Detailed instructions were provided to the patient regarding the administration of any prescribed medication.  Patient voiced understanding    Return to Clinic: 3 months

## 2023-11-02 ENCOUNTER — PATIENT MESSAGE (OUTPATIENT)
Dept: PSYCHIATRY | Facility: CLINIC | Age: 38
End: 2023-11-02
Payer: MEDICARE

## 2023-11-02 ENCOUNTER — HOSPITAL ENCOUNTER (OUTPATIENT)
Dept: RADIOLOGY | Facility: HOSPITAL | Age: 38
Discharge: HOME | End: 2023-11-02
Attending: UROLOGY
Payer: MEDICARE

## 2023-11-02 DIAGNOSIS — G12.20 MOTOR NEURON DISEASE, UNSPECIFIED: ICD-10-CM

## 2023-11-02 PROCEDURE — 72148 MRI LUMBAR SPINE WITHOUT CONTRAST: ICD-10-PCS | Mod: 26,,, | Performed by: RADIOLOGY

## 2023-11-02 PROCEDURE — 72148 MRI LUMBAR SPINE W/O DYE: CPT | Mod: 26,,, | Performed by: RADIOLOGY

## 2023-11-02 PROCEDURE — 72148 MRI LUMBAR SPINE W/O DYE: CPT | Mod: TC

## 2023-11-03 ENCOUNTER — PATIENT MESSAGE (OUTPATIENT)
Dept: UROLOGY | Facility: CLINIC | Age: 38
End: 2023-11-03
Payer: MEDICARE

## 2023-11-03 RX ORDER — LAMOTRIGINE 25 MG/1
TABLET ORAL
Qty: 120 TABLET | Refills: 2 | Status: SHIPPED | OUTPATIENT
Start: 2023-11-03 | End: 2023-12-14

## 2023-11-10 ENCOUNTER — HOSPITAL ENCOUNTER (OUTPATIENT)
Dept: RADIOLOGY | Facility: HOSPITAL | Age: 38
Discharge: HOME | End: 2023-11-10
Attending: STUDENT IN AN ORGANIZED HEALTH CARE EDUCATION/TRAINING PROGRAM
Payer: OTHER MISCELLANEOUS

## 2023-11-10 ENCOUNTER — OFFICE VISIT (OUTPATIENT)
Dept: PODIATRY | Facility: CLINIC | Age: 38
End: 2023-11-10
Payer: OTHER MISCELLANEOUS

## 2023-11-10 VITALS
HEIGHT: 67 IN | BODY MASS INDEX: 27 KG/M2 | SYSTOLIC BLOOD PRESSURE: 121 MMHG | TEMPERATURE: 99 F | HEART RATE: 101 BPM | RESPIRATION RATE: 18 BRPM | DIASTOLIC BLOOD PRESSURE: 74 MMHG | WEIGHT: 172 LBS

## 2023-11-10 DIAGNOSIS — Z98.890 POST-OPERATIVE STATE: ICD-10-CM

## 2023-11-10 DIAGNOSIS — Z98.890 POST-OPERATIVE STATE: Primary | ICD-10-CM

## 2023-11-10 DIAGNOSIS — M79.672 PAIN OF LEFT FOOT: ICD-10-CM

## 2023-11-10 DIAGNOSIS — B35.3 TINEA PEDIS OF LEFT FOOT: ICD-10-CM

## 2023-11-10 PROCEDURE — 99999 PR PBB SHADOW E&M-EST. PATIENT-LVL IV: CPT | Mod: PBBFAC,,, | Performed by: STUDENT IN AN ORGANIZED HEALTH CARE EDUCATION/TRAINING PROGRAM

## 2023-11-10 PROCEDURE — 73630 X-RAY EXAM OF FOOT: CPT | Mod: TC,LT

## 2023-11-10 PROCEDURE — 99024 PR POST-OP FOLLOW-UP VISIT: ICD-10-PCS | Mod: S$GLB,,, | Performed by: STUDENT IN AN ORGANIZED HEALTH CARE EDUCATION/TRAINING PROGRAM

## 2023-11-10 PROCEDURE — 99999 PR PBB SHADOW E&M-EST. PATIENT-LVL IV: ICD-10-PCS | Mod: PBBFAC,,, | Performed by: STUDENT IN AN ORGANIZED HEALTH CARE EDUCATION/TRAINING PROGRAM

## 2023-11-10 PROCEDURE — 73630 XR WEIGHT BEARING FOOT COMPLETE 3+ VIEW LEFT: ICD-10-PCS | Mod: 26,LT,, | Performed by: RADIOLOGY

## 2023-11-10 PROCEDURE — 99024 POSTOP FOLLOW-UP VISIT: CPT | Mod: S$GLB,,, | Performed by: STUDENT IN AN ORGANIZED HEALTH CARE EDUCATION/TRAINING PROGRAM

## 2023-11-10 PROCEDURE — 73630 X-RAY EXAM OF FOOT: CPT | Mod: 26,LT,, | Performed by: RADIOLOGY

## 2023-11-10 RX ORDER — TERBINAFINE HYDROCHLORIDE 250 MG/1
250 TABLET ORAL DAILY
Qty: 14 TABLET | Refills: 0 | Status: SHIPPED | OUTPATIENT
Start: 2023-11-10 | End: 2023-11-24

## 2023-11-10 NOTE — LETTER
November 10, 2023      [facility]  [address] [place] [address]  Phone: [phone]       Patient: Vianney Callahan   YOB: 1985  Date of Visit: 11/10/2023    To Whom It May Concern:    Jeremy Callahan  was at [facility] on 11/10/2023. She underwent left foot surgery 09/19/23 and is having slow healing of the surgical site, though her condition is improving with time. She is not cleared for return to work at this time due to her functional restrictions at this time and the nature of her work. Will reevaluate for return to work in 3 weeks. If you have any questions or concerns, or if I can be of further assistance, please do not hesitate to contact me.    Sincerely,        Sameer Wylie DPM

## 2023-11-10 NOTE — PROGRESS NOTES
Subjective:     Patient    Vianney Callahan is a 38 y.o. female.    Problem    09/01/23: Had fall from 4 foot ladder at work 2 days ago. Seen in the ED same day and diagnosed with displaced left 5th metatarsal fracture; discharged with oxycodone, acetaminophen, tall surgical boot, crutches. Reports ongoing swelling, pain, discoloration to the area.     09/19/23: Left 5th metatarsal fracture ORIF.     09/27/23: Returns 1 week s/p left 5th metatarsal ORIF. Pain is 6/10. Completed terbinafine without issue, requesting topical antifungals for left foot. Using crutches with partial weightbearing to left heel.     10/02/23: Returns 2 weeks s/p left 5th metatarsal ORIF. New redness and swelling to left foot, is using the topical antifungals. WB in surgical boot.     10/18/23: Returns 1 month s/p left 5th metatarsal ORIF. Skin of left foot slowly improving with topical antifungals. No pain in left foot when in surgical boot.     11/10/23: Returns 7.5 weeks s/p left 5th metatarsal ORIF. No pain in left foot when in boot, pain out of boot is still present but improving. Skin continues to improve although there are residual signs of fungus.     History    History obtained from patient and review of medical records.     Past Medical History:   Diagnosis Date    Abnormal cervical Papanicolaou smear 2012    Colposcopy    ADHD (attention deficit hyperactivity disorder) 8/16/2012    Allergy     Anxiety     Asthma     childhood    Back pain 5/19/2014    Bipolar affective disorder     Cholecystitis     Chronic migraine without aura, with intractable migraine, so stated, without mention of status migrainosus 9/24/2013    Depression     Fever blister     Gallstones 5/26/2014    Headache(784.0)     Hepatitis C antibody positive in blood     History of hepatitis C     History of psychiatric hospitalization     Suicide attempt    Personality disorder 4/11/2013    Psychosis 10/7/2013    Therapy     Tobacco abuse 9/24/2013       Past  Surgical History:   Procedure Laterality Date    ESOPHAGOGASTRODUODENOSCOPY      FRACTURE SURGERY      LIPOMA RESECTION N/A 10/4/2023    Procedure: EXCISION, LIPOMA;  Surgeon: Esvin Dupree DO;  Location: [facility];  Service: Plastics;  Laterality: N/A;  excision of forehead lipoma - 1 hour    MOLE REMOVAL      OPEN REDUCTION AND INTERNAL FIXATION (ORIF) OF FRACTURE OF METATARSAL BONE Left 2023    Procedure: ORIF, FRACTURE, METATARSAL BONE;  Surgeon: Sameer Wylie DPM;  Location: [facility];  Service: Podiatry;  Laterality: Left;  1.5 hours; foot tray; mini c arm    SKIN BIOPSY          Objective:     Vitals  Wt Readings from Last 1 Encounters:   11/10/23 78 kg (172 lb)     Temp Readings from Last 1 Encounters:   11/10/23 99 °F (37.2 °C)     BP Readings from Last 1 Encounters:   11/10/23 121/74     Pulse Readings from Last 1 Encounters:   11/10/23 101       Dermatological Exam    Skin:   Pedal hair growth, skin color, and skin texture normal on left; incision healed; diffuse scaling and peeling from fungal infection greatly improved    Nails:  All nails normal in length, thickness, color    Vascular Exam    Arteries:   Posterior tibial artery palpable on left  Dorsalis pedis artery palpable on left    Veins:   Superficial veins unremarkable on left    Swellin+ nonpitting on left at lateral forefoot    Neurological Exam    [place] touch test:  Light touch sensation intact to left foot     Musculoskeletal Exam    Footwear:  Casual on right  Surgical boot on left    Gait Exam:   Ambulatory Status: Ambulatory  Gait:  NWB LLE  Assistive Devices: Crutches    Foot Progression Angle:  Normal on right  Normal on left     Left Lower Extremity Additional Findings:  Mild pain on palpation at distal 5th metatarsal; gross function intact to 5th toe  Left foot and ankle function, strength, and range of motion unremarkable except as noted above.    Imaging and Other Tests    Imagin23 Left foot X ray:  displaced 5th metatarsal fracture    10/18/23 left foot X ray: 5th metatarsal fracture s/p ORIF, alignment of joint unremarkable, hardware intact, no concerns    Independently reviewed and interpreted imaging, findings are as follows:     11/10/23 left foot X ray: fracture stable, very minor signs of healing, slow healing     Assessment:     Encounter Diagnoses   Name Primary?    Post-operative state Yes    Tinea pedis of left foot         Plan:     I counseled the patient on her conditions, their implications and medical management.    S/p left 5th metatarsal ORIF  -Continue pain medications as prescribed, wean off as tolerated.  -LLE WBAT in surgical boot for short distances. Knee scooter or crutches for PWB or NWB for longer distances.    -Not yet cleared for return to work, need more evidence of healing on X ray. Letter provided.     Tinea pedis left foot: acute improving  -Continue topical ciclopirox to be applied to left foot 1-2 times daily until cleared.  -Start 2 week course of PO terbinafine.       Return to clinic in 3 weeks with X rays prior, call sooner PRN.

## 2023-11-13 ENCOUNTER — PATIENT MESSAGE (OUTPATIENT)
Dept: INTERNAL MEDICINE | Facility: CLINIC | Age: 38
End: 2023-11-13
Payer: MEDICARE

## 2023-11-22 ENCOUNTER — PATIENT MESSAGE (OUTPATIENT)
Dept: PODIATRY | Facility: CLINIC | Age: 38
End: 2023-11-22
Payer: MEDICARE

## 2023-11-22 DIAGNOSIS — Z98.890 POST-OPERATIVE STATE: Primary | ICD-10-CM

## 2023-11-22 RX ORDER — PREGABALIN 75 MG/1
75 CAPSULE ORAL NIGHTLY
Qty: 30 CAPSULE | Refills: 5 | Status: SHIPPED | OUTPATIENT
Start: 2023-11-22 | End: 2024-03-20

## 2023-11-24 ENCOUNTER — PATIENT MESSAGE (OUTPATIENT)
Dept: PSYCHIATRY | Facility: CLINIC | Age: 38
End: 2023-11-24
Payer: MEDICARE

## 2023-11-24 ENCOUNTER — PATIENT MESSAGE (OUTPATIENT)
Dept: OBSTETRICS AND GYNECOLOGY | Facility: CLINIC | Age: 38
End: 2023-11-24
Payer: MEDICARE

## 2023-11-27 ENCOUNTER — TELEPHONE (OUTPATIENT)
Dept: OBSTETRICS AND GYNECOLOGY | Facility: CLINIC | Age: 38
End: 2023-11-27
Payer: MEDICARE

## 2023-11-27 NOTE — TELEPHONE ENCOUNTER
Please let her know that Lamictal should not affect the effectiveness of OCPs.    Will will send a prescription for Nextstellis

## 2023-11-27 NOTE — TELEPHONE ENCOUNTER
Dr Abbott said he will review options and send in another prescription. However, it will need to [facility] as [facility] will need [facility] PA. If approved we can send a three month supply so you only have to go to the [facility] 4 times are you can speak with them about delivery, Claudia  ===View-only below this line===      ----- Message -----       From:Vianney Callahan       Sent:11/25/2023 12:47 PM CST         To:Patient Medical Advice Request Message List    Subject:Birth control    Perhaps something stronger.  Thanks again!  Vianney      ----- Message -----       From:Vianney Callahan       Sent:11/24/2023  7:18 PM CST         To:Morro Abbott MD    Subject:Birth control    Hello! I am having issues with my birth control. I'm not sure why but it is interacting with one of my psych med Lamictal. My Psych doctor said that it was just the type of birth control. I have used this BC for years and years. But it turns out I need a change. If you can help me out I would greatly appreciate it!  Thank you!   Vianney

## 2023-11-29 ENCOUNTER — PATIENT MESSAGE (OUTPATIENT)
Dept: PODIATRY | Facility: CLINIC | Age: 38
End: 2023-11-29
Payer: MEDICARE

## 2023-11-29 ENCOUNTER — TELEPHONE (OUTPATIENT)
Dept: PODIATRY | Facility: CLINIC | Age: 38
End: 2023-11-29
Payer: MEDICARE

## 2023-12-01 ENCOUNTER — TELEPHONE (OUTPATIENT)
Dept: OBSTETRICS AND GYNECOLOGY | Facility: CLINIC | Age: 38
End: 2023-12-01
Payer: MEDICARE

## 2023-12-01 RX ORDER — NORETHINDRONE ACETATE AND ETHINYL ESTRADIOL .03; 1.5 MG/1; MG/1
1 TABLET ORAL DAILY
Qty: 84 EACH | Refills: 3 | Status: SHIPPED | OUTPATIENT
Start: 2023-12-01 | End: 2024-11-30

## 2023-12-01 NOTE — TELEPHONE ENCOUNTER
irth control  (Newest Message First)  View All Conversations on this Encounter  Vianney BERUMEN Staff (supporting Morro Abbott MD)3 minutes ago (4:20 PM)        The generic Junel 1.5/30 please.        Nestor Callahan2 days ago     DI  I sent a refusal to the pharmacy twice. The message has been sent to Dr Abbott he would like you to check with your insurance to see what may be best covered or what is on your tier one which is usually the best covered level. Please let me know. Claudia BERUMEN Staff (supporting Morro Abbott MD)2 days ago       Please send over a different birth control prescription. My pharmacist just called me and said that the same prescription was sent over again. My insurance does not cover the prescription that you sent over.   thanks   vianney       Nestor routed conversation to Morro Abbott MD3 days ago     Vianney BERUMEN Staff (supporting Morro Abbott MD)3 days ago       hello, the doctor sent over a script to my pharmacy. However, my insurance doesn't cover it.  Can you try something else?  thanks!  Vianney       Nestor Callahan4 days ago     DI  Ok, Dr Abbott was going to send it in after he researched a few. Please reach out if you have not heard back from either Dr Abbott or myself by tomorrow afternoon. Claudia BERUMEN Staff (supporting Morro Abbott MD)4 days ago       OK! Thank you Claudia   Please let me know the name of the drug if possible. There are some prescriptions I can't afford.  Thanks again

## 2023-12-04 ENCOUNTER — OFFICE VISIT (OUTPATIENT)
Dept: PODIATRY | Facility: CLINIC | Age: 38
End: 2023-12-04
Payer: OTHER MISCELLANEOUS

## 2023-12-04 ENCOUNTER — HOSPITAL ENCOUNTER (OUTPATIENT)
Dept: RADIOLOGY | Facility: HOSPITAL | Age: 38
Discharge: HOME | End: 2023-12-04
Attending: STUDENT IN AN ORGANIZED HEALTH CARE EDUCATION/TRAINING PROGRAM
Payer: OTHER MISCELLANEOUS

## 2023-12-04 VITALS
RESPIRATION RATE: 18 BRPM | HEIGHT: 67 IN | DIASTOLIC BLOOD PRESSURE: 82 MMHG | WEIGHT: 172 LBS | BODY MASS INDEX: 27 KG/M2 | TEMPERATURE: 100 F | HEART RATE: 96 BPM | SYSTOLIC BLOOD PRESSURE: 125 MMHG | OXYGEN SATURATION: 99 %

## 2023-12-04 DIAGNOSIS — Z98.890 POST-OPERATIVE STATE: ICD-10-CM

## 2023-12-04 DIAGNOSIS — Z98.890 POST-OPERATIVE STATE: Primary | ICD-10-CM

## 2023-12-04 PROCEDURE — 73630 XR WEIGHT BEARING FOOT COMPLETE 3+ VIEW LEFT: ICD-10-PCS | Mod: 26,LT,, | Performed by: RADIOLOGY

## 2023-12-04 PROCEDURE — 73630 X-RAY EXAM OF FOOT: CPT | Mod: TC,LT

## 2023-12-04 PROCEDURE — 99999 PR PBB SHADOW E&M-EST. PATIENT-LVL IV: ICD-10-PCS | Mod: PBBFAC,,, | Performed by: STUDENT IN AN ORGANIZED HEALTH CARE EDUCATION/TRAINING PROGRAM

## 2023-12-04 PROCEDURE — 99024 PR POST-OP FOLLOW-UP VISIT: ICD-10-PCS | Mod: S$GLB,,, | Performed by: STUDENT IN AN ORGANIZED HEALTH CARE EDUCATION/TRAINING PROGRAM

## 2023-12-04 PROCEDURE — 99999 PR PBB SHADOW E&M-EST. PATIENT-LVL IV: CPT | Mod: PBBFAC,,, | Performed by: STUDENT IN AN ORGANIZED HEALTH CARE EDUCATION/TRAINING PROGRAM

## 2023-12-04 PROCEDURE — 99024 POSTOP FOLLOW-UP VISIT: CPT | Mod: S$GLB,,, | Performed by: STUDENT IN AN ORGANIZED HEALTH CARE EDUCATION/TRAINING PROGRAM

## 2023-12-04 PROCEDURE — 73630 X-RAY EXAM OF FOOT: CPT | Mod: 26,LT,, | Performed by: RADIOLOGY

## 2023-12-04 NOTE — LETTER
December 4, 2023      [facility]  [address]  [place] [address]  Phone: [phone]       Patient: Vianney Callahan   YOB: 1985  Date of Visit: 12/04/2023    To Whom It May Concern:    Jeremy Callahan  was at [facility] on 12/04/2023. She underwent left foot surgery 09/19/23 and is having slow healing of the surgical site, though her condition is improving with time. She is not cleared for return to work at this time due to her current functional restrictions and the nature of her work. Will reevaluate for return to work in 4 weeks. If you have any questions or concerns, or if I can be of further assistance, please do not hesitate to contact me.     Sincerely,        Sameer Wylie DPM

## 2023-12-04 NOTE — PROGRESS NOTES
Subjective:     Patient    Vianney Callahan is a 38 y.o. female.    Problem    09/01/23: Had fall from 4 foot ladder at work 2 days ago. Seen in the ED same day and diagnosed with displaced left 5th metatarsal fracture; discharged with oxycodone, acetaminophen, tall surgical boot, crutches. Reports ongoing swelling, pain, discoloration to the area.     09/19/23: Left 5th metatarsal fracture ORIF.     09/27/23: Returns 1 week s/p left 5th metatarsal ORIF. Pain is 6/10. Completed terbinafine without issue, requesting topical antifungals for left foot. Using crutches with partial weightbearing to left heel.     10/02/23: Returns 2 weeks s/p left 5th metatarsal ORIF. New redness and swelling to left foot, is using the topical antifungals. WB in surgical boot.     10/18/23: Returns 1 month s/p left 5th metatarsal ORIF. Skin of left foot slowly improving with topical antifungals. No pain in left foot when in surgical boot.     11/10/23: Returns 7.5 weeks s/p left 5th metatarsal ORIF. No pain in left foot when in boot, pain out of boot is still present but improving. Skin continues to improve although there are residual signs of fungus.     12/04/23: 2.5 months s/p left 5th metatarsal ORIF. Still having some nerve sensations in hands and feet, improved with pregabalin and overall improving with time. No pain at surgical site in boot.     History    History obtained from patient and review of medical records.     Past Medical History:   Diagnosis Date    Abnormal cervical Papanicolaou smear 2012    Colposcopy    ADHD (attention deficit hyperactivity disorder) 8/16/2012    Allergy     Anxiety     Asthma     childhood    Back pain 5/19/2014    Bipolar affective disorder     Cholecystitis     Chronic migraine without aura, with intractable migraine, so stated, without mention of status migrainosus 9/24/2013    Depression     Fever blister     Gallstones 5/26/2014    Headache(784.0)     Hepatitis C antibody positive in blood      History of hepatitis C     History of psychiatric hospitalization     Suicide attempt    Personality disorder 2013    Psychosis 10/7/2013    Therapy     Tobacco abuse 2013       Past Surgical History:   Procedure Laterality Date    ESOPHAGOGASTRODUODENOSCOPY      FRACTURE SURGERY      LIPOMA RESECTION N/A 10/4/2023    Procedure: EXCISION, LIPOMA;  Surgeon: Esvin Dupree DO;  Location: [facility] OR;  Service: Plastics;  Laterality: N/A;  excision of forehead lipoma - 1 hour    MOLE REMOVAL      OPEN REDUCTION AND INTERNAL FIXATION (ORIF) OF FRACTURE OF METATARSAL BONE Left 2023    Procedure: ORIF, FRACTURE, METATARSAL BONE;  Surgeon: Sameer Wylie DPM;  Location: [facility] OR;  Service: Podiatry;  Laterality: Left;  1.5 hours; foot tray; mini c arm    SKIN BIOPSY          Objective:     Vitals  Wt Readings from Last 1 Encounters:   23 78 kg (172 lb)     Temp Readings from Last 1 Encounters:   23 99.6 °F (37.6 °C)     BP Readings from Last 1 Encounters:   23 125/82     Pulse Readings from Last 1 Encounters:   23 96       Dermatological Exam    Skin:   Pedal hair growth, skin color, and skin texture normal on left; incision healed; diffuse scaling and peeling from fungal infection nearly resolved    Nails:  All nails normal in length, thickness, color    Vascular Exam    Arteries:   Posterior tibial artery palpable on left  Dorsalis pedis artery palpable on left    Veins:   Superficial veins unremarkable on left    Swellin+ nonpitting on left at lateral forefoot    Neurological Exam    [place] touch test:  Light touch sensation intact to left foot     Musculoskeletal Exam    Footwear:  Casual on right  Surgical boot on left    Gait Exam:   Ambulatory Status: Ambulatory  Gait: Normal  Assistive Devices: None    Foot Progression Angle:  Normal on right  Normal on left     Left Lower Extremity Additional Findings:  Mild pain on palpation at distal 5th metatarsal; gross  function intact to 5th toe  Left foot and ankle function, strength, and range of motion unremarkable except as noted above.    Imaging and Other Tests    Imagin23 Left foot X ray: displaced 5th metatarsal fracture    10/18/23 left foot X ray: 5th metatarsal fracture s/p ORIF, alignment of joint unremarkable, hardware intact, no concerns    11/10/23 left foot X ray: fracture stable, very minor signs of healing, slow healing    Independently reviewed and interpreted imaging, findings are as follows:     23 left foot X ray: fracture and hardware stable, interval healing     Assessment:     Encounter Diagnosis   Name Primary?    Post-operative state Yes          Plan:     I counseled the patient on her conditions, their implications and medical management.    S/p left 5th metatarsal ORIF  -X ray reviewed and interpreted as above: healing.   -Continue pain medications as prescribed, wean off as tolerated.  -Transition into standard footwear as tolerated; continue knee scooter for longer distances.    -Not yet cleared for return to work. Letter provided.     Tinea pedis left foot: acute improving  -Continue topical ciclopirox to be applied to left foot 1-2 times daily until cleared.       Return to clinic in 1 month, call sooner PRN.

## 2023-12-05 ENCOUNTER — PATIENT MESSAGE (OUTPATIENT)
Dept: PSYCHIATRY | Facility: CLINIC | Age: 38
End: 2023-12-05
Payer: MEDICARE

## 2023-12-05 RX ORDER — BUPROPION HYDROCHLORIDE 150 MG/1
150 TABLET ORAL DAILY
Qty: 30 TABLET | Refills: 5 | Status: SHIPPED | OUTPATIENT
Start: 2023-12-05 | End: 2024-02-05 | Stop reason: SDUPTHER

## 2023-12-12 ENCOUNTER — HOSPITAL ENCOUNTER (OUTPATIENT)
Dept: RADIOLOGY | Facility: HOSPITAL | Age: 38
Discharge: HOME | End: 2023-12-12
Attending: UROLOGY
Payer: MEDICARE

## 2023-12-12 ENCOUNTER — PROCEDURE VISIT (OUTPATIENT)
Dept: UROLOGY | Facility: CLINIC | Age: 38
End: 2023-12-12
Payer: MEDICARE

## 2023-12-12 VITALS
WEIGHT: 170.63 LBS | HEIGHT: 67 IN | DIASTOLIC BLOOD PRESSURE: 71 MMHG | BODY MASS INDEX: 26.78 KG/M2 | HEART RATE: 92 BPM | SYSTOLIC BLOOD PRESSURE: 111 MMHG

## 2023-12-12 DIAGNOSIS — N39.8 DYSFUNCTIONAL VOIDING OF URINE: ICD-10-CM

## 2023-12-12 DIAGNOSIS — N31.9 BLADDER DYSFUNCTION: ICD-10-CM

## 2023-12-12 PROCEDURE — 51741 ELECTRO-UROFLOWMETRY FIRST: CPT | Mod: 26,51,S$GLB, | Performed by: UROLOGY

## 2023-12-12 PROCEDURE — 51797 INTRAABDOMINAL PRESSURE TEST: CPT | Mod: 26,S$GLB,, | Performed by: UROLOGY

## 2023-12-12 PROCEDURE — 51728 PR COMPLEX CYSTOMETROGRAM VOIDING PRESSURE STUDIES: ICD-10-PCS | Mod: 26,S$GLB,, | Performed by: UROLOGY

## 2023-12-12 PROCEDURE — 74455 X-RAY URETHRA/BLADDER: CPT | Mod: 26,S$GLB,, | Performed by: UROLOGY

## 2023-12-12 PROCEDURE — 74455 PR X-RAY URETHROCYSTOGRAM+VOIDING: ICD-10-PCS | Mod: 26,S$GLB,, | Performed by: UROLOGY

## 2023-12-12 PROCEDURE — 76000 FLUOROSCOPY <1 HR PHYS/QHP: CPT | Mod: TC

## 2023-12-12 PROCEDURE — 51741 PR UROFLOWMETRY, COMPLEX: ICD-10-PCS | Mod: 26,51,S$GLB, | Performed by: UROLOGY

## 2023-12-12 PROCEDURE — 51600 PR INJECTION FOR BLADDER X-RAY: ICD-10-PCS | Mod: 51,S$GLB,, | Performed by: UROLOGY

## 2023-12-12 PROCEDURE — 51728 CYSTOMETROGRAM W/VP: CPT | Mod: 26,S$GLB,, | Performed by: UROLOGY

## 2023-12-12 PROCEDURE — 52000 CYSTOURETHROSCOPY: CPT | Mod: 59,S$GLB,, | Performed by: UROLOGY

## 2023-12-12 PROCEDURE — 51784 ANAL/URINARY MUSCLE STUDY: CPT | Mod: 26,51,S$GLB, | Performed by: UROLOGY

## 2023-12-12 PROCEDURE — 52000 PR CYSTOURETHROSCOPY: ICD-10-PCS | Mod: 59,S$GLB,, | Performed by: UROLOGY

## 2023-12-12 PROCEDURE — 51797 PR VOIDING PRESS STUDY INTRA-ABDOMINAL VOID: ICD-10-PCS | Mod: 26,S$GLB,, | Performed by: UROLOGY

## 2023-12-12 PROCEDURE — 51600 INJECTION FOR BLADDER X-RAY: CPT | Mod: 51,S$GLB,, | Performed by: UROLOGY

## 2023-12-12 PROCEDURE — 51784 PR ANAL/URINARY MUSCLE STUDY: ICD-10-PCS | Mod: 26,51,S$GLB, | Performed by: UROLOGY

## 2023-12-12 NOTE — PROCEDURES
Urodynamic Report    [facility]       Referring Physician:  Aris Lee MD    YOB: 1985  Date of Exam: 12/12/2023    HPI: Vianney Callahan is a very pleasant 38 y.o. female referred for evaluation of voiding difficulty of 1 years duration. She currently does not perform intermittent catheterization and voids on own.  She reports extreme voiding difficulty with straining to empty. She reports no concomitant neurologic symptoms or diagnoses. This has gradually progressed at least over the last year. She experiences hesitancy, straining and sensation of incomplete emptying. PVR today is 7 mL. She notes this with almost every void.      She reports no 24hr frequency (6x), nocturia (0x). She has occasional urgency without incontinence. She no longer takes any OAB medications. She has no history of UTI.      She has normal upper tract study and MRI shows no evidence of abnormality including tethered cord. No concomitant neurologic symptoms or diagnoses.      Cystometrogram:    Position: Sitting  Filling Rate: 50 mL/sec   Catheter: 7F  Fluid:Conray       Cath PVR prior: 50 mL Voiding Diary Capacity: Not Available   First Sensation: 1 mL First Desire: 56 mL   Strong Desire: 578 mL Cystometric Capacity: 578 mL       Pdet at correction: 0 cm H2O Compliance: Normal       Detrusor Overactivity (DO): Absent  Volume first DO: No DO   Max DO Pressure: No DO Urgency Incontinence: Absent        Leak Point Pressure Testing:        Volume Tested: 250 mL  Abdominal Leak Point Pressure: No Leak   Stress Induced DO: Absent          Voiding Study:        Voided Volume: 361 mL PVR: 214 mL   Maximum Flow: 12 mL/sec Average Flow 5 mL/sec   Max Pdet: 0 cmH2O  Pdet at Max Flow: 0 cmH2O   EMG Storage: Normal Recruitment  EMG Voiding: Flaring with valsalva       Fluroscopic Imaging:        Bladder Contour: Smooth Vesicoureteral Reflux:No VUR   Bladder Neck at Rest: closed Bladder Neck Voiding:Narrowed - Fixed      "  Impression:        Sensation:Normal    Capacity: Normal    Compliance:Normal    Detrusor Overactivity:Absent    Continence: No Incontinence    Contractility: Hypocontractility    Emptying:Unsatisfactory - Hypocontractility    Coordination:"Pseudo-dyssynergia" from valsalva voiding dysfunction          Summary:  This study was performed in accordance with the current AUA/SUFU Guideline on Adult Urodynamics. On filling phase she demonstrates normal first and strong desire with a normal bladder capacity. There is no detrusor overactivity (DO) demonstrated on this exam (though absence of DO on urodynamic evaluation does not exclude it as causative agent of urgency symptoms). Bladder compliance at capacity is normal. On fluoroscopy, the bladder contour is smooth. There is no VUR demonstrated on this exam.     On stress testing, with adequate cough and valsalva effort, there is no ALEX demonstrated and patient reports no clinical history of ALEX.    On voiding phase, dysfunctional voiding is present with no underlying detrusor contraction demonstrated. Voiding is with valsalva voiding only. On VCUG, the urethra is narrowed intermittently during voiding, suggesting the possibility of functional intermittent obstruction from detrusor sphincter dyssynergia or "pseudo-dyssynergia". The patient is unable to void to completion during the study or on independent flow rate but has no clinical history suggestive of voiding difficulty.     Cystoscopy Details: Informed consent was obtained and she was sterily prepped and 1% lidocaine jelly was injected per urethra. A flexible cystoscope was inserted into the bladder via the urethra. There was no evidence of stricture, stenosis, lesions, other obstruction, or diverticulum. Significant findings included a normal unobstructed urethra only. Cystoscopic examination of the bladder revealed orthotopically positioned, normal bilateral ureteral orifices with clear yellow urine effluxing from " each orifice. All mucosal surfaces were examined with no apparent stones, tumors, foreign bodies, erythema, trabeculation, diverticula, or ulcers. The procedure was concluded without complications. The patient was not administered a post-procedure antibiotic.     Assesment and Plan:   Valsalva Voiding Dysfunction: The patient demonstrates severe dysfunctional voiding resulting in chronic urinary retention. We discussed options including pelvic floor physical therapy with biofeedback versus a staged trial of SNM. She would like to consider these options and will reach out to me after deciding.

## 2023-12-14 ENCOUNTER — OFFICE VISIT (OUTPATIENT)
Dept: PSYCHIATRY | Facility: CLINIC | Age: 38
End: 2023-12-14
Payer: COMMERCIAL

## 2023-12-14 VITALS
WEIGHT: 170.63 LBS | SYSTOLIC BLOOD PRESSURE: 134 MMHG | HEART RATE: 98 BPM | DIASTOLIC BLOOD PRESSURE: 69 MMHG | BODY MASS INDEX: 26.73 KG/M2

## 2023-12-14 DIAGNOSIS — F41.9 ANXIETY: ICD-10-CM

## 2023-12-14 DIAGNOSIS — F60.9 PERSONALITY DISORDER: ICD-10-CM

## 2023-12-14 DIAGNOSIS — F25.0 SCHIZOAFFECTIVE DISORDER, BIPOLAR TYPE: Primary | ICD-10-CM

## 2023-12-14 DIAGNOSIS — Z86.59 HISTORY OF ADHD: ICD-10-CM

## 2023-12-14 PROCEDURE — 3008F BODY MASS INDEX DOCD: CPT | Mod: CPTII,S$GLB,, | Performed by: NURSE PRACTITIONER

## 2023-12-14 PROCEDURE — 99999 PR PBB SHADOW E&M-EST. PATIENT-LVL II: CPT | Mod: PBBFAC,,, | Performed by: NURSE PRACTITIONER

## 2023-12-14 PROCEDURE — 3078F PR MOST RECENT DIASTOLIC BLOOD PRESSURE < 80 MM HG: ICD-10-PCS | Mod: CPTII,S$GLB,, | Performed by: NURSE PRACTITIONER

## 2023-12-14 PROCEDURE — 3075F SYST BP GE 130 - 139MM HG: CPT | Mod: CPTII,S$GLB,, | Performed by: NURSE PRACTITIONER

## 2023-12-14 PROCEDURE — 99214 PR OFFICE/OUTPT VISIT, EST, LEVL IV, 30-39 MIN: ICD-10-PCS | Mod: S$GLB,,, | Performed by: NURSE PRACTITIONER

## 2023-12-14 PROCEDURE — 3078F DIAST BP <80 MM HG: CPT | Mod: CPTII,S$GLB,, | Performed by: NURSE PRACTITIONER

## 2023-12-14 PROCEDURE — 3075F PR MOST RECENT SYSTOLIC BLOOD PRESS GE 130-139MM HG: ICD-10-PCS | Mod: CPTII,S$GLB,, | Performed by: NURSE PRACTITIONER

## 2023-12-14 PROCEDURE — 99999 PR PBB SHADOW E&M-EST. PATIENT-LVL II: ICD-10-PCS | Mod: PBBFAC,,, | Performed by: NURSE PRACTITIONER

## 2023-12-14 PROCEDURE — 99214 OFFICE O/P EST MOD 30 MIN: CPT | Mod: S$GLB,,, | Performed by: NURSE PRACTITIONER

## 2023-12-14 PROCEDURE — 3008F PR BODY MASS INDEX (BMI) DOCUMENTED: ICD-10-PCS | Mod: CPTII,S$GLB,, | Performed by: NURSE PRACTITIONER

## 2023-12-14 RX ORDER — LAMOTRIGINE 150 MG/1
150 TABLET ORAL NIGHTLY
Qty: 30 TABLET | Refills: 5 | Status: SHIPPED | OUTPATIENT
Start: 2023-12-14

## 2023-12-14 RX ORDER — FLUPHENAZINE HYDROCHLORIDE 10 MG/1
TABLET ORAL
Qty: 90 TABLET | Refills: 5 | Status: SHIPPED | OUTPATIENT
Start: 2023-12-14
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
## 2023-12-18 ENCOUNTER — TELEPHONE (OUTPATIENT)
Dept: PODIATRY | Facility: CLINIC | Age: 38
End: 2023-12-18
Payer: MEDICARE

## 2023-12-18 NOTE — TELEPHONE ENCOUNTER
Returned call to  Nyasia/ Argenis Marx who contacted office stating pt had a office visit states they are wondering when can return back to work with restrictions and office note form 12/4 please call [phone]   Fax# [phone]

## 2023-12-24 DIAGNOSIS — A60.00 GENITAL HERPES SIMPLEX, UNSPECIFIED SITE: ICD-10-CM

## 2023-12-26 RX ORDER — VALACYCLOVIR HYDROCHLORIDE 1 G/1
1000 TABLET, FILM COATED ORAL DAILY
Qty: 90 TABLET | Refills: 0 | Status: SHIPPED | OUTPATIENT
Start: 2023-12-26

## 2023-12-26 NOTE — TELEPHONE ENCOUNTER
No care due was identified.  [facility] Embedded Care Due Messages. Reference number: [id number].   12/26/2023 9:21:49 AM CST

## 2023-12-26 NOTE — TELEPHONE ENCOUNTER
Refill Routing Note   Medication(s) are not appropriate for processing by [facility] for the following reason(s):        No active prescription written by provider    [facility] action(s):  Defer        Medication Therapy Plan: No recent appts found with the PA-C to indicate not following with PCP      Appointments  past 12m or future 3m with PCP    Date Provider   Last Visit   3/28/2023 Nellie Coreas MD   Next Visit   Visit date not found Nellie Coreas MD   ED visits in past 90 days: 0        Note composed:9:27 AM 12/26/2023

## 2024-01-04 ENCOUNTER — OFFICE VISIT (OUTPATIENT)
Dept: PODIATRY | Facility: CLINIC | Age: 39
End: 2024-01-04
Payer: OTHER MISCELLANEOUS

## 2024-01-04 VITALS
HEART RATE: 101 BPM | WEIGHT: 173.75 LBS | DIASTOLIC BLOOD PRESSURE: 73 MMHG | HEIGHT: 67 IN | BODY MASS INDEX: 27.27 KG/M2 | SYSTOLIC BLOOD PRESSURE: 111 MMHG | RESPIRATION RATE: 17 BRPM

## 2024-01-04 DIAGNOSIS — M79.672 PAIN OF LEFT FOOT: ICD-10-CM

## 2024-01-04 DIAGNOSIS — Z98.890 POST-OPERATIVE STATE: ICD-10-CM

## 2024-01-04 DIAGNOSIS — B35.1 TINEA UNGUIUM: ICD-10-CM

## 2024-01-04 DIAGNOSIS — B35.3 TINEA PEDIS OF LEFT FOOT: Primary | ICD-10-CM

## 2024-01-04 PROCEDURE — 99999 PR PBB SHADOW E&M-EST. PATIENT-LVL III: CPT | Mod: PBBFAC,,, | Performed by: STUDENT IN AN ORGANIZED HEALTH CARE EDUCATION/TRAINING PROGRAM

## 2024-01-04 PROCEDURE — 99213 OFFICE O/P EST LOW 20 MIN: CPT | Mod: S$GLB,,, | Performed by: STUDENT IN AN ORGANIZED HEALTH CARE EDUCATION/TRAINING PROGRAM

## 2024-01-04 RX ORDER — OXYCODONE HYDROCHLORIDE 5 MG/1
5 TABLET ORAL EVERY 4 HOURS PRN
Qty: 30 TABLET | Refills: 0 | Status: SHIPPED | OUTPATIENT
Start: 2024-01-04 | End: 2024-01-17 | Stop reason: SDUPTHER

## 2024-01-04 RX ORDER — TERBINAFINE HYDROCHLORIDE 250 MG/1
250 TABLET ORAL DAILY
Qty: 30 TABLET | Refills: 0 | Status: SHIPPED | OUTPATIENT
Start: 2024-01-04 | End: 2024-02-03

## 2024-01-04 NOTE — TELEPHONE ENCOUNTER
Pain at surgical site, pricking sensations in hands and feet. Ordered pregabalin nightly. No other change to plan.    Sameer Wylie DPM  Podiatric Medicine & Surgery  [facility]     Refer back to GI

## 2024-01-04 NOTE — PROGRESS NOTES
Subjective:     Patient    Vianney Callahan is a 38 y.o. female.    Problem    09/01/23: Had fall from 4 foot ladder at work 2 days ago. Seen in the ED same day and diagnosed with displaced left 5th metatarsal fracture; discharged with oxycodone, acetaminophen, tall surgical boot, crutches. Reports ongoing swelling, pain, discoloration to the area.     09/19/23: Left 5th metatarsal fracture ORIF.     09/27/23: Returns 1 week s/p left 5th metatarsal ORIF. Pain is 6/10. Completed terbinafine without issue, requesting topical antifungals for left foot. Using crutches with partial weightbearing to left heel.     10/02/23: Returns 2 weeks s/p left 5th metatarsal ORIF. New redness and swelling to left foot, is using the topical antifungals. WB in surgical boot.     10/18/23: Returns 1 month s/p left 5th metatarsal ORIF. Skin of left foot slowly improving with topical antifungals. No pain in left foot when in surgical boot.     11/10/23: Returns 7.5 weeks s/p left 5th metatarsal ORIF. No pain in left foot when in boot, pain out of boot is still present but improving. Skin continues to improve although there are residual signs of fungus.     12/04/23: 2.5 months s/p left 5th metatarsal ORIF. Still having some nerve sensations in hands and feet, improved with pregabalin and overall improving with time. No pain at surgical site in boot.     01/04/24: 3.5 months s/p left 5th metatarsal ORIF. Has occasional burning at surgical site, otherwise walking in normal footwear with no regular pain. Skin has cleared significantly but still has some scaling of skin and some discoloration of left 1st toenail.     History    History obtained from patient and review of medical records.     Past Medical History:   Diagnosis Date    Abnormal cervical Papanicolaou smear 2012    Colposcopy    ADHD (attention deficit hyperactivity disorder) 8/16/2012    Allergy     Anxiety     Asthma     childhood    Back pain 5/19/2014    Bipolar affective  disorder     Cholecystitis     Chronic migraine without aura, with intractable migraine, so stated, without mention of status migrainosus 2013    Depression     Fever blister     Gallstones 2014    Headache(784.0)     Hepatitis C antibody positive in blood     History of hepatitis C     History of psychiatric hospitalization     Suicide attempt    Personality disorder 2013    Psychosis 10/7/2013    Therapy     Tobacco abuse 2013       Past Surgical History:   Procedure Laterality Date    ESOPHAGOGASTRODUODENOSCOPY      FRACTURE SURGERY      LIPOMA RESECTION N/A 10/4/2023    Procedure: EXCISION, LIPOMA;  Surgeon: Esvin Dupree DO;  Location: [facility];  Service: Plastics;  Laterality: N/A;  excision of forehead lipoma - 1 hour    MOLE REMOVAL      OPEN REDUCTION AND INTERNAL FIXATION (ORIF) OF FRACTURE OF METATARSAL BONE Left 2023    Procedure: ORIF, FRACTURE, METATARSAL BONE;  Surgeon: Sameer Wylie DPM;  Location: [facility] OR;  Service: Podiatry;  Laterality: Left;  1.5 hours; foot tray; mini c arm    SKIN BIOPSY          Objective:     Vitals  Wt Readings from Last 1 Encounters:   24 78.8 kg (173 lb 11.6 oz)     Temp Readings from Last 1 Encounters:   23 99.6 °F (37.6 °C)     BP Readings from Last 1 Encounters:   24 111/73     Pulse Readings from Last 1 Encounters:   24 101       Dermatological Exam    Skin:   Pedal hair growth, skin color, and skin texture normal on left; incision healed; diffuse scaling and peeling from fungal infection nearly resolved    Nails:  All nails normal in length, thickness, color; slight discoloration distal left 1st toenail    Vascular Exam    Arteries:   Posterior tibial artery palpable on left  Dorsalis pedis artery palpable on left    Veins:   Superficial veins unremarkable on left    Swellin+ nonpitting on left at lateral forefoot    Neurological Exam    [place] touch test:  Light touch sensation intact to left foot      Musculoskeletal Exam    Footwear:  Casual on right  Surgical boot on left    Gait Exam:   Ambulatory Status: Ambulatory  Gait: Normal  Assistive Devices: None    Foot Progression Angle:  Normal on right  Normal on left     Left Lower Extremity Additional Findings:  Mild pain on palpation at distal 5th metatarsal; gross function intact to 5th toe  Left foot and ankle function, strength, and range of motion unremarkable except as noted above.    Imaging and Other Tests    Imagin23 Left foot X ray: displaced 5th metatarsal fracture    10/18/23 left foot X ray: 5th metatarsal fracture s/p ORIF, alignment of joint unremarkable, hardware intact, no concerns    11/10/23 left foot X ray: fracture stable, very minor signs of healing, slow healing    23 left foot X ray: fracture and hardware stable, interval healing    Independently reviewed and interpreted imaging, findings are as follows: N/A       Assessment:     Encounter Diagnoses   Name Primary?    Tinea pedis of left foot Yes    Tinea unguium     Pain of left foot     Post-operative state           Plan:     I counseled the patient on her conditions, their implications and medical management.    S/p left 5th metatarsal ORIF  -Moisturize and massage scar regularly.   -Continue physical activity and footwear as tolerated.  -Pregabalin, oxycodone. Will continue to wean.  -Cleared for return to work without restrictions.      Tinea pedis, tinea unguium: chronic improving  -Continue topical ciclopirox to be applied to left until next followup.   -Start 1 month PO terbinafine.       Return to clinic in 2 months, call sooner PRN.

## 2024-01-04 NOTE — LETTER
January 4, 2024      [facility]  [address]  [place] [address]  Phone: [phone]       Patient: Vianney Callahan   YOB: 1985  Date of Visit: 01/04/2024    To Whom It May Concern:    Jeremy Callahan  was at [facility] on 01/04/2024. She may return to work on 01/15/24 with no restrictions. If you have any questions or concerns, or if I can be of further assistance, please do not hesitate to contact me.    Sincerely,        Sameer Wylie DPM

## 2024-01-17 DIAGNOSIS — M79.672 PAIN OF LEFT FOOT: ICD-10-CM

## 2024-01-17 DIAGNOSIS — Z98.890 POST-OPERATIVE STATE: ICD-10-CM

## 2024-01-17 RX ORDER — OXYCODONE HYDROCHLORIDE 5 MG/1
5 TABLET ORAL EVERY 4 HOURS PRN
Qty: 30 TABLET | Refills: 0 | Status: SHIPPED | OUTPATIENT
Start: 2024-01-17 | End: 2024-02-07 | Stop reason: SDUPTHER

## 2024-01-30 ENCOUNTER — PATIENT MESSAGE (OUTPATIENT)
Dept: PSYCHIATRY | Facility: CLINIC | Age: 39
End: 2024-01-30
Payer: MEDICARE

## 2024-02-05 ENCOUNTER — OFFICE VISIT (OUTPATIENT)
Dept: PSYCHIATRY | Facility: CLINIC | Age: 39
End: 2024-02-05
Payer: COMMERCIAL

## 2024-02-05 VITALS
SYSTOLIC BLOOD PRESSURE: 121 MMHG | DIASTOLIC BLOOD PRESSURE: 80 MMHG | HEART RATE: 96 BPM | BODY MASS INDEX: 27.05 KG/M2 | WEIGHT: 172.75 LBS

## 2024-02-05 DIAGNOSIS — F25.0 SCHIZOAFFECTIVE DISORDER, BIPOLAR TYPE: Primary | ICD-10-CM

## 2024-02-05 DIAGNOSIS — F60.9 PERSONALITY DISORDER: ICD-10-CM

## 2024-02-05 DIAGNOSIS — F41.9 ANXIETY: ICD-10-CM

## 2024-02-05 PROCEDURE — 99214 OFFICE O/P EST MOD 30 MIN: CPT | Mod: S$GLB,,, | Performed by: NURSE PRACTITIONER

## 2024-02-05 PROCEDURE — 99999 PR PBB SHADOW E&M-EST. PATIENT-LVL II: CPT | Mod: PBBFAC,,, | Performed by: NURSE PRACTITIONER

## 2024-02-05 RX ORDER — SERTRALINE HYDROCHLORIDE 100 MG/1
200 TABLET, FILM COATED ORAL DAILY
Qty: 60 TABLET | Refills: 5 | Status: SHIPPED | OUTPATIENT
Start: 2024-02-05 | End: 2024-06-18 | Stop reason: SDUPTHER

## 2024-02-05 RX ORDER — BUPROPION HYDROCHLORIDE 300 MG/1
300 TABLET ORAL DAILY
Qty: 30 TABLET | Refills: 5 | Status: SHIPPED | OUTPATIENT
Start: 2024-02-05 | End: 2024-06-18 | Stop reason: SDUPTHER

## 2024-02-05 RX ORDER — BUSPIRONE HYDROCHLORIDE 30 MG/1
30 TABLET ORAL 2 TIMES DAILY
Qty: 50 TABLET | Refills: 5 | Status: SHIPPED | OUTPATIENT
Start: 2024-02-05 | End: 2024-06-18 | Stop reason: SDUPTHER

## 2024-02-05 RX ORDER — BUPROPION HYDROCHLORIDE 150 MG/1
150 TABLET ORAL DAILY
Qty: 30 TABLET | Refills: 5 | Status: SHIPPED | OUTPATIENT
Start: 2024-02-05 | End: 2024-06-18 | Stop reason: SDUPTHER
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
## 2024-02-07 ENCOUNTER — DOCUMENTATION ONLY (OUTPATIENT)
Dept: PODIATRY | Facility: CLINIC | Age: 39
End: 2024-02-07
Payer: MEDICARE

## 2024-02-07 DIAGNOSIS — Z98.890 POST-OPERATIVE STATE: ICD-10-CM

## 2024-02-07 DIAGNOSIS — M79.672 PAIN OF LEFT FOOT: ICD-10-CM

## 2024-02-07 RX ORDER — OXYCODONE HYDROCHLORIDE 5 MG/1
5 TABLET ORAL EVERY 4 HOURS PRN
Qty: 30 TABLET | Refills: 0 | Status: SHIPPED | OUTPATIENT
Start: 2024-02-07 | End: 2024-02-21 | Stop reason: SDUPTHER

## 2024-02-17 ENCOUNTER — OFFICE VISIT (OUTPATIENT)
Dept: URGENT CARE | Facility: CLINIC | Age: 39
End: 2024-02-17
Payer: MEDICARE

## 2024-02-17 VITALS
TEMPERATURE: 98 F | RESPIRATION RATE: 16 BRPM | SYSTOLIC BLOOD PRESSURE: 115 MMHG | DIASTOLIC BLOOD PRESSURE: 79 MMHG | HEART RATE: 90 BPM | HEIGHT: 67 IN | WEIGHT: 172 LBS | OXYGEN SATURATION: 96 % | BODY MASS INDEX: 27 KG/M2

## 2024-02-17 DIAGNOSIS — B35.9 RINGWORM: Primary | ICD-10-CM

## 2024-02-17 DIAGNOSIS — B35.9 TINEA: ICD-10-CM

## 2024-02-17 PROCEDURE — 99203 OFFICE O/P NEW LOW 30 MIN: CPT | Mod: S$GLB,,, | Performed by: FAMILY MEDICINE

## 2024-02-17 RX ORDER — KETOCONAZOLE 20 MG/G
CREAM TOPICAL 2 TIMES DAILY
Qty: 30 G | Refills: 0 | Status: SHIPPED | OUTPATIENT
Start: 2024-02-17 | End: 2024-02-24

## 2024-02-17 NOTE — PROGRESS NOTES
"Subjective:      Patient ID: Vianney Callahan is a 38 y.o. female.    Vitals:  height is 5' 7" (1.702 m) and weight is 78 kg (172 lb). Her oral temperature is 98.3 °F (36.8 °C). Her blood pressure is 115/79 and her pulse is 90. Her respiration is 16 and oxygen saturation is 96%.     Chief Complaint: Rash    This is a 38 y.o. female who presents today with a chief complaint of red spot on her stomach L ankle that started a week ago. Denies any itchiness       Rash  This is a new problem. The current episode started in the past 7 days. The problem is unchanged. The affected locations include the torso and left ankle. She was exposed to a new detergent/soap. Pertinent negatives include no anorexia, congestion, cough, diarrhea, eye pain, facial edema, fatigue, fever, joint pain, nail changes, rhinorrhea, shortness of breath, sore throat or vomiting. Past treatments include nothing.       Constitution: Negative for fatigue and fever.   HENT:  Negative for congestion and sore throat.    Eyes:  Negative for eye pain.   Respiratory:  Negative for cough and shortness of breath.    Gastrointestinal:  Negative for vomiting and diarrhea.   Skin:  Positive for rash and erythema.      Objective:     Physical Exam   Constitutional: She is oriented to person, place, and time. normal  HENT:   Head: Normocephalic and atraumatic.   Nose: No rhinorrhea or congestion.   Eyes: Conjunctivae are normal. Pupils are equal, round, and reactive to light.   Neck: Neck supple.   Pulmonary/Chest: Effort normal. No stridor. No respiratory distress.   Abdominal: Normal appearance.   Musculoskeletal: Normal range of motion.         General: Normal range of motion.   Neurological: no focal deficit. She is alert, oriented to person, place, and time and at baseline.   Skin: Skin is warm, dry and rash. Capillary refill takes less than 2 seconds. erythema and lesion              Comments: Plaques on right abdomen, left shin   Consisitant with tinea corporis "   Psychiatric: Her behavior is normal. Mood, judgment and thought content normal.   Nursing note and vitals reviewed.      Assessment:     Plan:   1. Ringworm  - ketoconazole (NIZORAL) 2 % cream; Apply topically 2 (two) times daily. for 7 days  Dispense: 30 g; Refill: 0   2. Wash pets in antifungal shampoo

## 2024-02-21 ENCOUNTER — OFFICE VISIT (OUTPATIENT)
Dept: PODIATRY | Facility: CLINIC | Age: 39
End: 2024-02-21
Payer: MEDICARE

## 2024-02-21 ENCOUNTER — HOSPITAL ENCOUNTER (OUTPATIENT)
Dept: RADIOLOGY | Facility: HOSPITAL | Age: 39
Discharge: HOME | End: 2024-02-21
Attending: STUDENT IN AN ORGANIZED HEALTH CARE EDUCATION/TRAINING PROGRAM
Payer: MEDICARE

## 2024-02-21 VITALS
SYSTOLIC BLOOD PRESSURE: 124 MMHG | HEART RATE: 89 BPM | BODY MASS INDEX: 27.3 KG/M2 | RESPIRATION RATE: 18 BRPM | TEMPERATURE: 99 F | HEIGHT: 67 IN | WEIGHT: 173.94 LBS | OXYGEN SATURATION: 96 % | DIASTOLIC BLOOD PRESSURE: 75 MMHG

## 2024-02-21 DIAGNOSIS — M79.672 PAIN OF LEFT FOOT: ICD-10-CM

## 2024-02-21 DIAGNOSIS — Z98.890 POST-OPERATIVE STATE: ICD-10-CM

## 2024-02-21 DIAGNOSIS — B35.1 TINEA UNGUIUM: Primary | ICD-10-CM

## 2024-02-21 DIAGNOSIS — B35.3 TINEA PEDIS OF BOTH FEET: ICD-10-CM

## 2024-02-21 PROCEDURE — 73630 X-RAY EXAM OF FOOT: CPT | Mod: 26,LT,, | Performed by: RADIOLOGY

## 2024-02-21 PROCEDURE — 73630 X-RAY EXAM OF FOOT: CPT | Mod: TC,LT

## 2024-02-21 PROCEDURE — 99999 PR PBB SHADOW E&M-EST. PATIENT-LVL IV: CPT | Mod: PBBFAC,,, | Performed by: STUDENT IN AN ORGANIZED HEALTH CARE EDUCATION/TRAINING PROGRAM

## 2024-02-21 PROCEDURE — 99214 OFFICE O/P EST MOD 30 MIN: CPT | Mod: S$GLB,,, | Performed by: STUDENT IN AN ORGANIZED HEALTH CARE EDUCATION/TRAINING PROGRAM

## 2024-02-21 RX ORDER — TERBINAFINE HYDROCHLORIDE 250 MG/1
250 TABLET ORAL DAILY
Qty: 90 TABLET | Refills: 0 | Status: SHIPPED | OUTPATIENT
Start: 2024-02-21 | End: 2024-05-21

## 2024-02-21 RX ORDER — OXYCODONE HYDROCHLORIDE 5 MG/1
5 TABLET ORAL EVERY 4 HOURS PRN
Qty: 30 TABLET | Refills: 0 | Status: SHIPPED | OUTPATIENT
Start: 2024-02-21 | End: 2024-03-04

## 2024-02-21 NOTE — PROGRESS NOTES
Subjective:     Patient    Vianney Callahan is a 38 y.o. female.    Problem    09/01/23: Had fall from 4 foot ladder at work 2 days ago. Seen in the ED same day and diagnosed with displaced left 5th metatarsal fracture; discharged with oxycodone, acetaminophen, tall surgical boot, crutches. Reports ongoing swelling, pain, discoloration to the area.     09/19/23: Left 5th metatarsal fracture ORIF.     09/27/23: Returns 1 week s/p left 5th metatarsal ORIF. Pain is 6/10. Completed terbinafine without issue, requesting topical antifungals for left foot. Using crutches with partial weightbearing to left heel.     10/02/23: Returns 2 weeks s/p left 5th metatarsal ORIF. New redness and swelling to left foot, is using the topical antifungals. WB in surgical boot.     10/18/23: Returns 1 month s/p left 5th metatarsal ORIF. Skin of left foot slowly improving with topical antifungals. No pain in left foot when in surgical boot.     11/10/23: Returns 7.5 weeks s/p left 5th metatarsal ORIF. No pain in left foot when in boot, pain out of boot is still present but improving. Skin continues to improve although there are residual signs of fungus.     12/04/23: 2.5 months s/p left 5th metatarsal ORIF. Still having some nerve sensations in hands and feet, improved with pregabalin and overall improving with time. No pain at surgical site in boot.     01/04/24: 3.5 months s/p left 5th metatarsal ORIF. Has occasional burning at surgical site, otherwise walking in normal footwear with no regular pain. Skin has cleared significantly but still has some scaling of skin and some discoloration of left 1st toenail.     02/21/24: Returns for chronic nerve pain/symptoms s/p left 5th metatarsal ORIF, also for tinea pedis. 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 She has been walking more lately and has been having more pain in the left lateral forefoot, not exactly at surgical site but close. Doing well with the pregabalin.  Also now has ringworm on her torso and legs.     History    History obtained from patient and review of medical records.     Past Medical History:   Diagnosis Date    Abnormal cervical Papanicolaou smear 2012    Colposcopy    ADHD (attention deficit hyperactivity disorder) 8/16/2012    Allergy     Anxiety     Asthma     childhood    Back pain 5/19/2014    Bipolar affective disorder     Cholecystitis     Chronic migraine without aura, with intractable migraine, so stated, without mention of status migrainosus 9/24/2013    Depression     Fever blister     Gallstones 5/26/2014    Headache(784.0)     Hepatitis C antibody positive in blood     History of hepatitis C     History of psychiatric hospitalization     Suicide attempt    Personality disorder 4/11/2013    Psychosis 10/7/2013    Therapy     Tobacco abuse 9/24/2013       Past Surgical History:   Procedure Laterality Date    ESOPHAGOGASTRODUODENOSCOPY      FRACTURE SURGERY      LIPOMA RESECTION N/A 10/4/2023    Procedure: EXCISION, LIPOMA;  Surgeon: Esvin Dupree DO;  Location: [facility] OR;  Service: Plastics;  Laterality: N/A;  excision of forehead lipoma - 1 hour    MOLE REMOVAL      OPEN REDUCTION AND INTERNAL FIXATION (ORIF) OF FRACTURE OF METATARSAL BONE Left 9/19/2023    Procedure: ORIF, FRACTURE, METATARSAL BONE;  Surgeon: Sameer Wylie DPM;  Location: [facility] OR;  Service: Podiatry;  Laterality: Left;  1.5 hours; foot tray; mini c arm    SKIN BIOPSY          Objective:     Vitals  Wt Readings from Last 1 Encounters:   02/21/24 78.9 kg (173 lb 15.1 oz)     Temp Readings from Last 1 Encounters:   02/21/24 98.9 °F (37.2 °C)     BP Readings from Last 1 Encounters:   02/21/24 124/75     Pulse Readings from Last 1 Encounters:   02/21/24 89       Dermatological Exam    Skin:   Pedal hair growth, skin color, and skin texture normal on left; incision healed; diffuse scaling and peeling from fungal infection returning    Nails:  All nails normal in length,  thickness, color; slight discoloration distal left 1st toenail and distal right 2nd nail    Vascular Exam    Arteries:   Posterior tibial artery palpable on left  Dorsalis pedis artery palpable on left    Veins:   Superficial veins unremarkable on left    Swellin+ nonpitting on left at lateral forefoot    Neurological Exam    [place] touch test:  Light touch sensation intact to left foot     Musculoskeletal Exam    Footwear:  Casual on right  Surgical boot on left    Gait Exam:   Ambulatory Status: Ambulatory  Gait: Normal  Assistive Devices: None    Foot Progression Angle:  Normal on right  Normal on left     Left Lower Extremity Additional Findings:  Mild pain on palpation at distal 4th IM space, none at surgical site or hardware  Left foot and ankle function, strength, and range of motion unremarkable except as noted above.    Imaging and Other Tests    Imagin23 Left foot X ray: displaced 5th metatarsal fracture    10/18/23 left foot X ray: 5th metatarsal fracture s/p ORIF, alignment of joint unremarkable, hardware intact, no concerns    11/10/23 left foot X ray: fracture stable, very minor signs of healing, slow healing    23 left foot X ray: fracture and hardware stable, interval healing    Independently reviewed and interpreted imaging, findings are as follows:     24 left foot X ray: ongoing healing/remodeling, alignment and hardware good       Assessment:     Encounter Diagnoses   Name Primary?    Pain of left foot     Post-operative state     Tinea unguium Yes    Tinea pedis of both feet             Plan:     I counseled the patient on her conditions, their implications and medical management.    S/p left 5th metatarsal ORIF, pain  -Suspect pain is nerve related, not directly related to fracture or surgery site.  -Continue physical activity and footwear as tolerated.  -Pregabalin, oxycodone. Will continue to wean.     Tinea pedis, tinea unguium: chronic  exacerbated/worsening  -Start 3 months PO terbinafine.       Return to clinic in 2 months for skin check, call sooner PRN.

## 2024-02-26 ENCOUNTER — LAB VISIT (OUTPATIENT)
Dept: LAB | Facility: HOSPITAL | Age: 39
End: 2024-02-26
Payer: MEDICARE

## 2024-02-26 ENCOUNTER — OFFICE VISIT (OUTPATIENT)
Dept: INTERNAL MEDICINE | Facility: CLINIC | Age: 39
End: 2024-02-26
Payer: MEDICARE

## 2024-02-26 VITALS
HEIGHT: 67 IN | WEIGHT: 167.25 LBS | HEART RATE: 94 BPM | SYSTOLIC BLOOD PRESSURE: 122 MMHG | OXYGEN SATURATION: 99 % | BODY MASS INDEX: 26.25 KG/M2 | DIASTOLIC BLOOD PRESSURE: 68 MMHG

## 2024-02-26 DIAGNOSIS — Z00.00 ANNUAL PHYSICAL EXAM: ICD-10-CM

## 2024-02-26 DIAGNOSIS — Z13.1 SCREENING FOR DIABETES MELLITUS: ICD-10-CM

## 2024-02-26 DIAGNOSIS — E78.1 HYPERTRIGLYCERIDEMIA: ICD-10-CM

## 2024-02-26 DIAGNOSIS — G43.809 OTHER MIGRAINE WITHOUT STATUS MIGRAINOSUS, NOT INTRACTABLE: ICD-10-CM

## 2024-02-26 DIAGNOSIS — Z01.89 OTHER LABORATORY EXAMINATION: ICD-10-CM

## 2024-02-26 DIAGNOSIS — Z00.00 ANNUAL PHYSICAL EXAM: Primary | ICD-10-CM

## 2024-02-26 LAB
ALBUMIN SERPL BCP-MCNC: 4.4 G/DL (ref 3.5–5.2)
ALP SERPL-CCNC: 94 U/L (ref 55–135)
ALT SERPL W/O P-5'-P-CCNC: 24 U/L (ref 10–44)
ANION GAP SERPL CALC-SCNC: 13 MMOL/L (ref 8–16)
AST SERPL-CCNC: 19 U/L (ref 10–40)
BASOPHILS # BLD AUTO: 0.03 K/UL (ref 0–0.2)
BASOPHILS NFR BLD: 0.4 % (ref 0–1.9)
BILIRUB SERPL-MCNC: 0.5 MG/DL (ref 0.1–1)
BUN SERPL-MCNC: 10 MG/DL (ref 6–20)
CALCIUM SERPL-MCNC: 10 MG/DL (ref 8.7–10.5)
CHLORIDE SERPL-SCNC: 108 MMOL/L (ref 95–110)
CHOLEST SERPL-MCNC: 242 MG/DL (ref 120–199)
CHOLEST/HDLC SERPL: 4.4 {RATIO} (ref 2–5)
CO2 SERPL-SCNC: 21 MMOL/L (ref 23–29)
CREAT SERPL-MCNC: 1.1 MG/DL (ref 0.5–1.4)
DIFFERENTIAL METHOD BLD: ABNORMAL
EOSINOPHIL # BLD AUTO: 0.3 K/UL (ref 0–0.5)
EOSINOPHIL NFR BLD: 3.6 % (ref 0–8)
ERYTHROCYTE [DISTWIDTH] IN BLOOD BY AUTOMATED COUNT: 13.4 % (ref 11.5–14.5)
EST. GFR  (NO RACE VARIABLE): >60 ML/MIN/1.73 M^2
ESTIMATED AVG GLUCOSE: 100 MG/DL (ref 68–131)
GLUCOSE SERPL-MCNC: 95 MG/DL (ref 70–110)
HBA1C MFR BLD: 5.1 % (ref 4–5.6)
HCT VFR BLD AUTO: 49.5 % (ref 37–48.5)
HDLC SERPL-MCNC: 55 MG/DL (ref 40–75)
HDLC SERPL: 22.7 % (ref 20–50)
HGB BLD-MCNC: 16.2 G/DL (ref 12–16)
IMM GRANULOCYTES # BLD AUTO: 0.02 K/UL (ref 0–0.04)
IMM GRANULOCYTES NFR BLD AUTO: 0.3 % (ref 0–0.5)
LDLC SERPL CALC-MCNC: 165 MG/DL (ref 63–159)
LYMPHOCYTES # BLD AUTO: 2.5 K/UL (ref 1–4.8)
LYMPHOCYTES NFR BLD: 32 % (ref 18–48)
MCH RBC QN AUTO: 30.5 PG (ref 27–31)
MCHC RBC AUTO-ENTMCNC: 32.7 G/DL (ref 32–36)
MCV RBC AUTO: 93 FL (ref 82–98)
MONOCYTES # BLD AUTO: 0.7 K/UL (ref 0.3–1)
MONOCYTES NFR BLD: 8.4 % (ref 4–15)
NEUTROPHILS # BLD AUTO: 4.3 K/UL (ref 1.8–7.7)
NEUTROPHILS NFR BLD: 55.3 % (ref 38–73)
NONHDLC SERPL-MCNC: 187 MG/DL
NRBC BLD-RTO: 0 /100 WBC
PLATELET # BLD AUTO: 438 K/UL (ref 150–450)
PMV BLD AUTO: 10.6 FL (ref 9.2–12.9)
POTASSIUM SERPL-SCNC: 4.8 MMOL/L (ref 3.5–5.1)
PROT SERPL-MCNC: 7.3 G/DL (ref 6–8.4)
RBC # BLD AUTO: 5.31 M/UL (ref 4–5.4)
SODIUM SERPL-SCNC: 142 MMOL/L (ref 136–145)
TRIGL SERPL-MCNC: 110 MG/DL (ref 30–150)
TSH SERPL DL<=0.005 MIU/L-ACNC: 1.5 UIU/ML (ref 0.4–4)
WBC # BLD AUTO: 7.75 K/UL (ref 3.9–12.7)

## 2024-02-26 PROCEDURE — 99214 OFFICE O/P EST MOD 30 MIN: CPT | Mod: S$GLB,,, | Performed by: PHYSICIAN ASSISTANT

## 2024-02-26 PROCEDURE — 83036 HEMOGLOBIN GLYCOSYLATED A1C: CPT | Performed by: PHYSICIAN ASSISTANT

## 2024-02-26 PROCEDURE — 80053 COMPREHEN METABOLIC PANEL: CPT | Performed by: PHYSICIAN ASSISTANT

## 2024-02-26 PROCEDURE — 85025 COMPLETE CBC W/AUTO DIFF WBC: CPT | Performed by: PHYSICIAN ASSISTANT

## 2024-02-26 PROCEDURE — 36415 COLL VENOUS BLD VENIPUNCTURE: CPT | Performed by: PHYSICIAN ASSISTANT

## 2024-02-26 PROCEDURE — 99999 PR PBB SHADOW E&M-EST. PATIENT-LVL IV: CPT | Mod: PBBFAC,,, | Performed by: PHYSICIAN ASSISTANT

## 2024-02-26 PROCEDURE — 84443 ASSAY THYROID STIM HORMONE: CPT | Performed by: PHYSICIAN ASSISTANT

## 2024-02-26 PROCEDURE — 80061 LIPID PANEL: CPT | Performed by: PHYSICIAN ASSISTANT

## 2024-02-26 NOTE — PATIENT INSTRUCTIONS
Soham Faith,     If you are due for any health screening(s) below please notify me so we can arrange them to be ordered and scheduled. Most healthy patients at your age complete them, but you are free to accept or refuse.     If you can't do it, I'll definitely understand. If you can, I'd certainly appreciate it!    All of your core healthy metrics are met.

## 2024-02-26 NOTE — PROGRESS NOTES
Subjective:       Patient ID: Vianney Callahan is a 38 y.o. female.        Chief Complaint: Annual Exam    Vianney Callahan is an established patient of Nellie Coreas MD here today for annual exam.    Overall doing well aside from recent fracture which has been causing pain.    S/p 5th metatarsal fracture with ORIF 9/2023, left foot -   Lyrica  Oxycodone 2 times/week prn  Dr. Wylie managing  Occurred while at work at [employer], on ladder and brought a box down with hangers, box was heavy, she lost balance and fell    ADHD, anxiety, personality disorder, schizoaffective disorder -   Followed by Philippe Luz NP in psychiatry            Review of Systems   Constitutional:  Negative for chills, diaphoresis, fatigue and fever.   HENT:  Negative for congestion and sore throat.    Eyes:  Negative for visual disturbance.   Respiratory:  Negative for cough, chest tightness and shortness of breath.    Cardiovascular:  Negative for chest pain, palpitations and leg swelling.   Gastrointestinal:  Negative for abdominal pain, blood in stool, constipation, diarrhea, nausea and vomiting.   Genitourinary:  Negative for dysuria, frequency, hematuria and urgency.   Musculoskeletal:  Negative for arthralgias and back pain.   Skin:  Negative for rash.   Neurological:  Negative for dizziness, syncope, weakness and headaches.   Psychiatric/Behavioral:  Negative for dysphoric mood and sleep disturbance. The patient is not nervous/anxious.        Objective:      Physical Exam  Vitals and nursing note reviewed.   Constitutional:       Appearance: Normal appearance. She is well-developed.   HENT:      Head: Normocephalic.      Right Ear: Tympanic membrane and external ear normal.      Left Ear: Tympanic membrane and external ear normal.      Nose: No mucosal edema or rhinorrhea.      Mouth/Throat:      Pharynx: Oropharynx is clear.   Eyes:      Pupils: Pupils are equal, round, and reactive to light.   Cardiovascular:      Rate and Rhythm: Normal rate  "and regular rhythm.      Heart sounds: Normal heart sounds. No murmur heard.     No friction rub. No gallop.   Pulmonary:      Effort: Pulmonary effort is normal. No respiratory distress.      Breath sounds: Normal breath sounds.   Abdominal:      Palpations: Abdomen is soft.      Tenderness: There is no abdominal tenderness.   Skin:     General: Skin is warm and dry.   Neurological:      Mental Status: She is alert.         Assessment:       1. Annual physical exam    2. Other migraine without status migrainosus, not intractable    3. Hypertriglyceridemia    4. Screening for diabetes mellitus    5. Other laboratory examination        Plan:       Vianney was seen today for annual exam.    Diagnoses and all orders for this visit:    Annual physical exam  -     Ambulatory referral/consult to Gynecology; Future  -     CBC Auto Differential; Future  -     Comprehensive Metabolic Panel; Future  -     Hemoglobin A1C; Future  -     Lipid Panel; Future  -     TSH; Future    Other migraine without status migrainosus, not intractable  -     CBC Auto Differential; Future  -     Comprehensive Metabolic Panel; Future  -     TSH; Future    Hypertriglyceridemia  -     Lipid Panel; Future    Screening for diabetes mellitus  -     Hemoglobin A1C; Future    Other laboratory examination    Schedule fasting lab work  Due soon for GYN exam    Pt has been given instructions populated from patient instructions database and has verbalized understanding of the after visit summary and information contained wherein.    Follow up with a primary care provider. May go to ER for acute shortness of breath, lightheadedness, fever, or any other emergent complaints or changes in condition.    "This note will be shared with the patient"    Future Appointments   Date Time Provider Department Center   4/4/2024  2:00 PM Philippe Luz NP [facility]   4/22/2024  3:30 PM Sameer Wylie, [facility] [place]                 "

## 2024-02-27 DIAGNOSIS — Z00.00 ENCOUNTER FOR MEDICARE ANNUAL WELLNESS EXAM: ICD-10-CM

## 2024-03-03 ENCOUNTER — PATIENT MESSAGE (OUTPATIENT)
Dept: PODIATRY | Facility: CLINIC | Age: 39
End: 2024-03-03
Payer: MEDICARE

## 2024-03-03 DIAGNOSIS — Z98.890 POST-OPERATIVE STATE: Primary | ICD-10-CM

## 2024-03-03 DIAGNOSIS — M79.672 PAIN OF LEFT FOOT: ICD-10-CM

## 2024-03-04 RX ORDER — IBUPROFEN 800 MG/1
800 TABLET ORAL 3 TIMES DAILY
Qty: 90 TABLET | Refills: 2 | Status: SHIPPED | OUTPATIENT
Start: 2024-03-04

## 2024-03-04 RX ORDER — TRAMADOL HYDROCHLORIDE 50 MG/1
50 TABLET ORAL EVERY 6 HOURS PRN
Qty: 28 EACH | Refills: 0 | Status: SHIPPED | OUTPATIENT
Start: 2024-03-04 | End: 2024-03-15 | Stop reason: SDUPTHER

## 2024-03-15 DIAGNOSIS — Z98.890 POST-OPERATIVE STATE: ICD-10-CM

## 2024-03-15 DIAGNOSIS — M79.672 PAIN OF LEFT FOOT: ICD-10-CM

## 2024-03-15 RX ORDER — TRAMADOL HYDROCHLORIDE 50 MG/1
50 TABLET ORAL EVERY 6 HOURS PRN
Qty: 28 EACH | Refills: 0 | Status: SHIPPED | OUTPATIENT
Start: 2024-03-15 | End: 2024-03-20 | Stop reason: SDUPTHER

## 2024-03-20 ENCOUNTER — OFFICE VISIT (OUTPATIENT)
Dept: PODIATRY | Facility: CLINIC | Age: 39
End: 2024-03-20
Payer: MEDICARE

## 2024-03-20 VITALS
HEIGHT: 67 IN | WEIGHT: 167.13 LBS | DIASTOLIC BLOOD PRESSURE: 76 MMHG | TEMPERATURE: 98 F | RESPIRATION RATE: 18 BRPM | BODY MASS INDEX: 26.23 KG/M2 | SYSTOLIC BLOOD PRESSURE: 115 MMHG | HEART RATE: 102 BPM | OXYGEN SATURATION: 98 %

## 2024-03-20 DIAGNOSIS — M79.672 PAIN OF LEFT FOOT: ICD-10-CM

## 2024-03-20 DIAGNOSIS — Z98.890 POST-OPERATIVE STATE: ICD-10-CM

## 2024-03-20 DIAGNOSIS — M79.2 NEUROGENIC PAIN OF LEFT FOOT: Primary | ICD-10-CM

## 2024-03-20 PROCEDURE — 99213 OFFICE O/P EST LOW 20 MIN: CPT | Mod: S$GLB,,, | Performed by: STUDENT IN AN ORGANIZED HEALTH CARE EDUCATION/TRAINING PROGRAM

## 2024-03-20 PROCEDURE — 99999 PR PBB SHADOW E&M-EST. PATIENT-LVL III: CPT | Mod: PBBFAC,,, | Performed by: STUDENT IN AN ORGANIZED HEALTH CARE EDUCATION/TRAINING PROGRAM

## 2024-03-20 RX ORDER — DEXAMETHASONE 4 MG/1
4 TABLET ORAL DAILY
Qty: 14 TABLET | Refills: 0 | Status: SHIPPED | OUTPATIENT
Start: 2024-03-20

## 2024-03-20 RX ORDER — TRAMADOL HYDROCHLORIDE 50 MG/1
50 TABLET ORAL EVERY 6 HOURS PRN
Qty: 28 EACH | Refills: 0 | Status: SHIPPED | OUTPATIENT
Start: 2024-03-20 | End: 2024-04-08 | Stop reason: SDUPTHER

## 2024-03-20 NOTE — PROGRESS NOTES
Subjective:     Patient    Vianney Callahan is a 38 y.o. female.    Problem    09/01/23: Had fall from 4 foot ladder at work 2 days ago. Seen in the ED same day and diagnosed with displaced left 5th metatarsal fracture; discharged with oxycodone, acetaminophen, tall surgical boot, crutches. Reports ongoing swelling, pain, discoloration to the area.     09/19/23: Left 5th metatarsal fracture ORIF.     09/27/23: Returns 1 week s/p left 5th metatarsal ORIF. Pain is 6/10. Completed terbinafine without issue, requesting topical antifungals for left foot. Using crutches with partial weightbearing to left heel.     10/02/23: Returns 2 weeks s/p left 5th metatarsal ORIF. New redness and swelling to left foot, is using the topical antifungals. WB in surgical boot.     10/18/23: Returns 1 month s/p left 5th metatarsal ORIF. Skin of left foot slowly improving with topical antifungals. No pain in left foot when in surgical boot.     11/10/23: Returns 7.5 weeks s/p left 5th metatarsal ORIF. No pain in left foot when in boot, pain out of boot is still present but improving. Skin continues to improve although there are residual signs of fungus.     12/04/23: 2.5 months s/p left 5th metatarsal ORIF. Still having some nerve sensations in hands and feet, improved with pregabalin and overall improving with time. No pain at surgical site in boot.     01/04/24: 3.5 months s/p left 5th metatarsal ORIF. Has occasional burning at surgical site, otherwise walking in normal footwear with no regular pain. Skin has cleared significantly but still has some scaling of skin and some discoloration of left 1st toenail.     02/21/24: Returns for chronic nerve pain/symptoms s/p left 5th metatarsal ORIF, also for tinea pedis. 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 She has been walking more lately and has been having more pain in the left lateral forefoot, not exactly at surgical site but close. Doing well with the pregabalin.  Also now has ringworm on her torso and legs.     03/20/24: Started on 3 months terbinafine at last visit, skin has improved. Returns primarily for left foot pain and burning sensations which flared up after recent return to full time work. She had weaned off pregabalin and was going to try to wean off other pain meds, the pregabalin eliminated her pain but caused mental fog.     History    History obtained from patient and review of medical records.     Past Medical History:   Diagnosis Date    Abnormal cervical Papanicolaou smear 2012    Colposcopy    ADHD (attention deficit hyperactivity disorder) 8/16/2012    Allergy     Anxiety     Asthma     childhood    Back pain 5/19/2014    Bipolar affective disorder     Cholecystitis     Chronic migraine without aura, with intractable migraine, so stated, without mention of status migrainosus 9/24/2013    Depression     Fever blister     Gallstones 5/26/2014    Headache(784.0)     Hepatitis C antibody positive in blood     History of hepatitis C     History of psychiatric hospitalization     Suicide attempt    Personality disorder 4/11/2013    Psychosis 10/7/2013    Therapy     Tobacco abuse 9/24/2013       Past Surgical History:   Procedure Laterality Date    ESOPHAGOGASTRODUODENOSCOPY      FRACTURE SURGERY      LIPOMA RESECTION N/A 10/4/2023    Procedure: EXCISION, LIPOMA;  Surgeon: Esvin Dupree DO;  Location: [facility] OR;  Service: Plastics;  Laterality: N/A;  excision of forehead lipoma - 1 hour    MOLE REMOVAL      OPEN REDUCTION AND INTERNAL FIXATION (ORIF) OF FRACTURE OF METATARSAL BONE Left 9/19/2023    Procedure: ORIF, FRACTURE, METATARSAL BONE;  Surgeon: Sameer Wylie DPM;  Location: [facility] OR;  Service: Podiatry;  Laterality: Left;  1.5 hours; foot tray; mini c arm    SKIN BIOPSY          Objective:     Vitals  Wt Readings from Last 1 Encounters:   03/20/24 75.8 kg (167 lb 1.7 oz)     Temp Readings from Last 1 Encounters:   03/20/24 98.4 °F (36.9 °C)      BP Readings from Last 1 Encounters:   24 115/76     Pulse Readings from Last 1 Encounters:   24 102       Dermatological Exam    Skin:   Pedal hair growth, skin color, and skin texture normal on left; incision healed; diffuse scaling and peeling from fungal infection improving    Nails:  All nails normal in length, thickness, color; slight discoloration distal left 1st toenail and distal right 2nd nail    Vascular Exam    Arteries:   Posterior tibial artery palpable on left  Dorsalis pedis artery palpable on left    Veins:   Superficial veins unremarkable on left    Swellin+ nonpitting on left at lateral forefoot    Neurological Exam    [place] touch test:  Light touch sensation intact to left foot     Musculoskeletal Exam    Footwear:  Casual on right  Surgical boot on left    Gait Exam:   Ambulatory Status: Ambulatory  Gait: Normal  Assistive Devices: None    Foot Progression Angle:  Normal on right  Normal on left     Left Lower Extremity Additional Findings:  Mild pain on palpation lateral foot from base of 5th metatarsal to neck of 5th metatarsal  Left foot and ankle function, strength, and range of motion unremarkable except as noted above.    Imaging and Other Tests    Imagin23 Left foot X ray: displaced 5th metatarsal fracture    10/18/23 left foot X ray: 5th metatarsal fracture s/p ORIF, alignment of joint unremarkable, hardware intact, no concerns    11/10/23 left foot X ray: fracture stable, very minor signs of healing, slow healing    23 left foot X ray: fracture and hardware stable, interval healing    24 left foot X ray: ongoing healing/remodeling, alignment and hardware good    Independently reviewed and interpreted imaging, findings are as follows: N/A       Assessment:     Encounter Diagnoses   Name Primary?    Neurogenic pain of left foot Yes    Post-operative state     Pain of left foot               Plan:     I counseled the patient on her conditions,  their implications and medical management.    S/p left 5th metatarsal ORIF, pain  -Suspect pain is nerve related, not directly related to fracture or surgery site.  -Continue physical activity and footwear as tolerated.  -Lidocaine, dexamethasone, tramadol. Will continue to wean.   -May need to restart pregabalin, will consider.     Tinea pedis, tinea unguium: chronic exacerbated/worsening  -Complete 3 months PO terbinafine.       Return to clinic in 1 month for nerve pain, call sooner PRN.

## 2024-03-25 ENCOUNTER — TELEPHONE (OUTPATIENT)
Dept: PODIATRY | Facility: CLINIC | Age: 39
End: 2024-03-25
Payer: MEDICARE

## 2024-03-25 ENCOUNTER — PATIENT MESSAGE (OUTPATIENT)
Dept: PODIATRY | Facility: CLINIC | Age: 39
End: 2024-03-25
Payer: MEDICARE

## 2024-03-25 DIAGNOSIS — K21.9 GASTROESOPHAGEAL REFLUX DISEASE, UNSPECIFIED WHETHER ESOPHAGITIS PRESENT: ICD-10-CM

## 2024-03-25 RX ORDER — OMEPRAZOLE 40 MG/1
CAPSULE, DELAYED RELEASE ORAL
Qty: 90 CAPSULE | Refills: 2 | Status: SHIPPED | OUTPATIENT
Start: 2024-03-25

## 2024-04-04 ENCOUNTER — OFFICE VISIT (OUTPATIENT)
Dept: PSYCHIATRY | Facility: CLINIC | Age: 39
End: 2024-04-04
Payer: COMMERCIAL

## 2024-04-04 VITALS
BODY MASS INDEX: 26.35 KG/M2 | SYSTOLIC BLOOD PRESSURE: 115 MMHG | DIASTOLIC BLOOD PRESSURE: 64 MMHG | WEIGHT: 168.19 LBS | HEART RATE: 100 BPM

## 2024-04-04 DIAGNOSIS — F25.0 SCHIZOAFFECTIVE DISORDER, BIPOLAR TYPE: Primary | ICD-10-CM

## 2024-04-04 DIAGNOSIS — F60.9 PERSONALITY DISORDER: ICD-10-CM

## 2024-04-04 DIAGNOSIS — F41.9 ANXIETY: ICD-10-CM

## 2024-04-04 PROCEDURE — 99999 PR PBB SHADOW E&M-EST. PATIENT-LVL II: CPT | Mod: PBBFAC,,, | Performed by: NURSE PRACTITIONER

## 2024-04-04 PROCEDURE — 3078F DIAST BP <80 MM HG: CPT | Mod: CPTII,S$GLB,, | Performed by: NURSE PRACTITIONER

## 2024-04-04 PROCEDURE — 99214 OFFICE O/P EST MOD 30 MIN: CPT | Mod: S$GLB,,, | Performed by: NURSE PRACTITIONER

## 2024-04-04 PROCEDURE — 3074F SYST BP LT 130 MM HG: CPT | Mod: CPTII,S$GLB,, | Performed by: NURSE PRACTITIONER

## 2024-04-04 PROCEDURE — 3008F BODY MASS INDEX DOCD: CPT | Mod: CPTII,S$GLB,, | Performed by: NURSE PRACTITIONER

## 2024-04-04 PROCEDURE — 3044F HG A1C LEVEL LT 7.0%: CPT | Mod: CPTII,S$GLB,, | Performed by: NURSE PRACTITIONER

## 2024-04-04 RX ORDER — FLUPHENAZINE HYDROCHLORIDE 10 MG/1
TABLET ORAL
Qty: 90 TABLET | Refills: 5 | Status: SHIPPED | OUTPATIENT
Start: 2024-04-04

## 2024-04-04 RX ORDER — LAMOTRIGINE 150 MG/1
150 TABLET ORAL NIGHTLY
Qty: 30 TABLET | Refills: 5 | Status: SHIPPED | OUTPATIENT
Start: 2024-04-04

## 2024-04-04 RX ORDER — BENZTROPINE MESYLATE 1 MG/1
1 TABLET ORAL 2 TIMES DAILY
Qty: 60 TABLET | Refills: 5 | Status: SHIPPED | OUTPATIENT
Start: 2024-04-04 | End: 2024-06-18 | Stop reason: SDUPTHER
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
## 2024-04-08 DIAGNOSIS — Z98.890 POST-OPERATIVE STATE: ICD-10-CM

## 2024-04-08 DIAGNOSIS — M79.672 PAIN OF LEFT FOOT: ICD-10-CM

## 2024-04-08 DIAGNOSIS — M79.2 NEUROGENIC PAIN OF LEFT FOOT: ICD-10-CM

## 2024-04-08 NOTE — TELEPHONE ENCOUNTER
Care Due:                  Date            Visit Type   Department     Provider  --------------------------------------------------------------------------------                                MYCHART                              ANNUAL                              CHECKUP/PHY  [facility] INTERNAL  Last Visit: 03-      S            LEVON ROBLES  Next Visit: None Scheduled  None         None Found                                                            Last  Test          Frequency    Reason                     Performed    Due Date  --------------------------------------------------------------------------------    Office Visit  15 months..  valACYclovir.............  03- 06-    [facility] Embedded Care Due Messages. Reference number: [id number].   4/08/2024 4:53:42 PM CDT

## 2024-04-09 RX ORDER — SUMATRIPTAN SUCCINATE 100 MG/1
TABLET ORAL
Qty: 27 TABLET | Refills: 0 | Status: SHIPPED | OUTPATIENT
Start: 2024-04-09

## 2024-04-09 RX ORDER — TRAMADOL HYDROCHLORIDE 50 MG/1
50 TABLET ORAL EVERY 6 HOURS PRN
Qty: 28 EACH | Refills: 0 | Status: SHIPPED | OUTPATIENT
Start: 2024-04-09 | End: 2024-05-02 | Stop reason: SDUPTHER

## 2024-04-09 NOTE — TELEPHONE ENCOUNTER
Provider Staff:  Action required for this patient    Requires appointment      Please see care gap opportunities below in Care Due Message.    Thanks!  [facility]     Appointments      Date Provider   Last Visit   3/28/2023 Nellie Coreas MD   Next Visit   Visit date not found Nellie Coreas MD     Refill Decision Note   Vianney Callahan  is requesting a refill authorization.  Brief Assessment and Rationale for Refill:  Approve     Medication Therapy Plan: Acute care/admission documentation reviewed. No change in therapy. Ok to approve per protocol      Extended chart review required: Yes   Comments:     Note composed:3:57 PM 04/09/2024

## 2024-04-30 DIAGNOSIS — A60.00 GENITAL HERPES SIMPLEX, UNSPECIFIED SITE: ICD-10-CM

## 2024-05-01 RX ORDER — VALACYCLOVIR HYDROCHLORIDE 1 G/1
1000 TABLET, FILM COATED ORAL DAILY
Qty: 90 TABLET | Refills: 0 | Status: SHIPPED | OUTPATIENT
Start: 2024-05-01

## 2024-05-01 NOTE — TELEPHONE ENCOUNTER
No care due was identified.  [facility] Embedded Care Due Messages. Reference number: [id number].   5/01/2024 1:32:50 PM CDT

## 2024-05-02 DIAGNOSIS — Z98.890 POST-OPERATIVE STATE: ICD-10-CM

## 2024-05-02 DIAGNOSIS — M79.672 PAIN OF LEFT FOOT: ICD-10-CM

## 2024-05-02 DIAGNOSIS — M79.2 NEUROGENIC PAIN OF LEFT FOOT: ICD-10-CM

## 2024-05-02 NOTE — TELEPHONE ENCOUNTER
Refill Decision Note   Vianney Callahan  is requesting a refill authorization.  Brief Assessment and Rationale for Refill:  Approve     Medication Therapy Plan:  Patient was seen in the ED on 8/30/23 for a Fracture.  No changes were made to the MED.  Patient was advised to follow-up with Podiatry.  Approved per protocol.      Comments:     Note composed:7:57 PM 05/01/2024

## 2024-05-03 RX ORDER — TRAMADOL HYDROCHLORIDE 50 MG/1
50 TABLET ORAL EVERY 6 HOURS PRN
Qty: 28 EACH | Refills: 0 | Status: SHIPPED | OUTPATIENT
Start: 2024-05-03 | End: 2024-05-20 | Stop reason: SDUPTHER

## 2024-05-03 NOTE — TELEPHONE ENCOUNTER
Please see the attached refill request.      Pt last saw you on 03/20/2024   Last Refill 04/09/2024

## 2024-05-20 DIAGNOSIS — M79.2 NEUROGENIC PAIN OF LEFT FOOT: ICD-10-CM

## 2024-05-20 DIAGNOSIS — Z98.890 POST-OPERATIVE STATE: ICD-10-CM

## 2024-05-20 DIAGNOSIS — M79.672 PAIN OF LEFT FOOT: ICD-10-CM

## 2024-05-20 RX ORDER — TRAMADOL HYDROCHLORIDE 50 MG/1
50 TABLET ORAL EVERY 6 HOURS PRN
Qty: 28 EACH | Refills: 0 | Status: SHIPPED | OUTPATIENT
Start: 2024-05-20 | End: 2024-05-28

## 2024-05-27 ENCOUNTER — PATIENT MESSAGE (OUTPATIENT)
Dept: PODIATRY | Facility: CLINIC | Age: 39
End: 2024-05-27
Payer: MEDICARE

## 2024-05-27 DIAGNOSIS — M79.2 NEUROGENIC PAIN OF LEFT FOOT: Primary | ICD-10-CM

## 2024-05-28 RX ORDER — HYDROCODONE BITARTRATE AND ACETAMINOPHEN 5; 325 MG/1; MG/1
1 TABLET ORAL EVERY 6 HOURS PRN
Qty: 28 TABLET | Refills: 0 | Status: SHIPPED | OUTPATIENT
Start: 2024-05-28 | End: 2024-06-12 | Stop reason: SDUPTHER

## 2024-06-10 ENCOUNTER — PATIENT MESSAGE (OUTPATIENT)
Dept: INTERNAL MEDICINE | Facility: CLINIC | Age: 39
End: 2024-06-10
Payer: MEDICARE

## 2024-06-12 DIAGNOSIS — M79.2 NEUROGENIC PAIN OF LEFT FOOT: ICD-10-CM

## 2024-06-12 DIAGNOSIS — K59.04 CHRONIC IDIOPATHIC CONSTIPATION: ICD-10-CM

## 2024-06-12 RX ORDER — SENNOSIDES 8.6 MG
3 TABLET ORAL
Qty: 90 TABLET | Refills: 11 | Status: SHIPPED | OUTPATIENT
Start: 2024-06-12

## 2024-06-12 RX ORDER — HYDROCODONE BITARTRATE AND ACETAMINOPHEN 5; 325 MG/1; MG/1
1 TABLET ORAL EVERY 6 HOURS PRN
Qty: 28 TABLET | Refills: 0 | Status: SHIPPED | OUTPATIENT
Start: 2024-06-12

## 2024-06-18 ENCOUNTER — OFFICE VISIT (OUTPATIENT)
Dept: PSYCHIATRY | Facility: CLINIC | Age: 39
End: 2024-06-18
Payer: MEDICARE

## 2024-06-18 VITALS
SYSTOLIC BLOOD PRESSURE: 123 MMHG | WEIGHT: 170.63 LBS | HEART RATE: 115 BPM | BODY MASS INDEX: 26.73 KG/M2 | DIASTOLIC BLOOD PRESSURE: 63 MMHG

## 2024-06-18 DIAGNOSIS — Z86.59 HISTORY OF ADHD: ICD-10-CM

## 2024-06-18 DIAGNOSIS — F25.0 SCHIZOAFFECTIVE DISORDER, BIPOLAR TYPE: Primary | ICD-10-CM

## 2024-06-18 DIAGNOSIS — F60.9 PERSONALITY DISORDER: ICD-10-CM

## 2024-06-18 DIAGNOSIS — F41.9 ANXIETY: ICD-10-CM

## 2024-06-18 PROCEDURE — 99214 OFFICE O/P EST MOD 30 MIN: CPT | Mod: S$GLB,,, | Performed by: NURSE PRACTITIONER

## 2024-06-18 PROCEDURE — 3044F HG A1C LEVEL LT 7.0%: CPT | Mod: CPTII,S$GLB,, | Performed by: NURSE PRACTITIONER

## 2024-06-18 PROCEDURE — 3078F DIAST BP <80 MM HG: CPT | Mod: CPTII,S$GLB,, | Performed by: NURSE PRACTITIONER

## 2024-06-18 PROCEDURE — 3074F SYST BP LT 130 MM HG: CPT | Mod: CPTII,S$GLB,, | Performed by: NURSE PRACTITIONER

## 2024-06-18 PROCEDURE — 3008F BODY MASS INDEX DOCD: CPT | Mod: CPTII,S$GLB,, | Performed by: NURSE PRACTITIONER

## 2024-06-18 PROCEDURE — 99999 PR PBB SHADOW E&M-EST. PATIENT-LVL II: CPT | Mod: PBBFAC,,, | Performed by: NURSE PRACTITIONER

## 2024-06-18 RX ORDER — BUPROPION HYDROCHLORIDE 300 MG/1
300 TABLET ORAL DAILY
Qty: 30 TABLET | Refills: 5 | Status: SHIPPED | OUTPATIENT
Start: 2024-06-18

## 2024-06-18 RX ORDER — BUSPIRONE HYDROCHLORIDE 30 MG/1
30 TABLET ORAL 2 TIMES DAILY
Qty: 50 TABLET | Refills: 5 | Status: SHIPPED | OUTPATIENT
Start: 2024-06-18

## 2024-06-18 RX ORDER — BENZTROPINE MESYLATE 1 MG/1
TABLET ORAL
Qty: 90 TABLET | Refills: 5 | Status: SHIPPED | OUTPATIENT
Start: 2024-06-18

## 2024-06-18 RX ORDER — SERTRALINE HYDROCHLORIDE 100 MG/1
200 TABLET, FILM COATED ORAL DAILY
Qty: 60 TABLET | Refills: 5 | Status: SHIPPED | OUTPATIENT
Start: 2024-06-18

## 2024-06-18 RX ORDER — BUPROPION HYDROCHLORIDE 150 MG/1
150 TABLET ORAL DAILY
Qty: 30 TABLET | Refills: 5 | Status: SHIPPED | OUTPATIENT
Start: 2024-06-18
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
## 2024-06-22 DIAGNOSIS — B35.3 TINEA PEDIS OF LEFT FOOT: ICD-10-CM

## 2024-06-23 DIAGNOSIS — M79.672 PAIN OF LEFT FOOT: ICD-10-CM

## 2024-06-23 DIAGNOSIS — Z98.890 POST-OPERATIVE STATE: ICD-10-CM

## 2024-06-24 ENCOUNTER — PATIENT MESSAGE (OUTPATIENT)
Dept: PSYCHIATRY | Facility: CLINIC | Age: 39
End: 2024-06-24
Payer: MEDICARE

## 2024-06-24 RX ORDER — CICLOPIROX OLAMINE 7.7 MG/G
CREAM TOPICAL
Qty: 30 G | Refills: 3 | Status: SHIPPED | OUTPATIENT
Start: 2024-06-24

## 2024-06-24 RX ORDER — IBUPROFEN 800 MG/1
800 TABLET ORAL 3 TIMES DAILY
Qty: 90 TABLET | Refills: 2 | Status: SHIPPED | OUTPATIENT
Start: 2024-06-24

## 2024-07-17 DIAGNOSIS — M79.2 NEUROGENIC PAIN OF LEFT FOOT: ICD-10-CM

## 2024-07-18 RX ORDER — HYDROCODONE BITARTRATE AND ACETAMINOPHEN 5; 325 MG/1; MG/1
1 TABLET ORAL EVERY 6 HOURS PRN
Qty: 28 TABLET | Refills: 0 | Status: SHIPPED | OUTPATIENT
Start: 2024-07-18

## 2024-07-18 NOTE — TELEPHONE ENCOUNTER
Please see the attached refill request.    Pt. Last saw you on 03/20/2024  Last refill 06/012/2024    Please advise

## 2024-07-18 NOTE — TELEPHONE ENCOUNTER
Please see the attached refill request.    Please see the attached refill request.     Pt. Last saw you on 03/20/2024  Last refill 06/012/2024     Please advise

## 2024-07-31 DIAGNOSIS — A60.00 GENITAL HERPES SIMPLEX, UNSPECIFIED SITE: ICD-10-CM

## 2024-07-31 RX ORDER — VALACYCLOVIR HYDROCHLORIDE 1 G/1
1000 TABLET, FILM COATED ORAL DAILY
Qty: 90 TABLET | Refills: 0 | Status: SHIPPED | OUTPATIENT
Start: 2024-07-31

## 2024-07-31 NOTE — TELEPHONE ENCOUNTER
Care Due:                  Date            Visit Type   Department     Provider  --------------------------------------------------------------------------------                                MYCHART                              ANNUAL                              CHECKUP/PHY  [facility] INTERNAL  Last Visit: 03-      S            LEVON ROBLES  Next Visit: None Scheduled  None         None Found                                                            Last  Test          Frequency    Reason                     Performed    Due Date  --------------------------------------------------------------------------------    Office Visit  15 months..  valACYclovir.............  03- 06-    [facility] Embedded Care Due Messages. Reference number: [id number].   7/31/2024 8:16:52 AM CDT

## 2024-08-05 DIAGNOSIS — M79.2 NEUROGENIC PAIN OF LEFT FOOT: ICD-10-CM

## 2024-08-05 RX ORDER — HYDROCODONE BITARTRATE AND ACETAMINOPHEN 5; 325 MG/1; MG/1
1 TABLET ORAL EVERY 6 HOURS PRN
Qty: 28 TABLET | Refills: 0 | Status: SHIPPED | OUTPATIENT
Start: 2024-08-05

## 2024-08-15 ENCOUNTER — OFFICE VISIT (OUTPATIENT)
Dept: URGENT CARE | Facility: CLINIC | Age: 39
End: 2024-08-15
Payer: MEDICARE

## 2024-08-15 VITALS
SYSTOLIC BLOOD PRESSURE: 112 MMHG | HEIGHT: 67 IN | TEMPERATURE: 98 F | BODY MASS INDEX: 26.68 KG/M2 | HEART RATE: 97 BPM | WEIGHT: 170 LBS | OXYGEN SATURATION: 97 % | RESPIRATION RATE: 20 BRPM | DIASTOLIC BLOOD PRESSURE: 72 MMHG

## 2024-08-15 DIAGNOSIS — N89.8 VAGINAL DISCHARGE: Primary | ICD-10-CM

## 2024-08-15 DIAGNOSIS — N30.01 ACUTE CYSTITIS WITH HEMATURIA: ICD-10-CM

## 2024-08-15 LAB
B-HCG UR QL: NEGATIVE
BILIRUBIN, UA POC OHS: NEGATIVE
BLOOD, UA POC OHS: ABNORMAL
CLARITY, UA POC OHS: ABNORMAL
COLOR, UA POC OHS: YELLOW
CTP QC/QA: YES
GLUCOSE, UA POC OHS: NEGATIVE
KETONES, UA POC OHS: NEGATIVE
LEUKOCYTES, UA POC OHS: ABNORMAL
NITRITE, UA POC OHS: NEGATIVE
PH, UA POC OHS: 6.5
PROTEIN, UA POC OHS: NEGATIVE
SPECIFIC GRAVITY, UA POC OHS: 1.02
UROBILINOGEN, UA POC OHS: 0.2

## 2024-08-15 PROCEDURE — 81003 URINALYSIS AUTO W/O SCOPE: CPT | Mod: QW,S$GLB,, | Performed by: PHYSICIAN ASSISTANT

## 2024-08-15 PROCEDURE — 99213 OFFICE O/P EST LOW 20 MIN: CPT | Mod: S$GLB,,, | Performed by: PHYSICIAN ASSISTANT

## 2024-08-15 PROCEDURE — 81025 URINE PREGNANCY TEST: CPT | Mod: S$GLB,,, | Performed by: PHYSICIAN ASSISTANT

## 2024-08-15 RX ORDER — FLUCONAZOLE 150 MG/1
TABLET ORAL
Qty: 2 TABLET | Refills: 0 | Status: SHIPPED | OUTPATIENT
Start: 2024-08-15

## 2024-08-15 RX ORDER — NITROFURANTOIN 25; 75 MG/1; MG/1
100 CAPSULE ORAL 2 TIMES DAILY
Qty: 10 CAPSULE | Refills: 0 | Status: SHIPPED | OUTPATIENT
Start: 2024-08-15 | End: 2024-08-20

## 2024-08-15 NOTE — PROGRESS NOTES
"Subjective:      Patient ID: Vianney Callahan is a 39 y.o. female.    Vitals:  height is 5' 7" (1.702 m) and weight is 77.1 kg (170 lb). Her oral temperature is 98.1 °F (36.7 °C). Her blood pressure is 112/72 and her pulse is 97. Her respiration is 20 and oxygen saturation is 97%.     Chief Complaint: Vaginal Discharge    Pt present with symptoms of- Green Vaginal Discharge ( few weeks) , Feverish and Dizziness the last few days.  Patient denies any concerns for STD testing does not wish to be tested.  Patient states that she has a white discharge that almost site cottage cheese with some green in it.    Vaginal Discharge  The patient's primary symptoms include vaginal discharge. The patient's pertinent negatives include no genital itching, genital odor or pelvic pain. This is a new problem. The current episode started in the past 7 days. The problem occurs constantly. The problem has been gradually worsening. The patient is experiencing no pain. She is not pregnant. Associated symptoms include chills, frequency and urgency. Pertinent negatives include no abdominal pain, back pain, constipation, diarrhea, dysuria, fever, nausea, rash, sore throat or vomiting. The vaginal discharge was green and thick. There has been no bleeding. She has not been passing clots. She has not been passing tissue. Nothing aggravates the symptoms. She has tried nothing for the symptoms. She is not sexually active. She uses oral contraceptives for contraception.       Constitution: Positive for chills. Negative for appetite change, sweating, fatigue and fever.   HENT:  Negative for sore throat.    Cardiovascular:  Negative for chest pain.   Respiratory:  Negative for shortness of breath.    Gastrointestinal:  Negative for abdominal pain, nausea, vomiting, constipation and diarrhea.   Genitourinary:  Positive for frequency, urgency and vaginal discharge. Negative for dysuria, irregular menstruation, vaginal pain, vaginal bleeding, vaginal odor, " genital sore and pelvic pain.   Musculoskeletal:  Negative for back pain and muscle ache.   Skin:  Negative for rash.      Past Medical History:   Diagnosis Date    Abnormal cervical Papanicolaou smear 2012    Colposcopy    ADHD (attention deficit hyperactivity disorder) 8/16/2012    Allergy     Anxiety     Asthma     childhood    Back pain 5/19/2014    Bipolar affective disorder     Cholecystitis     Chronic migraine without aura, with intractable migraine, so stated, without mention of status migrainosus 9/24/2013    Depression     Fever blister     Gallstones 5/26/2014    Headache(784.0)     Hepatitis C antibody positive in blood     History of hepatitis C     History of psychiatric hospitalization     Suicide attempt    Personality disorder 4/11/2013    Psychosis 10/7/2013    Therapy     Tobacco abuse 9/24/2013       Past Surgical History:   Procedure Laterality Date    ESOPHAGOGASTRODUODENOSCOPY      FRACTURE SURGERY      LIPOMA RESECTION N/A 10/4/2023    Procedure: EXCISION, LIPOMA;  Surgeon: Esvin Dupree DO;  Location: [facility] OR;  Service: Plastics;  Laterality: N/A;  excision of forehead lipoma - 1 hour    MOLE REMOVAL      OPEN REDUCTION AND INTERNAL FIXATION (ORIF) OF FRACTURE OF METATARSAL BONE Left 9/19/2023    Procedure: ORIF, FRACTURE, METATARSAL BONE;  Surgeon: Sameer Wylie DPM;  Location: [facility] OR;  Service: Podiatry;  Laterality: Left;  1.5 hours; foot tray; mini c arm    SKIN BIOPSY         Family History   Problem Relation Name Age of Onset    Melanoma Mother      ADD / ADHD Mother      Bipolar disorder Mother      Schizophrenia Mother      Alcohol abuse Father      Drug abuse Father      Depression Father      Anxiety disorder Sister      Acne Maternal Aunt      Depression Maternal Aunt      Migraines Maternal Aunt      Anxiety disorder Paternal Aunt      Depression Paternal Aunt      Breast cancer Neg Hx      Colon cancer Neg Hx      Ovarian cancer Neg Hx         Social History      Socioeconomic History    Marital status: Single   Occupational History    Occupation: disabled     Employer: disabled    Tobacco Use    Smoking status: Former     Current packs/day: 0.50     Types: Vaping with nicotine, Cigarettes    Smokeless tobacco: Never   Substance and Sexual Activity    Alcohol use: No     Alcohol/week: 0.0 standard drinks of alcohol    Drug use: No    Sexual activity: Not Currently     Partners: Male     Birth control/protection: OCP   Other Topics Concern    Are you pregnant or think you may be? No    Breast-feeding No    Caffeine Use: Excessive No    Financial Status: Unemployed No    Legal: Other No    Childhood History: Adopted No    Financial Status: Other No    Leisure: Exercise No    Childhood History: Early trauma No    Firearms: Does patient have access to a firearm? No    Leisure: Fishing No    Childhood History: Raised by parents Yes    Home situation: homeless No    Leisure: Hunting No    Childhood History: Uneventful No    Home situation: lives alone No    Leisure: Movie Watching Yes    Childhood History: Other No    Home situation: lives in group home No    Leisure: Shopping Yes    Education: Unfinished High School No    Home situation: lives in nursing home No    Leisure: Sports No    Education: High School Graduate Yes    Home situation: lives in shelter No    Leisure: Time with family No    Education: Unfinished college Yes    Home situation: lives with family No     Service No    Education: Trade School No    Home situation: lives with friends No    Spirituality: Active Participation No    Education: Associate's Degree No    Home situation: lives with significant other Yes    Spirituality: Organized Tenriism No    Education: Bachelor's Degree No    Home situation: lives with spouse No    Spirituality: Private Participation No    Education: More than one Bachelor's or Professional No    Legal consequences of chemical use No    Patient feels they ought to cut down  on drinking/drug use No    Education: Master's, PhD No    Legal: Arrest history No    Patient annoyed by others criticizing their drinking/drug use No    Financial Status: Disabled Yes    Legal: Involved in civil litigation No    Patient has felt bad or guilty about drinking/drug use No    Financial Status: Employed No    Legal: Involved in criminal litigation No    Patient has had a drink/used drugs as an eye opener in the AM No   Social History Narrative        Exercise:  Walking, classes at various gyms and [employer]        Lives in apartment with 2 other women with mental illness.       Current Outpatient Medications   Medication Sig Dispense Refill    benztropine (COGENTIN) 1 MG tablet Take 1 tablet (1 mg total) by mouth once daily AND 2 tablets (2 mg total) nightly. 90 tablet 5    buPROPion (WELLBUTRIN XL) 150 MG TB24 tablet Take 1 tablet (150 mg total) by mouth once daily. 30 tablet 5    buPROPion (WELLBUTRIN XL) 300 MG 24 hr tablet Take 1 tablet (300 mg total) by mouth once daily. 30 tablet 5    busPIRone (BUSPAR) 30 MG Tab Take 1 tablet (30 mg total) by mouth 2 (two) times daily. 50 tablet 5    ciclopirox (LOPROX) 0.77 % Crea APPLY TOPICALLY 2 (TWO) TIMES DAILY. APPLY TO AFFECTED FOOT/FEET 30 g 3    dexAMETHasone (DECADRON) 4 MG Tab Take 1 tablet (4 mg total) by mouth once daily. 14 tablet 0    fluphenazine (PROLIXIN) 10 MG tablet Take 1 tablet by mouth every morning, and 2 tablets by mouth every evening 90 tablet 5    HYDROcodone-acetaminophen (NORCO) 5-325 mg per tablet Take 1 tablet by mouth every 6 (six) hours as needed for Pain. 28 tablet 0    ibuprofen (ADVIL,MOTRIN) 800 MG tablet TAKE 1 TABLET BY MOUTH THREE TIMES A DAY 90 tablet 2    lamoTRIgine (LAMICTAL) 150 MG Tab Take 1 tablet (150 mg total) by mouth every evening. 30 tablet 5    norethindrone ac-eth estradioL (JUNEL 1.5/30, 21,) 1.5-30 mg-mcg Tab Take 1 tablet by mouth Daily. 84 each 3    omeprazole (PRILOSEC) 40 MG capsule TAKE 1 CAPSULE BY  MOUTH EVERY DAY 90 capsule 2    SENNA 8.6 mg tablet TAKE 3 TABLETS BY MOUTH EVERY DAY 90 tablet 11    sertraline (ZOLOFT) 100 MG tablet Take 2 tablets (200 mg total) by mouth once daily. 60 tablet 5    sumatriptan (IMITREX) 100 MG tablet TAKE 1 TABLET BY MOUTH EVERY DAY AS NEEDED FOR MIGRAINE HEADACHE 27 tablet 0    tretinoin (RETIN-A) 0.1 % cream APPLY SMALL AMOUNT TO ENTIRE FACE EVERY NIGHT AT BEDTIME. WASH OFF EVERY MORNING AND APPLY SUNSCREEN 45 g 3    valACYclovir (VALTREX) 1000 MG tablet Take 1 tablet (1,000 mg total) by mouth once daily. 90 tablet 0    fluconazole (DIFLUCAN) 150 MG Tab Take 1 tablet by mouth day 1, then take 1 tablet by mouth day 3 2 tablet 0    ketoconazole (NIZORAL) 2 % cream Apply topically 2 (two) times daily. for 7 days 30 g 0    nitrofurantoin, macrocrystal-monohydrate, (MACROBID) 100 MG capsule Take 1 capsule (100 mg total) by mouth 2 (two) times daily. for 5 days 10 capsule 0     No current facility-administered medications for this visit.       Review of patient's allergies indicates:   Allergen Reactions    Dilantin [phenytoin sodium extended] Rash    Saphris [asenapine]      Confusion and depressed mood.       Objective:     Physical Exam   Constitutional: She is oriented to person, place, and time. She appears well-developed. She is cooperative.  Non-toxic appearance. She does not appear ill. No distress.   HENT:   Head: Normocephalic and atraumatic.   Ears:   Right Ear: Hearing normal.   Left Ear: Hearing normal.   Nose: Nose normal. No mucosal edema, rhinorrhea, nasal deformity or congestion. No epistaxis. Right sinus exhibits no maxillary sinus tenderness and no frontal sinus tenderness. Left sinus exhibits no maxillary sinus tenderness and no frontal sinus tenderness.   Mouth/Throat: Uvula is midline, oropharynx is clear and moist and mucous membranes are normal. No trismus in the jaw. Normal dentition. No uvula swelling. No posterior oropharyngeal edema.   Eyes: Conjunctivae  and lids are normal. Right eye exhibits no discharge. Left eye exhibits no discharge. No scleral icterus.   Neck: Trachea normal and phonation normal. Neck supple. No edema present. No erythema present. No neck rigidity present.   Cardiovascular: Normal rate, regular rhythm and normal heart sounds.   No murmur heard.Exam reveals no gallop and no friction rub.   Pulmonary/Chest: Effort normal and breath sounds normal. No stridor. No respiratory distress. She has no decreased breath sounds. She has no wheezes. She has no rhonchi. She has no rales.   Abdominal: Normal appearance and bowel sounds are normal. She exhibits no distension and no mass. Soft. flat abdomen There is no abdominal tenderness. There is no rebound, no guarding, no tenderness at McBurney's point, no left CVA tenderness, negative Rovsing's sign and no right CVA tenderness. No hernia.      Comments: Patient has a history of cholecystectomy   Musculoskeletal:      Cervical back: She exhibits no tenderness.   Lymphadenopathy:     She has no cervical adenopathy.   Neurological: She is alert and oriented to person, place, and time. She exhibits normal muscle tone.   Skin: Skin is warm, dry, intact, not diaphoretic, not pale and no rash.   Psychiatric: Her speech is normal and behavior is normal. Mood, judgment and thought content normal.   Nursing note and vitals reviewed.    Results for orders placed or performed in visit on 08/15/24   POCT urine pregnancy   Result Value Ref Range    POC Preg Test, Ur Negative Negative     Acceptable Yes    POCT Urinalysis(Instrument)   Result Value Ref Range    Color, POC UA Yellow Yellow, Straw, Colorless    Clarity, POC UA Slight Cloudy (A) Clear    Glucose, POC UA Negative Negative    Bilirubin, POC UA Negative Negative    Ketones, POC UA Negative Negative    Spec Grav POC UA 1.020 1.005 - 1.030    Blood, POC UA Small (A) Negative    pH, POC UA 6.5 5.0 - 8.0    Protein, POC UA Negative Negative     Urobilinogen, POC UA 0.2 <=1.0    Nitrite, POC UA Negative Negative    WBC, POC UA Large (A) Negative       Assessment:     1. Vaginal discharge    2. Acute cystitis with hematuria        Plan:       Vaginal discharge  -     Cancel: POCT urine pregnancy  -     POCT urine pregnancy  -     Vaginosis Screen by DNA Probe  -     POCT Urinalysis(Instrument)  -     fluconazole (DIFLUCAN) 150 MG Tab; Take 1 tablet by mouth day 1, then take 1 tablet by mouth day 3  Dispense: 2 tablet; Refill: 0    Acute cystitis with hematuria  -     nitrofurantoin, macrocrystal-monohydrate, (MACROBID) 100 MG capsule; Take 1 capsule (100 mg total) by mouth 2 (two) times daily. for 5 days  Dispense: 10 capsule; Refill: 0           Results reviewed  I have reviewed the patient chart and pertinent past imaging/labs.       Patient Instructions   If your condition worsens or fails to improve we recommend that you receive another evaluation at the ER immediately or contact your PCP to discuss your concerns or return here. You must understand that you've received an urgent care treatment only and that you may be released before all your medical problems are known or treated. You the patient will arrange for followup care as instructed.       If you are are female and on BCP use additional methods to prevent pregnancy while on the antibiotics and for one cycle after.     Cranberry juice may help. Get the 100% cranberry juice and mix 4 oz of juice with 4 oz of water and drink this 8 oz glass of liquid once a day.   Increase water intake to at least 8-10 glasses/day.    Take Diflucan as prescribed for yeast infection.  We will follow up with results once available  Avoid caffeine, alcohol, or spicy foods as they irritate the bladder.      If symptoms do not improve in 2-3 days please return for evaluation

## 2024-08-15 NOTE — PATIENT INSTRUCTIONS
If your condition worsens or fails to improve we recommend that you receive another evaluation at the ER immediately or contact your PCP to discuss your concerns or return here. You must understand that you've received an urgent care treatment only and that you may be released before all your medical problems are known or treated. You the patient will arrange for followup care as instructed.       If you are are female and on BCP use additional methods to prevent pregnancy while on the antibiotics and for one cycle after.     Cranberry juice may help. Get the 100% cranberry juice and mix 4 oz of juice with 4 oz of water and drink this 8 oz glass of liquid once a day.   Increase water intake to at least 8-10 glasses/day.    Take Diflucan as prescribed for yeast infection.  We will follow up with results once available  Avoid caffeine, alcohol, or spicy foods as they irritate the bladder.      If symptoms do not improve in 2-3 days please return for evaluation

## 2024-08-23 ENCOUNTER — PATIENT MESSAGE (OUTPATIENT)
Dept: PSYCHIATRY | Facility: CLINIC | Age: 39
End: 2024-08-23
Payer: MEDICARE

## 2024-09-04 ENCOUNTER — PATIENT MESSAGE (OUTPATIENT)
Dept: PODIATRY | Facility: CLINIC | Age: 39
End: 2024-09-04
Payer: MEDICARE

## 2024-09-04 ENCOUNTER — OFFICE VISIT (OUTPATIENT)
Dept: PSYCHIATRY | Facility: CLINIC | Age: 39
End: 2024-09-04
Payer: COMMERCIAL

## 2024-09-04 VITALS
DIASTOLIC BLOOD PRESSURE: 69 MMHG | HEART RATE: 96 BPM | BODY MASS INDEX: 26.9 KG/M2 | WEIGHT: 171.75 LBS | SYSTOLIC BLOOD PRESSURE: 119 MMHG

## 2024-09-04 DIAGNOSIS — F25.0 SCHIZOAFFECTIVE DISORDER, BIPOLAR TYPE: Primary | ICD-10-CM

## 2024-09-04 DIAGNOSIS — F41.9 ANXIETY: ICD-10-CM

## 2024-09-04 DIAGNOSIS — F60.9 PERSONALITY DISORDER: ICD-10-CM

## 2024-09-04 DIAGNOSIS — Z86.59 HISTORY OF ADHD: ICD-10-CM

## 2024-09-04 PROCEDURE — 3078F DIAST BP <80 MM HG: CPT | Mod: CPTII,S$GLB,, | Performed by: NURSE PRACTITIONER

## 2024-09-04 PROCEDURE — 99214 OFFICE O/P EST MOD 30 MIN: CPT | Mod: S$GLB,,, | Performed by: NURSE PRACTITIONER

## 2024-09-04 PROCEDURE — 99999 PR PBB SHADOW E&M-EST. PATIENT-LVL II: CPT | Mod: PBBFAC,,, | Performed by: NURSE PRACTITIONER

## 2024-09-04 PROCEDURE — 3074F SYST BP LT 130 MM HG: CPT | Mod: CPTII,S$GLB,, | Performed by: NURSE PRACTITIONER

## 2024-09-04 PROCEDURE — 3044F HG A1C LEVEL LT 7.0%: CPT | Mod: CPTII,S$GLB,, | Performed by: NURSE PRACTITIONER

## 2024-09-04 PROCEDURE — 3008F BODY MASS INDEX DOCD: CPT | Mod: CPTII,S$GLB,, | Performed by: NURSE PRACTITIONER

## 2024-09-04 RX ORDER — SERTRALINE HYDROCHLORIDE 100 MG/1
200 TABLET, FILM COATED ORAL DAILY
Qty: 60 TABLET | Refills: 5 | Status: SHIPPED | OUTPATIENT
Start: 2024-09-04

## 2024-09-04 RX ORDER — BUPROPION HYDROCHLORIDE 300 MG/1
300 TABLET ORAL DAILY
Qty: 30 TABLET | Refills: 5 | Status: SHIPPED | OUTPATIENT
Start: 2024-09-04

## 2024-09-04 RX ORDER — FLUPHENAZINE HYDROCHLORIDE 5 MG/1
TABLET ORAL
Qty: 150 TABLET | Refills: 5 | Status: SHIPPED | OUTPATIENT
Start: 2024-09-04 | End: 2025-03-03

## 2024-09-04 RX ORDER — BENZTROPINE MESYLATE 1 MG/1
1 TABLET ORAL 2 TIMES DAILY
Qty: 60 TABLET | Refills: 5 | Status: SHIPPED | OUTPATIENT
Start: 2024-09-04

## 2024-09-04 RX ORDER — BUPROPION HYDROCHLORIDE 150 MG/1
150 TABLET ORAL DAILY
Qty: 30 TABLET | Refills: 5 | Status: SHIPPED | OUTPATIENT
Start: 2024-09-04

## 2024-09-04 RX ORDER — BUSPIRONE HYDROCHLORIDE 30 MG/1
30 TABLET ORAL 2 TIMES DAILY
Qty: 50 TABLET | Refills: 5 | Status: SHIPPED | OUTPATIENT
Start: 2024-09-04
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
## 2024-09-05 ENCOUNTER — PATIENT MESSAGE (OUTPATIENT)
Dept: PSYCHIATRY | Facility: CLINIC | Age: 39
End: 2024-09-05
Payer: MEDICARE

## 2024-09-06 ENCOUNTER — PATIENT MESSAGE (OUTPATIENT)
Dept: PSYCHIATRY | Facility: CLINIC | Age: 39
End: 2024-09-06
Payer: MEDICARE

## 2024-09-23 ENCOUNTER — OFFICE VISIT (OUTPATIENT)
Dept: PSYCHIATRY | Facility: CLINIC | Age: 39
End: 2024-09-23
Payer: COMMERCIAL

## 2024-09-23 VITALS
BODY MASS INDEX: 26.45 KG/M2 | WEIGHT: 168.88 LBS | HEART RATE: 109 BPM | SYSTOLIC BLOOD PRESSURE: 112 MMHG | DIASTOLIC BLOOD PRESSURE: 67 MMHG

## 2024-09-23 DIAGNOSIS — F90.0 ATTENTION DEFICIT HYPERACTIVITY DISORDER (ADHD), PREDOMINANTLY INATTENTIVE TYPE: ICD-10-CM

## 2024-09-23 DIAGNOSIS — F60.9 PERSONALITY DISORDER: ICD-10-CM

## 2024-09-23 DIAGNOSIS — F25.0 SCHIZOAFFECTIVE DISORDER, BIPOLAR TYPE: Primary | ICD-10-CM

## 2024-09-23 DIAGNOSIS — F41.9 ANXIETY: ICD-10-CM

## 2024-09-23 PROCEDURE — 99999 PR PBB SHADOW E&M-EST. PATIENT-LVL II: CPT | Mod: PBBFAC,,, | Performed by: NURSE PRACTITIONER

## 2024-09-23 PROCEDURE — 3008F BODY MASS INDEX DOCD: CPT | Mod: CPTII,S$GLB,, | Performed by: NURSE PRACTITIONER

## 2024-09-23 PROCEDURE — 3078F DIAST BP <80 MM HG: CPT | Mod: CPTII,S$GLB,, | Performed by: NURSE PRACTITIONER

## 2024-09-23 PROCEDURE — 99214 OFFICE O/P EST MOD 30 MIN: CPT | Mod: S$GLB,,, | Performed by: NURSE PRACTITIONER

## 2024-09-23 PROCEDURE — 3074F SYST BP LT 130 MM HG: CPT | Mod: CPTII,S$GLB,, | Performed by: NURSE PRACTITIONER

## 2024-09-23 PROCEDURE — G2211 COMPLEX E/M VISIT ADD ON: HCPCS | Mod: S$GLB,,, | Performed by: NURSE PRACTITIONER

## 2024-09-23 PROCEDURE — 3044F HG A1C LEVEL LT 7.0%: CPT | Mod: CPTII,S$GLB,, | Performed by: NURSE PRACTITIONER

## 2024-09-23 RX ORDER — ATOMOXETINE 40 MG/1
40 CAPSULE ORAL DAILY
Qty: 30 CAPSULE | Refills: 2 | Status: SHIPPED | OUTPATIENT
Start: 2024-09-26

## 2024-09-23 RX ORDER — FLUPHENAZINE HYDROCHLORIDE 10 MG/1
TABLET ORAL
Qty: 90 TABLET | Refills: 5 | Status: SHIPPED | OUTPATIENT
Start: 2024-09-23 | End: 2025-03-22

## 2024-09-23 RX ORDER — ATOMOXETINE 18 MG/1
18 CAPSULE ORAL DAILY
Qty: 3 CAPSULE | Refills: 0 | Status: SHIPPED | OUTPATIENT
Start: 2024-09-23 | End: 2024-09-26
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
## 2024-10-30 RX ORDER — NORETHINDRONE ACETATE AND ETHINYL ESTRADIOL .03; 1.5 MG/1; MG/1
1 TABLET ORAL DAILY
Qty: 84 TABLET | Refills: 0 | Status: SHIPPED | OUTPATIENT
Start: 2024-10-30 | End: 2025-10-30

## 2024-10-31 ENCOUNTER — PATIENT MESSAGE (OUTPATIENT)
Dept: OBSTETRICS AND GYNECOLOGY | Facility: CLINIC | Age: 39
End: 2024-10-31
Payer: MEDICARE

## 2024-11-17 DIAGNOSIS — Z98.890 POST-OPERATIVE STATE: ICD-10-CM

## 2024-11-17 DIAGNOSIS — M79.672 PAIN OF LEFT FOOT: ICD-10-CM

## 2024-11-18 RX ORDER — IBUPROFEN 800 MG/1
800 TABLET ORAL 3 TIMES DAILY
Qty: 90 TABLET | Refills: 2 | Status: SHIPPED | OUTPATIENT
Start: 2024-11-18

## 2024-12-02 RX ORDER — TERBINAFINE HYDROCHLORIDE 250 MG/1
250 TABLET ORAL
Qty: 90 TABLET | Refills: 0 | Status: SHIPPED | OUTPATIENT
Start: 2024-12-02

## 2024-12-05 ENCOUNTER — OFFICE VISIT (OUTPATIENT)
Dept: PSYCHIATRY | Facility: CLINIC | Age: 39
End: 2024-12-05
Payer: COMMERCIAL

## 2024-12-05 VITALS
WEIGHT: 174.81 LBS | BODY MASS INDEX: 27.38 KG/M2 | HEART RATE: 108 BPM | DIASTOLIC BLOOD PRESSURE: 69 MMHG | SYSTOLIC BLOOD PRESSURE: 121 MMHG

## 2024-12-05 DIAGNOSIS — F41.9 ANXIETY: ICD-10-CM

## 2024-12-05 DIAGNOSIS — F25.0 SCHIZOAFFECTIVE DISORDER, BIPOLAR TYPE: Primary | ICD-10-CM

## 2024-12-05 DIAGNOSIS — F60.9 PERSONALITY DISORDER: ICD-10-CM

## 2024-12-05 PROCEDURE — 3074F SYST BP LT 130 MM HG: CPT | Mod: CPTII,S$GLB,, | Performed by: NURSE PRACTITIONER

## 2024-12-05 PROCEDURE — G2211 COMPLEX E/M VISIT ADD ON: HCPCS | Mod: S$GLB,,, | Performed by: NURSE PRACTITIONER

## 2024-12-05 PROCEDURE — 3044F HG A1C LEVEL LT 7.0%: CPT | Mod: CPTII,S$GLB,, | Performed by: NURSE PRACTITIONER

## 2024-12-05 PROCEDURE — 3078F DIAST BP <80 MM HG: CPT | Mod: CPTII,S$GLB,, | Performed by: NURSE PRACTITIONER

## 2024-12-05 PROCEDURE — 3008F BODY MASS INDEX DOCD: CPT | Mod: CPTII,S$GLB,, | Performed by: NURSE PRACTITIONER

## 2024-12-05 PROCEDURE — 99999 PR PBB SHADOW E&M-EST. PATIENT-LVL II: CPT | Mod: PBBFAC,,, | Performed by: NURSE PRACTITIONER

## 2024-12-05 PROCEDURE — 99214 OFFICE O/P EST MOD 30 MIN: CPT | Mod: S$GLB,,, | Performed by: NURSE PRACTITIONER

## 2024-12-05 RX ORDER — LAMOTRIGINE 200 MG/1
200 TABLET ORAL NIGHTLY
Qty: 30 TABLET | Refills: 5 | Status: SHIPPED | OUTPATIENT
Start: 2024-12-05
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
## 2024-12-11 ENCOUNTER — OFFICE VISIT (OUTPATIENT)
Dept: OBSTETRICS AND GYNECOLOGY | Facility: CLINIC | Age: 39
End: 2024-12-11
Payer: MEDICARE

## 2024-12-11 VITALS
DIASTOLIC BLOOD PRESSURE: 70 MMHG | HEIGHT: 67 IN | SYSTOLIC BLOOD PRESSURE: 104 MMHG | BODY MASS INDEX: 27.02 KG/M2 | WEIGHT: 172.19 LBS

## 2024-12-11 DIAGNOSIS — Z30.41 ENCOUNTER FOR SURVEILLANCE OF CONTRACEPTIVE PILLS: ICD-10-CM

## 2024-12-11 DIAGNOSIS — Z01.419 WOMEN'S ANNUAL ROUTINE GYNECOLOGICAL EXAMINATION: Primary | ICD-10-CM

## 2024-12-11 DIAGNOSIS — Z12.4 PAP SMEAR FOR CERVICAL CANCER SCREENING: ICD-10-CM

## 2024-12-11 PROCEDURE — 99999 PR PBB SHADOW E&M-EST. PATIENT-LVL IV: CPT | Mod: PBBFAC,,, | Performed by: OBSTETRICS & GYNECOLOGY

## 2024-12-11 RX ORDER — NORETHINDRONE ACETATE AND ETHINYL ESTRADIOL .03; 1.5 MG/1; MG/1
1 TABLET ORAL DAILY
Qty: 84 TABLET | Refills: 3 | Status: SHIPPED | OUTPATIENT
Start: 2024-12-11 | End: 2025-12-11

## 2024-12-11 NOTE — PROGRESS NOTES
"Vianney Callahan is a 39 y.o. female  who presents for annual exam.  She is taking Junel OCPs continuously without any bleeding.  She has a history of WANG 1 on colposcopy 2023, followed conservatively.  S/P repair of metatarsal fracture 2023.  Otherwise denies recent changes in her medical or surgical history.  No gyn complaints.   No LMP recorded (lmp unknown). (Menstrual status: Birth Control).    Blood pressure 104/70, height 5' 7" (1.702 m), weight 78.1 kg (172 lb 2.9 oz).    Summary:  23 Pap: Negative, HPV: other HR HPV positive  23 Pap: Negative, HPV: other HR HPV positive   23 Colpo: Ecto WANG 1, Ecc negative    Past Medical History:   Diagnosis Date    Abnormal cervical Papanicolaou smear 2012    Colposcopy    ADHD (attention deficit hyperactivity disorder) 2012    Allergy     Anxiety     Asthma     childhood    Back pain 2014    Bipolar affective disorder     Cholecystitis     Chronic migraine without aura, with intractable migraine, so stated, without mention of status migrainosus 2013    Depression     Fever blister     Gallstones 2014    Headache(784.0)     Hepatitis C antibody positive in blood     History of hepatitis C     History of psychiatric hospitalization     Suicide attempt    Personality disorder 2013    Psychosis 10/7/2013    Therapy     Tobacco abuse 2013       Past Surgical History:   Procedure Laterality Date    ESOPHAGOGASTRODUODENOSCOPY      FRACTURE SURGERY      LIPOMA RESECTION N/A 10/4/2023    Procedure: EXCISION, LIPOMA;  Surgeon: Esvin Dupree DO;  Location: [facility] OR;  Service: Plastics;  Laterality: N/A;  excision of forehead lipoma - 1 hour    MOLE REMOVAL      OPEN REDUCTION AND INTERNAL FIXATION (ORIF) OF FRACTURE OF METATARSAL BONE Left 2023    Procedure: ORIF, FRACTURE, METATARSAL BONE;  Surgeon: Sameer Wylie DPM;  Location: [facility] OR;  Service: Podiatry;  Laterality: Left;  1.5 hours; foot tray; mini c arm    " SKIN BIOPSY         OB History          0    Para   0    Term                AB        Living             SAB        IAB        Ectopic        Multiple        Live Births                     ROS:  GENERAL: Feeling well overall.   SKIN: Denies rash or lesions.   HEAD: Denies head injury or headache.   NODES: Denies enlarged lymph nodes.   CHEST: Denies chest pain or shortness of breath.   CARDIOVASCULAR: Denies palpitations or left sided chest pain.   ABDOMEN: No abdominal pain, nausea, vomiting or rectal bleeding.   URINARY: No dysuria or hematuria.  REPRODUCTIVE: See HPI.   BREASTS: Denies pain, lumps, or nipple discharge.   HEMATOLOGIC: No easy bruisability or excessive bleeding.   MUSCULOSKELETAL:  Reports prior left foot fracture  NEUROLOGIC: Denies syncope or weakness.   PSYCHIATRIC: Denies depression.    PE:   (chaperone present during entire exam)  APPEARANCE: Well nourished, well developed, in no acute distress.  BREASTS: Symmetrical, no skin changes or visible lesions. No palpable masses, nipple discharge or adenopathy bilaterally.  ABDOMEN: Soft. No tenderness or masses.  VULVA: No lesions. Normal female genitalia.  URETHRAL MEATUS: Normal size and location, no lesions, no prolapse.  URETHRA: No masses, tenderness, prolapse or scarring.  VAGINA: Moist and well rugated, no abnormal discharge, no significant cystocele or rectocele.  CERVIX: No lesions and discharge. PAP done.  UTERUS: Normal size, regular shape, mobile, non-tender, bladder base nontender.  ADNEXA: No masses, tenderness or CDS nodularity.  ANUS PERINEUM: Normal.      Diagnosis:  1. Women's annual routine gynecological examination    2. Encounter for surveillance of contraceptive pills    3. Pap smear for cervical cancer screening          PLAN:    Orders Placed This Encounter    HPV High Risk Genotypes, PCR    Liquid-Based Pap Smear, Screening    norethindrone ac-eth estradioL (JUNEL 1.5/30, 21,) 1.5-30 mg-mcg Tab       Patient  was counseled today on the need for annual gyn exams.  We reviewed her usage of Junel OCPs with which she is doing well and desires to continue.  We also discussed her pap / colpo history.    Follow-up in 1 year.    This note was created with voice recognition software.  Grammatical, syntax and spelling errors may be inevitable.

## 2024-12-18 ENCOUNTER — PATIENT MESSAGE (OUTPATIENT)
Dept: GASTROENTEROLOGY | Facility: CLINIC | Age: 39
End: 2024-12-18
Payer: MEDICARE

## 2024-12-18 ENCOUNTER — PATIENT MESSAGE (OUTPATIENT)
Dept: OBSTETRICS AND GYNECOLOGY | Facility: CLINIC | Age: 39
End: 2024-12-18
Payer: MEDICARE

## 2024-12-19 DIAGNOSIS — K21.9 GASTROESOPHAGEAL REFLUX DISEASE, UNSPECIFIED WHETHER ESOPHAGITIS PRESENT: ICD-10-CM

## 2024-12-19 DIAGNOSIS — F41.9 ANXIETY: ICD-10-CM

## 2024-12-19 RX ORDER — OMEPRAZOLE 40 MG/1
CAPSULE, DELAYED RELEASE ORAL
Qty: 90 CAPSULE | Refills: 2 | Status: SHIPPED | OUTPATIENT
Start: 2024-12-19

## 2024-12-19 RX ORDER — BUSPIRONE HYDROCHLORIDE 30 MG/1
30 TABLET ORAL 2 TIMES DAILY
Qty: 60 TABLET | Refills: 0 | Status: SHIPPED | OUTPATIENT
Start: 2024-12-19

## 2024-12-26 ENCOUNTER — PATIENT MESSAGE (OUTPATIENT)
Dept: OBSTETRICS AND GYNECOLOGY | Facility: CLINIC | Age: 39
End: 2024-12-26
Payer: MEDICARE

## 2024-12-26 ENCOUNTER — TELEPHONE (OUTPATIENT)
Dept: OBSTETRICS AND GYNECOLOGY | Facility: CLINIC | Age: 39
End: 2024-12-26
Payer: MEDICARE

## 2024-12-26 DIAGNOSIS — A60.00 GENITAL HERPES SIMPLEX, UNSPECIFIED SITE: ICD-10-CM

## 2024-12-26 NOTE — TELEPHONE ENCOUNTER
[id number]  Me to Vianney ALCANTAR      12/26/24  4:30 PM  I will send the request to Dr Abbott as he is out of the office. Which pharmacy would you like it sent to?    Last read by Vianney Callahan at  4:54 PM on 12/26/2024.  Vianney Callahan to [facility]EN[facility] BERUMEN Staff (supporting Morro Abbott MD)         12/26/24  3:23 PM  I am currently having an outbreak. Could you send over some valACYclovir. I also need a maintenance dose.

## 2024-12-27 RX ORDER — VALACYCLOVIR HYDROCHLORIDE 500 MG/1
500 TABLET, FILM COATED ORAL 2 TIMES DAILY
Qty: 10 TABLET | Refills: 0 | Status: SHIPPED | OUTPATIENT
Start: 2024-12-27 | End: 2025-01-01

## 2024-12-31 ENCOUNTER — TELEPHONE (OUTPATIENT)
Dept: OBSTETRICS AND GYNECOLOGY | Facility: CLINIC | Age: 39
End: 2024-12-31

## 2024-12-31 ENCOUNTER — TELEPHONE (OUTPATIENT)
Dept: OBSTETRICS AND GYNECOLOGY | Facility: CLINIC | Age: 39
End: 2024-12-31
Payer: MEDICARE

## 2024-12-31 RX ORDER — VALACYCLOVIR HYDROCHLORIDE 500 MG/1
500 TABLET, FILM COATED ORAL DAILY
Qty: 30 TABLET | Refills: 3 | Status: SHIPPED | OUTPATIENT
Start: 2024-12-31

## 2024-12-31 NOTE — TELEPHONE ENCOUNTER
Called patient:    Message left to call us.    [Pap with normal cytology, other HR HPV positive x3.  History of WANG 1 on colposcopy last year.  Needs another colpo]

## 2024-12-31 NOTE — TELEPHONE ENCOUNTER
am so glad. I will have the daily does sent this afternoon. Claudia  ===View-only below this line===      ----- Message -----       From:Vianney Callahan       Sent:12/31/2024 10:34 AM CST         To:Patient Medical Advice Request Message List    Subject:HSV    hello  I am feeling better and I believe my outbreak is over.   thank you so much for your time! I still do need a maintenance dose though.  I appreciate you.  Vianney      ----- Message -----       From:Vianney Callahan       Sent:12/30/2024  5:26 PM CST         To:Patient Medical Advice Request Message List    Subject:HSV

## 2025-01-03 ENCOUNTER — TELEPHONE (OUTPATIENT)
Dept: OBSTETRICS AND GYNECOLOGY | Facility: CLINIC | Age: 40
End: 2025-01-03
Payer: MEDICARE

## 2025-01-03 NOTE — TELEPHONE ENCOUNTER
Called patient:    Message left to call us.    [Pap negative but other HR HPV present x 3, history of WANG 1 last year - needs colpo]    Patient portal message sent to patient.

## 2025-01-04 RX ORDER — VALACYCLOVIR HYDROCHLORIDE 500 MG/1
500 TABLET, FILM COATED ORAL
Qty: 90 TABLET | Refills: 1 | OUTPATIENT
Start: 2025-01-04

## 2025-01-04 NOTE — TELEPHONE ENCOUNTER
Refill Decision Note   Vianney Callahan  is requesting a refill authorization.  Brief Assessment and Rationale for Refill:  Quick Discontinue     Medication Therapy Plan:  Receipt confirmed by pharmacy (12/31/2024  3:14 PM CST)    Medication Reconciliation Completed: No   Comments:     No Care Gaps recommended.     Note composed:12:22 PM 01/04/2025

## 2025-01-09 ENCOUNTER — TELEPHONE (OUTPATIENT)
Dept: OBSTETRICS AND GYNECOLOGY | Facility: CLINIC | Age: 40
End: 2025-01-09

## 2025-01-09 NOTE — TELEPHONE ENCOUNTER
Called patient:    Message left to call us.    [Pap with normal cytology, other HR HPV present x 3, history of WANG 1 on colpo last year.  REC: colpo]

## 2025-01-10 DIAGNOSIS — F41.9 ANXIETY: ICD-10-CM

## 2025-01-10 RX ORDER — BUSPIRONE HYDROCHLORIDE 30 MG/1
30 TABLET ORAL 2 TIMES DAILY
Qty: 180 TABLET | Refills: 1 | Status: SHIPPED | OUTPATIENT
Start: 2025-01-10

## 2025-01-14 ENCOUNTER — PATIENT MESSAGE (OUTPATIENT)
Dept: GASTROENTEROLOGY | Facility: CLINIC | Age: 40
End: 2025-01-14
Payer: MEDICARE

## 2025-01-14 ENCOUNTER — TELEPHONE (OUTPATIENT)
Dept: OBSTETRICS AND GYNECOLOGY | Facility: CLINIC | Age: 40
End: 2025-01-14
Payer: MEDICARE

## 2025-01-14 NOTE — TELEPHONE ENCOUNTER
Called patient:    Discussed pap with normal cytology, other HR HPV present.  Prior colpo with WANG 1    We discussed her persistent other HR HPV and the recommendation for colpo.    Colpo scheduled for 2/26/25

## 2025-01-17 ENCOUNTER — PATIENT MESSAGE (OUTPATIENT)
Dept: PSYCHIATRY | Facility: CLINIC | Age: 40
End: 2025-01-17
Payer: MEDICARE

## 2025-01-21 ENCOUNTER — OFFICE VISIT (OUTPATIENT)
Dept: PSYCHIATRY | Facility: CLINIC | Age: 40
End: 2025-01-21
Payer: MEDICARE

## 2025-01-21 DIAGNOSIS — F60.9 PERSONALITY DISORDER: ICD-10-CM

## 2025-01-21 DIAGNOSIS — F41.9 ANXIETY: ICD-10-CM

## 2025-01-21 DIAGNOSIS — B35.9 RINGWORM: ICD-10-CM

## 2025-01-21 DIAGNOSIS — F25.0 SCHIZOAFFECTIVE DISORDER, BIPOLAR TYPE: Primary | ICD-10-CM

## 2025-01-21 PROCEDURE — 98006 SYNCH AUDIO-VIDEO EST MOD 30: CPT | Mod: 95,,, | Performed by: NURSE PRACTITIONER

## 2025-01-21 RX ORDER — BUPROPION HYDROCHLORIDE 300 MG/1
300 TABLET ORAL DAILY
Qty: 30 TABLET | Refills: 5 | Status: SHIPPED | OUTPATIENT
Start: 2025-01-21

## 2025-01-21 RX ORDER — FLUPHENAZINE HYDROCHLORIDE 10 MG/1
TABLET ORAL
Qty: 90 TABLET | Refills: 5 | Status: SHIPPED | OUTPATIENT
Start: 2025-01-21 | End: 2025-07-20

## 2025-01-21 RX ORDER — BENZTROPINE MESYLATE 1 MG/1
1 TABLET ORAL 2 TIMES DAILY
Qty: 60 TABLET | Refills: 5 | Status: SHIPPED | OUTPATIENT
Start: 2025-01-21

## 2025-01-21 RX ORDER — SERTRALINE HYDROCHLORIDE 100 MG/1
200 TABLET, FILM COATED ORAL DAILY
Qty: 60 TABLET | Refills: 5 | Status: SHIPPED | OUTPATIENT
Start: 2025-01-21

## 2025-01-21 RX ORDER — BUPROPION HYDROCHLORIDE 150 MG/1
150 TABLET ORAL DAILY
Qty: 30 TABLET | Refills: 5 | Status: SHIPPED | OUTPATIENT
Start: 2025-01-21
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
## 2025-02-26 ENCOUNTER — PATIENT MESSAGE (OUTPATIENT)
Dept: PSYCHIATRY | Facility: CLINIC | Age: 40
End: 2025-02-26
Payer: MEDICARE

## 2025-02-26 ENCOUNTER — PROCEDURE VISIT (OUTPATIENT)
Dept: OBSTETRICS AND GYNECOLOGY | Facility: CLINIC | Age: 40
End: 2025-02-26
Payer: MEDICARE

## 2025-02-26 ENCOUNTER — LAB VISIT (OUTPATIENT)
Dept: LAB | Facility: HOSPITAL | Age: 40
End: 2025-02-26
Attending: NURSE PRACTITIONER
Payer: MEDICARE

## 2025-02-26 ENCOUNTER — TELEPHONE (OUTPATIENT)
Dept: PSYCHIATRY | Facility: CLINIC | Age: 40
End: 2025-02-26
Payer: MEDICARE

## 2025-02-26 DIAGNOSIS — R87.810 PAP SMEAR OF CERVIX SHOWS HIGH RISK HPV PRESENT: Primary | ICD-10-CM

## 2025-02-26 DIAGNOSIS — F25.0 SCHIZOAFFECTIVE DISORDER, BIPOLAR TYPE: ICD-10-CM

## 2025-02-26 LAB
ALBUMIN SERPL BCP-MCNC: 4.2 G/DL (ref 3.5–5.2)
ALP SERPL-CCNC: 176 U/L (ref 40–150)
ALT SERPL W/O P-5'-P-CCNC: 99 U/L (ref 10–44)
ANION GAP SERPL CALC-SCNC: 8 MMOL/L (ref 8–16)
AST SERPL-CCNC: 72 U/L (ref 10–40)
B-HCG UR QL: NEGATIVE
BASOPHILS # BLD AUTO: 0.05 K/UL (ref 0–0.2)
BASOPHILS NFR BLD: 0.5 % (ref 0–1.9)
BILIRUB SERPL-MCNC: 0.4 MG/DL (ref 0.1–1)
BUN SERPL-MCNC: 18 MG/DL (ref 6–20)
CALCIUM SERPL-MCNC: 9.6 MG/DL (ref 8.7–10.5)
CHLORIDE SERPL-SCNC: 108 MMOL/L (ref 95–110)
CHOLEST SERPL-MCNC: 258 MG/DL (ref 120–199)
CHOLEST/HDLC SERPL: 4.1 {RATIO} (ref 2–5)
CO2 SERPL-SCNC: 23 MMOL/L (ref 23–29)
CREAT SERPL-MCNC: 1 MG/DL (ref 0.5–1.4)
CTP QC/QA: YES
DIFFERENTIAL METHOD BLD: ABNORMAL
EOSINOPHIL # BLD AUTO: 0.3 K/UL (ref 0–0.5)
EOSINOPHIL NFR BLD: 2.9 % (ref 0–8)
ERYTHROCYTE [DISTWIDTH] IN BLOOD BY AUTOMATED COUNT: 12.9 % (ref 11.5–14.5)
EST. GFR  (NO RACE VARIABLE): >60 ML/MIN/1.73 M^2
ESTIMATED AVG GLUCOSE: 105 MG/DL (ref 68–131)
GLUCOSE SERPL-MCNC: 85 MG/DL (ref 70–110)
HBA1C MFR BLD: 5.3 % (ref 4–5.6)
HCT VFR BLD AUTO: 49.2 % (ref 37–48.5)
HDLC SERPL-MCNC: 63 MG/DL (ref 40–75)
HDLC SERPL: 24.4 % (ref 20–50)
HGB BLD-MCNC: 15.6 G/DL (ref 12–16)
IMM GRANULOCYTES # BLD AUTO: 0.04 K/UL (ref 0–0.04)
IMM GRANULOCYTES NFR BLD AUTO: 0.4 % (ref 0–0.5)
LDLC SERPL CALC-MCNC: 178.2 MG/DL (ref 63–159)
LYMPHOCYTES # BLD AUTO: 2.8 K/UL (ref 1–4.8)
LYMPHOCYTES NFR BLD: 29.7 % (ref 18–48)
MCH RBC QN AUTO: 28.3 PG (ref 27–31)
MCHC RBC AUTO-ENTMCNC: 31.7 G/DL (ref 32–36)
MCV RBC AUTO: 89 FL (ref 82–98)
MONOCYTES # BLD AUTO: 0.8 K/UL (ref 0.3–1)
MONOCYTES NFR BLD: 8.3 % (ref 4–15)
NEUTROPHILS # BLD AUTO: 5.4 K/UL (ref 1.8–7.7)
NEUTROPHILS NFR BLD: 58.2 % (ref 38–73)
NONHDLC SERPL-MCNC: 195 MG/DL
NRBC BLD-RTO: 0 /100 WBC
PLATELET # BLD AUTO: 414 K/UL (ref 150–450)
PMV BLD AUTO: 10.8 FL (ref 9.2–12.9)
POTASSIUM SERPL-SCNC: 4.7 MMOL/L (ref 3.5–5.1)
PROT SERPL-MCNC: 6.8 G/DL (ref 6–8.4)
RBC # BLD AUTO: 5.52 M/UL (ref 4–5.4)
SODIUM SERPL-SCNC: 139 MMOL/L (ref 136–145)
TRIGL SERPL-MCNC: 84 MG/DL (ref 30–150)
TSH SERPL DL<=0.005 MIU/L-ACNC: 1.39 UIU/ML (ref 0.4–4)
WBC # BLD AUTO: 9.25 K/UL (ref 3.9–12.7)

## 2025-02-26 PROCEDURE — 85025 COMPLETE CBC W/AUTO DIFF WBC: CPT | Performed by: NURSE PRACTITIONER

## 2025-02-26 PROCEDURE — 81025 URINE PREGNANCY TEST: CPT | Mod: S$GLB,,, | Performed by: OBSTETRICS & GYNECOLOGY

## 2025-02-26 PROCEDURE — 36415 COLL VENOUS BLD VENIPUNCTURE: CPT | Mod: PN | Performed by: NURSE PRACTITIONER

## 2025-02-26 PROCEDURE — 83036 HEMOGLOBIN GLYCOSYLATED A1C: CPT | Performed by: NURSE PRACTITIONER

## 2025-02-26 PROCEDURE — 57454 BX/CURETT OF CERVIX W/SCOPE: CPT | Mod: S$GLB,,, | Performed by: OBSTETRICS & GYNECOLOGY

## 2025-02-26 PROCEDURE — 84443 ASSAY THYROID STIM HORMONE: CPT | Performed by: NURSE PRACTITIONER

## 2025-02-26 PROCEDURE — 80061 LIPID PANEL: CPT | Performed by: NURSE PRACTITIONER

## 2025-02-26 PROCEDURE — 80053 COMPREHEN METABOLIC PANEL: CPT | Performed by: NURSE PRACTITIONER

## 2025-02-26 NOTE — PROCEDURES
COLPOSCOPY:    Vianney Callahan is a 39 y.o. female who presents for a colposcopy secondary to persistent other HR HPV on her most recent pap 12/2024.  Colposcopy performed 5/2023 returned with WANG 1.     Summary:  6/17/23 Pap: Negative, HPV: other HR HPV positive  4/12/23 Pap: Negative, HPV: other HR HPV positive   5/24/23 Colpo: Ecto WANG 1, Ecc negative  12/11/24 Pap: Negative, other HR HPV present    UPT today is negative.        PRE-COLPOSCOPY PROCEDURE COUNSELING:  Discussed the abnormal pap test findings, HPV, need for colposcopy and possible biopsies to determine a diagnosis and plan of care, treatments available, the minimal risks of bleeding and infection with a colposcopy, alternatives to colposcopy and she agrees to proceed.  Patient was given the opportunity to ask questions and written consent was obtained.        COLPOSCOPY EXAM:   TIME OUT PERFORMED.   Cervix visualized with speculum  Acetic acid applied to cervix  Entire transformation zone seen  Minimal faint white epithelium noted at 1:00  Biopsy was taken at 1:00  ECC performed.    Hemostatic with silver nitrate.  Minimal blood loss.      The speculum was removed.   The patient tolerated the procedure well.    There were no complications.      IMPRESSION:   Pap with normal cytology, other HR HPV present    POST COLPOSCOPY COUNSELING:   Manage post colposcopy cramping with NSAIDs or Tylenol.  Avoid anything in vagina (intercourse, douching, tampons) one week after the procedure.  Report bleeding heavier than a period, worsening pain, fever > 101.0 F, or foul-smelling vaginal discharge.  Importance of follow-up stressed.      FOLLOW-UP:   We will contact her with biopsy results and to discuss management plan.

## 2025-02-27 ENCOUNTER — RESULTS FOLLOW-UP (OUTPATIENT)
Dept: PSYCHIATRY | Facility: CLINIC | Age: 40
End: 2025-02-27

## 2025-02-27 DIAGNOSIS — F25.0 SCHIZOAFFECTIVE DISORDER, BIPOLAR TYPE: Primary | ICD-10-CM

## 2025-02-28 DIAGNOSIS — B35.3 TINEA PEDIS OF LEFT FOOT: ICD-10-CM

## 2025-03-01 RX ORDER — CICLOPIROX OLAMINE 7.7 MG/G
CREAM TOPICAL 2 TIMES DAILY
Qty: 30 G | Refills: 3 | Status: SHIPPED | OUTPATIENT
Start: 2025-03-01

## 2025-03-05 ENCOUNTER — RESULTS FOLLOW-UP (OUTPATIENT)
Dept: OBSTETRICS AND GYNECOLOGY | Facility: CLINIC | Age: 40
End: 2025-03-05
Payer: MEDICARE

## 2025-03-05 NOTE — TELEPHONE ENCOUNTER
Called patient:    Discussed results of colpo:    1. Endocervical curettage shows scant fragments of benign endocervical tissue admixed with mucus, no dysplasia identified in this limited sample.    2. Cervical biopsy at 1 o'clock shows detached fragments of squamous epithelium with mild dysplasia and koilocytotic atypia (CIN1)     REC: Repeat pap in one year - I emphasized the importance of follow-up

## 2025-03-06 NOTE — TELEPHONE ENCOUNTER
----- Message from Morro Abbott MD sent at 3/5/2025  5:58 PM CST -----    Please sent reminder letter for pap in one year.  Thanks.  ----- Message -----  From: Claudia Cardenas MA  Sent: 2/26/2025   1:38 PM CST  To: Morro Abbott MD

## 2025-03-24 DIAGNOSIS — Z00.00 ENCOUNTER FOR MEDICARE ANNUAL WELLNESS EXAM: ICD-10-CM

## 2025-03-25 ENCOUNTER — PATIENT MESSAGE (OUTPATIENT)
Dept: PSYCHIATRY | Facility: CLINIC | Age: 40
End: 2025-03-25
Payer: MEDICARE

## 2025-03-26 ENCOUNTER — OFFICE VISIT (OUTPATIENT)
Dept: INTERNAL MEDICINE | Facility: CLINIC | Age: 40
End: 2025-03-26
Payer: MEDICARE

## 2025-03-26 VITALS
HEIGHT: 67 IN | OXYGEN SATURATION: 96 % | DIASTOLIC BLOOD PRESSURE: 62 MMHG | BODY MASS INDEX: 26.23 KG/M2 | HEART RATE: 93 BPM | WEIGHT: 167.13 LBS | SYSTOLIC BLOOD PRESSURE: 114 MMHG

## 2025-03-26 DIAGNOSIS — R74.8 ELEVATED LIVER ENZYMES: Primary | ICD-10-CM

## 2025-03-26 DIAGNOSIS — Z00.00 ENCOUNTER FOR PREVENTIVE HEALTH EXAMINATION: ICD-10-CM

## 2025-03-26 PROCEDURE — 99999 PR PBB SHADOW E&M-EST. PATIENT-LVL IV: CPT | Mod: PBBFAC,,, | Performed by: PHYSICIAN ASSISTANT

## 2025-03-26 NOTE — PROGRESS NOTES
Subjective:       Patient ID: Vianney Callahan is a 39 y.o. female.        Chief Complaint: Annual Exam    Vianney Callahan is an established patient of Nellie Coreas MD here today for annual exam.    S/p 5th metatarsal fracture with ORIF 9/2023, left foot    Exercise, 4 miles 3 times week    24 hour recall  Banana, hard boiled egg or skips breakfast  Salad with chicken or fish  Dinner varies  Reduced dairy and fat recently due to cholesterol elevated    ADHD, anxiety, personality disorder, schizoaffective disorder -   Followed by Philippe Luz NP in psychiatry      Liver enzymes elevated recent lab work, etiology unclear, no tylenol or alcohol, no N/V or abdominal pain, normal in past    Defers flu vaccine    Uses ibuprofen prn menstrual cycle cramping/pain           Review of Systems   Constitutional:  Negative for activity change, chills, diaphoresis, fatigue, fever and unexpected weight change.   HENT:  Negative for congestion, hearing loss, rhinorrhea, sore throat and trouble swallowing.    Eyes:  Negative for discharge and visual disturbance.   Respiratory:  Negative for cough, chest tightness, shortness of breath and wheezing.    Cardiovascular:  Negative for chest pain, palpitations and leg swelling.   Gastrointestinal:  Negative for abdominal pain, blood in stool, constipation, diarrhea, nausea and vomiting.   Endocrine: Negative for polydipsia and polyuria.   Genitourinary:  Negative for difficulty urinating, dysuria, frequency, hematuria, menstrual problem and urgency.   Musculoskeletal:  Negative for arthralgias, back pain, joint swelling and neck pain.   Skin:  Negative for rash.   Neurological:  Negative for dizziness, syncope, weakness and headaches.   Psychiatric/Behavioral:  Negative for confusion, dysphoric mood and sleep disturbance. The patient is not nervous/anxious.        Objective:      Physical Exam  Vitals and nursing note reviewed.   Constitutional:       Appearance: Normal appearance. She  "is well-developed.   HENT:      Head: Normocephalic.      Right Ear: Tympanic membrane and external ear normal.      Left Ear: Tympanic membrane and external ear normal.      Nose: No mucosal edema or rhinorrhea.      Mouth/Throat:      Pharynx: Oropharynx is clear.   Eyes:      Pupils: Pupils are equal, round, and reactive to light.   Cardiovascular:      Rate and Rhythm: Normal rate and regular rhythm.      Heart sounds: Normal heart sounds. No murmur heard.     No friction rub. No gallop.   Pulmonary:      Effort: Pulmonary effort is normal. No respiratory distress.      Breath sounds: Normal breath sounds.   Abdominal:      Palpations: Abdomen is soft.      Tenderness: There is no abdominal tenderness.   Skin:     General: Skin is warm and dry.   Neurological:      Mental Status: She is alert.         Assessment:       1. Elevated liver enzymes    2. Encounter for preventive health examination        Plan:       Vianney was seen today for annual exam.    Diagnoses and all orders for this visit:    Elevated liver enzymes  -     Comprehensive Metabolic Panel; Future  -     Lipid Panel; Future    Encounter for preventive health examination    Health and safety issues reviewed  Schedule fasting lab work including liver enzymes to repeat  If still elevated will check an US    Pt has been given instructions populated from patient instructions database and has verbalized understanding of the after visit summary and information contained wherein.    Follow up with a primary care provider. May go to ER for acute shortness of breath, lightheadedness, fever, or any other emergent complaints or changes in condition.    "This note will be shared with the patient"    Future Appointments   Date Time Provider Department Center   4/3/2025  2:00 PM Philippe Luz NP [facility]                 "

## 2025-04-03 ENCOUNTER — OFFICE VISIT (OUTPATIENT)
Dept: PSYCHIATRY | Facility: CLINIC | Age: 40
End: 2025-04-03
Payer: MEDICARE

## 2025-04-03 DIAGNOSIS — F41.9 ANXIETY: ICD-10-CM

## 2025-04-03 DIAGNOSIS — F25.0 SCHIZOAFFECTIVE DISORDER, BIPOLAR TYPE: Primary | ICD-10-CM

## 2025-04-03 DIAGNOSIS — F60.9 PERSONALITY DISORDER: ICD-10-CM

## 2025-04-03 PROCEDURE — 98006 SYNCH AUDIO-VIDEO EST MOD 30: CPT | Mod: 95,,, | Performed by: NURSE PRACTITIONER

## 2025-04-03 PROCEDURE — 3044F HG A1C LEVEL LT 7.0%: CPT | Mod: CPTII,95,, | Performed by: NURSE PRACTITIONER

## 2025-04-03 RX ORDER — BUPROPION HYDROCHLORIDE 300 MG/1
300 TABLET ORAL DAILY
Qty: 30 TABLET | Refills: 5 | Status: SHIPPED | OUTPATIENT
Start: 2025-04-03

## 2025-04-03 RX ORDER — BUPROPION HYDROCHLORIDE 150 MG/1
150 TABLET ORAL DAILY
Qty: 30 TABLET | Refills: 5 | Status: SHIPPED | OUTPATIENT
Start: 2025-04-03

## 2025-04-03 RX ORDER — FLUPHENAZINE HYDROCHLORIDE 10 MG/1
TABLET ORAL
Qty: 90 TABLET | Refills: 5 | Status: SHIPPED | OUTPATIENT
Start: 2025-04-03 | End: 2025-09-30

## 2025-04-03 RX ORDER — SERTRALINE HYDROCHLORIDE 100 MG/1
200 TABLET, FILM COATED ORAL DAILY
Qty: 60 TABLET | Refills: 5 | Status: SHIPPED | OUTPATIENT
Start: 2025-04-03

## 2025-04-03 RX ORDER — LAMOTRIGINE 200 MG/1
200 TABLET ORAL NIGHTLY
Qty: 30 TABLET | Refills: 5 | Status: SHIPPED | OUTPATIENT
Start: 2025-04-03

## 2025-04-03 RX ORDER — BENZTROPINE MESYLATE 1 MG/1
1 TABLET ORAL 2 TIMES DAILY
Qty: 60 TABLET | Refills: 5 | Status: SHIPPED | OUTPATIENT
Start: 2025-04-03
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
## 2025-05-05 ENCOUNTER — LAB VISIT (OUTPATIENT)
Dept: LAB | Facility: HOSPITAL | Age: 40
End: 2025-05-05
Attending: INTERNAL MEDICINE
Payer: MEDICARE

## 2025-05-05 DIAGNOSIS — F25.0 SCHIZOAFFECTIVE DISORDER, BIPOLAR TYPE: ICD-10-CM

## 2025-05-05 DIAGNOSIS — R74.8 ELEVATED LIVER ENZYMES: ICD-10-CM

## 2025-05-05 LAB
ALBUMIN SERPL BCP-MCNC: 4.1 G/DL (ref 3.5–5.2)
ALP SERPL-CCNC: 228 UNIT/L (ref 40–150)
ALT SERPL W/O P-5'-P-CCNC: 63 UNIT/L (ref 10–44)
ANION GAP (OHS): 12 MMOL/L (ref 8–16)
AST SERPL-CCNC: 30 UNIT/L (ref 11–45)
BILIRUB DIRECT SERPL-MCNC: 0.2 MG/DL (ref 0.1–0.3)
BILIRUB SERPL-MCNC: 0.4 MG/DL (ref 0.1–1)
BUN SERPL-MCNC: 14 MG/DL (ref 6–20)
CALCIUM SERPL-MCNC: 9.5 MG/DL (ref 8.7–10.5)
CHLORIDE SERPL-SCNC: 104 MMOL/L (ref 95–110)
CHOLEST SERPL-MCNC: 288 MG/DL (ref 120–199)
CHOLEST/HDLC SERPL: 4.6 {RATIO} (ref 2–5)
CO2 SERPL-SCNC: 23 MMOL/L (ref 23–29)
CREAT SERPL-MCNC: 0.9 MG/DL (ref 0.5–1.4)
GFR SERPLBLD CREATININE-BSD FMLA CKD-EPI: >60 ML/MIN/1.73/M2
GLUCOSE SERPL-MCNC: 95 MG/DL (ref 70–110)
HDLC SERPL-MCNC: 62 MG/DL (ref 40–75)
HDLC SERPL: 21.5 % (ref 20–50)
LDLC SERPL CALC-MCNC: 197.6 MG/DL (ref 63–159)
NONHDLC SERPL-MCNC: 226 MG/DL
POTASSIUM SERPL-SCNC: 4.5 MMOL/L (ref 3.5–5.1)
PROT SERPL-MCNC: 7.1 GM/DL (ref 6–8.4)
SODIUM SERPL-SCNC: 139 MMOL/L (ref 136–145)
TRIGL SERPL-MCNC: 142 MG/DL (ref 30–150)

## 2025-05-05 PROCEDURE — 80053 COMPREHEN METABOLIC PANEL: CPT

## 2025-05-05 PROCEDURE — 36415 COLL VENOUS BLD VENIPUNCTURE: CPT

## 2025-05-05 PROCEDURE — 82248 BILIRUBIN DIRECT: CPT

## 2025-05-05 PROCEDURE — 80061 LIPID PANEL: CPT

## 2025-05-08 ENCOUNTER — RESULTS FOLLOW-UP (OUTPATIENT)
Dept: INTERNAL MEDICINE | Facility: CLINIC | Age: 40
End: 2025-05-08

## 2025-05-08 ENCOUNTER — TELEPHONE (OUTPATIENT)
Dept: INTERNAL MEDICINE | Facility: CLINIC | Age: 40
End: 2025-05-08
Payer: MEDICARE

## 2025-05-08 NOTE — TELEPHONE ENCOUNTER
----- Message from Chantale Nichols PA-C sent at 5/8/2025  7:34 AM CDT -----  Please call patient  She needs to see Dr. Coreas to f/u on labs  She has seen me past several visits so it's time for her to see Dr. Coreas  Please schedule within the next 2-3 weeks  Let me know once complete  ----- Message -----  From: Lab, Background User  Sent: 5/5/2025   6:18 PM CDT  To: Chantale Nichols PA-C

## 2025-05-14 ENCOUNTER — PATIENT MESSAGE (OUTPATIENT)
Dept: INTERNAL MEDICINE | Facility: CLINIC | Age: 40
End: 2025-05-14
Payer: MEDICARE

## 2025-05-28 ENCOUNTER — TELEPHONE (OUTPATIENT)
Dept: INTERNAL MEDICINE | Facility: CLINIC | Age: 40
End: 2025-05-28
Payer: MEDICARE

## 2025-05-29 ENCOUNTER — LAB VISIT (OUTPATIENT)
Dept: LAB | Facility: HOSPITAL | Age: 40
End: 2025-05-29
Attending: INTERNAL MEDICINE
Payer: MEDICARE

## 2025-05-29 ENCOUNTER — OFFICE VISIT (OUTPATIENT)
Dept: INTERNAL MEDICINE | Facility: CLINIC | Age: 40
End: 2025-05-29
Payer: MEDICARE

## 2025-05-29 VITALS
HEART RATE: 91 BPM | HEIGHT: 67 IN | BODY MASS INDEX: 25.85 KG/M2 | WEIGHT: 164.69 LBS | DIASTOLIC BLOOD PRESSURE: 78 MMHG | SYSTOLIC BLOOD PRESSURE: 112 MMHG | OXYGEN SATURATION: 98 %

## 2025-05-29 DIAGNOSIS — R74.01 ELEVATED ALT MEASUREMENT: ICD-10-CM

## 2025-05-29 DIAGNOSIS — F19.21: ICD-10-CM

## 2025-05-29 DIAGNOSIS — E78.5 HYPERLIPIDEMIA, UNSPECIFIED HYPERLIPIDEMIA TYPE: ICD-10-CM

## 2025-05-29 DIAGNOSIS — F25.0 SCHIZOAFFECTIVE DISORDER, BIPOLAR TYPE: ICD-10-CM

## 2025-05-29 DIAGNOSIS — R74.8 ALKALINE PHOSPHATASE ELEVATION: ICD-10-CM

## 2025-05-29 DIAGNOSIS — R74.01 ELEVATED ALT MEASUREMENT: Primary | ICD-10-CM

## 2025-05-29 LAB
ALBUMIN SERPL BCP-MCNC: 4.5 G/DL (ref 3.5–5.2)
ALP SERPL-CCNC: 221 UNIT/L (ref 40–150)
ALT SERPL W/O P-5'-P-CCNC: 35 UNIT/L (ref 10–44)
AST SERPL-CCNC: 21 UNIT/L (ref 11–45)
BILIRUB DIRECT SERPL-MCNC: 0.2 MG/DL (ref 0.1–0.3)
BILIRUB SERPL-MCNC: 0.6 MG/DL (ref 0.1–1)
CHOLEST SERPL-MCNC: 254 MG/DL (ref 120–199)
CHOLEST/HDLC SERPL: 4.1 {RATIO} (ref 2–5)
GGT SERPL-CCNC: 442 U/L (ref 8–55)
HDLC SERPL-MCNC: 62 MG/DL (ref 40–75)
HDLC SERPL: 24.4 % (ref 20–50)
LDLC SERPL CALC-MCNC: 165.2 MG/DL (ref 63–159)
NONHDLC SERPL-MCNC: 192 MG/DL
PROT SERPL-MCNC: 7.1 GM/DL (ref 6–8.4)
TRIGL SERPL-MCNC: 134 MG/DL (ref 30–150)

## 2025-05-29 PROCEDURE — 80076 HEPATIC FUNCTION PANEL: CPT

## 2025-05-29 PROCEDURE — 82977 ASSAY OF GGT: CPT

## 2025-05-29 PROCEDURE — 99999 PR PBB SHADOW E&M-EST. PATIENT-LVL IV: CPT | Mod: PBBFAC,,, | Performed by: INTERNAL MEDICINE

## 2025-05-29 PROCEDURE — 3008F BODY MASS INDEX DOCD: CPT | Mod: CPTII,S$GLB,, | Performed by: INTERNAL MEDICINE

## 2025-05-29 PROCEDURE — 1159F MED LIST DOCD IN RCRD: CPT | Mod: CPTII,S$GLB,, | Performed by: INTERNAL MEDICINE

## 2025-05-29 PROCEDURE — 36415 COLL VENOUS BLD VENIPUNCTURE: CPT

## 2025-05-29 PROCEDURE — 80061 LIPID PANEL: CPT

## 2025-05-29 PROCEDURE — 3078F DIAST BP <80 MM HG: CPT | Mod: CPTII,S$GLB,, | Performed by: INTERNAL MEDICINE

## 2025-05-29 PROCEDURE — 3074F SYST BP LT 130 MM HG: CPT | Mod: CPTII,S$GLB,, | Performed by: INTERNAL MEDICINE

## 2025-05-29 PROCEDURE — 99214 OFFICE O/P EST MOD 30 MIN: CPT | Mod: S$GLB,,, | Performed by: INTERNAL MEDICINE

## 2025-05-29 PROCEDURE — G2211 COMPLEX E/M VISIT ADD ON: HCPCS | Mod: S$GLB,,, | Performed by: INTERNAL MEDICINE

## 2025-05-29 PROCEDURE — 3044F HG A1C LEVEL LT 7.0%: CPT | Mod: CPTII,S$GLB,, | Performed by: INTERNAL MEDICINE

## 2025-05-29 NOTE — PROGRESS NOTES
Subjective     Patient ID: Vianney Callahan is a 39 y.o. female.    Chief Complaint: Follow-up    HPI  Back is better.    Part time job at [employer] sales .    BMI 25.8.  Wt down 10 lbs from December, which she attributes to diet.    Walks when she can    She has joined [facility]    Schizoaffective do is controlled with multiple meds.    GERD, controlled with prilosec.    Migraines - improved.    Abstinent.  Off ocp's a few months.    She takes a supplement for focus.    False Pos hep C Ab    Review of Systems   Constitutional:  Negative for fever and unexpected weight change.   HENT:  Negative for nasal congestion and postnasal drip.    Eyes:  Negative for pain, discharge and visual disturbance.   Respiratory:  Negative for cough, chest tightness, shortness of breath and wheezing.    Cardiovascular:  Negative for chest pain and leg swelling.   Gastrointestinal:  Negative for abdominal pain, constipation, diarrhea and nausea.   Genitourinary:  Negative for difficulty urinating, dysuria and hematuria.   Integumentary:  Negative for rash.   Neurological:  Negative for headaches.   Psychiatric/Behavioral:  Negative for dysphoric mood and sleep disturbance. The patient is not nervous/anxious.           Objective     Physical Exam  Constitutional:       General: She is not in acute distress.     Appearance: She is well-developed. She is not ill-appearing, toxic-appearing or diaphoretic.   Eyes:      General: No scleral icterus.  Neck:      Thyroid: No thyromegaly.      Vascular: No JVD.   Cardiovascular:      Rate and Rhythm: Normal rate and regular rhythm.      Heart sounds: Normal heart sounds. No murmur heard.  Pulmonary:      Effort: Pulmonary effort is normal. No respiratory distress.      Breath sounds: Normal breath sounds. No stridor. No wheezing, rhonchi or rales.   Abdominal:      General: There is no distension.      Palpations: Abdomen is soft. There is no mass.      Tenderness: There is no abdominal  tenderness. There is no guarding.      Hernia: No hernia is present.   Musculoskeletal:      Cervical back: No rigidity or tenderness.      Right lower leg: No edema.      Left lower leg: No edema.   Lymphadenopathy:      Cervical: No cervical adenopathy.   Neurological:      Mental Status: She is alert and oriented to person, place, and time.   Psychiatric:         Mood and Affect: Mood normal.         Behavior: Behavior normal.         Thought Content: Thought content normal.       Results for orders placed or performed in visit on 05/05/25   Comprehensive Metabolic Panel    Collection Time: 05/05/25 12:46 PM   Result Value Ref Range    Sodium 139 136 - 145 mmol/L    Potassium 4.5 3.5 - 5.1 mmol/L    Chloride 104 95 - 110 mmol/L    CO2 23 23 - 29 mmol/L    Glucose 95 70 - 110 mg/dL    BUN 14 6 - 20 mg/dL    Creatinine 0.9 0.5 - 1.4 mg/dL    Calcium 9.5 8.7 - 10.5 mg/dL    Protein Total 7.1 6.0 - 8.4 gm/dL    Albumin 4.1 3.5 - 5.2 g/dL    Bilirubin Total 0.4 0.1 - 1.0 mg/dL     (H) 40 - 150 unit/L    AST 30 11 - 45 unit/L    ALT 63 (H) 10 - 44 unit/L    Anion Gap 12 8 - 16 mmol/L    eGFR >60 >60 mL/min/1.73/m2   Lipid Panel    Collection Time: 05/05/25 12:46 PM   Result Value Ref Range    Cholesterol Total 288 (H) 120 - 199 mg/dL    Triglyceride 142 30 - 150 mg/dL    HDL Cholesterol 62 40 - 75 mg/dL    LDL Cholesterol 197.6 (H) 63.0 - 159.0 mg/dL    HDL/Cholesterol Ratio 21.5 20.0 - 50.0 %    Cholesterol/HDL Ratio 4.6 2.0 - 5.0    Non HDL Cholesterol 226 mg/dL   Bilirubin, Direct    Collection Time: 05/05/25 12:46 PM   Result Value Ref Range    Bilirubin Direct 0.2 0.1 - 0.3 mg/dL     *Note: Due to a large number of results and/or encounters for the requested time period, some results have not been displayed. A complete set of results can be found in Results Review.         Assessment and Plan     1. Elevated ALT measurement  -     US Abdomen Limited; Future; Expected date: 05/29/2025  -     Hepatic Function  Panel; Future; Expected date: 05/29/2025  -     Gamma GT; Future; Expected date: 05/29/2025    2. Hyperlipidemia, unspecified hyperlipidemia type  -     Lipid Panel; Future; Expected date: 05/29/2025    3. Schizoaffective disorder, bipolar type    4. Polydrug dependence excluding opioid type drug, in remission  Assessment & Plan:  As outlined in psych notes.  No active abuse.      5. Alkaline phosphatase elevation      Stop supplements           No follow-ups on file.

## 2025-06-01 ENCOUNTER — RESULTS FOLLOW-UP (OUTPATIENT)
Dept: INTERNAL MEDICINE | Facility: CLINIC | Age: 40
End: 2025-06-01

## 2025-06-01 DIAGNOSIS — R74.8 ALKALINE PHOSPHATASE ELEVATION: Primary | ICD-10-CM

## 2025-06-02 DIAGNOSIS — K59.04 CHRONIC IDIOPATHIC CONSTIPATION: ICD-10-CM

## 2025-06-02 RX ORDER — SENNOSIDES 8.6 MG/1
3 TABLET ORAL DAILY
Qty: 90 TABLET | Refills: 11 | Status: SHIPPED | OUTPATIENT
Start: 2025-06-02

## 2025-06-07 ENCOUNTER — HOSPITAL ENCOUNTER (OUTPATIENT)
Dept: RADIOLOGY | Facility: HOSPITAL | Age: 40
Discharge: HOME | End: 2025-06-07
Attending: INTERNAL MEDICINE
Payer: MEDICARE

## 2025-06-07 DIAGNOSIS — R74.01 ELEVATED ALT MEASUREMENT: ICD-10-CM

## 2025-06-07 PROCEDURE — 76705 ECHO EXAM OF ABDOMEN: CPT | Mod: 26,,, | Performed by: RADIOLOGY

## 2025-06-07 PROCEDURE — 76705 ECHO EXAM OF ABDOMEN: CPT | Mod: TC

## 2025-06-09 ENCOUNTER — TELEPHONE (OUTPATIENT)
Dept: INTERNAL MEDICINE | Facility: CLINIC | Age: 40
End: 2025-06-09
Payer: MEDICARE

## 2025-06-09 ENCOUNTER — OFFICE VISIT (OUTPATIENT)
Dept: PSYCHIATRY | Facility: CLINIC | Age: 40
End: 2025-06-09
Payer: MEDICARE

## 2025-06-09 ENCOUNTER — LAB VISIT (OUTPATIENT)
Dept: LAB | Facility: HOSPITAL | Age: 40
End: 2025-06-09
Payer: MEDICARE

## 2025-06-09 DIAGNOSIS — F25.0 SCHIZOAFFECTIVE DISORDER, BIPOLAR TYPE: Primary | ICD-10-CM

## 2025-06-09 DIAGNOSIS — R74.8 ALKALINE PHOSPHATASE ELEVATION: ICD-10-CM

## 2025-06-09 DIAGNOSIS — F41.9 ANXIETY: ICD-10-CM

## 2025-06-09 PROCEDURE — 3044F HG A1C LEVEL LT 7.0%: CPT | Mod: CPTII,95,, | Performed by: NURSE PRACTITIONER

## 2025-06-09 PROCEDURE — 98006 SYNCH AUDIO-VIDEO EST MOD 30: CPT | Mod: 95,,, | Performed by: NURSE PRACTITIONER

## 2025-06-09 PROCEDURE — 36415 COLL VENOUS BLD VENIPUNCTURE: CPT

## 2025-06-09 PROCEDURE — G2211 COMPLEX E/M VISIT ADD ON: HCPCS | Mod: 95,,, | Performed by: NURSE PRACTITIONER

## 2025-06-09 PROCEDURE — 86381 MITOCHONDRIAL ANTIBODY EACH: CPT

## 2025-06-09 RX ORDER — LAMOTRIGINE 200 MG/1
200 TABLET ORAL NIGHTLY
Qty: 30 TABLET | Refills: 5 | Status: SHIPPED | OUTPATIENT
Start: 2025-06-09 | End: 2025-06-11 | Stop reason: SDUPTHER

## 2025-06-09 RX ORDER — BENZTROPINE MESYLATE 1 MG/1
1 TABLET ORAL 2 TIMES DAILY
Qty: 60 TABLET | Refills: 5 | Status: SHIPPED | OUTPATIENT
Start: 2025-06-09

## 2025-06-09 RX ORDER — BUPROPION HYDROCHLORIDE 300 MG/1
300 TABLET ORAL DAILY
Qty: 30 TABLET | Refills: 5 | Status: SHIPPED | OUTPATIENT
Start: 2025-06-09

## 2025-06-09 RX ORDER — BUPROPION HYDROCHLORIDE 150 MG/1
150 TABLET ORAL DAILY
Qty: 30 TABLET | Refills: 5 | Status: SHIPPED | OUTPATIENT
Start: 2025-06-09

## 2025-06-09 RX ORDER — SERTRALINE HYDROCHLORIDE 100 MG/1
200 TABLET, FILM COATED ORAL DAILY
Qty: 60 TABLET | Refills: 5 | Status: SHIPPED | OUTPATIENT
Start: 2025-06-09

## 2025-06-09 RX ORDER — FLUPHENAZINE HYDROCHLORIDE 10 MG/1
TABLET ORAL
Qty: 90 TABLET | Refills: 5 | Status: SHIPPED | OUTPATIENT
Start: 2025-06-09 | End: 2025-12-06

## 2025-06-09 NOTE — TELEPHONE ENCOUNTER
----- Message from Nellie Coreas MD sent at 6/8/2025  6:22 PM CDT -----  Pls review with her and schedule lab and hepatology:    Vianney - stable findings:  hemangiomas and focal nodular hyperplasia.    I will refer you to Hepatology for their opinion on cause of persistent alkaline phosphatase elevation.      We should check a lab first(anti-mitochondrial antibody).  NE  ----- Message -----  From: Marvin, Rad Results In  Sent: 6/7/2025  12:27 PM CDT  To: Nellie Coreas MD
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
## 2025-06-10 ENCOUNTER — PATIENT MESSAGE (OUTPATIENT)
Dept: PSYCHIATRY | Facility: CLINIC | Age: 40
End: 2025-06-10
Payer: MEDICARE

## 2025-06-10 ENCOUNTER — PATIENT MESSAGE (OUTPATIENT)
Dept: OBSTETRICS AND GYNECOLOGY | Facility: CLINIC | Age: 40
End: 2025-06-10
Payer: MEDICARE

## 2025-06-10 ENCOUNTER — PATIENT MESSAGE (OUTPATIENT)
Dept: INTERNAL MEDICINE | Facility: CLINIC | Age: 40
End: 2025-06-10
Payer: MEDICARE

## 2025-06-10 ENCOUNTER — RESULTS FOLLOW-UP (OUTPATIENT)
Dept: INTERNAL MEDICINE | Facility: CLINIC | Age: 40
End: 2025-06-10

## 2025-06-10 LAB — MITOCHONDRIA AB TITR SER IF: NORMAL {TITER}

## 2025-06-11 RX ORDER — LAMOTRIGINE 150 MG/1
300 TABLET ORAL NIGHTLY
Qty: 60 TABLET | Refills: 5 | Status: SHIPPED | OUTPATIENT
Start: 2025-06-11

## 2025-06-12 ENCOUNTER — OFFICE VISIT (OUTPATIENT)
Dept: INTERNAL MEDICINE | Facility: CLINIC | Age: 40
End: 2025-06-12
Payer: MEDICARE

## 2025-06-12 DIAGNOSIS — F32.81 PMDD (PREMENSTRUAL DYSPHORIC DISORDER): Primary | ICD-10-CM

## 2025-06-12 PROCEDURE — 1160F RVW MEDS BY RX/DR IN RCRD: CPT | Mod: CPTII,95,, | Performed by: PHYSICIAN ASSISTANT

## 2025-06-12 PROCEDURE — 98004 SYNCH AUDIO-VIDEO EST SF 10: CPT | Mod: 95,,, | Performed by: PHYSICIAN ASSISTANT

## 2025-06-12 PROCEDURE — 3044F HG A1C LEVEL LT 7.0%: CPT | Mod: CPTII,95,, | Performed by: PHYSICIAN ASSISTANT

## 2025-06-12 PROCEDURE — 1159F MED LIST DOCD IN RCRD: CPT | Mod: CPTII,95,, | Performed by: PHYSICIAN ASSISTANT

## 2025-06-12 RX ORDER — NORETHINDRONE ACETATE AND ETHINYL ESTRADIOL .03; 1.5 MG/1; MG/1
1 TABLET ORAL DAILY
Qty: 90 TABLET | Refills: 1 | Status: SHIPPED | OUTPATIENT
Start: 2025-06-12 | End: 2026-06-12
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
## 2025-06-20 ENCOUNTER — TELEPHONE (OUTPATIENT)
Dept: HEPATOLOGY | Facility: CLINIC | Age: 40
End: 2025-06-20
Payer: MEDICARE

## 2025-06-20 NOTE — TELEPHONE ENCOUNTER
Attempt made to reach patient.  I left a voicemail informing her that appt with ARJUN Stanley scheduled 8/11/25 was changed from in person to a virtual visit because of clinic construction.

## 2025-06-29 DIAGNOSIS — K21.9 GASTROESOPHAGEAL REFLUX DISEASE, UNSPECIFIED WHETHER ESOPHAGITIS PRESENT: ICD-10-CM

## 2025-07-01 ENCOUNTER — TELEPHONE (OUTPATIENT)
Dept: GASTROENTEROLOGY | Facility: CLINIC | Age: 40
End: 2025-07-01
Payer: MEDICARE

## 2025-07-01 RX ORDER — OMEPRAZOLE 40 MG/1
40 CAPSULE, DELAYED RELEASE ORAL EVERY MORNING
Qty: 30 CAPSULE | Refills: 0 | Status: SHIPPED | OUTPATIENT
Start: 2025-07-01 | End: 2025-07-31

## 2025-07-23 ENCOUNTER — PATIENT MESSAGE (OUTPATIENT)
Dept: PSYCHIATRY | Facility: CLINIC | Age: 40
End: 2025-07-23
Payer: MEDICARE

## 2025-08-02 DIAGNOSIS — B35.3 TINEA PEDIS OF LEFT FOOT: ICD-10-CM

## 2025-08-04 RX ORDER — CICLOPIROX OLAMINE 7.7 MG/G
CREAM TOPICAL 2 TIMES DAILY
Qty: 30 G | Refills: 3 | Status: SHIPPED | OUTPATIENT
Start: 2025-08-04

## 2025-08-11 ENCOUNTER — OFFICE VISIT (OUTPATIENT)
Dept: HEPATOLOGY | Facility: CLINIC | Age: 40
End: 2025-08-11
Payer: MEDICARE

## 2025-08-11 DIAGNOSIS — R74.8 ALKALINE PHOSPHATASE ELEVATION: Primary | ICD-10-CM

## 2025-08-11 DIAGNOSIS — R76.8 HEPATITIS C ANTIBODY POSITIVE: ICD-10-CM

## 2025-08-11 DIAGNOSIS — Z11.59 ENCOUNTER FOR SCREENING FOR OTHER VIRAL DISEASES: ICD-10-CM

## 2025-08-11 PROCEDURE — 3044F HG A1C LEVEL LT 7.0%: CPT | Mod: CPTII,95,, | Performed by: NURSE PRACTITIONER

## 2025-08-11 PROCEDURE — 98006 SYNCH AUDIO-VIDEO EST MOD 30: CPT | Mod: 95,,, | Performed by: NURSE PRACTITIONER

## 2025-08-11 PROCEDURE — 1160F RVW MEDS BY RX/DR IN RCRD: CPT | Mod: CPTII,95,, | Performed by: NURSE PRACTITIONER

## 2025-08-11 PROCEDURE — 1159F MED LIST DOCD IN RCRD: CPT | Mod: CPTII,95,, | Performed by: NURSE PRACTITIONER

## 2025-08-14 ENCOUNTER — PATIENT MESSAGE (OUTPATIENT)
Dept: GASTROENTEROLOGY | Facility: CLINIC | Age: 40
End: 2025-08-14

## 2025-08-14 ENCOUNTER — OFFICE VISIT (OUTPATIENT)
Dept: GASTROENTEROLOGY | Facility: CLINIC | Age: 40
End: 2025-08-14
Payer: MEDICARE

## 2025-08-14 DIAGNOSIS — K58.1 IRRITABLE BOWEL SYNDROME WITH CONSTIPATION: Primary | ICD-10-CM

## 2025-08-14 DIAGNOSIS — K21.9 GASTROESOPHAGEAL REFLUX DISEASE, UNSPECIFIED WHETHER ESOPHAGITIS PRESENT: ICD-10-CM

## 2025-08-14 RX ORDER — SODIUM, POTASSIUM,MAG SULFATES 17.5-3.13G
1 SOLUTION, RECONSTITUTED, ORAL ORAL DAILY
Qty: 1 KIT | Refills: 0 | Status: SHIPPED | OUTPATIENT
Start: 2025-08-14

## 2025-08-14 RX ORDER — PANTOPRAZOLE SODIUM 40 MG/1
40 TABLET, DELAYED RELEASE ORAL DAILY
Qty: 30 TABLET | Refills: 11 | Status: SHIPPED | OUTPATIENT
Start: 2025-08-14 | End: 2026-08-14

## 2025-08-21 ENCOUNTER — TELEPHONE (OUTPATIENT)
Dept: ENDOSCOPY | Facility: HOSPITAL | Age: 40
End: 2025-08-21
Payer: MEDICARE

## 2025-08-21 ENCOUNTER — PATIENT MESSAGE (OUTPATIENT)
Dept: ENDOSCOPY | Facility: HOSPITAL | Age: 40
End: 2025-08-21
Payer: MEDICARE

## 2025-08-27 ENCOUNTER — LAB VISIT (OUTPATIENT)
Dept: LAB | Facility: HOSPITAL | Age: 40
End: 2025-08-27
Attending: NURSE PRACTITIONER
Payer: MEDICARE

## 2025-08-27 ENCOUNTER — OFFICE VISIT (OUTPATIENT)
Dept: PSYCHIATRY | Facility: CLINIC | Age: 40
End: 2025-08-27
Payer: MEDICARE

## 2025-08-27 VITALS
BODY MASS INDEX: 26.36 KG/M2 | HEART RATE: 98 BPM | DIASTOLIC BLOOD PRESSURE: 65 MMHG | SYSTOLIC BLOOD PRESSURE: 101 MMHG | WEIGHT: 168.31 LBS

## 2025-08-27 DIAGNOSIS — R74.8 ALKALINE PHOSPHATASE ELEVATION: ICD-10-CM

## 2025-08-27 DIAGNOSIS — F90.0 ATTENTION DEFICIT HYPERACTIVITY DISORDER (ADHD), PREDOMINANTLY INATTENTIVE TYPE: Primary | ICD-10-CM

## 2025-08-27 DIAGNOSIS — Z11.59 ENCOUNTER FOR SCREENING FOR OTHER VIRAL DISEASES: ICD-10-CM

## 2025-08-27 DIAGNOSIS — F25.0 SCHIZOAFFECTIVE DISORDER, BIPOLAR TYPE: ICD-10-CM

## 2025-08-27 DIAGNOSIS — F60.9 PERSONALITY DISORDER: ICD-10-CM

## 2025-08-27 DIAGNOSIS — F41.9 ANXIETY: ICD-10-CM

## 2025-08-27 LAB
ALBUMIN SERPL BCP-MCNC: 4 G/DL (ref 3.5–5.2)
ALP SERPL-CCNC: 128 UNIT/L (ref 40–150)
ALT SERPL W/O P-5'-P-CCNC: 21 UNIT/L (ref 0–55)
ANION GAP (OHS): 7 MMOL/L (ref 8–16)
AST SERPL-CCNC: 19 UNIT/L (ref 0–50)
BILIRUB SERPL-MCNC: 0.5 MG/DL (ref 0.1–1)
BUN SERPL-MCNC: 11 MG/DL (ref 6–20)
CALCIUM SERPL-MCNC: 9.4 MG/DL (ref 8.7–10.5)
CHLORIDE SERPL-SCNC: 107 MMOL/L (ref 95–110)
CO2 SERPL-SCNC: 25 MMOL/L (ref 23–29)
CREAT SERPL-MCNC: 1.1 MG/DL (ref 0.5–1.4)
GFR SERPLBLD CREATININE-BSD FMLA CKD-EPI: >60 ML/MIN/1.73/M2
GGT SERPL-CCNC: 331 U/L (ref 8–55)
GLUCOSE SERPL-MCNC: 83 MG/DL (ref 70–110)
HAV AB SER QL IA: NORMAL
HBV CORE AB SERPL QL IA: NORMAL
HBV SURFACE AB SER-ACNC: 6.95 MIU/ML
HBV SURFACE AB SERPL IA-ACNC: NORMAL M[IU]/ML
HBV SURFACE AG SERPL QL IA: NORMAL
IGM SERPL-MCNC: 26 MG/DL (ref 50–300)
POTASSIUM SERPL-SCNC: 4.2 MMOL/L (ref 3.5–5.1)
PROT SERPL-MCNC: 6.6 GM/DL (ref 6–8.4)
SODIUM SERPL-SCNC: 139 MMOL/L (ref 136–145)

## 2025-08-27 PROCEDURE — 3044F HG A1C LEVEL LT 7.0%: CPT | Mod: CPTII,S$GLB,,

## 2025-08-27 PROCEDURE — 82977 ASSAY OF GGT: CPT

## 2025-08-27 PROCEDURE — 1159F MED LIST DOCD IN RCRD: CPT | Mod: CPTII,S$GLB,,

## 2025-08-27 PROCEDURE — 84075 ASSAY ALKALINE PHOSPHATASE: CPT

## 2025-08-27 PROCEDURE — 99999 PR PBB SHADOW E&M-EST. PATIENT-LVL III: CPT | Mod: PBBFAC,,,

## 2025-08-27 PROCEDURE — 86790 VIRUS ANTIBODY NOS: CPT

## 2025-08-27 PROCEDURE — 99215 OFFICE O/P EST HI 40 MIN: CPT | Mod: S$GLB,,,

## 2025-08-27 PROCEDURE — 87340 HEPATITIS B SURFACE AG IA: CPT

## 2025-08-27 PROCEDURE — 86706 HEP B SURFACE ANTIBODY: CPT

## 2025-08-27 PROCEDURE — 86704 HEP B CORE ANTIBODY TOTAL: CPT

## 2025-08-27 PROCEDURE — 82784 ASSAY IGA/IGD/IGG/IGM EACH: CPT

## 2025-08-27 PROCEDURE — 3008F BODY MASS INDEX DOCD: CPT | Mod: CPTII,S$GLB,,

## 2025-08-27 PROCEDURE — 84460 ALANINE AMINO (ALT) (SGPT): CPT

## 2025-08-27 PROCEDURE — 3078F DIAST BP <80 MM HG: CPT | Mod: CPTII,S$GLB,,

## 2025-08-27 PROCEDURE — 87522 HEPATITIS C REVRS TRNSCRPJ: CPT

## 2025-08-27 PROCEDURE — 3074F SYST BP LT 130 MM HG: CPT | Mod: CPTII,S$GLB,,

## 2025-08-27 PROCEDURE — 1160F RVW MEDS BY RX/DR IN RCRD: CPT | Mod: CPTII,S$GLB,,

## 2025-08-27 PROCEDURE — 36415 COLL VENOUS BLD VENIPUNCTURE: CPT

## 2025-08-28 ENCOUNTER — TELEPHONE (OUTPATIENT)
Dept: GASTROENTEROLOGY | Facility: CLINIC | Age: 40
End: 2025-08-28
Payer: MEDICARE

## 2025-08-28 ENCOUNTER — DOCUMENTATION ONLY (OUTPATIENT)
Dept: GASTROENTEROLOGY | Facility: CLINIC | Age: 40
End: 2025-08-28
Payer: MEDICARE

## 2025-08-28 LAB — HCV RNA SERPL NAA+PROBE-LOG IU: NOT DETECTED {LOG_IU}/ML

## 2025-09-03 ENCOUNTER — OFFICE VISIT (OUTPATIENT)
Dept: URGENT CARE | Facility: CLINIC | Age: 40
End: 2025-09-03
Payer: MEDICARE

## 2025-09-03 VITALS
OXYGEN SATURATION: 95 % | RESPIRATION RATE: 16 BRPM | HEART RATE: 106 BPM | HEIGHT: 67 IN | TEMPERATURE: 99 F | SYSTOLIC BLOOD PRESSURE: 102 MMHG | WEIGHT: 168 LBS | DIASTOLIC BLOOD PRESSURE: 65 MMHG | BODY MASS INDEX: 26.37 KG/M2

## 2025-09-03 DIAGNOSIS — J02.0 STREP THROAT: ICD-10-CM

## 2025-09-03 DIAGNOSIS — R05.9 COUGH, UNSPECIFIED TYPE: Primary | ICD-10-CM

## 2025-09-03 DIAGNOSIS — J02.9 SORE THROAT: ICD-10-CM

## 2025-09-03 LAB
CTP QC/QA: YES
MOLECULAR STREP A: POSITIVE
POC MOLECULAR INFLUENZA A AGN: NEGATIVE
POC MOLECULAR INFLUENZA B AGN: NEGATIVE
SARS-COV+SARS-COV-2 AG RESP QL IA.RAPID: NEGATIVE

## 2025-09-03 PROCEDURE — 87651 STREP A DNA AMP PROBE: CPT | Mod: QW,S$GLB,, | Performed by: FAMILY MEDICINE

## 2025-09-03 PROCEDURE — 99214 OFFICE O/P EST MOD 30 MIN: CPT | Mod: S$GLB,,, | Performed by: FAMILY MEDICINE

## 2025-09-03 PROCEDURE — 87502 INFLUENZA DNA AMP PROBE: CPT | Mod: QW,S$GLB,, | Performed by: FAMILY MEDICINE

## 2025-09-03 PROCEDURE — 87811 SARS-COV-2 COVID19 W/OPTIC: CPT | Mod: QW,S$GLB,, | Performed by: FAMILY MEDICINE

## 2025-09-03 RX ORDER — AMOXICILLIN 875 MG/1
875 TABLET, COATED ORAL EVERY 12 HOURS
Qty: 20 TABLET | Refills: 0 | Status: SHIPPED | OUTPATIENT
Start: 2025-09-03

## 2025-09-03 RX ORDER — AMOXICILLIN 875 MG/1
875 TABLET, COATED ORAL EVERY 12 HOURS
Qty: 20 TABLET | Refills: 0 | Status: SHIPPED | OUTPATIENT
Start: 2025-09-03 | End: 2025-09-03 | Stop reason: SDUPTHER

## (undated) DEVICE — TRAY MINOR ORTHO OMC

## (undated) DEVICE — SUT ETHILON 3-0 PS2 18 BLK

## (undated) DEVICE — Device

## (undated) DEVICE — SHEET EENT SPLIT

## (undated) DEVICE — DRESSING TRANS 2X2 TEGADERM

## (undated) DEVICE — TOWELS STERILE 18 X 25.5

## (undated) DEVICE — GLOVE SENSICARE PI ALOE 7

## (undated) DEVICE — GLOVE BIOGEL SKINSENSE PI 6.0

## (undated) DEVICE — DRESSING XEROFORM NONADH 1X8IN

## (undated) DEVICE — BLADE ELECTRODE 2.75 PTFE COAT

## (undated) DEVICE — DRAPE EENT SPLIT STERILE

## (undated) DEVICE — ELECTRODE REM PLYHSV RETURN 9

## (undated) DEVICE — SUT 3-0 MONOCRYL PLUS PS-2

## (undated) DEVICE — SPONGE DERMACEA GAUZE 4X4

## (undated) DEVICE — ELECTRODE MEGADYNE RETURN DUAL

## (undated) DEVICE — SUT ETHILON 6-0 BLK PC-1

## (undated) DEVICE — TRAY MINOR GEN SURG OMC

## (undated) DEVICE — GLOVE BIOGEL SKINSENSE PI 6.5

## (undated) DEVICE — DRAPE HALF SURGICAL 40X58IN

## (undated) DEVICE — MARKER FN REG DUAL UTIL RULER

## (undated) DEVICE — DRAPE THREE-QTR REINF 53X77IN

## (undated) DEVICE — SUT CTD VICRYL 3-0 V.L BR

## (undated) DEVICE — GAUZE AVANT SPNG 4PLY STRL 4X4

## (undated) DEVICE — GAUZE CNFRM STRL 4INX4.1YD

## (undated) DEVICE — BANDAGE ROLL COTTN 4.5INX4.1YD

## (undated) DEVICE — NDL HYPO REG 25G X 1 1/2

## (undated) DEVICE — DRAPE T EXTRM SURG 121X128X90

## (undated) DEVICE — BANDAGE MATRIX HK LOOP 4IN 5YD

## (undated) DEVICE — CUFF TOURNIQUET DL PRT

## (undated) DEVICE — GLOVE BIOGEL 7.0

## (undated) DEVICE — SYR 10CC LUER LOCK

## (undated) DEVICE — SOL NACL STRL BOTTLE 1000ML

## (undated) DEVICE — DRAPE STERI INSTRUMENT 1018

## (undated) DEVICE — SUT PLN 4-0 P3 18IN GUT YEL

## (undated) DEVICE — SUT VICRYL 4-0 RB1 27IN UD

## (undated) DEVICE — GAUZE SPONGE 4X4 12PLY